# Patient Record
Sex: MALE | Race: WHITE | NOT HISPANIC OR LATINO | Employment: OTHER | URBAN - METROPOLITAN AREA
[De-identification: names, ages, dates, MRNs, and addresses within clinical notes are randomized per-mention and may not be internally consistent; named-entity substitution may affect disease eponyms.]

---

## 2017-01-03 ENCOUNTER — TELEPHONE (OUTPATIENT)
Dept: FAMILY MEDICINE CLINIC | Facility: CLINIC | Age: 72
End: 2017-01-03

## 2017-01-03 DIAGNOSIS — E87.5 HYPERKALEMIA: Primary | ICD-10-CM

## 2017-01-03 NOTE — TELEPHONE ENCOUNTER
----- Message from Denzel Fuller Jr., MD sent at 1/3/2017  4:05 PM EST -----  Contact: TELE: [phone]   Result note from 12/29 given.  Please contact patient make sure he got this  ----- Message -----     From: Kaci Smith MA     Sent: 1/3/2017   1:40 PM       To: Denzel Fuller Jr., MD        ----- Message -----     From: Jodi Palafox     Sent: 1/3/2017   1:06 PM       To: Myra Moore MA    HE NEEDS HIS BLOOD WORK RESULTS. HE HASN'T HEARD ANYTHING.

## 2017-01-07 ENCOUNTER — LAB (OUTPATIENT)
Dept: FAMILY MEDICINE CLINIC | Facility: CLINIC | Age: 72
End: 2017-01-07

## 2017-01-07 DIAGNOSIS — E87.5 HYPERKALEMIA: ICD-10-CM

## 2017-01-07 LAB
ANION GAP SERPL CALCULATED.3IONS-SCNC: 12.7 MMOL/L
BUN BLD-MCNC: 11 MG/DL (ref 8–23)
BUN/CREAT SERPL: 11.5 (ref 7–25)
CALCIUM SPEC-SCNC: 9.6 MG/DL (ref 8.6–10.5)
CHLORIDE SERPL-SCNC: 98 MMOL/L (ref 98–107)
CO2 SERPL-SCNC: 27.3 MMOL/L (ref 22–29)
CREAT BLD-MCNC: 0.96 MG/DL (ref 0.76–1.27)
GFR SERPL CREATININE-BSD FRML MDRD: 77 ML/MIN/1.73
GLUCOSE BLD-MCNC: 148 MG/DL (ref 65–99)
POTASSIUM BLD-SCNC: 5 MMOL/L (ref 3.5–5.2)
SODIUM BLD-SCNC: 138 MMOL/L (ref 136–145)

## 2017-01-07 PROCEDURE — 80048 BASIC METABOLIC PNL TOTAL CA: CPT | Performed by: INTERNAL MEDICINE

## 2017-02-27 RX ORDER — ATORVASTATIN CALCIUM 20 MG/1
TABLET, FILM COATED ORAL
Qty: 30 TABLET | Refills: 0 | Status: SHIPPED | OUTPATIENT
Start: 2017-02-27 | End: 2017-05-22 | Stop reason: SDUPTHER

## 2017-04-20 RX ORDER — GLIMEPIRIDE 2 MG/1
TABLET ORAL
Qty: 30 TABLET | Refills: 0 | Status: SHIPPED | OUTPATIENT
Start: 2017-04-20 | End: 2017-05-22 | Stop reason: SDUPTHER

## 2017-04-21 ENCOUNTER — TELEPHONE (OUTPATIENT)
Dept: FAMILY MEDICINE CLINIC | Facility: CLINIC | Age: 72
End: 2017-04-21

## 2017-04-21 NOTE — TELEPHONE ENCOUNTER
----- Message from Myra Moore MA sent at 4/21/2017  2:29 PM EDT -----  SPOKE WITH WIFE, SHE STATES SHE THINKS PT IS JUST HAVING ANXIETY BUT SHE DOESN'T KNOW IT COULD BE A HEART ATTACK. INFORMED DR FRANCOIS HAS NOTHING AVAILABLE TODAY HE NEEDS TO GO TO ER. SHE SAID HE WILL NOT GO HE WILL NOT SEE ANYONE ELSE. TOLD HER IF IT WAS THAT SEVERE THEN SHE SHOULD CALL EMS. SHE SAID HE WOULDN'T LET THAT HAPPEN. SHE ASKED ABOUT APPT FOR NEXT WEEK, OFFERED HER Monday SHE STATES SHE CAN'T BRING HIM Monday SHE HAS PLANS SO I GAVE HER APPT FOR TUES. SHE THEN SAID OK I WILL BRING HIM IF HE ISN'T DEAD. INFORMED HER AGAIN WE RECOMMEND ER. SHE SAID THAT ISN'T A RECOMMENDATION AND I WAS FRUSTRATING HER SO SHE HAD TO GET OFF OF THE PHONE. I ONCE AGAIN TOLD HER TO TAKE HIM TO ER OR CALL EMS.
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
## 2017-04-25 ENCOUNTER — OFFICE VISIT (OUTPATIENT)
Dept: FAMILY MEDICINE CLINIC | Facility: CLINIC | Age: 72
End: 2017-04-25

## 2017-04-25 VITALS
TEMPERATURE: 98.7 F | HEART RATE: 100 BPM | BODY MASS INDEX: 26.72 KG/M2 | DIASTOLIC BLOOD PRESSURE: 78 MMHG | HEIGHT: 69 IN | SYSTOLIC BLOOD PRESSURE: 140 MMHG | WEIGHT: 180.4 LBS | OXYGEN SATURATION: 98 %

## 2017-04-25 DIAGNOSIS — G47.09 OTHER INSOMNIA: ICD-10-CM

## 2017-04-25 DIAGNOSIS — R07.89 OTHER CHEST PAIN: Primary | ICD-10-CM

## 2017-04-25 DIAGNOSIS — F41.9 ANXIETY: ICD-10-CM

## 2017-04-25 DIAGNOSIS — R06.00 DYSPNEA, UNSPECIFIED TYPE: ICD-10-CM

## 2017-04-25 PROCEDURE — 71020 XR CHEST 2 VW: CPT | Performed by: INTERNAL MEDICINE

## 2017-04-25 PROCEDURE — 99214 OFFICE O/P EST MOD 30 MIN: CPT | Performed by: INTERNAL MEDICINE

## 2017-04-25 PROCEDURE — 93000 ELECTROCARDIOGRAM COMPLETE: CPT | Performed by: INTERNAL MEDICINE

## 2017-04-25 RX ORDER — NITROGLYCERIN 0.4 MG/1
0.4 TABLET SUBLINGUAL
Qty: 30 TABLET | Refills: 5 | Status: SHIPPED | OUTPATIENT
Start: 2017-04-25 | End: 2021-03-18 | Stop reason: SDUPTHER

## 2017-04-25 RX ORDER — BUSPIRONE HYDROCHLORIDE 5 MG/1
5 TABLET ORAL 2 TIMES DAILY
Qty: 60 TABLET | Refills: 5 | Status: SHIPPED | OUTPATIENT
Start: 2017-04-25 | End: 2017-04-25

## 2017-04-25 RX ORDER — MULTIVIT WITH MINERALS/LUTEIN
1200 TABLET ORAL NIGHTLY
COMMUNITY
End: 2021-04-11 | Stop reason: ALTCHOICE

## 2017-04-25 RX ORDER — ESOMEPRAZOLE MAGNESIUM 40 MG/1
40 CAPSULE, DELAYED RELEASE ORAL
COMMUNITY
End: 2018-10-03

## 2017-04-25 RX ORDER — LORAZEPAM 0.5 MG/1
0.5 TABLET ORAL EVERY 8 HOURS PRN
Qty: 21 TABLET | Refills: 0 | OUTPATIENT
Start: 2017-04-25 | End: 2017-04-25

## 2017-04-25 RX ORDER — UBIDECARENONE 200 MG
200 CAPSULE ORAL NIGHTLY
COMMUNITY

## 2017-04-25 RX ORDER — ISOSORBIDE MONONITRATE 30 MG/1
30 TABLET, EXTENDED RELEASE ORAL DAILY
Qty: 30 TABLET | Refills: 0 | Status: SHIPPED | OUTPATIENT
Start: 2017-04-25 | End: 2017-04-25
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
## 2017-04-26 ENCOUNTER — OFFICE VISIT (OUTPATIENT)
Dept: CARDIOLOGY | Facility: CLINIC | Age: 72
End: 2017-04-26

## 2017-04-26 VITALS
SYSTOLIC BLOOD PRESSURE: 140 MMHG | BODY MASS INDEX: 26.36 KG/M2 | WEIGHT: 178 LBS | HEIGHT: 69 IN | HEART RATE: 67 BPM | DIASTOLIC BLOOD PRESSURE: 80 MMHG

## 2017-04-26 DIAGNOSIS — R07.2 PRECORDIAL PAIN: Primary | ICD-10-CM

## 2017-04-26 DIAGNOSIS — I73.9 PVD (PERIPHERAL VASCULAR DISEASE) (HCC): ICD-10-CM

## 2017-04-26 DIAGNOSIS — E78.5 HYPERLIPIDEMIA, UNSPECIFIED HYPERLIPIDEMIA TYPE: ICD-10-CM

## 2017-04-26 PROCEDURE — 99204 OFFICE O/P NEW MOD 45 MIN: CPT | Performed by: INTERNAL MEDICINE
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
## 2017-05-09 ENCOUNTER — HOSPITAL ENCOUNTER (OUTPATIENT)
Dept: CARDIOLOGY | Facility: HOSPITAL | Age: 72
Discharge: HOME | End: 2017-05-09
Attending: INTERNAL MEDICINE | Admitting: INTERNAL MEDICINE

## 2017-05-09 DIAGNOSIS — R07.2 PRECORDIAL PAIN: ICD-10-CM

## 2017-05-09 LAB
BH CV STRESS BP STAGE 1: NORMAL
BH CV STRESS COMMENTS STAGE 1: NORMAL
BH CV STRESS DOSE REGADENOSON STAGE 1: 0.4
BH CV STRESS DURATION MIN STAGE 1: 0
BH CV STRESS DURATION SEC STAGE 1: 15
BH CV STRESS HR STAGE 1: 118
BH CV STRESS PROTOCOL 1: NORMAL
BH CV STRESS RECOVERY BP: NORMAL MMHG
BH CV STRESS RECOVERY HR: 78 BPM
BH CV STRESS STAGE 1: 1
LV EF NUC BP: 48 %
MAXIMAL PREDICTED HEART RATE: 150 BPM
PERCENT MAX PREDICTED HR: 78.67 %
STRESS BASELINE BP: NORMAL MMHG
STRESS BASELINE HR: 71 BPM
STRESS PERCENT HR: 93 %
STRESS POST EXERCISE DUR SEC: 15 SEC
STRESS POST PEAK BP: NORMAL MMHG
STRESS POST PEAK HR: 118 BPM
STRESS TARGET HR: 128 BPM

## 2017-05-09 PROCEDURE — 93017 CV STRESS TEST TRACING ONLY: CPT

## 2017-05-09 PROCEDURE — 93018 CV STRESS TEST I&R ONLY: CPT | Performed by: INTERNAL MEDICINE

## 2017-05-09 PROCEDURE — 78452 HT MUSCLE IMAGE SPECT MULT: CPT | Performed by: INTERNAL MEDICINE

## 2017-05-09 PROCEDURE — 0 TECHNETIUM TETROFOSMIN KIT: Performed by: INTERNAL MEDICINE

## 2017-05-09 PROCEDURE — 25010000002 REGADENOSON 0.4 MG/5ML SOLUTION: Performed by: INTERNAL MEDICINE

## 2017-05-09 PROCEDURE — 93016 CV STRESS TEST SUPVJ ONLY: CPT | Performed by: INTERNAL MEDICINE

## 2017-05-09 PROCEDURE — A9502 TC99M TETROFOSMIN: HCPCS | Performed by: INTERNAL MEDICINE

## 2017-05-09 PROCEDURE — 78452 HT MUSCLE IMAGE SPECT MULT: CPT

## 2017-05-09 RX ADMIN — TETROFOSMIN 1 DOSE: 1.38 INJECTION, POWDER, LYOPHILIZED, FOR SOLUTION INTRAVENOUS at 13:00

## 2017-05-09 RX ADMIN — REGADENOSON 0.4 MG: 0.08 INJECTION, SOLUTION INTRAVENOUS at 14:00

## 2017-05-09 RX ADMIN — TETROFOSMIN 1 DOSE: 1.38 INJECTION, POWDER, LYOPHILIZED, FOR SOLUTION INTRAVENOUS at 14:00

## 2017-05-22 ENCOUNTER — OFFICE VISIT (OUTPATIENT)
Dept: CARDIOLOGY | Facility: CLINIC | Age: 72
End: 2017-05-22

## 2017-05-22 VITALS
BODY MASS INDEX: 26.66 KG/M2 | WEIGHT: 180 LBS | HEART RATE: 64 BPM | SYSTOLIC BLOOD PRESSURE: 160 MMHG | HEIGHT: 69 IN | DIASTOLIC BLOOD PRESSURE: 76 MMHG

## 2017-05-22 DIAGNOSIS — R94.39 ABNORMAL CARDIOVASCULAR STRESS TEST: Primary | ICD-10-CM

## 2017-05-22 DIAGNOSIS — R07.2 PRECORDIAL PAIN: ICD-10-CM

## 2017-05-22 DIAGNOSIS — E78.2 MIXED HYPERLIPIDEMIA: ICD-10-CM

## 2017-05-22 DIAGNOSIS — I21.4 NON-ST ELEVATION (NSTEMI) MYOCARDIAL INFARCTION (HCC): ICD-10-CM

## 2017-05-22 DIAGNOSIS — I73.9 PERIPHERAL ARTERIAL DISEASE (HCC): ICD-10-CM

## 2017-05-22 PROCEDURE — 99213 OFFICE O/P EST LOW 20 MIN: CPT | Performed by: INTERNAL MEDICINE

## 2017-05-22 RX ORDER — ISOSORBIDE MONONITRATE 30 MG/1
TABLET, EXTENDED RELEASE ORAL
Qty: 30 TABLET | Refills: 0 | Status: SHIPPED | OUTPATIENT
Start: 2017-05-22 | End: 2017-06-20 | Stop reason: SDUPTHER

## 2017-05-22 RX ORDER — BUSPIRONE HYDROCHLORIDE 5 MG/1
5 TABLET ORAL
COMMUNITY
End: 2017-11-03 | Stop reason: SDUPTHER

## 2017-05-22 RX ORDER — GLIMEPIRIDE 2 MG/1
TABLET ORAL
Qty: 30 TABLET | Refills: 0 | Status: SHIPPED | OUTPATIENT
Start: 2017-05-22 | End: 2017-06-20 | Stop reason: SDUPTHER

## 2017-05-22 RX ORDER — ATORVASTATIN CALCIUM 20 MG/1
TABLET, FILM COATED ORAL
Qty: 30 TABLET | Refills: 0 | Status: SHIPPED | OUTPATIENT
Start: 2017-05-22 | End: 2017-06-20 | Stop reason: SDUPTHER

## 2017-06-05 RX ORDER — ATORVASTATIN CALCIUM 10 MG/1
TABLET, FILM COATED ORAL
Qty: 30 TABLET | Refills: 3 | Status: SHIPPED | OUTPATIENT
Start: 2017-06-05 | End: 2017-09-28 | Stop reason: SDUPTHER

## 2017-06-20 RX ORDER — ISOSORBIDE MONONITRATE 30 MG/1
TABLET, EXTENDED RELEASE ORAL
Qty: 30 TABLET | Refills: 0 | Status: SHIPPED | OUTPATIENT
Start: 2017-06-20 | End: 2017-07-10 | Stop reason: SDUPTHER

## 2017-06-20 RX ORDER — GLIMEPIRIDE 2 MG/1
TABLET ORAL
Qty: 30 TABLET | Refills: 0 | Status: SHIPPED | OUTPATIENT
Start: 2017-06-20 | End: 2017-07-10 | Stop reason: SDUPTHER

## 2017-06-20 RX ORDER — ATORVASTATIN CALCIUM 20 MG/1
TABLET, FILM COATED ORAL
Qty: 30 TABLET | Refills: 0 | Status: SHIPPED | OUTPATIENT
Start: 2017-06-20 | End: 2017-07-10 | Stop reason: SDUPTHER

## 2017-07-10 RX ORDER — GLIMEPIRIDE 2 MG/1
TABLET ORAL
Qty: 30 TABLET | Refills: 0 | Status: SHIPPED | OUTPATIENT
Start: 2017-07-10 | End: 2017-11-03 | Stop reason: SDUPTHER

## 2017-07-10 RX ORDER — ISOSORBIDE MONONITRATE 30 MG/1
TABLET, EXTENDED RELEASE ORAL
Qty: 30 TABLET | Refills: 0 | Status: ON HOLD | OUTPATIENT
Start: 2017-07-10 | End: 2018-05-07 | Stop reason: SDUPTHER

## 2017-07-10 RX ORDER — ATORVASTATIN CALCIUM 20 MG/1
TABLET, FILM COATED ORAL
Qty: 30 TABLET | Refills: 0 | Status: SHIPPED | OUTPATIENT
Start: 2017-07-10 | End: 2017-11-06 | Stop reason: DRUGHIGH

## 2017-07-17 RX ORDER — GLIMEPIRIDE 2 MG/1
TABLET ORAL
Qty: 30 TABLET | Refills: 0 | Status: SHIPPED | OUTPATIENT
Start: 2017-07-17 | End: 2017-09-05 | Stop reason: SDUPTHER

## 2017-07-17 RX ORDER — ISOSORBIDE MONONITRATE 30 MG/1
TABLET, EXTENDED RELEASE ORAL
Qty: 30 TABLET | Refills: 0 | Status: SHIPPED | OUTPATIENT
Start: 2017-07-17 | End: 2017-09-05 | Stop reason: SDUPTHER

## 2017-07-17 RX ORDER — ATORVASTATIN CALCIUM 20 MG/1
TABLET, FILM COATED ORAL
Qty: 30 TABLET | Refills: 0 | Status: SHIPPED | OUTPATIENT
Start: 2017-07-17 | End: 2017-09-05 | Stop reason: SDUPTHER

## 2017-09-05 RX ORDER — GLIMEPIRIDE 2 MG/1
TABLET ORAL
Qty: 30 TABLET | Refills: 0 | Status: SHIPPED | OUTPATIENT
Start: 2017-09-05 | End: 2017-10-09 | Stop reason: SDUPTHER

## 2017-09-05 RX ORDER — ATORVASTATIN CALCIUM 20 MG/1
TABLET, FILM COATED ORAL
Qty: 30 TABLET | Refills: 0 | Status: SHIPPED | OUTPATIENT
Start: 2017-09-05 | End: 2017-11-03 | Stop reason: SDUPTHER

## 2017-09-05 RX ORDER — ISOSORBIDE MONONITRATE 30 MG/1
TABLET, EXTENDED RELEASE ORAL
Qty: 30 TABLET | Refills: 0 | Status: SHIPPED | OUTPATIENT
Start: 2017-09-05 | End: 2017-11-03 | Stop reason: SDUPTHER

## 2017-09-11 RX ORDER — ATORVASTATIN CALCIUM 20 MG/1
TABLET, FILM COATED ORAL
Qty: 30 TABLET | Refills: 0 | Status: SHIPPED | OUTPATIENT
Start: 2017-09-11 | End: 2017-11-03 | Stop reason: SDUPTHER

## 2017-09-11 RX ORDER — ISOSORBIDE MONONITRATE 30 MG/1
TABLET, EXTENDED RELEASE ORAL
Qty: 30 TABLET | Refills: 0 | Status: SHIPPED | OUTPATIENT
Start: 2017-09-11 | End: 2017-11-03 | Stop reason: SDUPTHER

## 2017-09-28 RX ORDER — ATORVASTATIN CALCIUM 10 MG/1
TABLET, FILM COATED ORAL
Qty: 30 TABLET | Refills: 2 | Status: SHIPPED | OUTPATIENT
Start: 2017-09-28 | End: 2017-10-30 | Stop reason: SDUPTHER

## 2017-10-09 RX ORDER — GLIMEPIRIDE 2 MG/1
TABLET ORAL
Qty: 30 TABLET | Refills: 0 | Status: SHIPPED | OUTPATIENT
Start: 2017-10-09 | End: 2017-11-08 | Stop reason: SDUPTHER

## 2017-10-09 RX ORDER — BUSPIRONE HYDROCHLORIDE 5 MG/1
TABLET ORAL
Qty: 60 TABLET | Refills: 5 | Status: ON HOLD | OUTPATIENT
Start: 2017-10-09 | End: 2018-05-04 | Stop reason: SDUPTHER

## 2017-10-30 RX ORDER — ATORVASTATIN CALCIUM 10 MG/1
TABLET, FILM COATED ORAL
Qty: 90 TABLET | Refills: 1 | Status: SHIPPED | OUTPATIENT
Start: 2017-10-30 | End: 2017-11-03 | Stop reason: SDUPTHER

## 2017-11-03 ENCOUNTER — OFFICE VISIT (OUTPATIENT)
Dept: FAMILY MEDICINE CLINIC | Facility: CLINIC | Age: 72
End: 2017-11-03

## 2017-11-03 VITALS
DIASTOLIC BLOOD PRESSURE: 84 MMHG | TEMPERATURE: 98.7 F | HEART RATE: 86 BPM | BODY MASS INDEX: 26.72 KG/M2 | HEIGHT: 69 IN | WEIGHT: 180.4 LBS | SYSTOLIC BLOOD PRESSURE: 132 MMHG | OXYGEN SATURATION: 98 %

## 2017-11-03 DIAGNOSIS — E78.49 OTHER HYPERLIPIDEMIA: ICD-10-CM

## 2017-11-03 DIAGNOSIS — R26.89 LOSS OF BALANCE: Primary | ICD-10-CM

## 2017-11-03 DIAGNOSIS — E11.9 DIABETES MELLITUS WITHOUT COMPLICATION (HCC): ICD-10-CM

## 2017-11-03 LAB
ALBUMIN SERPL-MCNC: 4.2 G/DL (ref 3.5–5.2)
ALBUMIN UR-MCNC: 20 MG/L (ref 0–20)
ALBUMIN/GLOB SERPL: 1.3 G/DL
ALP SERPL-CCNC: 106 U/L (ref 39–117)
ALT SERPL W P-5'-P-CCNC: 23 U/L (ref 1–41)
ANION GAP SERPL CALCULATED.3IONS-SCNC: 13.4 MMOL/L
AST SERPL-CCNC: 13 U/L (ref 1–40)
BASOPHILS # BLD AUTO: 0.04 10*3/MM3 (ref 0–0.2)
BASOPHILS NFR BLD AUTO: 0.4 % (ref 0–1.5)
BILIRUB SERPL-MCNC: 0.4 MG/DL (ref 0.1–1.2)
BUN BLD-MCNC: 12 MG/DL (ref 8–23)
BUN/CREAT SERPL: 12 (ref 7–25)
CALCIUM SPEC-SCNC: 9 MG/DL (ref 8.6–10.5)
CHLORIDE SERPL-SCNC: 98 MMOL/L (ref 98–107)
CHOLEST SERPL-MCNC: 145 MG/DL (ref 0–200)
CO2 SERPL-SCNC: 25.6 MMOL/L (ref 22–29)
CREAT BLD-MCNC: 1 MG/DL (ref 0.76–1.27)
DEPRECATED RDW RBC AUTO: 45.1 FL (ref 37–54)
EOSINOPHIL # BLD AUTO: 0.28 10*3/MM3 (ref 0–0.7)
EOSINOPHIL NFR BLD AUTO: 3.1 % (ref 0.3–6.2)
ERYTHROCYTE [DISTWIDTH] IN BLOOD BY AUTOMATED COUNT: 13.6 % (ref 11.5–14.5)
GFR SERPL CREATININE-BSD FRML MDRD: 74 ML/MIN/1.73
GLOBULIN UR ELPH-MCNC: 3.3 GM/DL
GLUCOSE BLD-MCNC: 181 MG/DL (ref 65–99)
HBA1C MFR BLD: 7.8 % (ref 4.8–5.6)
HCT VFR BLD AUTO: 41 % (ref 40.4–52.2)
HDLC SERPL-MCNC: 40 MG/DL (ref 40–60)
HGB BLD-MCNC: 13.9 G/DL (ref 13.7–17.6)
IMM GRANULOCYTES # BLD: 0.03 10*3/MM3 (ref 0–0.03)
IMM GRANULOCYTES NFR BLD: 0.3 % (ref 0–0.5)
LDLC SERPL CALC-MCNC: 77 MG/DL (ref 0–100)
LDLC/HDLC SERPL: 1.93 {RATIO}
LYMPHOCYTES # BLD AUTO: 2.54 10*3/MM3 (ref 0.9–4.8)
LYMPHOCYTES NFR BLD AUTO: 28.3 % (ref 19.6–45.3)
MCH RBC QN AUTO: 30.7 PG (ref 27–32.7)
MCHC RBC AUTO-ENTMCNC: 33.9 G/DL (ref 32.6–36.4)
MCV RBC AUTO: 90.5 FL (ref 79.8–96.2)
MONOCYTES # BLD AUTO: 0.62 10*3/MM3 (ref 0.2–1.2)
MONOCYTES NFR BLD AUTO: 6.9 % (ref 5–12)
NEUTROPHILS # BLD AUTO: 5.48 10*3/MM3 (ref 1.9–8.1)
NEUTROPHILS NFR BLD AUTO: 61 % (ref 42.7–76)
PLATELET # BLD AUTO: 248 10*3/MM3 (ref 140–500)
PMV BLD AUTO: 9.6 FL (ref 6–12)
POTASSIUM BLD-SCNC: 4.4 MMOL/L (ref 3.5–5.2)
PROT SERPL-MCNC: 7.5 G/DL (ref 6–8.5)
RBC # BLD AUTO: 4.53 10*6/MM3 (ref 4.6–6)
SODIUM BLD-SCNC: 137 MMOL/L (ref 136–145)
TRIGL SERPL-MCNC: 140 MG/DL (ref 0–150)
VLDLC SERPL-MCNC: 28 MG/DL (ref 5–40)
WBC NRBC COR # BLD: 8.99 10*3/MM3 (ref 4.5–10.7)

## 2017-11-03 PROCEDURE — 36415 COLL VENOUS BLD VENIPUNCTURE: CPT | Performed by: INTERNAL MEDICINE

## 2017-11-03 PROCEDURE — 83036 HEMOGLOBIN GLYCOSYLATED A1C: CPT | Performed by: INTERNAL MEDICINE

## 2017-11-03 PROCEDURE — 85025 COMPLETE CBC W/AUTO DIFF WBC: CPT | Performed by: INTERNAL MEDICINE

## 2017-11-03 PROCEDURE — 99214 OFFICE O/P EST MOD 30 MIN: CPT | Performed by: INTERNAL MEDICINE

## 2017-11-03 PROCEDURE — 82043 UR ALBUMIN QUANTITATIVE: CPT | Performed by: INTERNAL MEDICINE

## 2017-11-03 PROCEDURE — 80053 COMPREHEN METABOLIC PANEL: CPT | Performed by: INTERNAL MEDICINE

## 2017-11-03 PROCEDURE — 80061 LIPID PANEL: CPT | Performed by: INTERNAL MEDICINE

## 2017-11-06 DIAGNOSIS — E78.5 HYPERLIPIDEMIA, UNSPECIFIED HYPERLIPIDEMIA TYPE: Primary | ICD-10-CM

## 2017-11-06 RX ORDER — ATORVASTATIN CALCIUM 40 MG/1
40 TABLET, FILM COATED ORAL DAILY
Qty: 30 TABLET | Refills: 1 | Status: SHIPPED | OUTPATIENT
Start: 2017-11-06 | End: 2018-01-08 | Stop reason: SDUPTHER

## 2017-11-08 RX ORDER — GLIMEPIRIDE 2 MG/1
TABLET ORAL
Qty: 90 TABLET | Refills: 1 | Status: ON HOLD | OUTPATIENT
Start: 2017-11-08 | End: 2018-05-04 | Stop reason: SDUPTHER

## 2017-11-08 RX ORDER — ISOSORBIDE MONONITRATE 30 MG/1
TABLET, EXTENDED RELEASE ORAL
Qty: 90 TABLET | Refills: 1 | Status: ON HOLD | OUTPATIENT
Start: 2017-11-08 | End: 2018-05-04 | Stop reason: SDUPTHER

## 2017-11-28 NOTE — PROGRESS NOTES
Subjective   Erick Bruno is a 72 y.o. male.   Progressive loss of balance, several falls  History of Present Illness   This is been building for a while patient has diabetic neuropathy of feet 3 cannot since well is a cannot stand straight if no dark area or if his eyes are closed.  No noticed dizziness turning said side sciatica be been since his down he will get slight dizziness goes from sitting to standing he complains of slight dizziness also.  No chest pain palpitations no known carotid stenosis patient does have known PAD is doing fairly well in that respect.  Diabetes is been doing fairly well does tend to have cervical dietary indiscretions.  Is compliant with his statins get updated lipids levels on him no headache complaints    Review of Systems   HENT: Positive for sinus pressure and sneezing.    Neurological: Positive for dizziness.   All other systems reviewed and are negative.      Objective   Vitals:    11/03/17 0810   BP: 124/80   Pulse: 74   Temp: 98.7 °F (37.1 °C)   SpO2: 98%   Weight: 180 lb 6.4 oz (81.8 kg)     Physical Exam   Constitutional: He appears well-developed and well-nourished.   HENT:   Head: Normocephalic and atraumatic.   Right Ear: External ear normal.   Left Ear: External ear normal.   Mouth/Throat: Oropharynx is clear and moist.   Eyes:   EOMs within normal limits   Neck:   No carotid bruits   Cardiovascular: Normal rate, regular rhythm and normal heart sounds.    Pulmonary/Chest: Effort normal and breath sounds normal.   Abdominal: Soft. Bowel sounds are normal.   Musculoskeletal:   Patient does use a quad cane to walk but appears do fairly well in office setting   Neurological: He is alert.   Positive Romberg   Skin: Skin is warm and dry.   Nursing note and vitals reviewed.      Lab Results   Component Value Date    INR 0.93 04/15/2016       Procedures    Assessment/Plan 1.  Loss of balance plan refer Dr. Mcfadden await pending lab    2.  Type 2 diabetes we'll get updated values  hemoglobin A1c    3.  Hyperlipidemia plan await lab if satisfactory recheck in 6 months time    Patient has known coronary disease takes isosorbide no complaints cardiac-wise.    Much of this encounter note is an electronic transcription/translation of spoken language to printed text.  The electronic translation of spoken language may permit erroneous, or at times, nonsensical words or phrases to be inadvertently transcribed.  Although I have reviewed the note for such errors, some may still exist.          None

## 2018-01-08 RX ORDER — ATORVASTATIN CALCIUM 40 MG/1
TABLET, FILM COATED ORAL
Qty: 30 TABLET | Refills: 0 | Status: SHIPPED | OUTPATIENT
Start: 2018-01-08 | End: 2018-02-04 | Stop reason: SDUPTHER

## 2018-02-05 RX ORDER — ATORVASTATIN CALCIUM 40 MG/1
TABLET, FILM COATED ORAL
Qty: 30 TABLET | Refills: 0 | Status: SHIPPED | OUTPATIENT
Start: 2018-02-05 | End: 2018-10-03 | Stop reason: SDUPTHER

## 2018-03-06 RX ORDER — ATORVASTATIN CALCIUM 40 MG/1
TABLET, FILM COATED ORAL
Qty: 30 TABLET | Refills: 0 | Status: ON HOLD | OUTPATIENT
Start: 2018-03-06 | End: 2018-05-07 | Stop reason: SDUPTHER

## 2018-05-06 ENCOUNTER — APPOINTMENT (OUTPATIENT)
Dept: CT IMAGING | Facility: HOSPITAL | Age: 73
End: 2018-05-06

## 2018-05-06 ENCOUNTER — HOSPITAL ENCOUNTER (INPATIENT)
Facility: HOSPITAL | Age: 73
LOS: 4 days | Discharge: HOME | End: 2018-05-10
Attending: EMERGENCY MEDICINE | Admitting: SURGERY

## 2018-05-06 DIAGNOSIS — K85.90 ACUTE PANCREATITIS, UNSPECIFIED COMPLICATION STATUS, UNSPECIFIED PANCREATITIS TYPE: ICD-10-CM

## 2018-05-06 DIAGNOSIS — Z86.010 HX OF COLONIC POLYPS: ICD-10-CM

## 2018-05-06 DIAGNOSIS — K80.20 GALLSTONES: Primary | ICD-10-CM

## 2018-05-06 DIAGNOSIS — R10.13 EPIGASTRIC PAIN: ICD-10-CM

## 2018-05-06 LAB
ALBUMIN SERPL-MCNC: 4.3 G/DL (ref 3.5–5.2)
ALBUMIN/GLOB SERPL: 1.2 G/DL
ALP SERPL-CCNC: 122 U/L (ref 39–117)
ALT SERPL W P-5'-P-CCNC: 24 U/L (ref 1–41)
AMYLASE SERPL-CCNC: 69 U/L (ref 28–100)
ANION GAP SERPL CALCULATED.3IONS-SCNC: 18.1 MMOL/L
AST SERPL-CCNC: 14 U/L (ref 1–40)
BACTERIA UR QL AUTO: NORMAL /HPF
BASOPHILS # BLD AUTO: 0.04 10*3/MM3 (ref 0–0.2)
BASOPHILS NFR BLD AUTO: 0.3 % (ref 0–1.5)
BILIRUB SERPL-MCNC: 0.4 MG/DL (ref 0.1–1.2)
BILIRUB UR QL STRIP: NEGATIVE
BUN BLD-MCNC: 17 MG/DL (ref 8–23)
BUN/CREAT SERPL: 17.3 (ref 7–25)
CALCIUM SPEC-SCNC: 9.5 MG/DL (ref 8.6–10.5)
CHLORIDE SERPL-SCNC: 90 MMOL/L (ref 98–107)
CHOLEST SERPL-MCNC: 179 MG/DL (ref 0–200)
CLARITY UR: CLEAR
CO2 SERPL-SCNC: 22.9 MMOL/L (ref 22–29)
COLOR UR: YELLOW
CREAT BLD-MCNC: 0.98 MG/DL (ref 0.76–1.27)
DEPRECATED RDW RBC AUTO: 44.7 FL (ref 37–54)
EOSINOPHIL # BLD AUTO: 0.2 10*3/MM3 (ref 0–0.7)
EOSINOPHIL NFR BLD AUTO: 1.4 % (ref 0.3–6.2)
ERYTHROCYTE [DISTWIDTH] IN BLOOD BY AUTOMATED COUNT: 13.5 % (ref 11.5–14.5)
GFR SERPL CREATININE-BSD FRML MDRD: 75 ML/MIN/1.73
GLOBULIN UR ELPH-MCNC: 3.7 GM/DL
GLUCOSE BLD-MCNC: 284 MG/DL (ref 65–99)
GLUCOSE BLDC GLUCOMTR-MCNC: 223 MG/DL (ref 70–130)
GLUCOSE UR STRIP-MCNC: ABNORMAL MG/DL
HCT VFR BLD AUTO: 44.1 % (ref 40.4–52.2)
HDLC SERPL-MCNC: 48 MG/DL (ref 40–60)
HGB BLD-MCNC: 14.6 G/DL (ref 13.7–17.6)
HGB UR QL STRIP.AUTO: NEGATIVE
HOLD SPECIMEN: NORMAL
HOLD SPECIMEN: NORMAL
HYALINE CASTS UR QL AUTO: NORMAL /LPF
IMM GRANULOCYTES # BLD: 0.05 10*3/MM3 (ref 0–0.03)
IMM GRANULOCYTES NFR BLD: 0.3 % (ref 0–0.5)
KETONES UR QL STRIP: NEGATIVE
LDLC SERPL CALC-MCNC: 105 MG/DL (ref 0–100)
LDLC/HDLC SERPL: 2.19 {RATIO}
LEUKOCYTE ESTERASE UR QL STRIP.AUTO: NEGATIVE
LIPASE SERPL-CCNC: 93 U/L (ref 13–60)
LYMPHOCYTES # BLD AUTO: 2.01 10*3/MM3 (ref 0.9–4.8)
LYMPHOCYTES NFR BLD AUTO: 13.8 % (ref 19.6–45.3)
MCH RBC QN AUTO: 30.1 PG (ref 27–32.7)
MCHC RBC AUTO-ENTMCNC: 33.1 G/DL (ref 32.6–36.4)
MCV RBC AUTO: 90.9 FL (ref 79.8–96.2)
MONOCYTES # BLD AUTO: 0.8 10*3/MM3 (ref 0.2–1.2)
MONOCYTES NFR BLD AUTO: 5.5 % (ref 5–12)
NEUTROPHILS # BLD AUTO: 11.48 10*3/MM3 (ref 1.9–8.1)
NEUTROPHILS NFR BLD AUTO: 78.7 % (ref 42.7–76)
NITRITE UR QL STRIP: NEGATIVE
PH UR STRIP.AUTO: 5.5 [PH] (ref 5–8)
PLATELET # BLD AUTO: 257 10*3/MM3 (ref 140–500)
PMV BLD AUTO: 9.3 FL (ref 6–12)
POTASSIUM BLD-SCNC: 4.5 MMOL/L (ref 3.5–5.2)
PROT SERPL-MCNC: 8 G/DL (ref 6–8.5)
PROT UR QL STRIP: ABNORMAL
RBC # BLD AUTO: 4.85 10*6/MM3 (ref 4.6–6)
RBC # UR: NORMAL /HPF
REF LAB TEST METHOD: NORMAL
SODIUM BLD-SCNC: 131 MMOL/L (ref 136–145)
SP GR UR STRIP: 1.02 (ref 1–1.03)
SQUAMOUS #/AREA URNS HPF: NORMAL /HPF
TRIGL SERPL-MCNC: 130 MG/DL (ref 0–150)
UROBILINOGEN UR QL STRIP: ABNORMAL
VLDLC SERPL-MCNC: 26 MG/DL (ref 5–40)
WBC NRBC COR # BLD: 14.58 10*3/MM3 (ref 4.5–10.7)
WBC UR QL AUTO: NORMAL /HPF
WHOLE BLOOD HOLD SPECIMEN: NORMAL
WHOLE BLOOD HOLD SPECIMEN: NORMAL

## 2018-05-06 PROCEDURE — 82150 ASSAY OF AMYLASE: CPT | Performed by: SURGERY

## 2018-05-06 PROCEDURE — 80061 LIPID PANEL: CPT | Performed by: SURGERY

## 2018-05-06 PROCEDURE — 99284 EMERGENCY DEPT VISIT MOD MDM: CPT

## 2018-05-06 PROCEDURE — 81001 URINALYSIS AUTO W/SCOPE: CPT | Performed by: EMERGENCY MEDICINE

## 2018-05-06 PROCEDURE — 82962 GLUCOSE BLOOD TEST: CPT

## 2018-05-06 PROCEDURE — 80053 COMPREHEN METABOLIC PANEL: CPT | Performed by: EMERGENCY MEDICINE

## 2018-05-06 PROCEDURE — 74176 CT ABD & PELVIS W/O CONTRAST: CPT

## 2018-05-06 PROCEDURE — 83690 ASSAY OF LIPASE: CPT | Performed by: EMERGENCY MEDICINE

## 2018-05-06 PROCEDURE — 25010000002 ONDANSETRON PER 1 MG: Performed by: EMERGENCY MEDICINE

## 2018-05-06 PROCEDURE — 85025 COMPLETE CBC W/AUTO DIFF WBC: CPT | Performed by: EMERGENCY MEDICINE

## 2018-05-06 PROCEDURE — 25010000002 HYDROMORPHONE PER 4 MG: Performed by: EMERGENCY MEDICINE

## 2018-05-06 RX ORDER — HYDROMORPHONE HYDROCHLORIDE 1 MG/ML
0.5 INJECTION, SOLUTION INTRAMUSCULAR; INTRAVENOUS; SUBCUTANEOUS
Status: DISCONTINUED | OUTPATIENT
Start: 2018-05-06 | End: 2018-05-10 | Stop reason: HOSPADM

## 2018-05-06 RX ORDER — ISOSORBIDE MONONITRATE 30 MG/1
30 TABLET, EXTENDED RELEASE ORAL DAILY
Status: DISCONTINUED | OUTPATIENT
Start: 2018-05-07 | End: 2018-05-07

## 2018-05-06 RX ORDER — SODIUM CHLORIDE, SODIUM LACTATE, POTASSIUM CHLORIDE, CALCIUM CHLORIDE 600; 310; 30; 20 MG/100ML; MG/100ML; MG/100ML; MG/100ML
100 INJECTION, SOLUTION INTRAVENOUS CONTINUOUS
Status: DISCONTINUED | OUTPATIENT
Start: 2018-05-06 | End: 2018-05-08

## 2018-05-06 RX ORDER — PANTOPRAZOLE SODIUM 40 MG/1
40 TABLET, DELAYED RELEASE ORAL EVERY MORNING
Status: DISCONTINUED | OUTPATIENT
Start: 2018-05-07 | End: 2018-05-10 | Stop reason: HOSPADM

## 2018-05-06 RX ORDER — LORAZEPAM 0.5 MG/1
0.5 TABLET ORAL EVERY 8 HOURS PRN
Status: DISCONTINUED | OUTPATIENT
Start: 2018-05-06 | End: 2018-05-10 | Stop reason: HOSPADM

## 2018-05-06 RX ORDER — ACETAMINOPHEN 325 MG/1
650 TABLET ORAL EVERY 4 HOURS PRN
Status: DISCONTINUED | OUTPATIENT
Start: 2018-05-06 | End: 2018-05-10 | Stop reason: HOSPADM

## 2018-05-06 RX ORDER — NALOXONE HCL 0.4 MG/ML
0.4 VIAL (ML) INJECTION
Status: DISCONTINUED | OUTPATIENT
Start: 2018-05-06 | End: 2018-05-10 | Stop reason: HOSPADM

## 2018-05-06 RX ORDER — SODIUM CHLORIDE 0.9 % (FLUSH) 0.9 %
1-10 SYRINGE (ML) INJECTION AS NEEDED
Status: DISCONTINUED | OUTPATIENT
Start: 2018-05-06 | End: 2018-05-10 | Stop reason: HOSPADM

## 2018-05-06 RX ORDER — BUSPIRONE HYDROCHLORIDE 5 MG/1
5 TABLET ORAL EVERY 12 HOURS SCHEDULED
Status: DISCONTINUED | OUTPATIENT
Start: 2018-05-07 | End: 2018-05-10 | Stop reason: HOSPADM

## 2018-05-06 RX ORDER — SODIUM CHLORIDE 0.9 % (FLUSH) 0.9 %
10 SYRINGE (ML) INJECTION AS NEEDED
Status: DISCONTINUED | OUTPATIENT
Start: 2018-05-06 | End: 2018-05-10 | Stop reason: HOSPADM

## 2018-05-06 RX ORDER — NITROGLYCERIN 0.4 MG/1
0.4 TABLET SUBLINGUAL
Status: DISCONTINUED | OUTPATIENT
Start: 2018-05-06 | End: 2018-05-10 | Stop reason: HOSPADM

## 2018-05-06 RX ORDER — ONDANSETRON 2 MG/ML
4 INJECTION INTRAMUSCULAR; INTRAVENOUS ONCE
Status: COMPLETED | OUTPATIENT
Start: 2018-05-06 | End: 2018-05-06

## 2018-05-06 RX ORDER — HYDROMORPHONE HCL 110MG/55ML
1 PATIENT CONTROLLED ANALGESIA SYRINGE INTRAVENOUS ONCE
Status: COMPLETED | OUTPATIENT
Start: 2018-05-06 | End: 2018-05-06

## 2018-05-06 RX ADMIN — ONDANSETRON 4 MG: 2 INJECTION INTRAMUSCULAR; INTRAVENOUS at 21:09

## 2018-05-06 RX ADMIN — HYDROMORPHONE HYDROCHLORIDE 1 MG: 2 INJECTION INTRAMUSCULAR; INTRAVENOUS; SUBCUTANEOUS at 21:11

## 2018-05-06 RX ADMIN — SODIUM CHLORIDE 1000 ML: 9 INJECTION, SOLUTION INTRAVENOUS at 20:50

## 2018-05-07 ENCOUNTER — APPOINTMENT (OUTPATIENT)
Dept: ULTRASOUND IMAGING | Facility: HOSPITAL | Age: 73
End: 2018-05-07

## 2018-05-07 PROBLEM — R10.13 EPIGASTRIC PAIN: Status: ACTIVE | Noted: 2018-05-06

## 2018-05-07 PROBLEM — Z86.010 HX OF COLONIC POLYPS: Status: ACTIVE | Noted: 2018-05-06

## 2018-05-07 PROBLEM — Z86.0100 HX OF COLONIC POLYPS: Status: ACTIVE | Noted: 2018-05-06

## 2018-05-07 LAB
AMYLASE SERPL-CCNC: 55 U/L (ref 28–100)
ANION GAP SERPL CALCULATED.3IONS-SCNC: 11.7 MMOL/L
BASOPHILS # BLD AUTO: 0.04 10*3/MM3 (ref 0–0.2)
BASOPHILS NFR BLD AUTO: 0.3 % (ref 0–1.5)
BUN BLD-MCNC: 13 MG/DL (ref 8–23)
BUN/CREAT SERPL: 14.9 (ref 7–25)
CALCIUM SPEC-SCNC: 9.2 MG/DL (ref 8.6–10.5)
CHLORIDE SERPL-SCNC: 97 MMOL/L (ref 98–107)
CO2 SERPL-SCNC: 25.3 MMOL/L (ref 22–29)
CREAT BLD-MCNC: 0.87 MG/DL (ref 0.76–1.27)
DEPRECATED RDW RBC AUTO: 44.1 FL (ref 37–54)
EOSINOPHIL # BLD AUTO: 0.21 10*3/MM3 (ref 0–0.7)
EOSINOPHIL NFR BLD AUTO: 1.7 % (ref 0.3–6.2)
ERYTHROCYTE [DISTWIDTH] IN BLOOD BY AUTOMATED COUNT: 13.4 % (ref 11.5–14.5)
GFR SERPL CREATININE-BSD FRML MDRD: 87 ML/MIN/1.73
GLUCOSE BLD-MCNC: 191 MG/DL (ref 65–99)
HCT VFR BLD AUTO: 40.1 % (ref 40.4–52.2)
HGB BLD-MCNC: 13.5 G/DL (ref 13.7–17.6)
IMM GRANULOCYTES # BLD: 0.04 10*3/MM3 (ref 0–0.03)
IMM GRANULOCYTES NFR BLD: 0.3 % (ref 0–0.5)
LIPASE SERPL-CCNC: 67 U/L (ref 13–60)
LYMPHOCYTES # BLD AUTO: 2.01 10*3/MM3 (ref 0.9–4.8)
LYMPHOCYTES NFR BLD AUTO: 16.4 % (ref 19.6–45.3)
MCH RBC QN AUTO: 30.3 PG (ref 27–32.7)
MCHC RBC AUTO-ENTMCNC: 33.7 G/DL (ref 32.6–36.4)
MCV RBC AUTO: 90.1 FL (ref 79.8–96.2)
MONOCYTES # BLD AUTO: 0.94 10*3/MM3 (ref 0.2–1.2)
MONOCYTES NFR BLD AUTO: 7.7 % (ref 5–12)
NEUTROPHILS # BLD AUTO: 9.07 10*3/MM3 (ref 1.9–8.1)
NEUTROPHILS NFR BLD AUTO: 73.9 % (ref 42.7–76)
PLATELET # BLD AUTO: 238 10*3/MM3 (ref 140–500)
PMV BLD AUTO: 8.9 FL (ref 6–12)
POTASSIUM BLD-SCNC: 4.8 MMOL/L (ref 3.5–5.2)
RBC # BLD AUTO: 4.45 10*6/MM3 (ref 4.6–6)
SODIUM BLD-SCNC: 134 MMOL/L (ref 136–145)
WBC NRBC COR # BLD: 12.27 10*3/MM3 (ref 4.5–10.7)

## 2018-05-07 PROCEDURE — 76705 ECHO EXAM OF ABDOMEN: CPT

## 2018-05-07 PROCEDURE — 93010 ELECTROCARDIOGRAM REPORT: CPT | Performed by: INTERNAL MEDICINE

## 2018-05-07 PROCEDURE — 36415 COLL VENOUS BLD VENIPUNCTURE: CPT | Performed by: SURGERY

## 2018-05-07 PROCEDURE — 85025 COMPLETE CBC W/AUTO DIFF WBC: CPT | Performed by: SURGERY

## 2018-05-07 PROCEDURE — 25010000002 ONDANSETRON PER 1 MG: Performed by: SURGERY

## 2018-05-07 PROCEDURE — 80048 BASIC METABOLIC PNL TOTAL CA: CPT | Performed by: SURGERY

## 2018-05-07 PROCEDURE — 82150 ASSAY OF AMYLASE: CPT | Performed by: SURGERY

## 2018-05-07 PROCEDURE — 25010000002 HYDROMORPHONE PER 4 MG: Performed by: SURGERY

## 2018-05-07 PROCEDURE — 25010000002 PROMETHAZINE PER 50 MG: Performed by: SURGERY

## 2018-05-07 PROCEDURE — 93005 ELECTROCARDIOGRAM TRACING: CPT | Performed by: SURGERY

## 2018-05-07 PROCEDURE — 83690 ASSAY OF LIPASE: CPT | Performed by: SURGERY

## 2018-05-07 RX ORDER — ISOSORBIDE MONONITRATE 30 MG/1
TABLET, EXTENDED RELEASE ORAL
Qty: 90 TABLET | Refills: 1 | Status: SHIPPED | OUTPATIENT
Start: 2018-05-07 | End: 2018-05-07 | Stop reason: SDUPTHER

## 2018-05-07 RX ORDER — BUSPIRONE HYDROCHLORIDE 5 MG/1
TABLET ORAL
Qty: 60 TABLET | Refills: 5 | Status: SHIPPED | OUTPATIENT
Start: 2018-05-07 | End: 2018-08-14 | Stop reason: SDUPTHER

## 2018-05-07 RX ORDER — POLYETHYLENE GLYCOL 3350, SODIUM CHLORIDE, POTASSIUM CHLORIDE, SODIUM BICARBONATE, AND SODIUM SULFATE 240; 5.84; 2.98; 6.72; 22.72 G/4L; G/4L; G/4L; G/4L; G/4L
2000 POWDER, FOR SOLUTION ORAL 2 TIMES DAILY
Status: COMPLETED | OUTPATIENT
Start: 2018-05-07 | End: 2018-05-07

## 2018-05-07 RX ORDER — PROMETHAZINE HYDROCHLORIDE 25 MG/ML
12.5 INJECTION, SOLUTION INTRAMUSCULAR; INTRAVENOUS EVERY 4 HOURS PRN
Status: DISCONTINUED | OUTPATIENT
Start: 2018-05-07 | End: 2018-05-10 | Stop reason: HOSPADM

## 2018-05-07 RX ORDER — ONDANSETRON 2 MG/ML
4 INJECTION INTRAMUSCULAR; INTRAVENOUS EVERY 6 HOURS PRN
Status: DISCONTINUED | OUTPATIENT
Start: 2018-05-07 | End: 2018-05-10 | Stop reason: HOSPADM

## 2018-05-07 RX ORDER — ISOSORBIDE MONONITRATE 30 MG/1
30 TABLET, EXTENDED RELEASE ORAL NIGHTLY
COMMUNITY
End: 2018-12-03 | Stop reason: SDUPTHER

## 2018-05-07 RX ORDER — OMEPRAZOLE 20 MG/1
20 CAPSULE, DELAYED RELEASE ORAL DAILY
COMMUNITY
End: 2018-12-03 | Stop reason: SDUPTHER

## 2018-05-07 RX ORDER — ISOSORBIDE MONONITRATE 30 MG/1
30 TABLET, EXTENDED RELEASE ORAL NIGHTLY
Status: DISCONTINUED | OUTPATIENT
Start: 2018-05-07 | End: 2018-05-10 | Stop reason: HOSPADM

## 2018-05-07 RX ORDER — GLIMEPIRIDE 2 MG/1
TABLET ORAL
Qty: 90 TABLET | Refills: 1 | Status: SHIPPED | OUTPATIENT
Start: 2018-05-07 | End: 2018-09-24 | Stop reason: SDUPTHER

## 2018-05-07 RX ADMIN — POLYETHYLENE GLYCOL 3350, SODIUM CHLORIDE, POTASSIUM CHLORIDE, SODIUM BICARBONATE, AND SODIUM SULFATE 2000 ML: 240; 5.84; 2.98; 6.72; 22.72 POWDER, FOR SOLUTION ORAL at 19:13

## 2018-05-07 RX ADMIN — PANTOPRAZOLE SODIUM 40 MG: 40 TABLET, DELAYED RELEASE ORAL at 08:32

## 2018-05-07 RX ADMIN — PROMETHAZINE HYDROCHLORIDE 12.5 MG: 25 INJECTION INTRAMUSCULAR; INTRAVENOUS at 16:26

## 2018-05-07 RX ADMIN — BUSPIRONE HYDROCHLORIDE 5 MG: 5 TABLET ORAL at 21:43

## 2018-05-07 RX ADMIN — HYDROMORPHONE HYDROCHLORIDE 0.5 MG: 1 INJECTION, SOLUTION INTRAMUSCULAR; INTRAVENOUS; SUBCUTANEOUS at 07:21

## 2018-05-07 RX ADMIN — LORAZEPAM 0.5 MG: 0.5 TABLET ORAL at 16:31

## 2018-05-07 RX ADMIN — BUSPIRONE HYDROCHLORIDE 5 MG: 5 TABLET ORAL at 00:55

## 2018-05-07 RX ADMIN — ISOSORBIDE MONONITRATE 30 MG: 30 TABLET ORAL at 21:43

## 2018-05-07 RX ADMIN — HYDROMORPHONE HYDROCHLORIDE 0.5 MG: 1 INJECTION, SOLUTION INTRAMUSCULAR; INTRAVENOUS; SUBCUTANEOUS at 10:30

## 2018-05-07 RX ADMIN — ONDANSETRON 4 MG: 2 INJECTION INTRAMUSCULAR; INTRAVENOUS at 11:26

## 2018-05-07 RX ADMIN — BUSPIRONE HYDROCHLORIDE 5 MG: 5 TABLET ORAL at 08:32

## 2018-05-07 RX ADMIN — POLYETHYLENE GLYCOL 3350, SODIUM CHLORIDE, POTASSIUM CHLORIDE, SODIUM BICARBONATE, AND SODIUM SULFATE 2000 ML: 240; 5.84; 2.98; 6.72; 22.72 POWDER, FOR SOLUTION ORAL at 13:00

## 2018-05-07 RX ADMIN — SODIUM CHLORIDE, POTASSIUM CHLORIDE, SODIUM LACTATE AND CALCIUM CHLORIDE 100 ML/HR: 600; 310; 30; 20 INJECTION, SOLUTION INTRAVENOUS at 11:28

## 2018-05-07 RX ADMIN — SODIUM CHLORIDE, POTASSIUM CHLORIDE, SODIUM LACTATE AND CALCIUM CHLORIDE 150 ML/HR: 600; 310; 30; 20 INJECTION, SOLUTION INTRAVENOUS at 00:55

## 2018-05-07 RX ADMIN — HYDROMORPHONE HYDROCHLORIDE 0.5 MG: 1 INJECTION, SOLUTION INTRAMUSCULAR; INTRAVENOUS; SUBCUTANEOUS at 00:55

## 2018-05-07 NOTE — PLAN OF CARE
Problem: Patient Care Overview  Goal: Plan of Care Review  Outcome: Ongoing (interventions implemented as appropriate)   05/07/18 0792   Coping/Psychosocial   Plan of Care Reviewed With patient;spouse   Plan of Care Review   Progress no change   OTHER   Outcome Summary pt medicated twice during shift for pain and nausea. Bowel prep start with no results at this time. Resting in bed at this time. Will continue to monitor.       Problem: Fall Risk (Adult)  Goal: Identify Related Risk Factors and Signs and Symptoms  Outcome: Ongoing (interventions implemented as appropriate)      Problem: Pancreatitis, Acute/Chronic (Adult)  Goal: Signs and Symptoms of Listed Potential Problems Will be Absent, Minimized or Managed (Pancreatitis, Acute/Chronic)  Outcome: Ongoing (interventions implemented as appropriate)      Problem: Pain, Acute (Adult)  Goal: Identify Related Risk Factors and Signs and Symptoms  Outcome: Ongoing (interventions implemented as appropriate)    Goal: Acceptable Pain Control/Comfort Level  Outcome: Ongoing (interventions implemented as appropriate)

## 2018-05-07 NOTE — ED PROVIDER NOTES
" EMERGENCY DEPARTMENT ENCOUNTER    CHIEF COMPLAINT  Chief Complaint: Abd pain  History given by: Pt  History limited by: Nothing  Room Number: 34/34  PMD: Denzel Fuller MD      HPI:  Pt is a 72 y.o. male who presents complaining of mid abd pain that began 1 week ago and has progressively worsened. The pt denies nausea, fever, chills, or diarrhea. The pt states that he does not drink alcohol.     Duration:  1 week  Onset: Gradual  Timing: Constant  Location: Mid abd  Radiation: None  Quality: \"pain\"  Intensity/Severity: Moderate  Progression: Worsening  Associated Symptoms: None  Aggravating Factors: Unknown  Alleviating Factors: Unknown  Previous Episodes: None  Treatment before arrival: None    PAST MEDICAL HISTORY  Active Ambulatory Problems     Diagnosis Date Noted   • Toe ulcer    • Sleep apnea    • Seizure    • PVD (peripheral vascular disease)    • Peripheral arterial disease    • Macular degeneration    • Hyperlipidemia    • Enlarged prostate    • Diabetes mellitus    • Colon polyps    • Arthritis    • Anxiety    • Acid reflux      Resolved Ambulatory Problems     Diagnosis Date Noted   • No Resolved Ambulatory Problems     Past Medical History:   Diagnosis Date   • Acid reflux    • Anemia    • Anxiety    • Arthritis    • Chest discomfort    • Colon polyps    • Diabetes mellitus    • Enlarged prostate    • Hepatitis A    • Hyperlipidemia    • Hypertension    • Macular degeneration    • Peripheral arterial disease    • Precordial pain    • PVD (peripheral vascular disease)    • Seizure    • Sleep apnea    • SOBOE (shortness of breath on exertion)    • Toe ulcer        PAST SURGICAL HISTORY  Past Surgical History:   Procedure Laterality Date   • COLONOSCOPY     • FEMORAL ARTERY STENT Left     on 3/22/16       FAMILY HISTORY  Family History   Problem Relation Age of Onset   • Hypertension Mother    • Heart failure Father    • Cancer Maternal Aunt    • Diabetes Maternal Grandfather    • Diabetes Daughter  "       SOCIAL HISTORY  Social History     Social History   • Marital status:      Spouse name: N/A   • Number of children: N/A   • Years of education: N/A     Occupational History   • [employer]      Social History Main Topics   • Smoking status: Former Smoker     Packs/day: 1.00     Years: 60.00     Quit date: 2008   • Smokeless tobacco: Never Used   • Alcohol use No      Comment: caffeine use   • Drug use: No   • Sexual activity: Not on file     Other Topics Concern   • Not on file     Social History Narrative   • No narrative on file       ALLERGIES  Biaxin [clarithromycin]; Crestor [rosuvastatin]; Levaquin [levofloxacin in d5w]; Penicillins; Percocet [oxycodone-acetaminophen]; and Latex    REVIEW OF SYSTEMS  Review of Systems   Constitutional: Negative for activity change, appetite change and fever.   HENT: Negative for congestion and sore throat.    Eyes: Negative.    Respiratory: Negative for cough and shortness of breath.    Cardiovascular: Negative for chest pain and leg swelling.   Gastrointestinal: Positive for abdominal pain. Negative for diarrhea and vomiting.   Endocrine: Negative.    Genitourinary: Negative for decreased urine volume and dysuria.   Musculoskeletal: Negative for neck pain.   Skin: Negative for rash and wound.   Allergic/Immunologic: Negative.    Neurological: Negative for weakness, numbness and headaches.   Hematological: Negative.    Psychiatric/Behavioral: Negative.    All other systems reviewed and are negative.      PHYSICAL EXAM  ED Triage Vitals   Temp Heart Rate Resp BP SpO2   05/06/18 1804 05/06/18 1804 05/06/18 1838 05/06/18 1840 05/06/18 1804   97.2 °F (36.2 °C) 110 16 (!) 200/100 93 %      Temp src Heart Rate Source Patient Position BP Location FiO2 (%)   05/06/18 1804 -- 05/06/18 1840 -- --   Tympanic  Sitting         Physical Exam   Constitutional: He is oriented to person, place, and time and well-developed, well-nourished, and in no distress.   HENT:   Head:  Normocephalic and atraumatic.   Eyes: EOM are normal. Pupils are equal, round, and reactive to light.   Neck: Normal range of motion. Neck supple.   Cardiovascular: Normal rate, regular rhythm and normal heart sounds.    Pulmonary/Chest: Effort normal and breath sounds normal. No respiratory distress.   Abdominal: Soft. There is tenderness (Diffuse). There is no rebound and no guarding.   Musculoskeletal: Normal range of motion. He exhibits no edema.   Neurological: He is alert and oriented to person, place, and time. He has normal sensation and normal strength.   Skin: Skin is warm and dry.   Psychiatric: Mood and affect normal.   Nursing note and vitals reviewed.      LAB RESULTS  Lab Results (last 24 hours)     Procedure Component Value Units Date/Time    CBC & Differential [[id number]] Collected:  05/06/18 2041    Specimen:  Blood Updated:  05/06/18 2121    Narrative:       The following orders were created for panel order CBC & Differential.  Procedure                               Abnormality         Status                     ---------                               -----------         ------                     CBC Auto Differential[[id number]]        Abnormal            Final result                 Please view results for these tests on the individual orders.    Comprehensive Metabolic Panel [[id number]]  (Abnormal) Collected:  05/06/18 2041    Specimen:  Blood from Arm, Right Updated:  05/06/18 2114     Glucose 284 (H) mg/dL      BUN 17 mg/dL      Creatinine 0.98 mg/dL      Sodium 131 (L) mmol/L      Potassium 4.5 mmol/L      Chloride 90 (L) mmol/L      CO2 22.9 mmol/L      Calcium 9.5 mg/dL      Total Protein 8.0 g/dL      Albumin 4.30 g/dL      ALT (SGPT) 24 U/L      AST (SGOT) 14 U/L      Alkaline Phosphatase 122 (H) U/L      Total Bilirubin 0.4 mg/dL      eGFR Non African Amer 75 mL/min/1.73      Globulin 3.7 gm/dL      A/G Ratio 1.2 g/dL      BUN/Creatinine Ratio 17.3     Anion Gap 18.1 mmol/L      Narrative:       The MDRD GFR formula is only valid for adults with stable renal function between ages 18 and 70.    Lipase [[id number]]  (Abnormal) Collected:  05/06/18 2041    Specimen:  Blood from Arm, Right Updated:  05/06/18 2114     Lipase 93 (H) U/L     CBC Auto Differential [[id number]]  (Abnormal) Collected:  05/06/18 2041    Specimen:  Blood from Arm, Right Updated:  05/06/18 2121     WBC 14.58 (H) 10*3/mm3      RBC 4.85 10*6/mm3      Hemoglobin 14.6 g/dL      Hematocrit 44.1 %      MCV 90.9 fL      MCH 30.1 pg      MCHC 33.1 g/dL      RDW 13.5 %      RDW-SD 44.7 fl      MPV 9.3 fL      Platelets 257 10*3/mm3      Neutrophil % 78.7 (H) %      Lymphocyte % 13.8 (L) %      Monocyte % 5.5 %      Eosinophil % 1.4 %      Basophil % 0.3 %      Immature Grans % 0.3 %      Neutrophils, Absolute 11.48 (H) 10*3/mm3      Lymphocytes, Absolute 2.01 10*3/mm3      Monocytes, Absolute 0.80 10*3/mm3      Eosinophils, Absolute 0.20 10*3/mm3      Basophils, Absolute 0.04 10*3/mm3      Immature Grans, Absolute 0.05 (H) 10*3/mm3     Amylase [[id number]]  (Normal) Collected:  05/06/18 2041    Specimen:  Blood from Arm, Right Updated:  05/06/18 2114     Amylase 69 U/L     Urinalysis With / Culture If Indicated - Urine, Clean Catch [[id number]]  (Abnormal) Collected:  05/06/18 2141    Specimen:  Urine from Urine, Clean Catch Updated:  05/06/18 2154     Color, UA Yellow     Appearance, UA Clear     pH, UA 5.5     Specific Gravity, UA 1.019     Glucose, UA >=1000 mg/dL (3+) (A)     Ketones, UA Negative     Bilirubin, UA Negative     Blood, UA Negative     Protein, UA 30 mg/dL (1+) (A)     Leuk Esterase, UA Negative     Nitrite, UA Negative     Urobilinogen, UA 0.2 E.U./dL    Urinalysis, Microscopic Only - Urine, Clean Catch [[id number]] Collected:  05/06/18 2141    Specimen:  Urine from Urine, Clean Catch Updated:  05/06/18 6935          I ordered the above labs and reviewed the results    RADIOLOGY  CT Abdomen Pelvis Without Contrast    Final Result   1.  Heterogeneous, fatty infiltrated liver.   2. Edematous pancreatic head with some inflammatory change about the   pancreas and duodenum as discussed.   3. Minimal diverticulosis                   This study was performed with techniques to keep radiation doses as low   as reasonably achievable (ALARA). Individualized dose reduction   techniques using automated exposure control or adjustment of mA and/or   kV according to the patient size were employed.        This report was finalized on 5/6/2018 8:14 PM by Richie Nguyen M.D.               I ordered the above noted radiological studies. Interpreted by radiologist. Reviewed by me in PACS.       PROCEDURES  Procedures      PROGRESS AND CONSULTS  ED Course   1913 Ordered CT abd/pelvis for further evaluation.     2038 Placed call to surgery without answer. Ordered dilaudid and Zofran for pain and nausea.     2048 Discussed the pt with Dr. Gonzalez (surgery) who agrees to admit the pt.     2119 Ordered inpatient admission.     2134 Discussed that the pt's CT abd/pelvis shows that he has pancreatitis and that he needs to be admitted to the hospital. Discussed that the pt    MEDICAL DECISION MAKING  Results were reviewed/discussed with the patient and they were also made aware of online access. Pt also made aware that some labs, such as cultures, will not be resulted during ER visit and follow up with PMD is necessary.     MDM  Number of Diagnoses or Management Options  Acute pancreatitis, unspecified complication status, unspecified pancreatitis type:      Amount and/or Complexity of Data Reviewed  Clinical lab tests: ordered and reviewed (Lipase: 93, Glucose: 284, Sodium: 131, WBC: 14.58)  Tests in the radiology section of CPT®: ordered and reviewed (CT abd/pelvis: fatty infiltrated liver, edematous pancreatic head, minimal diverticulosis)  Decide to obtain previous medical records or to obtain history from someone other than the patient: yes  Review  and summarize past medical records: yes  Discuss the patient with other providers: yes (Dr. Gonzalez (surgery))    Patient Progress  Patient progress: stable         DIAGNOSIS  Final diagnoses:   Acute pancreatitis, unspecified complication status, unspecified pancreatitis type       DISPOSITION  ADMISSION    Discussed treatment plan and reason for admission with pt/family and admitting physician.  Pt/family voiced understanding of the plan for admission for further testing/treatment as needed.       Latest Documented Vital Signs:  As of 9:55 PM  BP- (!) 189/79 HR- 98 Temp- 97.2 °F (36.2 °C) (Tympanic) O2 sat- 97%    --  Documentation assistance provided by shane Jones for Dr. Griffin.  Information recorded by the [employer] was done at my direction and has been verified and validated by me.     Beatris Jones  05/06/18 9752       Beatris Jones  05/06/18 3601       Willian Griffin MD  05/07/18 0103

## 2018-05-07 NOTE — PROGRESS NOTES
Discharge Planning Assessment   [facility]     Patient Name: Erick Bruno  MRN: [id number]  Today's Date: 5/7/2018    Admit Date: 5/6/2018          Discharge Needs Assessment     Row Name 05/07/18 1358       Living Environment    Lives With spouse    Current Living Arrangements home/apartment/condo    Primary Care Provided by self    Provides Primary Care For no one    Family Caregiver if Needed spouse    Able to Return to Prior Arrangements yes    Living Arrangement Comments Pt lives in a single story house with wife (Marcelle Bruno  [phone])       Resource/Environmental Concerns    Resource/Environmental Concerns none       Transition Planning    Patient/Family Anticipates Transition to home with family    Patient/Family Anticipated Services at Transition none    Transportation Anticipated family or friend will provide       Discharge Needs Assessment    Equipment Currently Used at Home bipap/cpap;cane, quad;glucometer;shower chair;walker, rolling;other (see comments)   Motorized scooter    Anticipated Changes Related to Illness none    Equipment Needed After Discharge bipap/cpap;cane, quad;glucometer;shower chair;walker, rolling;other (see comments)   Motorized scooter    Offered/Gave Vendor List no            Discharge Plan     Row Name 05/07/18 1400       Plan    Plan Plan home with family.   VIDAL Riddle    Patient/Family in Agreement with Plan yes    Plan Comments FACE SHEET VERIFIED/ IM LETTER SIGNED.  Spoke to pt and pt's daughter  (Veronique [phone]) at bedside with pt's permission.  Pt lives in a single story house with wife  (Marcelle Bruno  [phone]).  Pt's PCP is Dr. Denzel Fuller.   Pt has a quad cane, glucometer, shower chair, C PAP that he does not use, rolling walker, and motorized scooter for home use.  Pt gets prescriptions at [facility] or [facility] depending on cost.   Pt states can afford medications but looks for the best price.  Pt is not current with HH.  Pt has not been in SNF.   Pt denies any discharge needs.  Plan home.  Janessa,, RN        Destination     No service coordination in this encounter.      Durable Medical Equipment     No service coordination in this encounter.      Dialysis/Infusion     No service coordination in this encounter.      Home Medical Care     No service coordination in this encounter.      Social Care     No service coordination in this encounter.                Demographic Summary     Row Name 05/07/18 0992       General Information    Admission Type inpatient    Arrived From emergency department    Referral Source admission list    Reason for Consult discharge planning    Preferred Language English     Used During This Interaction no       Contact Information    Permission Granted to Share Info With family/designee   Daughter  (Veronique [phone])            Functional Status     Row Name 05/07/18 4979       Functional Status    Usual Activity Tolerance good    Current Activity Tolerance good       Functional Status, IADL    Medications independent    Meal Preparation assistive person    Housekeeping assistive person    Laundry assistive person    Shopping assistive person       Mental Status    General Appearance WDL WDL       Mental Status Summary    Recent Changes in Mental Status/Cognitive Functioning no changes            Psychosocial    No documentation.           Abuse/Neglect    No documentation.           Legal    No documentation.           Substance Abuse    No documentation.           Patient Forms    No documentation.         Charlotte Nagy, RN

## 2018-05-07 NOTE — PROGRESS NOTES
Continued Stay Note   [facility]     Patient Name: Erick Bruno  MRN: [id number]  Today's Date: 5/7/2018    Admit Date: 5/6/2018          Discharge Plan     Row Name 05/07/18 1400       Plan    Plan Plan home with family.   VIDAL Riddle    Patient/Family in Agreement with Plan yes    Plan Comments FACE SHEET VERIFIED/ IM LETTER SIGNED.  Spoke to pt and pt's daughter  (Veronique [phone]) at bedside with pt's permission.  Pt lives in a single story house with wife  (Marcelle Bruno  [phone]).  Pt's PCP is Dr. Denzel Fuller.   Pt has a quad cane, glucometer, shower chair, C PAP that he does not use, rolling walker, and motorized scooter for home use.  Pt gets prescriptions at [facility] or [facility] depending on cost.   Pt states can afford medications but looks for the best price.  Pt is not current with HH.  Pt has not been in SNF.  Pt denies any discharge needs.  Plan home.  Janessa, RN              Discharge Codes    No documentation.           Charlotte Nagy, RN

## 2018-05-07 NOTE — PLAN OF CARE
Problem: Patient Care Overview  Goal: Plan of Care Review  Outcome: Ongoing (interventions implemented as appropriate)   05/07/18 0650   Coping/Psychosocial   Plan of Care Reviewed With patient   Plan of Care Review   Progress no change   OTHER   Outcome Summary Patient admitted with Pancreatitis. medicated for pain. Remain NPO. IV fluids infusing well. Remain NSR on monitor. Nursing will continue to monitor.      Goal: Individualization and Mutuality  Outcome: Ongoing (interventions implemented as appropriate)    Goal: Discharge Needs Assessment  Outcome: Ongoing (interventions implemented as appropriate)    Goal: Interprofessional Rounds/Family Conf  Outcome: Ongoing (interventions implemented as appropriate)      Problem: Fall Risk (Adult)  Goal: Identify Related Risk Factors and Signs and Symptoms  Outcome: Ongoing (interventions implemented as appropriate)    Goal: Absence of Fall  Outcome: Ongoing (interventions implemented as appropriate)      Problem: Pancreatitis, Acute/Chronic (Adult)  Goal: Signs and Symptoms of Listed Potential Problems Will be Absent, Minimized or Managed (Pancreatitis, Acute/Chronic)  Outcome: Ongoing (interventions implemented as appropriate)      Problem: Pain, Acute (Adult)  Goal: Identify Related Risk Factors and Signs and Symptoms  Outcome: Ongoing (interventions implemented as appropriate)    Goal: Acceptable Pain Control/Comfort Level  Outcome: Ongoing (interventions implemented as appropriate)

## 2018-05-07 NOTE — PROGRESS NOTES
Clinical Pharmacy Services: Medication History    Erick Bruno is a 72 y.o. male presenting to [facility] for Pancreatitis, acute [K85.90]  Pancreatitis [K85.90]  Acute pancreatitis, unspecified complication status, unspecified pancreatitis type [K85.90]    He  has a past medical history of Acid reflux; Anemia; Anxiety; Arthritis; Chest discomfort; Colon polyps; Diabetes mellitus; Enlarged prostate; Hepatitis A; Hyperlipidemia; Hypertension; Macular degeneration; Peripheral arterial disease; Precordial pain; PVD (peripheral vascular disease); Seizure; Sleep apnea; SOBOE (shortness of breath on exertion); and Toe ulcer.    Allergies as of 05/06/2018 - Reviewed 05/06/2018   Allergen Reaction Noted   • Penicillins Hives 04/15/2016   • Percocet [oxycodone-acetaminophen] Itching 04/15/2016   • Biaxin [clarithromycin] Itching 04/15/2016   • Crestor [rosuvastatin] Other (See Comments) 04/15/2016   • Levaquin [levofloxacin in d5w] Other (See Comments) 04/15/2016   • Latex Rash 04/15/2016     Medication information was obtained from: Patient, Wife and Daughter    Prior to Admission Medications     Prescriptions Last Dose Informant Patient Reported? Taking?    aspirin 81 MG chewable tablet 5/5/2018  Yes Yes    Chew 81 mg Every Night. PT TO CALL DR MULLER TO SEE WHEN TO STOP    atorvastatin (LIPITOR) 40 MG tablet 5/6/2018  No Yes    TAKE 1 TABLET BY MOUTH ONE TIME A DAY     coenzyme Q10 100 MG capsule  Spouse/Significant Other Yes Yes    Take 200 mg by mouth Every Night.    esomeprazole (nexIUM) 40 MG capsule   Yes Yes    Take 40 mg by mouth Every Morning Before Breakfast.    LORazepam (ATIVAN) 0.5 MG tablet   Yes Yes    Take 0.5 mg by mouth Every 8 (Eight) Hours As Needed for Anxiety.    nitroglycerin (NITROSTAT) 0.4 MG SL tablet   No Yes    Place 1 tablet under the tongue Every 5 (Five) Minutes As Needed for Chest Pain. Take no more than 3 doses in 15 minutes.    ONE TOUCH ULTRA TEST test strip   No Yes    USE  ONE STRIP TO CHECK GLUCOSE ONCE DAILY    saw palmetto 160 MG capsule   Yes Yes    Take 160 mg by mouth 2 (Two) Times a Day.    vitamin E 1000 UNIT capsule  Spouse/Significant Other Yes Yes    Take 1,200 Units by mouth Every Night.    busPIRone (BUSPAR) 5 MG tablet  Spouse/Significant Other No No    TAKE ONE-HALF TABLET BY MOUTH TWICE DAILY FOR 7 DAYS AND THEN TAKE ONE TABLET TWICE DAILY    Patient taking differently:  TAKE ONE TABLET TWICE DAILY    glimepiride (AMARYL) 2 MG tablet   No No    TAKE ONE TABLET BY MOUTH IN THE MORNING BEFORE BREAKFAST    isosorbide mononitrate (IMDUR) 30 MG 24 hr tablet  Spouse/Significant Other No No    TAKE ONE TABLET BY MOUTH ONCE DAILY    Patient taking differently:  TAKE ONE TABLET BY MOUTH ONCE NIGHTLY    metFORMIN (GLUCOPHAGE) 1000 MG tablet   No No    Take 1 tablet by mouth 2 (Two) Times a Day With Meals.        Medication notes: Patient notes that he has recently switched from Nexium to OTC Prilosec 20mg at a once daily frequency.      This medication list is complete to the best of my knowledge as of 5/7/2018    Please call if questions.    Thanks, Bony Wesley, PharmD, BCPS  5/7/2018 3:15 PM

## 2018-05-07 NOTE — H&P
May 7, 2018    Erick Bruno  [id number]      GENERAL SURGERY HISTORY AND PHYSICAL    ADMITTING PHYSICIAN:  Daniel Gonzalez M.D.    PRIMARY PHYSICIAN:   Denzel Fuller MD.    DIAGNOSIS:  Epigastric abdominal pain, pancreatitis.    HISTORY:  Erick Bruno is a 72 y.o. male who is admitted to the hospital with epigastric abdominal pain and suspected acute pancreatitis.  Patient presented to the emergency room with about a week long history of epigastric abdominal pain that had been steady and worsening.  It was associated with some mild anorexia but no other associated GI complaints were noted.  He has no history of similar problems in the past but he does take Nexium for chronic reflux.  He denied any change in the color of urine or stool except when he started taking excessive Pepto-Bismol which resulted in marked stool.  That also resulted in constipation.  When the symptoms were not leading up he presented to the ER for further evaluation.  His workup in the ER revealed evidence of epigastric pain and some inflammation around the head of the pancreas.  He was admitted to surgical service.  Further evaluation included blood work which did not reveal elevated pancreas enzymes or elevated triglycerides for that matter.  He has no history of pancreatitis in the past.  He does not drink alcohol.  He has no known history of biliary disease.  His last colonoscopy was more than 5 years ago and he has a personal history of colon polyps.  He denies any urinary complaints, fever, chills, chest pain, shortness of breath.       Past Medical History:   Diagnosis Date   • Acid reflux    • Anemia    • Anxiety    • Arthritis    • Chest discomfort     both typical and atypical   • Colon polyps    • Diabetes mellitus    • Enlarged prostate    • Hepatitis A    • Hyperlipidemia    • Hypertension    • Macular degeneration    • Peripheral arterial disease    • Precordial pain    • PVD (peripheral vascular disease)    • Seizure     2013,  "HAD HIGH FEVER   • Sleep apnea    • SOBOE (shortness of breath on exertion)    • Toe ulcer     LEFT 2ND TOE     Past Surgical History:   Procedure Laterality Date   • COLONOSCOPY     • FEMORAL ARTERY STENT Left     on 3/22/16     Family History   Problem Relation Age of Onset   • Hypertension Mother    • Heart failure Father    • Cancer Maternal Aunt    • Diabetes Maternal Grandfather    • Diabetes Daughter      Social History   Substance Use Topics   • Smoking status: Former Smoker     Packs/day: 1.00     Years: 60.00     Quit date: 2008   • Smokeless tobacco: Never Used   • Alcohol use No      Comment: caffeine use       Review of Systems  On questioning, the patient denies any weight loss, fatigue or headache, no visual changes or hearing complaints, no new cardiovascular or pulmonary complaints, no  complaints, no musculoskeletal or neurologic complaints, no skin, bleeding, psychiatric or endocrine complaints.    Vitals:    05/06/18 2039 05/06/18 2043 05/06/18 2151 05/06/18 2341   BP: (!) 196/91 (!) 208/95 (!) 189/79 (!) 183/81   BP Location:    Left arm   Patient Position:    Lying   Pulse:   98 86   Resp:   17    Temp:       TempSrc:       SpO2:   97% 96%   Weight:       Height:           Physical Exam  BP (!) 183/81 (BP Location: Left arm, Patient Position: Lying)   Pulse 86   Temp 97.2 °F (36.2 °C) (Tympanic)   Resp 17   Ht 175.3 cm (69\")   Wt 81.6 kg (180 lb)   SpO2 96%   BMI 26.58 kg/m²     On exam, patient is alert oriented and appropriate and is in no acute distress.  HEENT:   Head is normocephalic, both external ears are normal and sclerae are nonicteric.  Neck:  Supple without lymphadenopathy.  Heart:  Regular without obvious murmur.  Chest:  Good respiratory effort bilaterally.  Abdomen:  Soft, protuberant, obese, nondistended, tenderness mild, without guarding, without rebound - epigastric.  Rectal:  Deferred.  Extremities:  Without edema.  Skin:  Negative.    Diagnostic Imaging: CT scan " images and results reviewed.  CT Abdomen Pelvis Without Contrast   Final Result   1.  Heterogeneous, fatty infiltrated liver.   2. Edematous pancreatic head with some inflammatory change about the   pancreas and duodenum as discussed.   3. Minimal diverticulosis                   This study was performed with techniques to keep radiation doses as low   as reasonably achievable (ALARA). Individualized dose reduction   techniques using automated exposure control or adjustment of mA and/or   kV according to the patient size were employed.        This report was finalized on 5/6/2018 8:14 PM by Richie Nguyen M.D.          US Gallbladder    (Results Pending)       Labs:   Lab Results   Component Value Date    WBC 12.27 (H) 05/07/2018    HGB 13.5 (L) 05/07/2018    HCT 40.1 (L) 05/07/2018     05/07/2018     Lab Results   Component Value Date     (L) 05/07/2018    K 4.8 05/07/2018    CL 97 (L) 05/07/2018    CO2 25.3 05/07/2018    BUN 13 05/07/2018    CREATININE 0.87 05/07/2018    GLUCOSE 191 (H) 05/07/2018     Pancreas enzymes very minimally elevated.    ASSESSMENT/PLAN:  Patient is a 72 y.o. male who is admitted to the hospital with epigastric pain and suspected acute pancreatitis.  He has no risk factors for obvious pancreatitis other than the fact that he has diabetes and potentially has had some biliary complaints that were undiagnosed.  In addition, the findings on CT scan are minimal at best and his enzymes aren't very elevated.  I think he needs to have an ultrasound of the gallbladder and an upper endoscopy.  And, given his history of colon polyps and lack of colon evaluation in 5 or more years, we should probably combine the upper endoscopy and colonoscopy.  I will initiate a bowel prep today in hopes of getting those tests done tomorrow and I will let him have clear liquids and we'll check the gallbladder today.  Plan was discussed, questions were answered and we will proceed.    Daniel Gonzalez,  M.D.

## 2018-05-08 ENCOUNTER — ANESTHESIA (OUTPATIENT)
Dept: GASTROENTEROLOGY | Facility: HOSPITAL | Age: 73
End: 2018-05-08

## 2018-05-08 ENCOUNTER — ANESTHESIA EVENT (OUTPATIENT)
Dept: GASTROENTEROLOGY | Facility: HOSPITAL | Age: 73
End: 2018-05-08

## 2018-05-08 PROBLEM — K80.20 GALLSTONES: Status: ACTIVE | Noted: 2018-05-06

## 2018-05-08 LAB
AMYLASE SERPL-CCNC: 31 U/L (ref 28–100)
LIPASE SERPL-CCNC: 29 U/L (ref 13–60)

## 2018-05-08 PROCEDURE — 25010000002 ERTAPENEM PER 500 MG: Performed by: SURGERY

## 2018-05-08 PROCEDURE — 83690 ASSAY OF LIPASE: CPT | Performed by: SURGERY

## 2018-05-08 PROCEDURE — 36415 COLL VENOUS BLD VENIPUNCTURE: CPT | Performed by: SURGERY

## 2018-05-08 PROCEDURE — 0DD78ZX EXTRACTION OF STOMACH, PYLORUS, VIA NATURAL OR ARTIFICIAL OPENING ENDOSCOPIC, DIAGNOSTIC: ICD-10-PCS | Performed by: SURGERY

## 2018-05-08 PROCEDURE — 82150 ASSAY OF AMYLASE: CPT | Performed by: SURGERY

## 2018-05-08 PROCEDURE — 88305 TISSUE EXAM BY PATHOLOGIST: CPT | Performed by: SURGERY

## 2018-05-08 PROCEDURE — 88312 SPECIAL STAINS GROUP 1: CPT | Performed by: SURGERY

## 2018-05-08 PROCEDURE — 0DBM8ZZ EXCISION OF DESCENDING COLON, VIA NATURAL OR ARTIFICIAL OPENING ENDOSCOPIC: ICD-10-PCS | Performed by: SURGERY

## 2018-05-08 PROCEDURE — 25010000002 PROPOFOL 10 MG/ML EMULSION: Performed by: ANESTHESIOLOGY

## 2018-05-08 PROCEDURE — 0DBN8ZZ EXCISION OF SIGMOID COLON, VIA NATURAL OR ARTIFICIAL OPENING ENDOSCOPIC: ICD-10-PCS | Performed by: SURGERY

## 2018-05-08 RX ORDER — PROPOFOL 10 MG/ML
VIAL (ML) INTRAVENOUS CONTINUOUS PRN
Status: DISCONTINUED | OUTPATIENT
Start: 2018-05-08 | End: 2018-05-08 | Stop reason: SURG

## 2018-05-08 RX ORDER — SODIUM CHLORIDE, SODIUM LACTATE, POTASSIUM CHLORIDE, AND CALCIUM CHLORIDE .6; .31; .03; .02 G/100ML; G/100ML; G/100ML; G/100ML
30 INJECTION, SOLUTION INTRAVENOUS CONTINUOUS
Status: DISCONTINUED | OUTPATIENT
Start: 2018-05-08 | End: 2018-05-08

## 2018-05-08 RX ORDER — CHOLECALCIFEROL (VITAMIN D3) 125 MCG
5 CAPSULE ORAL NIGHTLY PRN
Status: DISCONTINUED | OUTPATIENT
Start: 2018-05-08 | End: 2018-05-10 | Stop reason: HOSPADM

## 2018-05-08 RX ORDER — LIDOCAINE HYDROCHLORIDE 20 MG/ML
INJECTION, SOLUTION INFILTRATION; PERINEURAL AS NEEDED
Status: DISCONTINUED | OUTPATIENT
Start: 2018-05-08 | End: 2018-05-08 | Stop reason: SURG

## 2018-05-08 RX ORDER — PROPOFOL 10 MG/ML
VIAL (ML) INTRAVENOUS AS NEEDED
Status: DISCONTINUED | OUTPATIENT
Start: 2018-05-08 | End: 2018-05-08 | Stop reason: SURG

## 2018-05-08 RX ORDER — SODIUM CHLORIDE, SODIUM LACTATE, POTASSIUM CHLORIDE, CALCIUM CHLORIDE 600; 310; 30; 20 MG/100ML; MG/100ML; MG/100ML; MG/100ML
75 INJECTION, SOLUTION INTRAVENOUS CONTINUOUS
Status: DISCONTINUED | OUTPATIENT
Start: 2018-05-08 | End: 2018-05-10

## 2018-05-08 RX ADMIN — PROPOFOL 100 MCG/KG/MIN: 10 INJECTION, EMULSION INTRAVENOUS at 09:00

## 2018-05-08 RX ADMIN — BUSPIRONE HYDROCHLORIDE 5 MG: 5 TABLET ORAL at 11:02

## 2018-05-08 RX ADMIN — PANTOPRAZOLE SODIUM 40 MG: 40 TABLET, DELAYED RELEASE ORAL at 06:17

## 2018-05-08 RX ADMIN — PROPOFOL 100 MG: 10 INJECTION, EMULSION INTRAVENOUS at 09:00

## 2018-05-08 RX ADMIN — LIDOCAINE HYDROCHLORIDE 60 MG: 20 INJECTION, SOLUTION INFILTRATION; PERINEURAL at 09:00

## 2018-05-08 RX ADMIN — SODIUM CHLORIDE, POTASSIUM CHLORIDE, SODIUM LACTATE AND CALCIUM CHLORIDE 75 ML/HR: 600; 310; 30; 20 INJECTION, SOLUTION INTRAVENOUS at 14:08

## 2018-05-08 RX ADMIN — BUSPIRONE HYDROCHLORIDE 5 MG: 5 TABLET ORAL at 18:22

## 2018-05-08 RX ADMIN — Medication 5 MG: at 23:33

## 2018-05-08 RX ADMIN — SODIUM CHLORIDE 1 G: 900 INJECTION INTRAVENOUS at 14:19

## 2018-05-08 RX ADMIN — SODIUM CHLORIDE, POTASSIUM CHLORIDE, SODIUM LACTATE AND CALCIUM CHLORIDE 30 ML/HR: 600; 310; 30; 20 INJECTION, SOLUTION INTRAVENOUS at 08:16

## 2018-05-08 RX ADMIN — SODIUM CHLORIDE, POTASSIUM CHLORIDE, SODIUM LACTATE AND CALCIUM CHLORIDE: 600; 310; 30; 20 INJECTION, SOLUTION INTRAVENOUS at 08:55

## 2018-05-08 RX ADMIN — ISOSORBIDE MONONITRATE 30 MG: 30 TABLET ORAL at 21:28

## 2018-05-08 RX ADMIN — SODIUM CHLORIDE, POTASSIUM CHLORIDE, SODIUM LACTATE AND CALCIUM CHLORIDE 100 ML/HR: 600; 310; 30; 20 INJECTION, SOLUTION INTRAVENOUS at 00:01

## 2018-05-08 NOTE — OP NOTE
May 8, 2018    Erick Bruno  [id number]    PROCEDURE: Esophagogastroduodenoscopy with biopsy and colonoscopy to cecum with biopsy.    PREOPERATIVE DIAGNOSIS:  Epigastric pain [R10.13]  Hx of colonic polyps [Z86.010], abnormal CT scan abdomen.    POSTOPERATIVE DIAGNOSIS: Mild reflux esophagitis, small sliding hiatal hernia, mild antral gastritis, descending and sigmoid colon polyps.    SURGEON:  Daniel Gonzalez M.D.    ASSISTANT:  None.    ANESTHESIA:  MAC    ESTIMATED BLOOD LOSS:  Minimal    SPECIMENS:    Order Name Source Comment Collection Info Order Time   TISSUE PATHOLOGY EXAM Large Intestine, Left / Descending Colon  Collected By: Daniel Gonzalez MD 5/8/2018  9:37 AM       INDICATIONS:  Patient is a 72-year-old young man who came to the hospital with about a week of nonspecific abdominal complaints and malaise and was found on his workup to have mild pancreatitis and duodenitis on a CT scan.  He also had a history of polyps.  I recommended upper and lower endoscopy as part of his evaluation and he now presents..    PROCEDURE:    Patient was brought into the endoscopy room and confirmed. He was positioned in the left lateral decubitus position.  After administration of adequate IV sedation by anesthesia, an upper endoscope was inserted the posterior pharynx and advanced through the upper GI tract to the third portion of the duodenum. The scope was then pulled back into the stomach and retroflexed.  Picture documentation was performed.  Biopsies were taken. Findings included mild reflux esophagitis with a mild ring and a small sliding hiatal hernia as well as mild antral gastritis.  Duodenum was grossly normal.  Then, patient was turned around and repositioned for second portion of the procedure.  A digital rectal exam was performed and a colonoscope was inserted and advanced all the way to the cecum.  The scope was then removed with a slow reevaluation on the way out and the procedure was completed. Findings  included 2 polyps, descending and sigmoid colon, biopsied. Procedure was then concluded and my recommendations will depend on final biopsy results and ultimate findings.  Patient will be sent home today with instructions to call for those results.      Daniel Gonzalez M.D.

## 2018-05-08 NOTE — PLAN OF CARE
Problem: Patient Care Overview  Goal: Plan of Care Review  Outcome: Ongoing (interventions implemented as appropriate)   05/08/18 0635   Coping/Psychosocial   Plan of Care Reviewed With patient;spouse   Plan of Care Review   Progress no change   OTHER   Outcome Summary Patient has had no complaint of pain this shift. Completed bowel prep and is having loose B/M which is now mostly clear. nursing will continue to monitor.     Goal: Individualization and Mutuality  Outcome: Ongoing (interventions implemented as appropriate)    Goal: Discharge Needs Assessment  Outcome: Ongoing (interventions implemented as appropriate)      Problem: Fall Risk (Adult)  Goal: Identify Related Risk Factors and Signs and Symptoms  Outcome: Outcome(s) achieved Date Met: 05/08/18    Goal: Absence of Fall  Outcome: Ongoing (interventions implemented as appropriate)      Problem: Pancreatitis, Acute/Chronic (Adult)  Goal: Signs and Symptoms of Listed Potential Problems Will be Absent, Minimized or Managed (Pancreatitis, Acute/Chronic)  Outcome: Ongoing (interventions implemented as appropriate)      Problem: Pain, Acute (Adult)  Goal: Identify Related Risk Factors and Signs and Symptoms  Outcome: Outcome(s) achieved Date Met: 05/08/18    Goal: Acceptable Pain Control/Comfort Level  Outcome: Ongoing (interventions implemented as appropriate)

## 2018-05-08 NOTE — ANESTHESIA POSTPROCEDURE EVALUATION
"Patient: Erick Bruno    Procedure Summary     Date:  05/08/18 Room / Location:   [facility]    Anesthesia Start:  0900 Anesthesia Stop:  0945    Procedures:       ESOPHAGOGASTRODUODENOSCOPY with biopsies (N/A Esophagus)      COLONOSCOPY to cecum with polypectomies (N/A ) Diagnosis:       Epigastric pain      Hx of colonic polyps      (Epigastric pain [R10.13])      (Hx of colonic polyps [Z86.010])    Surgeon:  Daniel Gonzalez MD Provider:  Jack Kaplan MD    Anesthesia Type:  MAC ASA Status:  3          Anesthesia Type: MAC  Last vitals  BP   178/90 (05/08/18 0952)   Temp   36.7 °C (98.1 °F) (05/08/18 0952)   Pulse   84 (05/08/18 0942)   Resp   16 (05/08/18 0952)     SpO2   98 % (05/08/18 0952)     Post Anesthesia Care and Evaluation    Patient location during evaluation: PACU  Patient participation: complete - patient participated  Level of consciousness: awake  Pain score: 0  Pain management: adequate  Airway patency: patent  Anesthetic complications: No anesthetic complications  PONV Status: none  Cardiovascular status: acceptable  Respiratory status: acceptable  Hydration status: acceptable    Comments: /90 (BP Location: Left arm, Patient Position: Sitting)   Pulse 84   Temp 36.7 °C (98.1 °F) (Oral)   Resp 16   Ht 175.3 cm (69\")   Wt 81.6 kg (180 lb)   SpO2 98%   BMI 26.58 kg/m²       "

## 2018-05-08 NOTE — ANESTHESIA PREPROCEDURE EVALUATION
Anesthesia Evaluation     NPO Solid Status: > 8 hours  NPO Liquid Status: > 8 hours           Airway   Mallampati: I  TM distance: <3 FB  Neck ROM: full  no difficulty expected  Dental - normal exam   (+) poor dentition    Pulmonary - normal exam   (+) a smoker Former, shortness of breath, sleep apnea,   Cardiovascular - normal exam    (+) hypertension, past MI  6-12 months, PVD, hyperlipidemia,       Neuro/Psych  (+) seizures, psychiatric history,     GI/Hepatic/Renal/Endo    (+)  GERD,  hepatitis, liver disease, diabetes mellitus,     Musculoskeletal     Abdominal  - normal exam    Bowel sounds: normal.   Substance History      OB/GYN          Other   (+) arthritis                     Anesthesia Plan    ASA 3     MAC     Anesthetic plan and risks discussed with patient.

## 2018-05-09 ENCOUNTER — APPOINTMENT (OUTPATIENT)
Dept: GENERAL RADIOLOGY | Facility: HOSPITAL | Age: 73
End: 2018-05-09

## 2018-05-09 ENCOUNTER — ANESTHESIA EVENT (OUTPATIENT)
Dept: PERIOP | Facility: HOSPITAL | Age: 73
End: 2018-05-09

## 2018-05-09 ENCOUNTER — ANESTHESIA (OUTPATIENT)
Dept: PERIOP | Facility: HOSPITAL | Age: 73
End: 2018-05-09

## 2018-05-09 LAB
ALBUMIN SERPL-MCNC: 3.1 G/DL (ref 3.5–5.2)
ALBUMIN/GLOB SERPL: 1 G/DL
ALP SERPL-CCNC: 105 U/L (ref 39–117)
ALT SERPL W P-5'-P-CCNC: 25 U/L (ref 1–41)
AMYLASE SERPL-CCNC: 37 U/L (ref 28–100)
ANION GAP SERPL CALCULATED.3IONS-SCNC: 10.6 MMOL/L
AST SERPL-CCNC: 16 U/L (ref 1–40)
BASOPHILS # BLD AUTO: 0.03 10*3/MM3 (ref 0–0.2)
BASOPHILS NFR BLD AUTO: 0.5 % (ref 0–1.5)
BILIRUB SERPL-MCNC: 0.3 MG/DL (ref 0.1–1.2)
BUN BLD-MCNC: 11 MG/DL (ref 8–23)
BUN/CREAT SERPL: 11.7 (ref 7–25)
CALCIUM SPEC-SCNC: 8.7 MG/DL (ref 8.6–10.5)
CHLORIDE SERPL-SCNC: 99 MMOL/L (ref 98–107)
CO2 SERPL-SCNC: 25.4 MMOL/L (ref 22–29)
CREAT BLD-MCNC: 0.94 MG/DL (ref 0.76–1.27)
CYTO UR: NORMAL
DEPRECATED RDW RBC AUTO: 43.9 FL (ref 37–54)
EOSINOPHIL # BLD AUTO: 0.24 10*3/MM3 (ref 0–0.7)
EOSINOPHIL NFR BLD AUTO: 3.9 % (ref 0.3–6.2)
ERYTHROCYTE [DISTWIDTH] IN BLOOD BY AUTOMATED COUNT: 13.3 % (ref 11.5–14.5)
GFR SERPL CREATININE-BSD FRML MDRD: 79 ML/MIN/1.73
GLOBULIN UR ELPH-MCNC: 3.1 GM/DL
GLUCOSE BLD-MCNC: 276 MG/DL (ref 65–99)
GLUCOSE BLDC GLUCOMTR-MCNC: 175 MG/DL (ref 70–130)
GLUCOSE BLDC GLUCOMTR-MCNC: 185 MG/DL (ref 70–130)
GLUCOSE BLDC GLUCOMTR-MCNC: 264 MG/DL (ref 70–130)
HCT VFR BLD AUTO: 35 % (ref 40.4–52.2)
HGB BLD-MCNC: 11.3 G/DL (ref 13.7–17.6)
IMM GRANULOCYTES # BLD: 0 10*3/MM3 (ref 0–0.03)
IMM GRANULOCYTES NFR BLD: 0 % (ref 0–0.5)
LAB AP CASE REPORT: NORMAL
LIPASE SERPL-CCNC: 41 U/L (ref 13–60)
LYMPHOCYTES # BLD AUTO: 1.7 10*3/MM3 (ref 0.9–4.8)
LYMPHOCYTES NFR BLD AUTO: 27.9 % (ref 19.6–45.3)
Lab: NORMAL
MCH RBC QN AUTO: 29.5 PG (ref 27–32.7)
MCHC RBC AUTO-ENTMCNC: 32.3 G/DL (ref 32.6–36.4)
MCV RBC AUTO: 91.4 FL (ref 79.8–96.2)
MONOCYTES # BLD AUTO: 0.5 10*3/MM3 (ref 0.2–1.2)
MONOCYTES NFR BLD AUTO: 8.2 % (ref 5–12)
NEUTROPHILS # BLD AUTO: 3.63 10*3/MM3 (ref 1.9–8.1)
NEUTROPHILS NFR BLD AUTO: 59.5 % (ref 42.7–76)
PATH REPORT.FINAL DX SPEC: NORMAL
PATH REPORT.GROSS SPEC: NORMAL
PLATELET # BLD AUTO: 199 10*3/MM3 (ref 140–500)
PMV BLD AUTO: 9.2 FL (ref 6–12)
POTASSIUM BLD-SCNC: 4.1 MMOL/L (ref 3.5–5.2)
PROT SERPL-MCNC: 6.2 G/DL (ref 6–8.5)
RBC # BLD AUTO: 3.83 10*6/MM3 (ref 4.6–6)
SODIUM BLD-SCNC: 135 MMOL/L (ref 136–145)
WBC NRBC COR # BLD: 6.1 10*3/MM3 (ref 4.5–10.7)

## 2018-05-09 PROCEDURE — 82962 GLUCOSE BLOOD TEST: CPT

## 2018-05-09 PROCEDURE — 25010000002 DEXAMETHASONE PER 1 MG: Performed by: NURSE ANESTHETIST, CERTIFIED REGISTERED

## 2018-05-09 PROCEDURE — 0FT44ZZ RESECTION OF GALLBLADDER, PERCUTANEOUS ENDOSCOPIC APPROACH: ICD-10-PCS | Performed by: SURGERY

## 2018-05-09 PROCEDURE — 0 IOTHALAMATE 60 % SOLUTION: Performed by: SURGERY

## 2018-05-09 PROCEDURE — 25010000002 ONDANSETRON PER 1 MG: Performed by: NURSE ANESTHETIST, CERTIFIED REGISTERED

## 2018-05-09 PROCEDURE — 25010000002 NALOXONE PER 1 MG: Performed by: NURSE ANESTHETIST, CERTIFIED REGISTERED

## 2018-05-09 PROCEDURE — 25010000002 METOCLOPRAMIDE PER 10 MG: Performed by: SURGERY

## 2018-05-09 PROCEDURE — 82150 ASSAY OF AMYLASE: CPT | Performed by: SURGERY

## 2018-05-09 PROCEDURE — 25010000002 MIDAZOLAM PER 1 MG: Performed by: ANESTHESIOLOGY

## 2018-05-09 PROCEDURE — 83690 ASSAY OF LIPASE: CPT | Performed by: SURGERY

## 2018-05-09 PROCEDURE — 25010000002 ERTAPENEM PER 500 MG: Performed by: SURGERY

## 2018-05-09 PROCEDURE — 88304 TISSUE EXAM BY PATHOLOGIST: CPT | Performed by: SURGERY

## 2018-05-09 PROCEDURE — 74300 X-RAY BILE DUCTS/PANCREAS: CPT

## 2018-05-09 PROCEDURE — 25010000002 PROPOFOL 10 MG/ML EMULSION: Performed by: NURSE ANESTHETIST, CERTIFIED REGISTERED

## 2018-05-09 PROCEDURE — 85025 COMPLETE CBC W/AUTO DIFF WBC: CPT | Performed by: SURGERY

## 2018-05-09 PROCEDURE — 25010000002 KETOROLAC TROMETHAMINE PER 15 MG: Performed by: NURSE ANESTHETIST, CERTIFIED REGISTERED

## 2018-05-09 PROCEDURE — 25010000002 HYDROMORPHONE PER 4 MG: Performed by: NURSE ANESTHETIST, CERTIFIED REGISTERED

## 2018-05-09 PROCEDURE — 25010000002 FENTANYL CITRATE (PF) 100 MCG/2ML SOLUTION: Performed by: NURSE ANESTHETIST, CERTIFIED REGISTERED

## 2018-05-09 PROCEDURE — BF101ZZ FLUOROSCOPY OF BILE DUCTS USING LOW OSMOLAR CONTRAST: ICD-10-PCS | Performed by: SURGERY

## 2018-05-09 PROCEDURE — 80053 COMPREHEN METABOLIC PANEL: CPT | Performed by: SURGERY

## 2018-05-09 PROCEDURE — 25010000002 SUCCINYLCHOLINE PER 20 MG: Performed by: NURSE ANESTHETIST, CERTIFIED REGISTERED

## 2018-05-09 RX ORDER — BUSPIRONE HYDROCHLORIDE 5 MG/1
5 TABLET ORAL 2 TIMES DAILY
Status: DISCONTINUED | OUTPATIENT
Start: 2018-05-09 | End: 2018-05-09 | Stop reason: SDUPTHER

## 2018-05-09 RX ORDER — MEPERIDINE HYDROCHLORIDE 25 MG/ML
12.5 INJECTION INTRAMUSCULAR; INTRAVENOUS; SUBCUTANEOUS
Status: DISCONTINUED | OUTPATIENT
Start: 2018-05-09 | End: 2018-05-09 | Stop reason: HOSPADM

## 2018-05-09 RX ORDER — KETOROLAC TROMETHAMINE 30 MG/ML
INJECTION, SOLUTION INTRAMUSCULAR; INTRAVENOUS AS NEEDED
Status: DISCONTINUED | OUTPATIENT
Start: 2018-05-09 | End: 2018-05-09 | Stop reason: SURG

## 2018-05-09 RX ORDER — ONDANSETRON 2 MG/ML
4 INJECTION INTRAMUSCULAR; INTRAVENOUS ONCE AS NEEDED
Status: DISCONTINUED | OUTPATIENT
Start: 2018-05-09 | End: 2018-05-09 | Stop reason: HOSPADM

## 2018-05-09 RX ORDER — NALOXONE HYDROCHLORIDE 0.4 MG/ML
INJECTION, SOLUTION INTRAMUSCULAR; INTRAVENOUS; SUBCUTANEOUS AS NEEDED
Status: DISCONTINUED | OUTPATIENT
Start: 2018-05-09 | End: 2018-05-09 | Stop reason: SURG

## 2018-05-09 RX ORDER — HYDROCODONE BITARTRATE AND ACETAMINOPHEN 7.5; 325 MG/1; MG/1
2 TABLET ORAL EVERY 4 HOURS PRN
Status: DISCONTINUED | OUTPATIENT
Start: 2018-05-09 | End: 2018-05-10 | Stop reason: HOSPADM

## 2018-05-09 RX ORDER — ALBUTEROL SULFATE 2.5 MG/3ML
2.5 SOLUTION RESPIRATORY (INHALATION) ONCE AS NEEDED
Status: DISCONTINUED | OUTPATIENT
Start: 2018-05-09 | End: 2018-05-09 | Stop reason: HOSPADM

## 2018-05-09 RX ORDER — METOCLOPRAMIDE HYDROCHLORIDE 5 MG/ML
10 INJECTION INTRAMUSCULAR; INTRAVENOUS ONCE
Status: COMPLETED | OUTPATIENT
Start: 2018-05-09 | End: 2018-05-09

## 2018-05-09 RX ORDER — PROMETHAZINE HYDROCHLORIDE 25 MG/1
12.5 TABLET ORAL ONCE AS NEEDED
Status: DISCONTINUED | OUTPATIENT
Start: 2018-05-09 | End: 2018-05-09 | Stop reason: HOSPADM

## 2018-05-09 RX ORDER — HYDROCODONE BITARTRATE AND ACETAMINOPHEN 7.5; 325 MG/1; MG/1
1 TABLET ORAL EVERY 4 HOURS PRN
Status: DISCONTINUED | OUTPATIENT
Start: 2018-05-09 | End: 2018-05-10 | Stop reason: HOSPADM

## 2018-05-09 RX ORDER — HYDROMORPHONE HCL 110MG/55ML
0.5 PATIENT CONTROLLED ANALGESIA SYRINGE INTRAVENOUS
Status: DISCONTINUED | OUTPATIENT
Start: 2018-05-09 | End: 2018-05-09 | Stop reason: HOSPADM

## 2018-05-09 RX ORDER — MAGNESIUM HYDROXIDE 1200 MG/15ML
LIQUID ORAL AS NEEDED
Status: DISCONTINUED | OUTPATIENT
Start: 2018-05-09 | End: 2018-05-09 | Stop reason: HOSPADM

## 2018-05-09 RX ORDER — PROMETHAZINE HYDROCHLORIDE 25 MG/1
25 SUPPOSITORY RECTAL ONCE AS NEEDED
Status: DISCONTINUED | OUTPATIENT
Start: 2018-05-09 | End: 2018-05-09 | Stop reason: HOSPADM

## 2018-05-09 RX ORDER — DEXAMETHASONE SODIUM PHOSPHATE 10 MG/ML
INJECTION INTRAMUSCULAR; INTRAVENOUS AS NEEDED
Status: DISCONTINUED | OUTPATIENT
Start: 2018-05-09 | End: 2018-05-09 | Stop reason: SURG

## 2018-05-09 RX ORDER — EPHEDRINE SULFATE 50 MG/ML
5 INJECTION, SOLUTION INTRAVENOUS ONCE AS NEEDED
Status: DISCONTINUED | OUTPATIENT
Start: 2018-05-09 | End: 2018-05-09 | Stop reason: HOSPADM

## 2018-05-09 RX ORDER — PROMETHAZINE HYDROCHLORIDE 25 MG/1
25 TABLET ORAL ONCE AS NEEDED
Status: DISCONTINUED | OUTPATIENT
Start: 2018-05-09 | End: 2018-05-09 | Stop reason: HOSPADM

## 2018-05-09 RX ORDER — PROMETHAZINE HYDROCHLORIDE 25 MG/ML
12.5 INJECTION, SOLUTION INTRAMUSCULAR; INTRAVENOUS ONCE AS NEEDED
Status: DISCONTINUED | OUTPATIENT
Start: 2018-05-09 | End: 2018-05-09 | Stop reason: HOSPADM

## 2018-05-09 RX ORDER — MIDAZOLAM HYDROCHLORIDE 1 MG/ML
1 INJECTION INTRAMUSCULAR; INTRAVENOUS
Status: DISCONTINUED | OUTPATIENT
Start: 2018-05-09 | End: 2018-05-09 | Stop reason: HOSPADM

## 2018-05-09 RX ORDER — SODIUM CHLORIDE, SODIUM LACTATE, POTASSIUM CHLORIDE, CALCIUM CHLORIDE 600; 310; 30; 20 MG/100ML; MG/100ML; MG/100ML; MG/100ML
9 INJECTION, SOLUTION INTRAVENOUS CONTINUOUS
Status: DISCONTINUED | OUTPATIENT
Start: 2018-05-09 | End: 2018-05-10 | Stop reason: HOSPADM

## 2018-05-09 RX ORDER — LIDOCAINE HYDROCHLORIDE 10 MG/ML
0.5 INJECTION, SOLUTION EPIDURAL; INFILTRATION; INTRACAUDAL; PERINEURAL ONCE AS NEEDED
Status: DISCONTINUED | OUTPATIENT
Start: 2018-05-09 | End: 2018-05-09 | Stop reason: HOSPADM

## 2018-05-09 RX ORDER — FENTANYL CITRATE 50 UG/ML
INJECTION, SOLUTION INTRAMUSCULAR; INTRAVENOUS AS NEEDED
Status: DISCONTINUED | OUTPATIENT
Start: 2018-05-09 | End: 2018-05-09 | Stop reason: SURG

## 2018-05-09 RX ORDER — GLIPIZIDE 5 MG/1
5 TABLET ORAL
Status: DISCONTINUED | OUTPATIENT
Start: 2018-05-10 | End: 2018-05-10 | Stop reason: HOSPADM

## 2018-05-09 RX ORDER — ONDANSETRON 2 MG/ML
INJECTION INTRAMUSCULAR; INTRAVENOUS AS NEEDED
Status: DISCONTINUED | OUTPATIENT
Start: 2018-05-09 | End: 2018-05-09 | Stop reason: SURG

## 2018-05-09 RX ORDER — BUPIVACAINE HYDROCHLORIDE AND EPINEPHRINE 2.5; 5 MG/ML; UG/ML
INJECTION, SOLUTION INFILTRATION; PERINEURAL AS NEEDED
Status: DISCONTINUED | OUTPATIENT
Start: 2018-05-09 | End: 2018-05-09 | Stop reason: HOSPADM

## 2018-05-09 RX ORDER — LIDOCAINE HYDROCHLORIDE 20 MG/ML
INJECTION, SOLUTION INFILTRATION; PERINEURAL AS NEEDED
Status: DISCONTINUED | OUTPATIENT
Start: 2018-05-09 | End: 2018-05-09 | Stop reason: SURG

## 2018-05-09 RX ORDER — ISOSORBIDE MONONITRATE 30 MG/1
30 TABLET, EXTENDED RELEASE ORAL NIGHTLY
Status: DISCONTINUED | OUTPATIENT
Start: 2018-05-09 | End: 2018-05-09 | Stop reason: SDUPTHER

## 2018-05-09 RX ORDER — GLYCOPYRROLATE 0.2 MG/ML
INJECTION INTRAMUSCULAR; INTRAVENOUS AS NEEDED
Status: DISCONTINUED | OUTPATIENT
Start: 2018-05-09 | End: 2018-05-09 | Stop reason: SURG

## 2018-05-09 RX ORDER — DIPHENHYDRAMINE HYDROCHLORIDE 50 MG/ML
12.5 INJECTION INTRAMUSCULAR; INTRAVENOUS
Status: DISCONTINUED | OUTPATIENT
Start: 2018-05-09 | End: 2018-05-09 | Stop reason: HOSPADM

## 2018-05-09 RX ORDER — NALOXONE HCL 0.4 MG/ML
0.2 VIAL (ML) INJECTION AS NEEDED
Status: DISCONTINUED | OUTPATIENT
Start: 2018-05-09 | End: 2018-05-09 | Stop reason: HOSPADM

## 2018-05-09 RX ORDER — SUCCINYLCHOLINE CHLORIDE 20 MG/ML
INJECTION INTRAMUSCULAR; INTRAVENOUS AS NEEDED
Status: DISCONTINUED | OUTPATIENT
Start: 2018-05-09 | End: 2018-05-09 | Stop reason: SURG

## 2018-05-09 RX ORDER — FENTANYL CITRATE 50 UG/ML
50 INJECTION, SOLUTION INTRAMUSCULAR; INTRAVENOUS
Status: DISCONTINUED | OUTPATIENT
Start: 2018-05-09 | End: 2018-05-09 | Stop reason: HOSPADM

## 2018-05-09 RX ORDER — SODIUM CHLORIDE 0.9 % (FLUSH) 0.9 %
1-10 SYRINGE (ML) INJECTION AS NEEDED
Status: DISCONTINUED | OUTPATIENT
Start: 2018-05-09 | End: 2018-05-09 | Stop reason: HOSPADM

## 2018-05-09 RX ORDER — ROCURONIUM BROMIDE 10 MG/ML
INJECTION, SOLUTION INTRAVENOUS AS NEEDED
Status: DISCONTINUED | OUTPATIENT
Start: 2018-05-09 | End: 2018-05-09 | Stop reason: SURG

## 2018-05-09 RX ORDER — LABETALOL HYDROCHLORIDE 5 MG/ML
5 INJECTION, SOLUTION INTRAVENOUS
Status: DISCONTINUED | OUTPATIENT
Start: 2018-05-09 | End: 2018-05-09 | Stop reason: HOSPADM

## 2018-05-09 RX ORDER — FLUMAZENIL 0.1 MG/ML
0.2 INJECTION INTRAVENOUS AS NEEDED
Status: DISCONTINUED | OUTPATIENT
Start: 2018-05-09 | End: 2018-05-09 | Stop reason: HOSPADM

## 2018-05-09 RX ORDER — FAMOTIDINE 10 MG/ML
20 INJECTION, SOLUTION INTRAVENOUS ONCE
Status: COMPLETED | OUTPATIENT
Start: 2018-05-09 | End: 2018-05-09

## 2018-05-09 RX ORDER — HYDRALAZINE HYDROCHLORIDE 20 MG/ML
5 INJECTION INTRAMUSCULAR; INTRAVENOUS
Status: DISCONTINUED | OUTPATIENT
Start: 2018-05-09 | End: 2018-05-09 | Stop reason: HOSPADM

## 2018-05-09 RX ORDER — HYDROMORPHONE HCL 110MG/55ML
PATIENT CONTROLLED ANALGESIA SYRINGE INTRAVENOUS AS NEEDED
Status: DISCONTINUED | OUTPATIENT
Start: 2018-05-09 | End: 2018-05-09 | Stop reason: SURG

## 2018-05-09 RX ORDER — PROPOFOL 10 MG/ML
VIAL (ML) INTRAVENOUS AS NEEDED
Status: DISCONTINUED | OUTPATIENT
Start: 2018-05-09 | End: 2018-05-09 | Stop reason: SURG

## 2018-05-09 RX ORDER — MIDAZOLAM HYDROCHLORIDE 1 MG/ML
2 INJECTION INTRAMUSCULAR; INTRAVENOUS
Status: DISCONTINUED | OUTPATIENT
Start: 2018-05-09 | End: 2018-05-09 | Stop reason: HOSPADM

## 2018-05-09 RX ADMIN — SUGAMMADEX 200 MG: 100 INJECTION, SOLUTION INTRAVENOUS at 14:16

## 2018-05-09 RX ADMIN — FAMOTIDINE 20 MG: 10 INJECTION INTRAVENOUS at 13:06

## 2018-05-09 RX ADMIN — SODIUM CHLORIDE, POTASSIUM CHLORIDE, SODIUM LACTATE AND CALCIUM CHLORIDE 75 ML/HR: 600; 310; 30; 20 INJECTION, SOLUTION INTRAVENOUS at 04:36

## 2018-05-09 RX ADMIN — MIDAZOLAM HYDROCHLORIDE 1 MG: 2 INJECTION, SOLUTION INTRAMUSCULAR; INTRAVENOUS at 13:06

## 2018-05-09 RX ADMIN — GLYCOPYRROLATE 0.2 MG: 0.2 INJECTION INTRAMUSCULAR; INTRAVENOUS at 13:43

## 2018-05-09 RX ADMIN — METOCLOPRAMIDE 10 MG: 5 INJECTION, SOLUTION INTRAMUSCULAR; INTRAVENOUS at 20:34

## 2018-05-09 RX ADMIN — LIDOCAINE HYDROCHLORIDE 50 MG: 20 INJECTION, SOLUTION INFILTRATION; PERINEURAL at 13:45

## 2018-05-09 RX ADMIN — SODIUM CHLORIDE, POTASSIUM CHLORIDE, SODIUM LACTATE AND CALCIUM CHLORIDE 9 ML/HR: 600; 310; 30; 20 INJECTION, SOLUTION INTRAVENOUS at 13:04

## 2018-05-09 RX ADMIN — SUCCINYLCHOLINE CHLORIDE 100 MG: 20 INJECTION, SOLUTION INTRAMUSCULAR; INTRAVENOUS; PARENTERAL at 13:45

## 2018-05-09 RX ADMIN — ISOSORBIDE MONONITRATE 30 MG: 30 TABLET ORAL at 20:32

## 2018-05-09 RX ADMIN — FENTANYL CITRATE 100 MCG: 50 INJECTION INTRAMUSCULAR; INTRAVENOUS at 14:05

## 2018-05-09 RX ADMIN — NALOXONE HYDROCHLORIDE 0.1 MG: 0.4 INJECTION, SOLUTION INTRAMUSCULAR; INTRAVENOUS; SUBCUTANEOUS at 14:33

## 2018-05-09 RX ADMIN — PROPOFOL 50 MG: 10 INJECTION, EMULSION INTRAVENOUS at 13:59

## 2018-05-09 RX ADMIN — ROCURONIUM BROMIDE 5 MG: 10 INJECTION INTRAVENOUS at 13:45

## 2018-05-09 RX ADMIN — Medication 5 MG: at 20:44

## 2018-05-09 RX ADMIN — DEXAMETHASONE SODIUM PHOSPHATE 6 MG: 10 INJECTION INTRAMUSCULAR; INTRAVENOUS at 14:14

## 2018-05-09 RX ADMIN — HYDROMORPHONE HYDROCHLORIDE 0.25 MG: 2 INJECTION INTRAMUSCULAR; INTRAVENOUS; SUBCUTANEOUS at 13:43

## 2018-05-09 RX ADMIN — ONDANSETRON 4 MG: 2 INJECTION INTRAMUSCULAR; INTRAVENOUS at 14:14

## 2018-05-09 RX ADMIN — KETOROLAC TROMETHAMINE 30 MG: 30 INJECTION, SOLUTION INTRAMUSCULAR; INTRAVENOUS at 14:05

## 2018-05-09 RX ADMIN — PANTOPRAZOLE SODIUM 40 MG: 40 TABLET, DELAYED RELEASE ORAL at 07:00

## 2018-05-09 RX ADMIN — BUSPIRONE HYDROCHLORIDE 5 MG: 5 TABLET ORAL at 18:06

## 2018-05-09 RX ADMIN — PROPOFOL 150 MG: 10 INJECTION, EMULSION INTRAVENOUS at 13:45

## 2018-05-09 RX ADMIN — HYDROMORPHONE HYDROCHLORIDE 0.25 MG: 2 INJECTION INTRAMUSCULAR; INTRAVENOUS; SUBCUTANEOUS at 13:51

## 2018-05-09 RX ADMIN — SODIUM CHLORIDE 1 G: 900 INJECTION INTRAVENOUS at 10:15

## 2018-05-09 RX ADMIN — BUSPIRONE HYDROCHLORIDE 5 MG: 5 TABLET ORAL at 09:26

## 2018-05-09 RX ADMIN — SODIUM CHLORIDE, POTASSIUM CHLORIDE, SODIUM LACTATE AND CALCIUM CHLORIDE: 600; 310; 30; 20 INJECTION, SOLUTION INTRAVENOUS at 14:22

## 2018-05-09 NOTE — ANESTHESIA PROCEDURE NOTES
Airway  Urgency: elective    Airway not difficult    General Information and Staff    Patient location during procedure: OR  Anesthesiologist: NOELLE BIGGS  CRNA: NELL CASTRO    Indications and Patient Condition  Indications for airway management: airway protection    Preoxygenated: yes  MILS maintained throughout  Mask difficulty assessment: 1 - vent by mask    Final Airway Details  Final airway type: endotracheal airway      Successful airway: ETT  Cuffed: yes   Successful intubation technique: direct laryngoscopy  Facilitating devices/methods: cricoid pressure  Endotracheal tube insertion site: oral  Blade: Lucius  Blade size: #3  ETT size: 8.0 mm  Cormack-Lehane Classification: grade I - full view of glottis  Placement verified by: chest auscultation and capnometry   Inital cuff pressure (cm H2O): 22  Cuff volume (mL): 10  Measured from: lips  ETT to lips (cm): 23  Number of attempts at approach: 1

## 2018-05-09 NOTE — ANESTHESIA POSTPROCEDURE EVALUATION
Patient: Erick Bruno    Procedure Summary     Date:  05/09/18 Room / Location:  [facility] OR [address] [place] [address] MAIN OR    Anesthesia Start:  1337 Anesthesia Stop:  1440    Procedure:  CHOLECYSTECTOMY LAPAROSCOPIC INTRAOPERATIVE CHOLANGIOGRAM (N/A Abdomen) Diagnosis:       Gallstones      (Gallstones [K80.20])    Surgeon:  Daniel Gonzalez MD Provider:  Don Hahn MD    Anesthesia Type:  general ASA Status:  3 - Emergent          Anesthesia Type: general  Last vitals  BP   141/78 (05/09/18 1500)   Temp   36.4 °C (97.5 °F) (05/09/18 1440)   Pulse   69 (05/09/18 1500)   Resp   16 (05/09/18 1500)     SpO2   92 % (05/09/18 1500)     Post Anesthesia Care and Evaluation    Patient location during evaluation: PACU  Patient participation: complete - patient participated  Level of consciousness: awake and alert  Pain management: adequate  Airway patency: patent  Anesthetic complications: No anesthetic complications    Cardiovascular status: acceptable  Respiratory status: acceptable  Hydration status: acceptable    Comments: --------------------            05/09/18               1500     --------------------   BP:       141/78     Pulse:      69       Resp:       16       Temp:                SpO2:      92%      --------------------

## 2018-05-09 NOTE — ANESTHESIA PREPROCEDURE EVALUATION
Anesthesia Evaluation     Patient summary reviewed and Nursing notes reviewed                Airway   Mallampati: II  No difficulty expected  Dental    (+) poor dentition    Pulmonary    (+) a smoker Former, COPD, shortness of breath, sleep apnea,   Cardiovascular     ECG reviewed  Rhythm: regular  Rate: normal    (+) hypertension, past MI  6-12 months, CAD, PVD, hyperlipidemia,       Neuro/Psych  (+) seizures, psychiatric history Anxiety,     GI/Hepatic/Renal/Endo    (+)  GERD,  hepatitis, liver disease, diabetes mellitus,     Musculoskeletal     Abdominal    Substance History - negative use     OB/GYN negative ob/gyn ROS         Other   (+) arthritis                     Anesthesia Plan    ASA 3 - emergent     general   (COPD  Possible inf MI months ago ; saw cardiology then  Chronic SOB with COPD  PVD  Pt has one tooth)  intravenous induction   Anesthetic plan and risks discussed with patient.

## 2018-05-09 NOTE — PLAN OF CARE
Problem: Patient Care Overview  Goal: Plan of Care Review  Outcome: Ongoing (interventions implemented as appropriate)   05/09/18 8130   Coping/Psychosocial   Plan of Care Reviewed With patient;spouse   Plan of Care Review   Progress improving   OTHER   Outcome Summary Patient resting today. . Had EGD and Colonoscopy , tolerated well Patient NPO for Surgery this morning. Has denied any pain, C/o insomnia, order obtained for Melatonin No acute disress at this time. Nursing will continue to monitor.     Goal: Individualization and Mutuality  Outcome: Ongoing (interventions implemented as appropriate)    Goal: Discharge Needs Assessment  Outcome: Ongoing (interventions implemented as appropriate)      Problem: Fall Risk (Adult)  Goal: Absence of Fall  Outcome: Ongoing (interventions implemented as appropriate)      Problem: Pancreatitis, Acute/Chronic (Adult)  Goal: Signs and Symptoms of Listed Potential Problems Will be Absent, Minimized or Managed (Pancreatitis, Acute/Chronic)  Outcome: Ongoing (interventions implemented as appropriate)      Problem: Pain, Acute (Adult)  Goal: Acceptable Pain Control/Comfort Level  Outcome: Ongoing (interventions implemented as appropriate)

## 2018-05-10 VITALS
OXYGEN SATURATION: 97 % | BODY MASS INDEX: 26.67 KG/M2 | WEIGHT: 180.1 LBS | HEART RATE: 56 BPM | SYSTOLIC BLOOD PRESSURE: 149 MMHG | HEIGHT: 69 IN | DIASTOLIC BLOOD PRESSURE: 66 MMHG | RESPIRATION RATE: 14 BRPM | TEMPERATURE: 97.4 F

## 2018-05-10 PROBLEM — K85.90 PANCREATITIS, ACUTE: Status: RESOLVED | Noted: 2018-05-06 | Resolved: 2018-05-10

## 2018-05-10 PROBLEM — K85.90 PANCREATITIS: Status: RESOLVED | Noted: 2018-05-06 | Resolved: 2018-05-10

## 2018-05-10 PROBLEM — K80.20 GALLSTONES: Status: RESOLVED | Noted: 2018-05-06 | Resolved: 2018-05-10

## 2018-05-10 PROBLEM — R10.13 EPIGASTRIC PAIN: Status: RESOLVED | Noted: 2018-05-06 | Resolved: 2018-05-10

## 2018-05-10 LAB
CYTO UR: NORMAL
GLUCOSE BLDC GLUCOMTR-MCNC: 199 MG/DL (ref 70–130)
GLUCOSE BLDC GLUCOMTR-MCNC: 287 MG/DL (ref 70–130)
LAB AP CASE REPORT: NORMAL
Lab: NORMAL
PATH REPORT.FINAL DX SPEC: NORMAL
PATH REPORT.GROSS SPEC: NORMAL

## 2018-05-10 PROCEDURE — 90732 PPSV23 VACC 2 YRS+ SUBQ/IM: CPT | Performed by: SURGERY

## 2018-05-10 PROCEDURE — 25010000002 PNEUMOCOCCAL VAC POLYVALENT PER 0.5 ML: Performed by: SURGERY

## 2018-05-10 PROCEDURE — G0009 ADMIN PNEUMOCOCCAL VACCINE: HCPCS | Performed by: SURGERY

## 2018-05-10 PROCEDURE — 82962 GLUCOSE BLOOD TEST: CPT

## 2018-05-10 PROCEDURE — 25010000002 ERTAPENEM: Performed by: SURGERY

## 2018-05-10 RX ORDER — HYDROCODONE BITARTRATE AND ACETAMINOPHEN 7.5; 325 MG/1; MG/1
1 TABLET ORAL EVERY 4 HOURS PRN
Qty: 25 TABLET | Refills: 0 | Status: SHIPPED | OUTPATIENT
Start: 2018-05-10 | End: 2018-10-03

## 2018-05-10 RX ADMIN — PNEUMOCOCCAL VACCINE POLYVALENT 0.5 ML
25; 25; 25; 25; 25; 25; 25; 25; 25; 25; 25; 25; 25; 25; 25; 25; 25; 25; 25; 25; 25; 25; 25 INJECTION, SOLUTION INTRAMUSCULAR; SUBCUTANEOUS at 07:58

## 2018-05-10 RX ADMIN — PANTOPRAZOLE SODIUM 40 MG: 40 TABLET, DELAYED RELEASE ORAL at 06:50

## 2018-05-10 RX ADMIN — BUSPIRONE HYDROCHLORIDE 5 MG: 5 TABLET ORAL at 10:46

## 2018-05-10 RX ADMIN — SODIUM CHLORIDE 1 G: 900 INJECTION INTRAVENOUS at 11:55

## 2018-05-10 RX ADMIN — GLIPIZIDE 5 MG: 5 TABLET ORAL at 06:50

## 2018-05-10 NOTE — PLAN OF CARE
Problem: Patient Care Overview  Goal: Plan of Care Review  Outcome: Ongoing (interventions implemented as appropriate)   05/10/18 0313   Coping/Psychosocial   Plan of Care Reviewed With patient   Plan of Care Review   Progress improving   OTHER   Outcome Summary pt resting in bed quietly. denies pain. passing gas. one bm this shift. voiding well. bs positive. will continue to monitor.      Goal: Individualization and Mutuality  Outcome: Ongoing (interventions implemented as appropriate)    Goal: Discharge Needs Assessment  Outcome: Ongoing (interventions implemented as appropriate)      Problem: Fall Risk (Adult)  Goal: Absence of Fall  Outcome: Ongoing (interventions implemented as appropriate)      Problem: Pancreatitis, Acute/Chronic (Adult)  Goal: Signs and Symptoms of Listed Potential Problems Will be Absent, Minimized or Managed (Pancreatitis, Acute/Chronic)  Outcome: Ongoing (interventions implemented as appropriate)      Problem: Pain, Acute (Adult)  Goal: Acceptable Pain Control/Comfort Level  Outcome: Ongoing (interventions implemented as appropriate)

## 2018-05-10 NOTE — DISCHARGE SUMMARY
May 10, 2018    Erick Bruno  [id number]      ADMITTING DIAGNOSIS:  Pancreatitis, acute [K85.90]  Pancreatitis [K85.90]  Acute pancreatitis, unspecified complication status, unspecified pancreatitis type [K85.90]    DISCHARGE DIAGNOSIS:  Same with reflux esophagitis, antral gastritis, colon polyps, cholecystitis and cholelithiasis.      ADMITTING MD:  Daniel Gonzalez M.D.    CONSULTANTS:  None.    PRIMARY MD:  Denzel Fuller MD    PROCEDURES PERFORMED:   Procedure(s):  EGD with BIOPSY and COLONOSCOPY with BIOPSY  CHOLECYSTECTOMY LAPAROSCOPIC INTRAOPERATIVE CHOLANGIOGRAM       HOSPITAL COURSE:  Patient is a 72 y.o. male presented with epigastric pain and what looked like mild duodenitis versus pancreatitis on CT scan.  He was admitted for plans for conservative therapy and further evaluation.  Based on the CT scan findings and the minimal evidence of chemical pancreatitis I was concerned that perhaps there was duodenitis and recommended upper endoscopy in conjunction with a colonoscopy for personal history of polyps.  I also recommended gallbladder studies based on his biliary colic type complaints.  Upper endoscopy confirms some mild inflammation of the upper GI tract but nothing grossly acutely pathological and the ultrasound of the gallbladder confirmed gallstones.  Based on the minimal findings at endoscopy and his complaints of brought into the hospital I recommended consideration for laparoscopic cholecystectomy while here and they agreed to proceed.  The procedure was performed uneventfully and he had a very uneventful and almost pain-free night after the surgery while kept in observation.  He was cleared for discharge this morning with outpatient follow-up planned and resumption of all of his home medications.  He was to call the any setbacks or problems after discharge and he is to follow-up with his primary physician as previously scheduled.    DISPOSITION:  Home.    Discharge Medications   Erick Bruno    Home Medication Instructions GEORGETTE:[id number]    Printed on:05/10/18 0655   Medication Information                      aspirin 81 MG chewable tablet  Chew 81 mg Every Night. PT TO CALL DR MULLER TO SEE WHEN TO STOP             atorvastatin (LIPITOR) 40 MG tablet  TAKE 1 TABLET BY MOUTH ONE TIME A DAY              busPIRone (BUSPAR) 5 MG tablet  TAKE ONE-HALF TABLET BY MOUTH TWICE DAILY FOR 7 DAYS AND THEN TAKE ONE TABLET TWICE DAILY             coenzyme Q10 100 MG capsule  Take 200 mg by mouth Every Night.             esomeprazole (nexIUM) 40 MG capsule  Take 40 mg by mouth Every Morning Before Breakfast.             glimepiride (AMARYL) 2 MG tablet  TAKE ONE TABLET BY MOUTH IN THE MORNING BEFORE BREAKFAST             HYDROcodone-acetaminophen (NORCO) 7.5-325 MG per tablet  Take 1 tablet by mouth Every 4 (Four) Hours As Needed for Moderate Pain  for up to 25 doses.             isosorbide mononitrate (IMDUR) 30 MG 24 hr tablet  Take 30 mg by mouth Every Night.             LORazepam (ATIVAN) 0.5 MG tablet  Take 0.5 mg by mouth Every 8 (Eight) Hours As Needed for Anxiety.             metFORMIN (GLUCOPHAGE) 1000 MG tablet  Take 1 tablet by mouth 2 (Two) Times a Day With Meals.             nitroglycerin (NITROSTAT) 0.4 MG SL tablet  Place 1 tablet under the tongue Every 5 (Five) Minutes As Needed for Chest Pain. Take no more than 3 doses in 15 minutes.             omeprazole (priLOSEC) 20 MG capsule  Take 20 mg by mouth Daily. Uses when not taking Nexium             ONE TOUCH ULTRA TEST test strip  USE ONE STRIP TO CHECK GLUCOSE ONCE DAILY             saw palmetto 160 MG capsule  Take 160 mg by mouth 2 (Two) Times a Day.             vitamin E 1000 UNIT capsule  Take 1,200 Units by mouth Every Night.                   Daniel Gonzalez M.D.  05/10/18  6:55 AM    Time: 15 minutes.

## 2018-05-10 NOTE — PROGRESS NOTES
Continued Stay Note   [facility]     Patient Name: Erick Bruno  MRN: [id number]  Today's Date: 5/10/2018    Admit Date: 5/6/2018          Discharge Plan     Row Name 05/10/18 1002       Plan    Plan Plan home with wife.  VIDAL Riddle    Patient/Family in Agreement with Plan yes    Plan Comments Spoke with pt and wife (Marcelle  [phone]) at bedside.  Pt denies any discharge needs.  Plan home.  VIDAL Riddle              Discharge Codes    No documentation.       Expected Discharge Date and Time     Expected Discharge Date Expected Discharge Time    May 10, 2018             Charlotte Nagy, RN

## 2018-05-10 NOTE — PLAN OF CARE
Problem: Patient Care Overview  Goal: Plan of Care Review  Outcome: Ongoing (interventions implemented as appropriate)   05/09/18 2032   Coping/Psychosocial   Plan of Care Reviewed With patient;spouse;family   Plan of Care Review   Progress improving   OTHER   Outcome Summary Lap Elizabeth surgery completed. Pt doing well. Denies pain Drsgs intact. Voided. Tolerating liquids. Telemetry NSR. Will continue to monitor.,       Problem: Fall Risk (Adult)  Goal: Absence of Fall  Outcome: Ongoing (interventions implemented as appropriate)      Problem: Pancreatitis, Acute/Chronic (Adult)  Goal: Signs and Symptoms of Listed Potential Problems Will be Absent, Minimized or Managed (Pancreatitis, Acute/Chronic)  Outcome: Ongoing (interventions implemented as appropriate)   05/09/18 2032   Goal/Outcome Evaluation   Problems Assessed (Pancreatitis) all   Problems Present (Pancreatitis) situational response       Problem: Pain, Acute (Adult)  Goal: Acceptable Pain Control/Comfort Level  Outcome: Ongoing (interventions implemented as appropriate)

## 2018-05-14 NOTE — PROGRESS NOTES
Case Management Discharge Note    Final Note: Plan home with wife.  VIDAL Riddle    Destination     No service coordination in this encounter.      Durable Medical Equipment     No service coordination in this encounter.      Dialysis/Infusion     No service coordination in this encounter.      Home Medical Care     No service coordination in this encounter.      Social Care     No service coordination in this encounter.        Other: Other (Private Auto)    Final Discharge Disposition Code: 01 - home or self-care

## 2018-05-15 RX ORDER — ATORVASTATIN CALCIUM 40 MG/1
TABLET, FILM COATED ORAL
Qty: 30 TABLET | Refills: 2 | Status: SHIPPED | OUTPATIENT
Start: 2018-05-15 | End: 2018-05-21 | Stop reason: SDUPTHER

## 2018-05-21 ENCOUNTER — OFFICE VISIT (OUTPATIENT)
Dept: CARDIOLOGY | Facility: CLINIC | Age: 73
End: 2018-05-21

## 2018-05-21 VITALS
SYSTOLIC BLOOD PRESSURE: 140 MMHG | WEIGHT: 174 LBS | HEART RATE: 71 BPM | HEIGHT: 69 IN | DIASTOLIC BLOOD PRESSURE: 84 MMHG | BODY MASS INDEX: 25.77 KG/M2

## 2018-05-21 DIAGNOSIS — R94.39 ABNORMAL NUCLEAR STRESS TEST: Primary | Chronic | ICD-10-CM

## 2018-05-21 DIAGNOSIS — E78.49 OTHER HYPERLIPIDEMIA: ICD-10-CM

## 2018-05-21 PROCEDURE — 99213 OFFICE O/P EST LOW 20 MIN: CPT | Performed by: INTERNAL MEDICINE

## 2018-05-21 RX ORDER — ATORVASTATIN CALCIUM 10 MG/1
10 TABLET, FILM COATED ORAL DAILY
Refills: 1 | COMMUNITY
Start: 2018-04-05 | End: 2018-05-21 | Stop reason: SDUPTHER

## 2018-05-21 NOTE — PROGRESS NOTES
Subjective:     Encounter Date: 5/21/2018      Patient ID: Erick Bruno is a 72 y.o. male.    Chief Complaint: abnormal stress test  History of Present Illness    Dear Dr. Fuller,    I had the pleasure of seeing this patient in the office today for follow-up of his cardiac status.  It has been one year since his last visit    He is been seen in the past for chest discomfort.  No stress test performed that showed a small area of hypokinesis in the inferior wall with an ejection fraction of 48% and no ischemia.  At the time he elected not to proceed with any additional diagnostic testing.  He's been treated with medical therapy.    He was hospitalized with acute cholecystitis and pancreatitis and had a cholecystectomy.  Other than that he's been doing okay.  He didn't have any heart issues at the time his hospitalization.    He states that since his last visit he's been doing great from his heart standpoint with no other complaints.  This patient denies any chest pain, pressure, tightness, squeezing, or heartburn.  This patient has not experienced any feeling of palpitations, tachycardia or heart racing and no presyncope or syncope.  There has not been any problems with dizziness or lightheadedness.  There has not been any orthopnea or PND, and no problems with lower extremity edema.  This patient denies any shortness of breath at rest or with activity and has not had any wheezing.  This patient has not had any problems with unexplained nausea or vomiting. The patient has continued to perform daily activities of living without any specific problem or change in the level of activity.       The following portions of the patient's history were reviewed and updated as appropriate: allergies, current medications, past family history, past medical history, past social history, past surgical history and problem list.    Past Medical History:   Diagnosis Date   • Acid reflux    • Anemia    • Anxiety    • Arthritis    •  CAD (coronary artery disease)    • Chest discomfort     both typical and atypical   • Colon polyps    • Diabetes mellitus    • Enlarged prostate    • Hepatitis A    • Hyperlipidemia    • Hypertension    • Macular degeneration    • NSTEMI (non-ST elevated myocardial infarction)    • Panarteritis    • Peripheral arterial disease    • Precordial pain    • PVD (peripheral vascular disease)    • Seizure     2013, HAD HIGH FEVER   • Sleep apnea    • SOBOE (shortness of breath on exertion)    • Toe ulcer     LEFT 2ND TOE       Past Surgical History:   Procedure Laterality Date   • CHOLECYSTECTOMY WITH INTRAOPERATIVE CHOLANGIOGRAM N/A 5/9/2018    Procedure: CHOLECYSTECTOMY LAPAROSCOPIC INTRAOPERATIVE CHOLANGIOGRAM;  Surgeon: Daniel Gonzalez MD;  Location: [facility] MAIN OR;  Service: General   • COLONOSCOPY     • COLONOSCOPY N/A 5/8/2018    Procedure: COLONOSCOPY to cecum with polypectomies;  Surgeon: Daniel Gonzalez MD;  Location: [facility] ENDOSCOPY;  Service: Gastroenterology   • ENDOSCOPY N/A 5/8/2018    Procedure: ESOPHAGOGASTRODUODENOSCOPY with biopsies;  Surgeon: Daniel Gonzalez MD;  Location: [facility] ENDOSCOPY;  Service: Gastroenterology   • FEMORAL ARTERY STENT Left     on 3/22/16       Social History     Social History   • Marital status:      Spouse name: N/A   • Number of children: N/A   • Years of education: N/A     Occupational History   • [employer]      Social History Main Topics   • Smoking status: Former Smoker     Packs/day: 1.00     Years: 60.00     Quit date: 2008   • Smokeless tobacco: Never Used   • Alcohol use No      Comment: caffeine use   • Drug use: No   • Sexual activity: Defer     Other Topics Concern   • Not on file     Social History Narrative   • No narrative on file       Review of Systems   Constitution: Negative for chills, decreased appetite, fever and night sweats.   HENT: Negative for ear discharge, ear pain, hearing loss, nosebleeds and sore throat.    Eyes: Negative for  "blurred vision, double vision and pain.   Cardiovascular: Negative for cyanosis.   Respiratory: Negative for hemoptysis and sputum production.    Endocrine: Negative for cold intolerance and heat intolerance.   Hematologic/Lymphatic: Negative for adenopathy.   Skin: Negative for dry skin, itching, nail changes, rash and suspicious lesions.   Musculoskeletal: Negative for arthritis, gout, muscle cramps, muscle weakness, myalgias and neck pain.   Gastrointestinal: Negative for anorexia, bowel incontinence, constipation, diarrhea, dysphagia, hematemesis and jaundice.   Genitourinary: Negative for bladder incontinence, dysuria, flank pain, frequency, hematuria and nocturia.   Neurological: Negative for focal weakness, numbness, paresthesias and seizures.   Psychiatric/Behavioral: Negative for altered mental status, hallucinations, hypervigilance, suicidal ideas and thoughts of violence.   Allergic/Immunologic: Negative for persistent infections.       Procedures  I reviewed the EKG from the hospital.     Objective:     Vitals:    05/21/18 1144   BP: 140/84   Pulse: 71   Weight: 78.9 kg (174 lb)   Height: 175.3 cm (69\")         Physical Exam   Constitutional: He is oriented to person, place, and time. He appears well-developed and well-nourished. No distress.   HENT:   Head: Normocephalic and atraumatic.   Nose: Nose normal.   Mouth/Throat: Oropharynx is clear and moist.   Eyes: Conjunctivae and EOM are normal. Pupils are equal, round, and reactive to light. Right eye exhibits no discharge. Left eye exhibits no discharge.   Neck: Normal range of motion. Neck supple. No tracheal deviation present. No thyromegaly present.   Cardiovascular: Normal rate, regular rhythm, S1 normal, S2 normal and normal heart sounds.  Exam reveals no S3.    Pulses:       Dorsalis pedis pulses are 1+ on the right side, and 1+ on the left side.        Posterior tibial pulses are 1+ on the right side, and 1+ on the left side.   Pulmonary/Chest: " Effort normal and breath sounds normal. No stridor. No respiratory distress. He exhibits no tenderness.   Abdominal: Soft. Bowel sounds are normal. He exhibits no distension and no mass. There is no tenderness. There is no rebound and no guarding.   Musculoskeletal: Normal range of motion. He exhibits no tenderness or deformity.   Lymphadenopathy:     He has no cervical adenopathy.   Neurological: He is alert and oriented to person, place, and time. He has normal reflexes.   Skin: Skin is warm and dry. No rash noted. He is not diaphoretic. No erythema.   Psychiatric: He has a normal mood and affect. Thought content normal.       Lab Review:             Performed        Assessment:          Diagnosis Plan   1. Abnormal nuclear stress test     2. Other hyperlipidemia            Plan:       1. Coronary Artery Disease  Assessment  • The patient has no angina    Plan  • Lifestyle modifications discussed include adhering to a heart healthy diet, avoidance of tobacco products, maintenance of a healthy weight, medication compliance, regular exercise and regular monitoring of cholesterol and blood pressure  • This patient has a stress test consistent with a prior inferior myocardial infarction.  No ischemia, and the patient now is pain-free on his current medical therapy.  We will continue medical therapy and focus on risk factor modification.    Subjective - Objective  • There is a history of past MI  • Current antiplatelet therapy includes aspirin 81 mg        Thank you very much for allowing us to participate in the care of this pleasant patient.  Please don't hesitate to call if I can be of assistance in any way.      Current Outpatient Prescriptions:   •  aspirin 81 MG chewable tablet, Chew 81 mg Every Night. PT TO CALL DR MULLER TO SEE WHEN TO STOP, Disp: , Rfl:   •  atorvastatin (LIPITOR) 40 MG tablet, TAKE 1 TABLET BY MOUTH ONE TIME A DAY , Disp: 30 tablet, Rfl: 0  •  busPIRone (BUSPAR) 5 MG tablet, TAKE ONE-HALF  TABLET BY MOUTH TWICE DAILY FOR 7 DAYS AND THEN TAKE ONE TABLET TWICE DAILY (Patient taking differently: TAKE ONE TABLET TWICE DAILY), Disp: 60 tablet, Rfl: 5  •  Coenzyme Q10 200 MG capsule, Take 200 mg by mouth Every Night., Disp: , Rfl:   •  esomeprazole (nexIUM) 40 MG capsule, Take 40 mg by mouth Every Morning Before Breakfast., Disp: , Rfl:   •  glimepiride (AMARYL) 2 MG tablet, TAKE ONE TABLET BY MOUTH IN THE MORNING BEFORE BREAKFAST, Disp: 90 tablet, Rfl: 1  •  isosorbide mononitrate (IMDUR) 30 MG 24 hr tablet, Take 30 mg by mouth Every Night., Disp: , Rfl:   •  LORazepam (ATIVAN) 0.5 MG tablet, Take 0.5 mg by mouth Every 8 (Eight) Hours As Needed for Anxiety., Disp: , Rfl:   •  metFORMIN (GLUCOPHAGE) 1000 MG tablet, Take 1 tablet by mouth 2 (Two) Times a Day With Meals., Disp: 60 tablet, Rfl: 4  •  nitroglycerin (NITROSTAT) 0.4 MG SL tablet, Place 1 tablet under the tongue Every 5 (Five) Minutes As Needed for Chest Pain. Take no more than 3 doses in 15 minutes., Disp: 30 tablet, Rfl: 5  •  omeprazole (priLOSEC) 20 MG capsule, Take 20 mg by mouth Daily. Uses when not taking Nexium, Disp: , Rfl:   •  ONE TOUCH ULTRA TEST test strip, USE ONE STRIP TO CHECK GLUCOSE ONCE DAILY, Disp: 100 each, Rfl: 0  •  saw palmetto 160 MG capsule, Take 160 mg by mouth 2 (Two) Times a Day., Disp: , Rfl:   •  vitamin E 1000 UNIT capsule, Take 1,200 Units by mouth Every Night., Disp: , Rfl:   •  HYDROcodone-acetaminophen (NORCO) 7.5-325 MG per tablet, Take 1 tablet by mouth Every 4 (Four) Hours As Needed for Moderate Pain  for up to 25 doses., Disp: 25 tablet, Rfl: 0         EMR Dragon/Transcription disclaimer:    Much of this encounter note is an electronic transcription/translation of spoken language to printed text. The electronic translation of spoken language may permit erroneous, or at times, nonsensical words or phrases to be inadvertently transcribed; Although I have reviewed the note for such errors, some may still  exist.

## 2018-07-23 RX ORDER — ATORVASTATIN CALCIUM 40 MG/1
TABLET, FILM COATED ORAL
Qty: 30 TABLET | Refills: 0 | Status: SHIPPED | OUTPATIENT
Start: 2018-07-23 | End: 2018-08-13 | Stop reason: SDUPTHER

## 2018-08-13 RX ORDER — ATORVASTATIN CALCIUM 40 MG/1
TABLET, FILM COATED ORAL
Qty: 30 TABLET | Refills: 0 | Status: SHIPPED | OUTPATIENT
Start: 2018-08-13 | End: 2018-10-03 | Stop reason: SDUPTHER

## 2018-08-14 RX ORDER — BUSPIRONE HYDROCHLORIDE 5 MG/1
5 TABLET ORAL 2 TIMES DAILY
Qty: 180 TABLET | Refills: 0 | Status: SHIPPED | OUTPATIENT
Start: 2018-08-14 | End: 2018-12-03 | Stop reason: SDUPTHER

## 2018-08-20 RX ORDER — ATORVASTATIN CALCIUM 40 MG/1
TABLET, FILM COATED ORAL
Qty: 30 TABLET | Refills: 0 | Status: SHIPPED | OUTPATIENT
Start: 2018-08-20 | End: 2018-10-03 | Stop reason: SDUPTHER

## 2018-09-17 ENCOUNTER — TRANSCRIBE ORDERS (OUTPATIENT)
Dept: ADMINISTRATIVE | Facility: HOSPITAL | Age: 73
End: 2018-09-17

## 2018-09-17 DIAGNOSIS — I73.9 PAD (PERIPHERAL ARTERY DISEASE) (HCC): Primary | ICD-10-CM

## 2018-09-24 RX ORDER — GLIMEPIRIDE 2 MG/1
TABLET ORAL
Qty: 90 TABLET | Refills: 1 | Status: SHIPPED | OUTPATIENT
Start: 2018-09-24 | End: 2018-10-03 | Stop reason: SDUPTHER

## 2018-09-24 RX ORDER — ATORVASTATIN CALCIUM 20 MG/1
TABLET, FILM COATED ORAL
Qty: 30 TABLET | Refills: 0 | Status: SHIPPED | OUTPATIENT
Start: 2018-09-24 | End: 2018-10-03

## 2018-09-24 RX ORDER — ISOSORBIDE MONONITRATE 30 MG/1
TABLET, EXTENDED RELEASE ORAL
Qty: 90 TABLET | Refills: 1 | Status: SHIPPED | OUTPATIENT
Start: 2018-09-24 | End: 2018-10-03 | Stop reason: SDUPTHER

## 2018-09-26 ENCOUNTER — HOSPITAL ENCOUNTER (OUTPATIENT)
Dept: CT IMAGING | Facility: HOSPITAL | Age: 73
Discharge: HOME | End: 2018-09-26
Attending: SURGERY | Admitting: SURGERY

## 2018-09-26 DIAGNOSIS — I73.9 PAD (PERIPHERAL ARTERY DISEASE) (HCC): ICD-10-CM

## 2018-09-26 PROCEDURE — 75635 CT ANGIO ABDOMINAL ARTERIES: CPT

## 2018-09-26 PROCEDURE — 82565 ASSAY OF CREATININE: CPT

## 2018-09-26 PROCEDURE — 25010000002 IOPAMIDOL 61 % SOLUTION: Performed by: SURGERY

## 2018-09-26 RX ADMIN — IOPAMIDOL 95 ML: 612 INJECTION, SOLUTION INTRAVENOUS at 19:48

## 2018-09-27 LAB — CREAT BLDA-MCNC: 1 MG/DL (ref 0.6–1.3)

## 2018-10-03 ENCOUNTER — HOSPITAL ENCOUNTER (OUTPATIENT)
Dept: GENERAL RADIOLOGY | Facility: HOSPITAL | Age: 73
Discharge: HOME | End: 2018-10-03
Admitting: SURGERY

## 2018-10-03 ENCOUNTER — APPOINTMENT (OUTPATIENT)
Dept: PREADMISSION TESTING | Facility: HOSPITAL | Age: 73
End: 2018-10-03

## 2018-10-03 VITALS
WEIGHT: 175.9 LBS | TEMPERATURE: 98.5 F | SYSTOLIC BLOOD PRESSURE: 141 MMHG | BODY MASS INDEX: 26.05 KG/M2 | HEART RATE: 67 BPM | HEIGHT: 69 IN | OXYGEN SATURATION: 97 % | RESPIRATION RATE: 16 BRPM | DIASTOLIC BLOOD PRESSURE: 70 MMHG

## 2018-10-03 DIAGNOSIS — Z00.00 ROUTINE GENERAL MEDICAL EXAMINATION AT A HEALTH CARE FACILITY: Primary | ICD-10-CM

## 2018-10-03 LAB
ALBUMIN SERPL-MCNC: 4.1 G/DL (ref 3.5–5.2)
ALBUMIN/GLOB SERPL: 1.2 G/DL
ALP SERPL-CCNC: 112 U/L (ref 39–117)
ALT SERPL W P-5'-P-CCNC: 29 U/L (ref 1–41)
ANION GAP SERPL CALCULATED.3IONS-SCNC: 15.4 MMOL/L
APTT PPP: 26.5 SECONDS (ref 22.7–35.4)
AST SERPL-CCNC: 18 U/L (ref 1–40)
BASOPHILS # BLD AUTO: 0.05 10*3/MM3 (ref 0–0.2)
BASOPHILS NFR BLD AUTO: 0.6 % (ref 0–1.5)
BILIRUB SERPL-MCNC: 0.3 MG/DL (ref 0.1–1.2)
BILIRUB UR QL STRIP: NEGATIVE
BUN BLD-MCNC: 18 MG/DL (ref 8–23)
BUN/CREAT SERPL: 15.8 (ref 7–25)
CALCIUM SPEC-SCNC: 9.2 MG/DL (ref 8.6–10.5)
CHLORIDE SERPL-SCNC: 97 MMOL/L (ref 98–107)
CLARITY UR: CLEAR
CO2 SERPL-SCNC: 22.6 MMOL/L (ref 22–29)
COLOR UR: YELLOW
CREAT BLD-MCNC: 1.14 MG/DL (ref 0.76–1.27)
DEPRECATED RDW RBC AUTO: 44.1 FL (ref 37–54)
EOSINOPHIL # BLD AUTO: 0.29 10*3/MM3 (ref 0–0.7)
EOSINOPHIL NFR BLD AUTO: 3.6 % (ref 0.3–6.2)
ERYTHROCYTE [DISTWIDTH] IN BLOOD BY AUTOMATED COUNT: 13.5 % (ref 11.5–14.5)
GFR SERPL CREATININE-BSD FRML MDRD: 63 ML/MIN/1.73
GLOBULIN UR ELPH-MCNC: 3.3 GM/DL
GLUCOSE BLD-MCNC: 138 MG/DL (ref 65–99)
GLUCOSE UR STRIP-MCNC: ABNORMAL MG/DL
HCT VFR BLD AUTO: 42.1 % (ref 40.4–52.2)
HGB BLD-MCNC: 13.6 G/DL (ref 13.7–17.6)
HGB UR QL STRIP.AUTO: NEGATIVE
IMM GRANULOCYTES # BLD: 0.02 10*3/MM3 (ref 0–0.03)
IMM GRANULOCYTES NFR BLD: 0.2 % (ref 0–0.5)
INR PPP: 1.01 (ref 0.9–1.1)
KETONES UR QL STRIP: ABNORMAL
LEUKOCYTE ESTERASE UR QL STRIP.AUTO: NEGATIVE
LYMPHOCYTES # BLD AUTO: 2.27 10*3/MM3 (ref 0.9–4.8)
LYMPHOCYTES NFR BLD AUTO: 28.2 % (ref 19.6–45.3)
MCH RBC QN AUTO: 28.9 PG (ref 27–32.7)
MCHC RBC AUTO-ENTMCNC: 32.3 G/DL (ref 32.6–36.4)
MCV RBC AUTO: 89.6 FL (ref 79.8–96.2)
MONOCYTES # BLD AUTO: 0.45 10*3/MM3 (ref 0.2–1.2)
MONOCYTES NFR BLD AUTO: 5.6 % (ref 5–12)
NEUTROPHILS # BLD AUTO: 4.96 10*3/MM3 (ref 1.9–8.1)
NEUTROPHILS NFR BLD AUTO: 61.8 % (ref 42.7–76)
NITRITE UR QL STRIP: NEGATIVE
PH UR STRIP.AUTO: 5.5 [PH] (ref 5–8)
PLATELET # BLD AUTO: 216 10*3/MM3 (ref 140–500)
PMV BLD AUTO: 9.6 FL (ref 6–12)
POTASSIUM BLD-SCNC: 4.4 MMOL/L (ref 3.5–5.2)
PROT SERPL-MCNC: 7.4 G/DL (ref 6–8.5)
PROT UR QL STRIP: NEGATIVE
PROTHROMBIN TIME: 13.1 SECONDS (ref 11.7–14.2)
RBC # BLD AUTO: 4.7 10*6/MM3 (ref 4.6–6)
SODIUM BLD-SCNC: 135 MMOL/L (ref 136–145)
SP GR UR STRIP: 1.03 (ref 1–1.03)
UROBILINOGEN UR QL STRIP: ABNORMAL
WBC NRBC COR # BLD: 8.04 10*3/MM3 (ref 4.5–10.7)

## 2018-10-03 PROCEDURE — 71046 X-RAY EXAM CHEST 2 VIEWS: CPT

## 2018-10-03 PROCEDURE — 85730 THROMBOPLASTIN TIME PARTIAL: CPT | Performed by: SURGERY

## 2018-10-03 PROCEDURE — 85610 PROTHROMBIN TIME: CPT | Performed by: SURGERY

## 2018-10-03 PROCEDURE — 81003 URINALYSIS AUTO W/O SCOPE: CPT | Performed by: SURGERY

## 2018-10-03 PROCEDURE — 80053 COMPREHEN METABOLIC PANEL: CPT | Performed by: SURGERY

## 2018-10-03 PROCEDURE — 85025 COMPLETE CBC W/AUTO DIFF WBC: CPT | Performed by: SURGERY

## 2018-10-03 PROCEDURE — 36415 COLL VENOUS BLD VENIPUNCTURE: CPT

## 2018-10-03 RX ORDER — ATORVASTATIN CALCIUM 40 MG/1
40 TABLET, FILM COATED ORAL DAILY
COMMUNITY
End: 2018-12-03 | Stop reason: SDUPTHER

## 2018-10-03 RX ORDER — GLIMEPIRIDE 2 MG/1
2 TABLET ORAL
COMMUNITY
End: 2018-12-03 | Stop reason: SDUPTHER

## 2018-10-03 NOTE — DISCHARGE INSTRUCTIONS
Take the following medications the morning of surgery with a small sip of water:  PRILOSEC    PLEASE ARRIVE AT THE HOSPITAL AT 6:30 AM ON October 10, 2018    General Instructions:  • Do not eat solid food after midnight the night before surgery.  • You may drink clear liquids day of surgery but must stop at least one hour before your hospital arrival time.  • It is beneficial for you to have a clear drink that contains carbohydrates the day of surgery.  We suggest a 12 to 20 ounce bottle of Gatorade or Powerade for non-diabetic patients or a 12 to 20 ounce bottle of G2 or Powerade Zero for diabetic patients. (Pediatric patients, are not advised to drink a 12 to 20 ounce carbohydrate drink)    Clear liquids are liquids you can see through.  Nothing red in color.     Plain water                               Sports drinks  Sodas                                   Gelatin (Jell-O)  Fruit juices without pulp such as white grape juice and apple juice  Popsicles that contain no fruit or yogurt  Tea or coffee (no cream or milk added)  Gatorade / Powerade  G2 / Powerade Zero    • Infants may have breast milk up to four hours before surgery.  • Infants drinking formula may drink formula up to six hours before surgery.   • Patients who avoid smoking, chewing tobacco and alcohol for 4 weeks prior to surgery have a reduced risk of post-operative complications.  Quit smoking as many days before surgery as you can.  • Do not smoke, use chewing tobacco or drink alcohol the day of surgery.   • If applicable bring your C-PAP/ BI-PAP machine.  • Bring any papers given to you in the doctor’s office.  • Wear clean comfortable clothes and socks.  • Do not wear contact lenses or make-up.  Bring a case for your glasses.   • Bring crutches or walker if applicable.  • Remove all piercings.  Leave jewelry and any other valuables at home.  • Hair extensions with metal clips must be removed prior to surgery.  • The Pre-Admission Testing nurse  will instruct you to bring medications if unable to obtain an accurate list in Pre-Admission Testing.        If you were given a blood bank ID arm band remember to bring it with you the day of surgery.    Preventing a Surgical Site Infection:  • For 2 to 3 days before surgery, avoid shaving with a razor because the razor can irritate skin and make it easier to develop an infection.    • Any areas of open skin can increase the risk of a post-operative wound infection by allowing bacteria to enter and travel throughout the body.  Notify your surgeon if you have any skin wounds / rashes even if it is not near the expected surgical site.  The area will need assessed to determine if surgery should be delayed until it is healed.  • The night prior to surgery sleep in a clean bed with clean clothing.  Do not allow pets to sleep with you.  • Shower on the morning of surgery using a fresh bar of anti-bacterial soap (such as Dial) and clean washcloth.  Dry with a clean towel and dress in clean clothing.  • Ask your surgeon if you will be receiving antibiotics prior to surgery.  • Make sure you, your family, and all healthcare providers clean their hands with soap and water or an alcohol based hand  before caring for you or your wound.    Day of surgery:  Upon arrival, a Pre-op nurse and Anesthesiologist will review your health history, obtain vital signs, and answer questions you may have.  The only belongings needed at this time will be your home medications and if applicable your C-PAP/BI-PAP machine.  If you are staying overnight your family can leave the rest of your belongings in the car and bring them to your room later.  A Pre-op nurse will start an IV and you may receive medication in preparation for surgery, including something to help you relax.  Your family will be able to see you in the Pre-op area.  While you are in surgery your family should notify the waiting room  if they leave the waiting  room area and provide a contact phone number.    Please be aware that surgery does come with discomfort.  We want to make every effort to control your discomfort so please discuss any uncontrolled symptoms with your nurse.   Your doctor will most likely have prescribed pain medications.      If you are going home after surgery you will receive individualized written care instructions before being discharged.  A responsible adult must drive you to and from the hospital on the day of your surgery and stay with you for 24 hours.    If you are staying overnight following surgery, you will be transported to your hospital room following the recovery period.  [facility] has all private rooms.    You have received a list of surgical assistants for your reference.  If you have any questions please call Pre-Admission Testing at [phone].  Deductibles and co-payments are collected on the day of service. Please be prepared to pay the required co-pay, deductible or deposit on the day of service as defined by your plan.

## 2018-10-17 ENCOUNTER — APPOINTMENT (OUTPATIENT)
Dept: GENERAL RADIOLOGY | Facility: HOSPITAL | Age: 73
End: 2018-10-17

## 2018-10-17 ENCOUNTER — ANESTHESIA EVENT (OUTPATIENT)
Dept: PERIOP | Facility: HOSPITAL | Age: 73
End: 2018-10-17

## 2018-10-17 ENCOUNTER — HOSPITAL ENCOUNTER (OUTPATIENT)
Facility: HOSPITAL | Age: 73
Setting detail: HOSPITAL OUTPATIENT SURGERY
Discharge: HOME | End: 2018-10-17
Attending: SURGERY | Admitting: SURGERY

## 2018-10-17 ENCOUNTER — ANESTHESIA (OUTPATIENT)
Dept: PERIOP | Facility: HOSPITAL | Age: 73
End: 2018-10-17

## 2018-10-17 VITALS
HEART RATE: 95 BPM | BODY MASS INDEX: 25.94 KG/M2 | TEMPERATURE: 97.6 F | HEIGHT: 69 IN | OXYGEN SATURATION: 96 % | SYSTOLIC BLOOD PRESSURE: 116 MMHG | DIASTOLIC BLOOD PRESSURE: 64 MMHG | RESPIRATION RATE: 16 BRPM | WEIGHT: 175.13 LBS

## 2018-10-17 LAB
GLUCOSE BLDC GLUCOMTR-MCNC: 141 MG/DL (ref 70–130)
GLUCOSE BLDC GLUCOMTR-MCNC: 229 MG/DL (ref 70–130)

## 2018-10-17 PROCEDURE — C2623 CATH, TRANSLUMIN, DRUG-COAT: HCPCS | Performed by: SURGERY

## 2018-10-17 PROCEDURE — 25010000002 HEPARIN (PORCINE) PER 1000 UNITS: Performed by: SURGERY

## 2018-10-17 PROCEDURE — C1887 CATHETER, GUIDING: HCPCS | Performed by: SURGERY

## 2018-10-17 PROCEDURE — 75716 ARTERY X-RAYS ARMS/LEGS: CPT

## 2018-10-17 PROCEDURE — 25010000002 VANCOMYCIN: Performed by: SURGERY

## 2018-10-17 PROCEDURE — C1725 CATH, TRANSLUMIN NON-LASER: HCPCS | Performed by: SURGERY

## 2018-10-17 PROCEDURE — 25010000002 PROPOFOL 10 MG/ML EMULSION: Performed by: NURSE ANESTHETIST, CERTIFIED REGISTERED

## 2018-10-17 PROCEDURE — 25010000002 HEPARIN (PORCINE) PER 1000 UNITS: Performed by: NURSE ANESTHETIST, CERTIFIED REGISTERED

## 2018-10-17 PROCEDURE — C1769 GUIDE WIRE: HCPCS | Performed by: SURGERY

## 2018-10-17 PROCEDURE — C1894 INTRO/SHEATH, NON-LASER: HCPCS | Performed by: SURGERY

## 2018-10-17 PROCEDURE — 0 IOPAMIDOL PER 1 ML: Performed by: SURGERY

## 2018-10-17 PROCEDURE — C1751 CATH, INF, PER/CENT/MIDLINE: HCPCS | Performed by: SURGERY

## 2018-10-17 PROCEDURE — 82962 GLUCOSE BLOOD TEST: CPT

## 2018-10-17 PROCEDURE — 25010000002 VANCOMYCIN 10 G RECONSTITUTED SOLUTION: Performed by: SURGERY

## 2018-10-17 PROCEDURE — 25010000002 FENTANYL CITRATE (PF) 100 MCG/2ML SOLUTION: Performed by: ANESTHESIOLOGY

## 2018-10-17 PROCEDURE — 25010000002 PROTAMINE SULFATE PER 10 MG: Performed by: NURSE ANESTHETIST, CERTIFIED REGISTERED

## 2018-10-17 RX ORDER — MIDAZOLAM HYDROCHLORIDE 1 MG/ML
2 INJECTION INTRAMUSCULAR; INTRAVENOUS
Status: DISCONTINUED | OUTPATIENT
Start: 2018-10-17 | End: 2018-10-17 | Stop reason: HOSPADM

## 2018-10-17 RX ORDER — HYDROCODONE BITARTRATE AND ACETAMINOPHEN 5; 325 MG/1; MG/1
2 TABLET ORAL EVERY 6 HOURS PRN
Qty: 24 TABLET | Refills: 0 | Status: SHIPPED | OUTPATIENT
Start: 2018-10-17 | End: 2018-12-03

## 2018-10-17 RX ORDER — PROTAMINE SULFATE 10 MG/ML
INJECTION, SOLUTION INTRAVENOUS AS NEEDED
Status: DISCONTINUED | OUTPATIENT
Start: 2018-10-17 | End: 2018-10-17 | Stop reason: SURG

## 2018-10-17 RX ORDER — LIDOCAINE HYDROCHLORIDE 10 MG/ML
0.5 INJECTION, SOLUTION EPIDURAL; INFILTRATION; INTRACAUDAL; PERINEURAL ONCE AS NEEDED
Status: DISCONTINUED | OUTPATIENT
Start: 2018-10-17 | End: 2018-10-17 | Stop reason: HOSPADM

## 2018-10-17 RX ORDER — PROMETHAZINE HYDROCHLORIDE 25 MG/ML
12.5 INJECTION, SOLUTION INTRAMUSCULAR; INTRAVENOUS ONCE AS NEEDED
Status: DISCONTINUED | OUTPATIENT
Start: 2018-10-17 | End: 2018-10-17 | Stop reason: HOSPADM

## 2018-10-17 RX ORDER — HEPARIN SODIUM 1000 [USP'U]/ML
INJECTION, SOLUTION INTRAVENOUS; SUBCUTANEOUS AS NEEDED
Status: DISCONTINUED | OUTPATIENT
Start: 2018-10-17 | End: 2018-10-17 | Stop reason: SURG

## 2018-10-17 RX ORDER — SODIUM CHLORIDE 9 MG/ML
100 INJECTION, SOLUTION INTRAVENOUS CONTINUOUS
Status: DISCONTINUED | OUTPATIENT
Start: 2018-10-17 | End: 2018-10-17 | Stop reason: HOSPADM

## 2018-10-17 RX ORDER — LABETALOL HYDROCHLORIDE 5 MG/ML
5 INJECTION, SOLUTION INTRAVENOUS
Status: DISCONTINUED | OUTPATIENT
Start: 2018-10-17 | End: 2018-10-17 | Stop reason: HOSPADM

## 2018-10-17 RX ORDER — PROPOFOL 10 MG/ML
VIAL (ML) INTRAVENOUS CONTINUOUS PRN
Status: DISCONTINUED | OUTPATIENT
Start: 2018-10-17 | End: 2018-10-17 | Stop reason: SURG

## 2018-10-17 RX ORDER — NALOXONE HCL 0.4 MG/ML
0.2 VIAL (ML) INJECTION AS NEEDED
Status: DISCONTINUED | OUTPATIENT
Start: 2018-10-17 | End: 2018-10-17 | Stop reason: HOSPADM

## 2018-10-17 RX ORDER — CLOPIDOGREL BISULFATE 75 MG/1
75 TABLET ORAL DAILY
Qty: 30 TABLET | Refills: 3 | Status: SHIPPED | OUTPATIENT
Start: 2018-10-17 | End: 2018-12-03 | Stop reason: SDUPTHER

## 2018-10-17 RX ORDER — LIDOCAINE HYDROCHLORIDE 20 MG/ML
INJECTION, SOLUTION INFILTRATION; PERINEURAL AS NEEDED
Status: DISCONTINUED | OUTPATIENT
Start: 2018-10-17 | End: 2018-10-17 | Stop reason: SURG

## 2018-10-17 RX ORDER — PROMETHAZINE HYDROCHLORIDE 25 MG/1
12.5 TABLET ORAL ONCE AS NEEDED
Status: DISCONTINUED | OUTPATIENT
Start: 2018-10-17 | End: 2018-10-17 | Stop reason: HOSPADM

## 2018-10-17 RX ORDER — PROMETHAZINE HYDROCHLORIDE 25 MG/1
25 TABLET ORAL ONCE AS NEEDED
Status: DISCONTINUED | OUTPATIENT
Start: 2018-10-17 | End: 2018-10-17 | Stop reason: HOSPADM

## 2018-10-17 RX ORDER — MIDAZOLAM HYDROCHLORIDE 1 MG/ML
1 INJECTION INTRAMUSCULAR; INTRAVENOUS
Status: DISCONTINUED | OUTPATIENT
Start: 2018-10-17 | End: 2018-10-17 | Stop reason: HOSPADM

## 2018-10-17 RX ORDER — FENTANYL CITRATE 50 UG/ML
50 INJECTION, SOLUTION INTRAMUSCULAR; INTRAVENOUS
Status: DISCONTINUED | OUTPATIENT
Start: 2018-10-17 | End: 2018-10-17 | Stop reason: HOSPADM

## 2018-10-17 RX ORDER — SODIUM CHLORIDE 0.9 % (FLUSH) 0.9 %
3 SYRINGE (ML) INJECTION EVERY 12 HOURS SCHEDULED
Status: DISCONTINUED | OUTPATIENT
Start: 2018-10-17 | End: 2018-10-17 | Stop reason: HOSPADM

## 2018-10-17 RX ORDER — LORAZEPAM 0.5 MG/1
0.5 TABLET ORAL EVERY 8 HOURS PRN
COMMUNITY
End: 2018-12-03 | Stop reason: SDUPTHER

## 2018-10-17 RX ORDER — SODIUM CHLORIDE 0.9 % (FLUSH) 0.9 %
1-10 SYRINGE (ML) INJECTION AS NEEDED
Status: DISCONTINUED | OUTPATIENT
Start: 2018-10-17 | End: 2018-10-17 | Stop reason: HOSPADM

## 2018-10-17 RX ORDER — CLOPIDOGREL BISULFATE 75 MG/1
75 TABLET ORAL DAILY
Status: DISCONTINUED | OUTPATIENT
Start: 2018-10-18 | End: 2018-10-17 | Stop reason: HOSPADM

## 2018-10-17 RX ORDER — SODIUM CHLORIDE 0.9 % (FLUSH) 0.9 %
3-10 SYRINGE (ML) INJECTION AS NEEDED
Status: DISCONTINUED | OUTPATIENT
Start: 2018-10-17 | End: 2018-10-17 | Stop reason: HOSPADM

## 2018-10-17 RX ORDER — ONDANSETRON 2 MG/ML
4 INJECTION INTRAMUSCULAR; INTRAVENOUS ONCE AS NEEDED
Status: DISCONTINUED | OUTPATIENT
Start: 2018-10-17 | End: 2018-10-17 | Stop reason: HOSPADM

## 2018-10-17 RX ORDER — PROMETHAZINE HYDROCHLORIDE 25 MG/1
25 SUPPOSITORY RECTAL ONCE AS NEEDED
Status: DISCONTINUED | OUTPATIENT
Start: 2018-10-17 | End: 2018-10-17 | Stop reason: HOSPADM

## 2018-10-17 RX ORDER — FAMOTIDINE 10 MG/ML
20 INJECTION, SOLUTION INTRAVENOUS ONCE
Status: COMPLETED | OUTPATIENT
Start: 2018-10-17 | End: 2018-10-17

## 2018-10-17 RX ORDER — FLUMAZENIL 0.1 MG/ML
0.2 INJECTION INTRAVENOUS AS NEEDED
Status: DISCONTINUED | OUTPATIENT
Start: 2018-10-17 | End: 2018-10-17 | Stop reason: HOSPADM

## 2018-10-17 RX ORDER — CLOPIDOGREL BISULFATE 75 MG/1
300 TABLET ORAL ONCE
Status: DISCONTINUED | OUTPATIENT
Start: 2018-10-17 | End: 2018-10-17

## 2018-10-17 RX ORDER — DIPHENHYDRAMINE HYDROCHLORIDE 50 MG/ML
12.5 INJECTION INTRAMUSCULAR; INTRAVENOUS
Status: DISCONTINUED | OUTPATIENT
Start: 2018-10-17 | End: 2018-10-17 | Stop reason: HOSPADM

## 2018-10-17 RX ORDER — SODIUM CHLORIDE, SODIUM LACTATE, POTASSIUM CHLORIDE, CALCIUM CHLORIDE 600; 310; 30; 20 MG/100ML; MG/100ML; MG/100ML; MG/100ML
9 INJECTION, SOLUTION INTRAVENOUS CONTINUOUS
Status: DISCONTINUED | OUTPATIENT
Start: 2018-10-17 | End: 2018-10-17 | Stop reason: HOSPADM

## 2018-10-17 RX ORDER — EPHEDRINE SULFATE 50 MG/ML
5 INJECTION, SOLUTION INTRAVENOUS ONCE AS NEEDED
Status: DISCONTINUED | OUTPATIENT
Start: 2018-10-17 | End: 2018-10-17 | Stop reason: HOSPADM

## 2018-10-17 RX ADMIN — SODIUM CHLORIDE, POTASSIUM CHLORIDE, SODIUM LACTATE AND CALCIUM CHLORIDE 9 ML/HR: 600; 310; 30; 20 INJECTION, SOLUTION INTRAVENOUS at 11:42

## 2018-10-17 RX ADMIN — PROPOFOL 100 MCG/KG/MIN: 10 INJECTION, EMULSION INTRAVENOUS at 14:25

## 2018-10-17 RX ADMIN — IOPAMIDOL 163.3 ML: 510 INJECTION, SOLUTION INTRAVASCULAR at 14:05

## 2018-10-17 RX ADMIN — VANCOMYCIN HYDROCHLORIDE 1.25 G: 10 INJECTION, POWDER, LYOPHILIZED, FOR SOLUTION INTRAVENOUS at 14:15

## 2018-10-17 RX ADMIN — FAMOTIDINE 20 MG: 10 INJECTION, SOLUTION INTRAVENOUS at 11:42

## 2018-10-17 RX ADMIN — PROTAMINE SULFATE 40 MG: 10 INJECTION, SOLUTION INTRAVENOUS at 16:29

## 2018-10-17 RX ADMIN — FENTANYL CITRATE 25 MCG: 50 INJECTION INTRAMUSCULAR; INTRAVENOUS at 15:35

## 2018-10-17 RX ADMIN — HEPARIN SODIUM 2500 UNITS: 1000 INJECTION, SOLUTION INTRAVENOUS; SUBCUTANEOUS at 15:45

## 2018-10-17 RX ADMIN — HEPARIN SODIUM 5000 UNITS: 1000 INJECTION, SOLUTION INTRAVENOUS; SUBCUTANEOUS at 15:25

## 2018-10-17 RX ADMIN — FENTANYL CITRATE 25 MCG: 50 INJECTION INTRAMUSCULAR; INTRAVENOUS at 14:25

## 2018-10-17 RX ADMIN — FENTANYL CITRATE 25 MCG: 50 INJECTION INTRAMUSCULAR; INTRAVENOUS at 16:00

## 2018-10-17 RX ADMIN — LIDOCAINE HYDROCHLORIDE 100 MG: 20 INJECTION, SOLUTION INFILTRATION; PERINEURAL at 14:29

## 2018-10-17 RX ADMIN — VANCOMYCIN HYDROCHLORIDE 1250 MG: 10 INJECTION, POWDER, LYOPHILIZED, FOR SOLUTION INTRAVENOUS at 13:39

## 2018-10-17 NOTE — OP NOTE
Operative Note  Date of Admission:  10/17/2018  OR Date: 10/17/2018    Pre-op Diagnosis:  Atherosclerosis of native arteries of extremities with intermittent claudication, bilateral legs (CMS/HCC)    Post-op Diagnosis: Same    Procedure:   1) ultrasound guided vascular access right common femoral artery  2) AIF with bilateral lower extremity runoff  3) left popliteal artery angioplasty with a 4 x 150 DCB  4) left superficial femoral artery angioplasty of in-stent restenosis with 4 x 150 DCB    Surgeon: Jayden Marie MD    Assistant: Pablo Lea [facility]    CPT:  12285 Fem/Pop PTA  26682-70 Abdominal aortogram (no diagnostic quality arterial imaging prior or significant change in vascular status)  52041-87 Bilateral extremity runoff arteriogram (no diagnostic quality arterial imaging prior or significant change in vascular status)      Anesthesia: Monitor Anesthesia Care    Estimated Blood Loss: minimal    Findings:    Intense scarring in the right groin make it very difficult to pass each sheath.    Implants:    Nothing was implanted during the procedure    Specimen: * No orders in the log *    Complications:   None    Closure:  Manual pressure for 12 minutes    Indications:    The patient is an 73 y.o. male seen for evaluation of severe bilateral lower extremity peripheral arterial disease.  Left lower extremity JODI had declined with a known in-stent restenosis status post prior secondary intervention.  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  The CT angiogram done in preparation for today's intervention did reveal some information but because of the dissection, calcium, and tortuosity of the vessels it was really inadequate for me to understand and plan the intervention.  For this reason, diagnostic angiogram need to be done prior to today's intervention.       Procedure:    The patient was taken to the operating room and placed supine on the operating room  table.  After induction of IV sedation, the bilateral groins were prepped and draped with ChloraPrep.  The femoral heads were marked under fluoroscopy.  They were relatively high because of his pannus.  The right-sided common femoral artery was accessed under direct ultrasound guidance.  Despite this, I still had to take 2 separate passes at this in order to cannulate the femoral artery.  It was very woody in its feel and felt like a lot of chronic scar tissue.  A 4-[place] Micropuncture sheath was placed and a right femoral angiogram was done in an oblique position showing that we had good access site.  I went ahead and then upsized over a Stiff wire to a 5-[place] sheath. This, too, was also very difficult to advance into the right groin, but we were able to do so with the assistance of the Stiff wire.  I then used a Glidewire to navigate through the dissection of the right external and common iliac artery and got into the true lumen of the aorta without any significant difficulty.  AIF was obtained. The patient has a significantly irregular diseased aorta.  There is a 60% stenosis at the origin of the right common iliac, and then the right external iliac artery dissection with aneurysmal-looking changes.  I decided not to intervene on this side right now and to go ahead and crossover onto the left and see how bad the in-stent restenosis appeared to be. I used a Rim pressure catheter to crossover, and then positioned a multi-side hole in the left common femoral artery.  A runoff was done.  The common femoral and profunda femoris arteries were patent.  There was a severe in-stent restenosis in the first of the SFA stents.  There was about a 60% stenosis in the unstented mid segment and then another severe in-stent restenosis of the distal SFA stent.  There were some calcium protrusions into the above and at the knee popliteal arteries, but contrast does go quickly through these.  Anterior tibial runoff and posterior  tibial runoff were the main runoff to the foot.  He had a high origin of the anterior tibial artery on the left.  I crossed the lesion with a hydrophilic-braided catheter and the straight Glidewire.  I then angioplastied the popliteal segment with a predilatation right plain old 4 x 250 balloon.  Two inflations were still used because of the length of the treated segment.  After angioplasty, I went back and retreated the in-stent restenosis segments with 4 x 150 drug-coated balloons.  Drug-coated balloons were each held up for 3 minutes per instructions for use as provided by the .  After angioplasty, there was minimal residual stenosis.  The sheath was then backed up over the bifurcation and I did a runoff of the right lower extremity. Surprisingly, the SFA has in-line flow, although it is diseased all the way down to the below-knee popliteal artery.  He has advanced tibial disease.  I would like not to do any sort of intervention in this setting on the right SFA.  The sheath was removed.  Manual pressure alone was used for hemostasis.  Heparin was reversed with protamine.                                      Radiographic Findings:            There are no hospital problems to display for this patient.     Jayden Marie MD     Date: 10/17/2018  Time: 4:36 PM

## 2018-10-17 NOTE — DISCHARGE INSTRUCTIONS
[facility]  Audie Sahu, Alyssa Deutsch Rachel, Scherrer, Thomas  [address]  [place] [address]  [phone]      Discharge Instructions for Angiogram    1. Go home, rest and take it easy today.      2. You may experience some dizziness or memory loss from the anesthesia.  This may last for the next 24 hours.  Someone should plan on staying with you for the first 24 hours for your safety.    3. Do not make any important legal decisions or sign any legal papers for the next 24 hours.      4. Eat and drink lightly today.  Start off with liquids, jello, soup, crackers or other bland foods at first. You may advance your diet tomorrow as tolerated as long as you do not experience any nausea or vomiting.    5. You may resume routine medications including blood thinners. However, Glucophage should be not be started for 72 hours after the dye is given.      6. No lifting over 10 pounds and no strenuous activity for the next 2-3 days.    7. Try not to strain or bear down when your bowels move or when you empty your bladder.    8. Limit going up and down steps for 2 days.    9. No driving for the remainder of the day.  You may resume limited driving tomorrow if necessary.     10.  You may shower tomorrow.  No bath or hot tubs for at least 2 days after the procedure.          11. Leave the pressure dressing on until tomorrow morning.  You may then take this off, as well as the small see through dressing with gauze tomorrow.  If it doesn’t come off easily, do not pull this off.  If it is stuck, shower and let the warm water loosen it before removal.       12. Check your groin dressing regularly for bleeding through the dressing (under the pressure dressing).  A small amount of blood contained by the gauze is normal; the whole dressing should not be filled with blood or leaking out under the sides.     13. If you experience bleeding through the gauze/clear sticky dressing, lay flat  and have someone apply direct pressure for 15 minutes.  If bleeding has stopped after this, you may put a clean gauze and tape over the area.  Continue to lie flat for 1-2 hours.  If bleeding continues after 15 minutes of pressure, call us at the number listed above.  There is an MD available after hours.      14. If you experience heavy bleeding or large swelling, continue to hold firm pressure and              call 911.  Do not call the MD on call.     15.  If you experience pain or discomfort you may take Tylenol or Ibuprofen, whichever you normally use for minor discomfort, unless otherwise instructed.       16.  If the MD gives you a prescription for narcotic pain pills (Tylenol #3, Vicodin, Hydrocodone, Oxycodone or Percocet), you cannot drive a vehicle or operate machinery while taking these.    17.  Severe pain is not expected after this procedure.  If you experience severe pain, please call our office at [phone].    18.  Remember to contact our office for any of the following:    • Fever > 101 degrees  • Severe pain that cannot be controlled by taking your pain pills  • Severe nausea or vomiting   • Significant bleeding of your incisions  • Drainage that has a bad smell or is yellow or green in appearance  • Any other questions or concerns

## 2018-10-17 NOTE — PERIOPERATIVE NURSING NOTE
Dr. Marie at bedside, states he does not want plavix loading dose given today.  Pt to start plavix tomorrow.

## 2018-10-17 NOTE — PERIOPERATIVE NURSING NOTE
Notified dr. Marie of scabs on left second toe and left shin.  No orders given.    Also Dr. Marie notified that we used the doppler to check pedal pulses.  No pulses detected on dorsal pedal pulses.  No orders given.

## 2018-10-17 NOTE — ANESTHESIA PREPROCEDURE EVALUATION
Anesthesia Evaluation     Patient summary reviewed and Nursing notes reviewed   NPO Solid Status: > 8 hours  NPO Liquid Status: > 2 hours           Airway   Mallampati: II  Neck ROM: full  No difficulty expected  Dental    (+) poor dentition    Comment: Few teeth lower    Pulmonary     breath sounds clear to auscultation  (+) a smoker Former, shortness of breath, sleep apnea,   Cardiovascular     Rhythm: regular    (+) hypertension, past MI , CAD, PVD, hyperlipidemia,       Neuro/Psych  (+) seizures, psychiatric history Anxiety,     GI/Hepatic/Renal/Endo    (+)  GERD,  diabetes mellitus,     Musculoskeletal     Abdominal    Substance History      OB/GYN          Other   (+) arthritis                     Anesthesia Plan    ASA 3     MAC     intravenous induction   Anesthetic plan, all risks, benefits, and alternatives have been provided, discussed and informed consent has been obtained with: patient.

## 2018-10-18 ENCOUNTER — TRANSCRIBE ORDERS (OUTPATIENT)
Dept: ADMINISTRATIVE | Facility: HOSPITAL | Age: 73
End: 2018-10-18

## 2018-10-18 DIAGNOSIS — I73.9 PAD (PERIPHERAL ARTERY DISEASE) (HCC): Primary | ICD-10-CM

## 2018-10-18 NOTE — ANESTHESIA POSTPROCEDURE EVALUATION
"Patient: Erick Bruno    Procedure Summary     Date:  10/17/18 Room / Location:  [facility] OR [address] [place] [address] MAIN OR    Anesthesia Start:  1415 Anesthesia Stop:  1657    Procedure:  AIF ARTERIOGRAM, LEFT SFA  ANGIOPLASTY (N/A ) Diagnosis:  Atherosclerosis of native arteries of extremities with intermittent claudication, bilateral legs (CMS/HCC)    Surgeon:  Jayden Marie MD Provider:  Kumar Crowe MD    Anesthesia Type:  MAC ASA Status:  3          Anesthesia Type: MAC  Last vitals  BP   111/57 (10/17/18 2105)   Temp   36.4 °C (97.6 °F) (10/17/18 1652)   Pulse   95 (10/17/18 2105)   Resp   16 (10/17/18 2105)     SpO2   94 % (10/17/18 2105)     Post Anesthesia Care and Evaluation    Patient location during evaluation: bedside  Patient participation: complete - patient participated  Level of consciousness: awake  Pain management: adequate  Airway patency: patent  Anesthetic complications: No anesthetic complications    Cardiovascular status: acceptable  Respiratory status: acceptable  Hydration status: acceptable    Comments: */57   Pulse 95   Temp 36.4 °C (97.6 °F) (Oral)   Resp 16   Ht 175.3 cm (69\")   Wt 79.4 kg (175 lb 2 oz)   SpO2 94%   BMI 25.86 kg/m²         "

## 2018-10-29 ENCOUNTER — HOSPITAL ENCOUNTER (OUTPATIENT)
Dept: CARDIOLOGY | Facility: HOSPITAL | Age: 73
Discharge: HOME | End: 2018-10-29
Attending: SURGERY | Admitting: SURGERY

## 2018-10-29 ENCOUNTER — TRANSCRIBE ORDERS (OUTPATIENT)
Dept: ADMINISTRATIVE | Facility: HOSPITAL | Age: 73
End: 2018-10-29

## 2018-10-29 DIAGNOSIS — I73.9 PAD (PERIPHERAL ARTERY DISEASE) (HCC): ICD-10-CM

## 2018-10-29 DIAGNOSIS — I73.9 PAD (PERIPHERAL ARTERY DISEASE) (HCC): Primary | ICD-10-CM

## 2018-10-29 LAB
BH CV LOWER ARTERIAL LEFT ABI RATIO: 0.81
BH CV LOWER ARTERIAL LEFT CALF RATIO: 0.91
BH CV LOWER ARTERIAL LEFT DORSALIS PEDIS SYS MAX: 122 MMHG
BH CV LOWER ARTERIAL LEFT GREAT TOE SYS MAX: 102 MMHG
BH CV LOWER ARTERIAL LEFT POPLITEAL SYS MAX: 137 MMHG
BH CV LOWER ARTERIAL LEFT POST TIBIAL SYS MAX: 93 MMHG
BH CV LOWER ARTERIAL LEFT TBI RATIO: 0.68
BH CV LOWER ARTERIAL RIGHT ABI RATIO: 0.68
BH CV LOWER ARTERIAL RIGHT CALF RATIO: 0.91
BH CV LOWER ARTERIAL RIGHT DORSALIS PEDIS SYS MAX: 93 MMHG
BH CV LOWER ARTERIAL RIGHT GREAT TOE SYS MAX: 68 MMHG
BH CV LOWER ARTERIAL RIGHT HIGH THIGH SYS MAX: 144 MMHG
BH CV LOWER ARTERIAL RIGHT LOW THIGH SYS MAX: 138 MMHG
BH CV LOWER ARTERIAL RIGHT POPLITEAL SYS MAX: 136 MMHG
BH CV LOWER ARTERIAL RIGHT POST TIBIAL SYS MAX: 102 MMHG
BH CV LOWER ARTERIAL RIGHT TBI RATIO: 0.45
UPPER ARTERIAL LEFT ARM BRACHIAL SYS MAX: 150 MMHG
UPPER ARTERIAL RIGHT ARM BRACHIAL SYS MAX: 148 MMHG

## 2018-10-29 PROCEDURE — 93923 UPR/LXTR ART STDY 3+ LVLS: CPT

## 2018-11-13 RX ORDER — ATORVASTATIN CALCIUM 40 MG/1
TABLET, FILM COATED ORAL
Qty: 30 TABLET | Refills: 0 | OUTPATIENT
Start: 2018-11-13

## 2018-11-13 RX ORDER — ATORVASTATIN CALCIUM 20 MG/1
TABLET, FILM COATED ORAL
Qty: 30 TABLET | Refills: 0 | OUTPATIENT
Start: 2018-11-13

## 2018-11-15 RX ORDER — ATORVASTATIN CALCIUM 20 MG/1
TABLET, FILM COATED ORAL
Qty: 30 TABLET | Refills: 0 | OUTPATIENT
Start: 2018-11-15

## 2018-12-03 ENCOUNTER — OFFICE VISIT (OUTPATIENT)
Dept: FAMILY MEDICINE CLINIC | Facility: CLINIC | Age: 73
End: 2018-12-03

## 2018-12-03 VITALS
TEMPERATURE: 98.4 F | DIASTOLIC BLOOD PRESSURE: 78 MMHG | BODY MASS INDEX: 26.51 KG/M2 | SYSTOLIC BLOOD PRESSURE: 140 MMHG | OXYGEN SATURATION: 99 % | WEIGHT: 179 LBS | HEART RATE: 75 BPM | HEIGHT: 69 IN

## 2018-12-03 DIAGNOSIS — F41.9 ANXIETY: ICD-10-CM

## 2018-12-03 DIAGNOSIS — E78.49 OTHER HYPERLIPIDEMIA: ICD-10-CM

## 2018-12-03 DIAGNOSIS — E11.9 DIABETES MELLITUS WITHOUT COMPLICATION (HCC): ICD-10-CM

## 2018-12-03 DIAGNOSIS — Z00.00 MEDICARE ANNUAL WELLNESS VISIT, INITIAL: Primary | ICD-10-CM

## 2018-12-03 DIAGNOSIS — Z51.81 MEDICATION MONITORING ENCOUNTER: ICD-10-CM

## 2018-12-03 LAB
ALBUMIN SERPL-MCNC: 4.5 G/DL (ref 3.5–5.2)
ALBUMIN UR-MCNC: 0 MG/L (ref 0–20)
ALBUMIN/GLOB SERPL: 1.5 G/DL
ALP SERPL-CCNC: 136 U/L (ref 39–117)
ALT SERPL W P-5'-P-CCNC: 21 U/L (ref 1–41)
ANION GAP SERPL CALCULATED.3IONS-SCNC: 13.9 MMOL/L
AST SERPL-CCNC: 17 U/L (ref 1–40)
BILIRUB SERPL-MCNC: 0.4 MG/DL (ref 0.1–1.2)
BUN BLD-MCNC: 12 MG/DL (ref 8–23)
BUN/CREAT SERPL: 11.2 (ref 7–25)
CALCIUM SPEC-SCNC: 9.7 MG/DL (ref 8.6–10.5)
CHLORIDE SERPL-SCNC: 96 MMOL/L (ref 98–107)
CHOLEST SERPL-MCNC: 160 MG/DL (ref 0–200)
CO2 SERPL-SCNC: 26.1 MMOL/L (ref 22–29)
CREAT BLD-MCNC: 1.07 MG/DL (ref 0.76–1.27)
GFR SERPL CREATININE-BSD FRML MDRD: 68 ML/MIN/1.73
GLOBULIN UR ELPH-MCNC: 3.1 GM/DL
GLUCOSE BLD-MCNC: 213 MG/DL (ref 65–99)
HBA1C MFR BLD: 7.8 % (ref 4.8–5.6)
HDLC SERPL-MCNC: 45 MG/DL (ref 40–60)
LDLC SERPL CALC-MCNC: 79 MG/DL (ref 0–100)
LDLC/HDLC SERPL: 1.76 {RATIO}
POTASSIUM BLD-SCNC: 4.3 MMOL/L (ref 3.5–5.2)
PROT SERPL-MCNC: 7.6 G/DL (ref 6–8.5)
SODIUM BLD-SCNC: 136 MMOL/L (ref 136–145)
TRIGL SERPL-MCNC: 180 MG/DL (ref 0–150)
VLDLC SERPL-MCNC: 36 MG/DL (ref 5–40)

## 2018-12-03 PROCEDURE — 36415 COLL VENOUS BLD VENIPUNCTURE: CPT | Performed by: INTERNAL MEDICINE

## 2018-12-03 PROCEDURE — 80061 LIPID PANEL: CPT | Performed by: INTERNAL MEDICINE

## 2018-12-03 PROCEDURE — G0438 PPPS, INITIAL VISIT: HCPCS | Performed by: INTERNAL MEDICINE

## 2018-12-03 PROCEDURE — 80053 COMPREHEN METABOLIC PANEL: CPT | Performed by: INTERNAL MEDICINE

## 2018-12-03 PROCEDURE — 82043 UR ALBUMIN QUANTITATIVE: CPT | Performed by: INTERNAL MEDICINE

## 2018-12-03 PROCEDURE — 83036 HEMOGLOBIN GLYCOSYLATED A1C: CPT | Performed by: INTERNAL MEDICINE

## 2018-12-03 PROCEDURE — 99214 OFFICE O/P EST MOD 30 MIN: CPT | Performed by: INTERNAL MEDICINE

## 2018-12-03 RX ORDER — GLIMEPIRIDE 2 MG/1
2 TABLET ORAL
Qty: 30 TABLET | Refills: 5 | Status: SHIPPED | OUTPATIENT
Start: 2018-12-03 | End: 2019-08-30 | Stop reason: SDUPTHER

## 2018-12-03 RX ORDER — LORAZEPAM 0.5 MG/1
0.5 TABLET ORAL EVERY 8 HOURS PRN
Qty: 30 TABLET | Refills: 0 | Status: SHIPPED | OUTPATIENT
Start: 2018-12-03 | End: 2020-02-22

## 2018-12-03 RX ORDER — OMEPRAZOLE 20 MG/1
20 CAPSULE, DELAYED RELEASE ORAL DAILY
Qty: 30 CAPSULE | Refills: 5 | Status: SHIPPED | OUTPATIENT
Start: 2018-12-03 | End: 2019-05-30 | Stop reason: SDUPTHER

## 2018-12-03 RX ORDER — ATORVASTATIN CALCIUM 40 MG/1
40 TABLET, FILM COATED ORAL DAILY
Qty: 30 TABLET | Refills: 5 | Status: SHIPPED | OUTPATIENT
Start: 2018-12-03 | End: 2018-12-14 | Stop reason: SDUPTHER

## 2018-12-03 RX ORDER — BUSPIRONE HYDROCHLORIDE 5 MG/1
5 TABLET ORAL 2 TIMES DAILY
Qty: 60 TABLET | Refills: 5 | Status: SHIPPED | OUTPATIENT
Start: 2018-12-03 | End: 2019-06-02 | Stop reason: SDUPTHER

## 2018-12-03 RX ORDER — METFORMIN HYDROCHLORIDE 500 MG/1
TABLET, EXTENDED RELEASE ORAL
Qty: 120 TABLET | Refills: 1 | Status: SHIPPED | OUTPATIENT
Start: 2018-12-03 | End: 2019-02-20 | Stop reason: SDUPTHER

## 2018-12-03 RX ORDER — CLOPIDOGREL BISULFATE 75 MG/1
75 TABLET ORAL DAILY
Qty: 30 TABLET | Refills: 5 | Status: SHIPPED | OUTPATIENT
Start: 2018-12-03 | End: 2019-07-02 | Stop reason: SDUPTHER

## 2018-12-03 RX ORDER — ISOSORBIDE MONONITRATE 30 MG/1
30 TABLET, EXTENDED RELEASE ORAL NIGHTLY
Qty: 30 TABLET | Refills: 5 | Status: SHIPPED | OUTPATIENT
Start: 2018-12-03 | End: 2019-08-30 | Stop reason: SDUPTHER

## 2018-12-03 NOTE — PROGRESS NOTES
Subjective   Erick Bruno is a 73 y.o. male.   Care wellness visit initial.  Needs follow-up on diabetes lipids  History of Present Illness   Patient with recent procedure for PAD which worked fairly well.  Does need updated lipids rechecked sugars doing fairly well will check these he does have some difficulty getting here for lab work a regular basis sugars been doing fairly well patient's knowledge no specific chest pain or other complaints this point.  Compliant with medications drug allergy include penicillin and latex patient's former smoker quit 2008 no heavy alcohol patient has family history for heart failure have retention and diabetes.  Review of Systems   All other systems reviewed and are negative.      Objective   Vitals:    12/03/18 1608   BP: 140/78   Pulse: 75   Temp: 98.4 °F (36.9 °C)   SpO2: 99%   Weight: 81.2 kg (179 lb)     Physical Exam   Constitutional: He appears well-developed and well-nourished.   HENT:   Head: Normocephalic and atraumatic.   Eyes: Conjunctivae are normal. Pupils are equal, round, and reactive to light.   Neck:   No carotid bruits   Cardiovascular: Normal rate, regular rhythm and normal heart sounds.   Pulmonary/Chest: Effort normal and breath sounds normal.   Abdominal: Soft. Bowel sounds are normal.   Neurological: He is alert.   Somewhat slow but relatively stable gait and station   Skin: Skin is warm and dry.   Nursing note and vitals reviewed.      Lab Results   Component Value Date    INR 1.01 10/03/2018    INR 0.93 04/15/2016       Procedures    Assessment/Plan     Medicare wellness visit initial    2.  Hyperlipidemia await pending lab if good recheck 6 months    3.  Type 2 diabetes plan await hemoglobin A1c if satisfactory continue present recheck in 6 months    4.  Medication monitoring for Ativan which she takes for his anxiety also does fairly well with BuSpar also    5.  Anxiety disorder continue Ativan when necessary and BuSpar regularly        Much of this  encounter note is an electronic transcription/translation of spoken language to printed text.  The electronic translation of spoken language may permit erroneous, or at times, nonsensical words or phrases to be inadvertently transcribed.  Although I have reviewed the note for such errors, some may still exist. If there are questions or for further clarification, please contact me.

## 2018-12-03 NOTE — PATIENT INSTRUCTIONS
Medicare Wellness  Personal Prevention Plan of Service     Date of Office Visit:  2018  Encounter Provider:  Denzel Fuller MD  Place of Service:  [facility]  Patient Name: Erick Bruno  :  1945    As part of the Medicare Wellness portion of your visit today, we are providing you with this personalized preventive plan of services (PPPS). This plan is based upon recommendations of the [place] Preventive Services Task Force (USPSTF) and the Advisory Committee on Immunization Practices (ACIP).    This lists the preventive care services that should be considered, and provides dates of when you are due. Items listed as completed are up-to-date and do not require any further intervention.    Health Maintenance   Topic Date Due   • TDAP/TD VACCINES (1 - Tdap) 1965   • ZOSTER VACCINE (1 of 2) 1996   • LUNG CANCER SCREENING  2001   • HEPATITIS C SCREENING  2016   • DIABETIC EYE EXAM  2016   • HEMOGLOBIN A1C  2018   • URINE MICROALBUMIN  2018   • LIPID PANEL  2019   • DIABETIC FOOT EXAM  2019   • MEDICARE ANNUAL WELLNESS  2019   • COLONOSCOPY  2028   • INFLUENZA VACCINE  Completed   • AAA SCREEN (ONE-TIME)  Completed   • PNEUMOCOCCAL VACCINES (65+ LOW/MEDIUM RISK)  Addressed       No orders of the defined types were placed in this encounter.      No Follow-up on file.

## 2018-12-03 NOTE — PROGRESS NOTES
QUICK REFERENCE INFORMATION:  The ABCs of the Annual Wellness Visit    Initial Medicare Wellness Visit    HEALTH RISK ASSESSMENT    1945    Recent Hospitalizations:  Recently treated at the following:  [facility].        Current Medical Providers:  Patient Care Team:  Denzel Fuller Jr., MD as PCP - General  Denzel Fuller Jr., MD as PCP - Family Medicine  Michael Wyman III, MD as Consulting Physician (Cardiology)  Jayden Marie MD as Consulting Physician (Vascular Surgery)  Segundo Cunningham OD as Consulting Physician (Ophthalmology)  Daniel Gonzalez MD as Surgeon (General Surgery)        Smoking Status:  Social History     Tobacco Use   Smoking Status Former Smoker   • Packs/day: 1.00   • Years: 60.00   • Pack years: 60.00   • Last attempt to quit: 2008   • Years since quitting: 10.9   Smokeless Tobacco Never Used       Alcohol Consumption:  Social History     Substance and Sexual Activity   Alcohol Use No    Comment: caffeine use       Depression Screen:   PHQ-2/PHQ-9 Depression Screening 12/3/2018   Little interest or pleasure in doing things 0   Feeling down, depressed, or hopeless 0   Trouble falling or staying asleep, or sleeping too much 0   Feeling tired or having little energy 0   Poor appetite or overeating 0   Feeling bad about yourself - or that you are a failure or have let yourself or your family down 0   Trouble concentrating on things, such as reading the newspaper or watching television 0   Moving or speaking so slowly that other people could have noticed. Or the opposite - being so fidgety or restless that you have been moving around a lot more than usual 0   Thoughts that you would be better off dead, or of hurting yourself in some way 0   Total Score 0   If you checked off any problems, how difficult have these problems made it for you to do your work, take care of things at home, or get along with other people? Not difficult at all       Health Habits  and Functional and Cognitive Screening:  Functional & Cognitive Status 12/3/2018   Do you have difficulty preparing food and eating? No   Do you have difficulty bathing yourself, getting dressed or grooming yourself? Yes   Do you have difficulty using the toilet? No   Do you have difficulty moving around from place to place? Yes   Do you have trouble with steps or getting out of a bed or a chair? Yes   In the past year have you fallen or experienced a near fall? Yes   Current Diet Unhealthy Diet   Dental Exam Not up to date   Eye Exam Not up to date   Exercise (times per week) 2 times per week   Current Exercise Activities Include Walking   Do you need help using the phone?  No   Are you deaf or do you have serious difficulty hearing?  Yes   Do you need help with transportation? Yes   Do you need help shopping? No   Do you need help preparing meals?  No   Do you need help with housework?  No   Do you need help with laundry? No   Do you need help taking your medications? No   Do you need help managing money? No   Do you ever drive or ride in a car without wearing a seat belt? No   Have you felt unusual stress, anger or loneliness in the last month? Yes   Who do you live with? Spouse   If you need help, do you have trouble finding someone available to you? No   Have you been bothered in the last four weeks by sexual problems? No   Do you have difficulty concentrating, remembering or making decisions? Yes           Does the patient have evidence of cognitive impairment?no      Asiprin use counseling: Taking ASA appropriately as indicated      Recent Lab Results:    Visual Acuity:  No exam data present    Age-appropriate Screening Schedule:  Refer to the list below for future screening recommendations based on patient's age, sex and/or medical conditions. Orders for these recommended tests are listed in the plan section. The patient has been provided with a written plan.    Health Maintenance   Topic Date Due   •  TDAP/TD VACCINES (1 - Tdap) 06/17/1965   • ZOSTER VACCINE (1 of 2) 06/17/1996   • DIABETIC EYE EXAM  03/28/2016   • HEMOGLOBIN A1C  05/03/2018   • URINE MICROALBUMIN  11/03/2018   • LIPID PANEL  05/06/2019   • DIABETIC FOOT EXAM  11/05/2019   • COLONOSCOPY  05/08/2028   • INFLUENZA VACCINE  Completed   • PNEUMOCOCCAL VACCINES (65+ LOW/MEDIUM RISK)  Addressed        Subjective   History of Present Illness    Erick Bruno is a 73 y.o. male who presents for an Annual Wellness Visit.    The following portions of the patient's history were reviewed and updated as appropriate: allergies, past family history, past medical history, past social history and problem list.    Outpatient Medications Prior to Visit   Medication Sig Dispense Refill   • aspirin 81 MG chewable tablet Chew 81 mg Every Night. PER JUAN DYKES TO CONTINUE TAKING     • Coenzyme Q10 200 MG capsule Take 200 mg by mouth Every Night. HOLDING FOR SURGERY     • LORazepam (ATIVAN) 0.5 MG tablet Take 0.5 mg by mouth Every 8 (Eight) Hours As Needed for Anxiety.     • metFORMIN (GLUCOPHAGE) 1000 MG tablet Take 1 tablet by mouth 2 (Two) Times a Day With Meals. 60 tablet 4   • nitroglycerin (NITROSTAT) 0.4 MG SL tablet Place 1 tablet under the tongue Every 5 (Five) Minutes As Needed for Chest Pain. Take no more than 3 doses in 15 minutes. 30 tablet 5   • ONE TOUCH ULTRA TEST test strip USE ONE STRIP TO CHECK GLUCOSE ONCE DAILY 50 each 1   • saw palmetto 160 MG capsule Take 160 mg by mouth 2 (Two) Times a Day. HOLD FOR SURGERY     • vitamin E 1000 UNIT capsule Take 1,200 Units by mouth Every Night. HOLD FOR SURGERY     • atorvastatin (LIPITOR) 40 MG tablet Take 40 mg by mouth Daily.     • busPIRone (BUSPAR) 5 MG tablet Take 1 tablet by mouth 2 (Two) Times a Day. 180 tablet 0   • clopidogrel (PLAVIX) 75 MG tablet Take 1 tablet by mouth Daily. 30 tablet 3   • glimepiride (AMARYL) 2 MG tablet Take 2 mg by mouth Every Morning Before Breakfast.     • isosorbide  "mononitrate (IMDUR) 30 MG 24 hr tablet Take 30 mg by mouth Every Night.     • omeprazole (priLOSEC) 20 MG capsule Take 20 mg by mouth Daily. Uses when not taking Nexium     • HYDROcodone-acetaminophen (NORCO) 5-325 MG per tablet Take 2 tablets by mouth Every 6 (Six) Hours As Needed for Moderate Pain . 24 tablet 0     No facility-administered medications prior to visit.        Patient Active Problem List   Diagnosis   • Toe ulcer (CMS/HCC)   • Sleep apnea   • Seizure (CMS/HCC)   • PVD (peripheral vascular disease) (CMS/HCC)   • Peripheral arterial disease (CMS/HCC)   • Macular degeneration   • Hyperlipidemia   • Enlarged prostate   • Diabetes mellitus (CMS/HCC)   • Colon polyps   • Arthritis   • Anxiety   • Acid reflux   • Hx of colonic polyps   • Abnormal nuclear stress test       Advance Care Planning:  has NO advance directive - not interested in additional information    Identification of Risk Factors:  Risk factors include: weight , unhealthy diet, cardiovascular risk, increased fall risk, depression and hearing limitations.    Review of Systems    Compared to one year ago, the patient feels his physical health is worse.  Compared to one year ago, the patient feels his mental health is worse.    Objective     Physical Exam    Vitals:    12/03/18 1608   BP: 140/78   BP Location: Left arm   Patient Position: Sitting   Cuff Size: Adult   Pulse: 75   Temp: 98.4 °F (36.9 °C)   TempSrc: Oral   SpO2: 99%   Weight: 81.2 kg (179 lb)   Height: 175.3 cm (69\")   PainSc: 0-No pain       Patient's Body mass index is 26.43 kg/m². BMI is within normal parameters. No follow-up required.      Assessment/Plan   Patient Self-Management and Personalized Health Advice  The patient has been provided with information about: diet, exercise, weight management, fall prevention and mental health concerns and preventive services including:   · Diabetes screening, see lab orders.    Visit Diagnoses:  No diagnosis found.    No orders of the " defined types were placed in this encounter.      Outpatient Encounter Medications as of 12/3/2018   Medication Sig Dispense Refill   • aspirin 81 MG chewable tablet Chew 81 mg Every Night. PER JUAN DYKES TO CONTINUE TAKING     • atorvastatin (LIPITOR) 40 MG tablet Take 1 tablet by mouth Daily. 30 tablet 5   • busPIRone (BUSPAR) 5 MG tablet Take 1 tablet by mouth 2 (Two) Times a Day. 60 tablet 5   • clopidogrel (PLAVIX) 75 MG tablet Take 1 tablet by mouth Daily. 30 tablet 5   • Coenzyme Q10 200 MG capsule Take 200 mg by mouth Every Night. HOLDING FOR SURGERY     • glimepiride (AMARYL) 2 MG tablet Take 1 tablet by mouth Every Morning Before Breakfast. 30 tablet 5   • isosorbide mononitrate (IMDUR) 30 MG 24 hr tablet Take 1 tablet by mouth Every Night. 30 tablet 5   • LORazepam (ATIVAN) 0.5 MG tablet Take 0.5 mg by mouth Every 8 (Eight) Hours As Needed for Anxiety.     • metFORMIN (GLUCOPHAGE) 1000 MG tablet Take 1 tablet by mouth 2 (Two) Times a Day With Meals. 60 tablet 4   • nitroglycerin (NITROSTAT) 0.4 MG SL tablet Place 1 tablet under the tongue Every 5 (Five) Minutes As Needed for Chest Pain. Take no more than 3 doses in 15 minutes. 30 tablet 5   • omeprazole (priLOSEC) 20 MG capsule Take 1 capsule by mouth Daily. Uses when not taking Nexium 30 capsule 5   • ONE TOUCH ULTRA TEST test strip USE ONE STRIP TO CHECK GLUCOSE ONCE DAILY 50 each 1   • saw palmetto 160 MG capsule Take 160 mg by mouth 2 (Two) Times a Day. HOLD FOR SURGERY     • vitamin E 1000 UNIT capsule Take 1,200 Units by mouth Every Night. HOLD FOR SURGERY     • [DISCONTINUED] atorvastatin (LIPITOR) 40 MG tablet Take 40 mg by mouth Daily.     • [DISCONTINUED] busPIRone (BUSPAR) 5 MG tablet Take 1 tablet by mouth 2 (Two) Times a Day. 180 tablet 0   • [DISCONTINUED] clopidogrel (PLAVIX) 75 MG tablet Take 1 tablet by mouth Daily. 30 tablet 3   • [DISCONTINUED] glimepiride (AMARYL) 2 MG tablet Take 2 mg by mouth Every Morning Before Breakfast.     •  [DISCONTINUED] isosorbide mononitrate (IMDUR) 30 MG 24 hr tablet Take 30 mg by mouth Every Night.     • [DISCONTINUED] omeprazole (priLOSEC) 20 MG capsule Take 20 mg by mouth Daily. Uses when not taking Nexium     • HYDROcodone-acetaminophen (NORCO) 5-325 MG per tablet Take 2 tablets by mouth Every 6 (Six) Hours As Needed for Moderate Pain . 24 tablet 0     No facility-administered encounter medications on file as of 12/3/2018.        Reviewed use of high risk medication in the elderly: not applicable  Reviewed for potential of harmful drug interactions in the elderly: not applicable    Follow Up:  No Follow-up on file.     An After Visit Summary and PPPS with all of these plans were given to the patient.

## 2018-12-06 LAB — CONV REPORT SUMMARY: NORMAL

## 2018-12-12 ENCOUNTER — DOCUMENTATION (OUTPATIENT)
Dept: FAMILY MEDICINE CLINIC | Facility: CLINIC | Age: 73
End: 2018-12-12

## 2018-12-14 ENCOUNTER — TELEPHONE (OUTPATIENT)
Dept: FAMILY MEDICINE CLINIC | Facility: CLINIC | Age: 73
End: 2018-12-14

## 2018-12-14 DIAGNOSIS — E11.9 TYPE 2 DIABETES MELLITUS WITHOUT COMPLICATION, WITHOUT LONG-TERM CURRENT USE OF INSULIN (HCC): ICD-10-CM

## 2018-12-14 DIAGNOSIS — R74.8 ELEVATED ALKALINE PHOSPHATASE LEVEL: ICD-10-CM

## 2018-12-14 DIAGNOSIS — E78.5 HYPERLIPIDEMIA, UNSPECIFIED HYPERLIPIDEMIA TYPE: Primary | ICD-10-CM

## 2018-12-14 RX ORDER — ATORVASTATIN CALCIUM 80 MG/1
80 TABLET, FILM COATED ORAL DAILY
Qty: 30 TABLET | Refills: 2 | Status: SHIPPED | OUTPATIENT
Start: 2018-12-14 | End: 2019-02-26 | Stop reason: SDUPTHER

## 2018-12-14 RX ORDER — ICOSAPENT ETHYL 1000 MG/1
2 CAPSULE ORAL 2 TIMES DAILY WITH MEALS
Qty: 120 CAPSULE | Refills: 2 | Status: SHIPPED | OUTPATIENT
Start: 2018-12-14 | End: 2019-05-24 | Stop reason: ALTCHOICE

## 2018-12-17 ENCOUNTER — LAB (OUTPATIENT)
Dept: FAMILY MEDICINE CLINIC | Facility: CLINIC | Age: 73
End: 2018-12-17

## 2018-12-17 DIAGNOSIS — R74.8 ELEVATED ALKALINE PHOSPHATASE LEVEL: ICD-10-CM

## 2018-12-17 PROCEDURE — 36415 COLL VENOUS BLD VENIPUNCTURE: CPT | Performed by: INTERNAL MEDICINE

## 2018-12-18 LAB
ALP BONE CFR SERPL: 23 % (ref 12–68)
ALP INTEST CFR SERPL: 13 % (ref 0–18)
ALP LIVER CFR SERPL: 64 % (ref 13–88)
ALP SERPL-CCNC: 107 IU/L (ref 39–117)

## 2019-02-21 RX ORDER — METFORMIN HYDROCHLORIDE 500 MG/1
TABLET, EXTENDED RELEASE ORAL
Qty: 120 TABLET | Refills: 1 | Status: SHIPPED | OUTPATIENT
Start: 2019-02-21 | End: 2019-04-23 | Stop reason: SDUPTHER

## 2019-02-26 RX ORDER — ATORVASTATIN CALCIUM 80 MG/1
TABLET, FILM COATED ORAL
Qty: 30 TABLET | Refills: 2 | Status: SHIPPED | OUTPATIENT
Start: 2019-02-26 | End: 2019-07-18 | Stop reason: SDUPTHER

## 2019-02-27 ENCOUNTER — LAB (OUTPATIENT)
Dept: LAB | Facility: HOSPITAL | Age: 74
End: 2019-02-27

## 2019-02-27 ENCOUNTER — TRANSCRIBE ORDERS (OUTPATIENT)
Dept: ADMINISTRATIVE | Facility: HOSPITAL | Age: 74
End: 2019-02-27

## 2019-02-27 DIAGNOSIS — F04: ICD-10-CM

## 2019-02-27 DIAGNOSIS — F04: Primary | ICD-10-CM

## 2019-02-27 DIAGNOSIS — R94.6 ABNORMAL RESULTS OF THYROID FUNCTION STUDIES: ICD-10-CM

## 2019-02-27 LAB
T-UPTAKE NFR SERPL: 1.1 TBI (ref 0.8–1.3)
T3 SERPL-MCNC: 81.7 NG/DL (ref 80–200)
T4 SERPL-MCNC: 6.16 MCG/DL (ref 4.5–11.7)
TSH SERPL DL<=0.05 MIU/L-ACNC: 2.3 MIU/ML (ref 0.27–4.2)
VIT B12 BLD-MCNC: 318 PG/ML (ref 211–946)

## 2019-02-27 PROCEDURE — 84479 ASSAY OF THYROID (T3 OR T4): CPT

## 2019-02-27 PROCEDURE — 84480 ASSAY TRIIODOTHYRONINE (T3): CPT

## 2019-02-27 PROCEDURE — 82607 VITAMIN B-12: CPT

## 2019-02-27 PROCEDURE — 36415 COLL VENOUS BLD VENIPUNCTURE: CPT

## 2019-02-27 PROCEDURE — 84443 ASSAY THYROID STIM HORMONE: CPT

## 2019-02-27 PROCEDURE — 84436 ASSAY OF TOTAL THYROXINE: CPT

## 2019-04-15 ENCOUNTER — HOSPITAL ENCOUNTER (OUTPATIENT)
Dept: CARDIOLOGY | Facility: HOSPITAL | Age: 74
Discharge: HOME | End: 2019-04-15
Attending: SURGERY | Admitting: SURGERY

## 2019-04-15 DIAGNOSIS — I73.9 PAD (PERIPHERAL ARTERY DISEASE) (HCC): ICD-10-CM

## 2019-04-15 LAB
BH CV LOWER ARTERIAL LEFT ABI RATIO: 0.87
BH CV LOWER ARTERIAL LEFT DORSALIS PEDIS SYS MAX: 142 MMHG
BH CV LOWER ARTERIAL LEFT GREAT TOE SYS MAX: 99 MMHG
BH CV LOWER ARTERIAL LEFT POPLITEAL SYS MAX: 157 MMHG
BH CV LOWER ARTERIAL LEFT POST EX ABI RATIO: 0.86
BH CV LOWER ARTERIAL LEFT POST TIBIAL SYS MAX: 142 MMHG
BH CV LOWER ARTERIAL LEFT TBI RATIO: 0.6
BH CV LOWER ARTERIAL RIGHT ABI RATIO: 0.71
BH CV LOWER ARTERIAL RIGHT DORSALIS PEDIS SYS MAX: 88 MMHG
BH CV LOWER ARTERIAL RIGHT GREAT TOE SYS MAX: 71 MMHG
BH CV LOWER ARTERIAL RIGHT HIGH THIGH SYS MAX: 161 MMHG
BH CV LOWER ARTERIAL RIGHT LOW THIGH SYS MAX: 139 MMHG
BH CV LOWER ARTERIAL RIGHT POPLITEAL SYS MAX: 114 MMHG
BH CV LOWER ARTERIAL RIGHT POST EX ABI RATIO: 0.69
BH CV LOWER ARTERIAL RIGHT POST TIBIAL SYS MAX: 117 MMHG
BH CV LOWER ARTERIAL RIGHT TBI RATIO: 0.43
UPPER ARTERIAL LEFT ARM BRACHIAL SYS MAX: 164 MMHG
UPPER ARTERIAL RIGHT ARM BRACHIAL SYS MAX: 161 MMHG

## 2019-04-15 PROCEDURE — 93923 UPR/LXTR ART STDY 3+ LVLS: CPT

## 2019-04-23 RX ORDER — METFORMIN HYDROCHLORIDE 500 MG/1
TABLET, EXTENDED RELEASE ORAL
Qty: 240 TABLET | Refills: 0 | Status: SHIPPED | OUTPATIENT
Start: 2019-04-23 | End: 2019-04-30 | Stop reason: SDUPTHER

## 2019-04-30 RX ORDER — METFORMIN HYDROCHLORIDE 500 MG/1
TABLET, EXTENDED RELEASE ORAL
Qty: 240 TABLET | Refills: 0 | Status: SHIPPED | OUTPATIENT
Start: 2019-04-30 | End: 2019-07-19 | Stop reason: SDUPTHER

## 2019-05-01 ENCOUNTER — TRANSCRIBE ORDERS (OUTPATIENT)
Dept: ADMINISTRATIVE | Facility: HOSPITAL | Age: 74
End: 2019-05-01

## 2019-05-01 DIAGNOSIS — I73.9 PAD (PERIPHERAL ARTERY DISEASE) (HCC): ICD-10-CM

## 2019-05-01 DIAGNOSIS — I71.00 DISSECTION OF AORTA, UNSPECIFIED PORTION OF AORTA (HCC): Primary | ICD-10-CM

## 2019-05-24 ENCOUNTER — OFFICE VISIT (OUTPATIENT)
Dept: CARDIOLOGY | Facility: CLINIC | Age: 74
End: 2019-05-24

## 2019-05-24 VITALS
BODY MASS INDEX: 24.59 KG/M2 | WEIGHT: 166 LBS | SYSTOLIC BLOOD PRESSURE: 126 MMHG | HEART RATE: 56 BPM | HEIGHT: 69 IN | DIASTOLIC BLOOD PRESSURE: 78 MMHG

## 2019-05-24 DIAGNOSIS — R94.39 ABNORMAL NUCLEAR STRESS TEST: Primary | Chronic | ICD-10-CM

## 2019-05-24 DIAGNOSIS — I73.9 PERIPHERAL ARTERIAL DISEASE (HCC): ICD-10-CM

## 2019-05-24 DIAGNOSIS — E11.51 TYPE 2 DIABETES MELLITUS WITH DIABETIC PERIPHERAL ANGIOPATHY WITHOUT GANGRENE, WITHOUT LONG-TERM CURRENT USE OF INSULIN (HCC): ICD-10-CM

## 2019-05-24 DIAGNOSIS — E78.49 OTHER HYPERLIPIDEMIA: Chronic | ICD-10-CM

## 2019-05-24 PROCEDURE — 99214 OFFICE O/P EST MOD 30 MIN: CPT | Performed by: INTERNAL MEDICINE

## 2019-05-24 PROCEDURE — 93000 ELECTROCARDIOGRAM COMPLETE: CPT | Performed by: INTERNAL MEDICINE

## 2019-05-24 RX ORDER — ESCITALOPRAM OXALATE 10 MG/1
10 TABLET ORAL NIGHTLY
COMMUNITY

## 2019-05-24 NOTE — PROGRESS NOTES
Subjective:     Encounter Date: 5/24/2019      Patient ID: Erick Bruno is a 73 y.o. male.    Chief Complaint: abnormal stress test  History of Present Illness    Dear Dr. Fuller,    I had the pleasure of seeing this patient in the office today for follow-up of his cardiac status.  It has been one year since his last visit    He is been seen in the past for chest discomfort.  In 2017 he had a stress test performed that showed a small area of hypokinesis in the inferior wall with an ejection fraction of 48% and no ischemia.  At the time he elected not to proceed with any additional diagnostic testing.  He's been treated with medical therapy.    Seen today for routine follow-up and states that he has been doing great without any cardiac complaints.  He has been working with vascular surgery for his peripheral arterial disease.  That is doing better- he is not having claudication at this point.  He denies any chest pain, pressure, tightness, squeezing, or heartburn.  He has not experienced any feeling of palpitations, tachycardia or heart racing and no presyncope or syncope.  He continues to have problems with stability which is a chronic problem.  There has not been any orthopnea or PND, and no problems with lower extremity edema.  He denies any shortness of breath at rest or with activity and has not had any wheezing.  He has not had any problems with unexplained nausea or vomiting. He has continued to perform daily activities of living without any specific problem or change in the level of activity.  He has not been recently hospitalized for any reason.      The following portions of the patient's history were reviewed and updated as appropriate: allergies, current medications, past family history, past medical history, past social history, past surgical history and problem list.    Past Medical History:   Diagnosis Date   • Abnormal nuclear stress test 5/21/2018   • Acid reflux    • Anemia    • Anxiety    •  Arthritis    • CAD (coronary artery disease)    • Chest discomfort     both typical and atypical   • Dementia    • Diabetes mellitus (CMS/HCC)    • Enlarged prostate    • History of hepatitis A    • History of seizure 2013    S/P TEMPERATURE   • Hyperlipidemia    • Hypertension    • Macular degeneration    • NSTEMI (non-ST elevated myocardial infarction) (CMS/HCC)    • Panarteritis (CMS/HCC)    • Peripheral arterial disease (CMS/HCC)    • Precordial pain    • PVD (peripheral vascular disease) (CMS/HCC)    • Sleep apnea     DOES NOT WEAR CPAP   • SOBOE (shortness of breath on exertion)        Past Surgical History:   Procedure Laterality Date   • APPENDECTOMY     • ATHRECTOMY ILIAC, FEMORAL, TIBIAL ARTERY N/A 10/17/2018    Procedure: AIF ARTERIOGRAM, LEFT SFA  ANGIOPLASTY;  Surgeon: Jayden Marie MD;  Location: [facility] OR;  Service: Vascular   • CHOLECYSTECTOMY     • CHOLECYSTECTOMY WITH INTRAOPERATIVE CHOLANGIOGRAM N/A 2018    Procedure: CHOLECYSTECTOMY LAPAROSCOPIC INTRAOPERATIVE CHOLANGIOGRAM;  Surgeon: Daniel Gonzalez MD;  Location: [facility] MAIN OR;  Service: General   • COLONOSCOPY     • COLONOSCOPY N/A 2018    Procedure: COLONOSCOPY to cecum with polypectomies;  Surgeon: Daniel Gonzalez MD;  Location: [facility] ENDOSCOPY;  Service: Gastroenterology   • ENDOSCOPY N/A 2018    Procedure: ESOPHAGOGASTRODUODENOSCOPY with biopsies;  Surgeon: Daniel Gonzalez MD;  Location: [facility] ENDOSCOPY;  Service: Gastroenterology   • FEMORAL ARTERY STENT Left     on 3/22/16       Social History     Socioeconomic History   • Marital status:      Spouse name: Not on file   • Number of children: Not on file   • Years of education: Not on file   • Highest education level: Not on file   Occupational History   • Occupation: [employer]   Tobacco Use   • Smoking status: Former Smoker     Packs/day: 1.00     Years: 60.00     Pack years: 60.00     Last attempt to quit:      Years since quittin.4  "  • Smokeless tobacco: Never Used   Substance and Sexual Activity   • Alcohol use: No     Comment: caffeine use   • Drug use: No   • Sexual activity: Defer       Review of Systems   Constitution: Negative for chills, decreased appetite, fever and night sweats.   HENT: Negative for ear discharge, ear pain, hearing loss, nosebleeds and sore throat.    Eyes: Negative for blurred vision, double vision and pain.   Cardiovascular: Negative for cyanosis.   Respiratory: Negative for hemoptysis and sputum production.    Endocrine: Negative for cold intolerance and heat intolerance.   Hematologic/Lymphatic: Negative for adenopathy.   Skin: Negative for dry skin, itching, nail changes, rash and suspicious lesions.   Musculoskeletal: Negative for arthritis, gout, muscle cramps, muscle weakness, myalgias and neck pain.   Gastrointestinal: Negative for anorexia, bowel incontinence, constipation, diarrhea, dysphagia, hematemesis and jaundice.   Genitourinary: Negative for bladder incontinence, dysuria, flank pain, frequency, hematuria and nocturia.   Neurological: Negative for focal weakness, numbness, paresthesias and seizures.   Psychiatric/Behavioral: Negative for altered mental status, hallucinations, hypervigilance, suicidal ideas and thoughts of violence.   Allergic/Immunologic: Negative for persistent infections.         ECG 12 Lead  Date/Time: 5/24/2019 10:29 AM  Performed by: Michael Wyman III, MD  Authorized by: Michael Wyman III, MD   Comparison: compared with previous ECG   Similar to previous ECG  Rhythm: sinus rhythm  Rate: normal  Conduction: conduction normal  ST Segments: ST segments normal  T Waves: T waves normal  QRS axis: normal  Other: no other findings    Clinical impression: normal ECG               Objective:     Vitals:    05/24/19 1008   BP: 126/78   Pulse: 56   Weight: 75.3 kg (166 lb)   Height: 175.3 cm (69\")         Physical Exam   Constitutional: He is oriented to person, place, and time. He " appears well-developed and well-nourished. No distress.   HENT:   Head: Normocephalic and atraumatic.   Nose: Nose normal.   Mouth/Throat: Oropharynx is clear and moist.   Eyes: Conjunctivae and EOM are normal. Pupils are equal, round, and reactive to light. Right eye exhibits no discharge. Left eye exhibits no discharge.   Neck: Normal range of motion. Neck supple. No tracheal deviation present. No thyromegaly present.   Cardiovascular: Normal rate, regular rhythm, S1 normal, S2 normal and normal heart sounds. Exam reveals no S3.   Pulses:       Dorsalis pedis pulses are 1+ on the right side, and 1+ on the left side.        Posterior tibial pulses are 1+ on the right side, and 1+ on the left side.   Pulmonary/Chest: Effort normal and breath sounds normal. No stridor. No respiratory distress. He exhibits no tenderness.   Abdominal: Soft. Bowel sounds are normal. He exhibits no distension and no mass. There is no tenderness. There is no rebound and no guarding.   Musculoskeletal: Normal range of motion. He exhibits no tenderness or deformity.   Lymphadenopathy:     He has no cervical adenopathy.   Neurological: He is alert and oriented to person, place, and time. He has normal reflexes.   Skin: Skin is warm and dry. No rash noted. He is not diaphoretic. No erythema.   Psychiatric: He has a normal mood and affect. Thought content normal.       Lab Review:             Performed        Assessment:          Diagnosis Plan   1. Abnormal nuclear stress test  ECG 12 Lead   2. Other hyperlipidemia  ECG 12 Lead   3. Peripheral arterial disease (CMS/[facility])  ECG 12 Lead   4. Type 2 diabetes mellitus with diabetic peripheral angiopathy without gangrene, without long-term current use of insulin (CMS/[facility])  ECG 12 Lead          Plan:       1. Coronary Artery Disease  Assessment  • The patient has no angina    Plan  • Lifestyle modifications discussed include adhering to a heart healthy diet, avoidance of tobacco products, maintenance  of a healthy weight, medication compliance, regular exercise and regular monitoring of cholesterol and blood pressure  • This patient has a stress test consistent with a prior inferior myocardial infarction.  No ischemia, and the patient now is pain-free on his current medical therapy.  We will continue medical therapy and focus on risk factor modification.    Subjective - Objective  • There is a history of past MI  • Current antiplatelet therapy includes aspirin 81 mg        Thank you very much for allowing us to participate in the care of this pleasant patient.  Please don't hesitate to call if I can be of assistance in any way.      Current Outpatient Medications:   •  aspirin 81 MG chewable tablet, Chew 81 mg Every Night. PER JUAN DYKES TO CONTINUE TAKING, Disp: , Rfl:   •  atorvastatin (LIPITOR) 80 MG tablet, TAKE 1 TABLET BY MOUTH ONCE DAILY, Disp: 30 tablet, Rfl: 2  •  busPIRone (BUSPAR) 5 MG tablet, Take 1 tablet by mouth 2 (Two) Times a Day., Disp: 60 tablet, Rfl: 5  •  clopidogrel (PLAVIX) 75 MG tablet, Take 1 tablet by mouth Daily., Disp: 30 tablet, Rfl: 5  •  Coenzyme Q10 200 MG capsule, Take 200 mg by mouth Every Night. HOLDING FOR SURGERY, Disp: , Rfl:   •  escitalopram (LEXAPRO) 5 MG tablet, Take 5 mg by mouth Daily., Disp: , Rfl:   •  glimepiride (AMARYL) 2 MG tablet, Take 1 tablet by mouth Every Morning Before Breakfast., Disp: 30 tablet, Rfl: 5  •  glucose blood test strip, bid, Disp: 100 each, Rfl: 12  •  isosorbide mononitrate (IMDUR) 30 MG 24 hr tablet, Take 1 tablet by mouth Every Night., Disp: 30 tablet, Rfl: 5  •  LORazepam (ATIVAN) 0.5 MG tablet, Take 1 tablet by mouth Every 8 (Eight) Hours As Needed for Anxiety., Disp: 30 tablet, Rfl: 0  •  metFORMIN ER (GLUCOPHAGE-XR) 500 MG 24 hr tablet, TAKE 4 TABLETS BY MOUTH ONCE DAILY WITH  SUPPER, Disp: 240 tablet, Rfl: 0  •  nitroglycerin (NITROSTAT) 0.4 MG SL tablet, Place 1 tablet under the tongue Every 5 (Five) Minutes As Needed for Chest  Pain. Take no more than 3 doses in 15 minutes., Disp: 30 tablet, Rfl: 5  •  omeprazole (priLOSEC) 20 MG capsule, Take 1 capsule by mouth Daily. Uses when not taking Nexium, Disp: 30 capsule, Rfl: 5  •  ONE TOUCH ULTRA TEST test strip, USE ONE STRIP TO CHECK GLUCOSE ONCE DAILY, Disp: 50 each, Rfl: 1  •  saw palmetto 160 MG capsule, Take 160 mg by mouth 2 (Two) Times a Day. HOLD FOR SURGERY, Disp: , Rfl:   •  vitamin E 1000 UNIT capsule, Take 1,200 Units by mouth Every Night. HOLD FOR SURGERY, Disp: , Rfl:          EMR Dragon/Transcription disclaimer:    Much of this encounter note is an electronic transcription/translation of spoken language to printed text. The electronic translation of spoken language may permit erroneous, or at times, nonsensical words or phrases to be inadvertently transcribed; Although I have reviewed the note for such errors, some may still exist.

## 2019-05-30 RX ORDER — OMEPRAZOLE 20 MG/1
CAPSULE, DELAYED RELEASE ORAL
Qty: 90 CAPSULE | Refills: 1 | Status: SHIPPED | OUTPATIENT
Start: 2019-05-30 | End: 2019-11-30 | Stop reason: SDUPTHER

## 2019-06-03 RX ORDER — BUSPIRONE HYDROCHLORIDE 5 MG/1
TABLET ORAL
Qty: 60 TABLET | Refills: 5 | Status: SHIPPED | OUTPATIENT
Start: 2019-06-03 | End: 2019-12-17 | Stop reason: SDUPTHER

## 2019-07-02 RX ORDER — CLOPIDOGREL BISULFATE 75 MG/1
TABLET ORAL
Qty: 90 TABLET | Refills: 1 | Status: SHIPPED | OUTPATIENT
Start: 2019-07-02 | End: 2019-10-09 | Stop reason: SDUPTHER

## 2019-07-18 RX ORDER — ATORVASTATIN CALCIUM 80 MG/1
TABLET, FILM COATED ORAL
Qty: 90 TABLET | Refills: 0 | Status: SHIPPED | OUTPATIENT
Start: 2019-07-18 | End: 2019-08-30 | Stop reason: SDUPTHER

## 2019-07-19 RX ORDER — METFORMIN HYDROCHLORIDE 500 MG/1
TABLET, EXTENDED RELEASE ORAL
Qty: 360 TABLET | Refills: 0 | Status: SHIPPED | OUTPATIENT
Start: 2019-07-19 | End: 2019-11-30 | Stop reason: SDUPTHER

## 2019-08-30 RX ORDER — ATORVASTATIN CALCIUM 80 MG/1
TABLET, FILM COATED ORAL
Qty: 90 TABLET | Refills: 0 | Status: SHIPPED | OUTPATIENT
Start: 2019-08-30 | End: 2019-09-04 | Stop reason: SDUPTHER

## 2019-08-30 RX ORDER — ISOSORBIDE MONONITRATE 30 MG/1
TABLET, EXTENDED RELEASE ORAL
Qty: 90 TABLET | Refills: 1 | Status: SHIPPED | OUTPATIENT
Start: 2019-08-30 | End: 2019-09-04 | Stop reason: SDUPTHER

## 2019-08-30 RX ORDER — GLIMEPIRIDE 2 MG/1
TABLET ORAL
Qty: 90 TABLET | Refills: 1 | Status: SHIPPED | OUTPATIENT
Start: 2019-08-30 | End: 2019-12-30

## 2019-09-04 RX ORDER — ISOSORBIDE MONONITRATE 30 MG/1
30 TABLET, EXTENDED RELEASE ORAL NIGHTLY
Qty: 90 TABLET | Refills: 1 | Status: SHIPPED | OUTPATIENT
Start: 2019-09-04 | End: 2020-03-26 | Stop reason: SDUPTHER

## 2019-09-04 RX ORDER — ATORVASTATIN CALCIUM 80 MG/1
80 TABLET, FILM COATED ORAL DAILY
Qty: 90 TABLET | Refills: 0 | Status: SHIPPED | OUTPATIENT
Start: 2019-09-04 | End: 2020-02-24

## 2019-10-10 RX ORDER — CLOPIDOGREL BISULFATE 75 MG/1
TABLET ORAL
Qty: 90 TABLET | Refills: 1 | Status: SHIPPED | OUTPATIENT
Start: 2019-10-10 | End: 2020-03-26 | Stop reason: SDUPTHER

## 2019-10-17 ENCOUNTER — TRANSCRIBE ORDERS (OUTPATIENT)
Dept: ADMINISTRATIVE | Facility: HOSPITAL | Age: 74
End: 2019-10-17

## 2019-10-17 DIAGNOSIS — I71.019 CHRONIC DISSECTION OF THORACIC AORTA (HCC): ICD-10-CM

## 2019-10-17 DIAGNOSIS — I73.9 PAD (PERIPHERAL ARTERY DISEASE) (HCC): Primary | ICD-10-CM

## 2019-10-30 ENCOUNTER — HOSPITAL ENCOUNTER (OUTPATIENT)
Dept: CT IMAGING | Facility: HOSPITAL | Age: 74
Discharge: HOME | End: 2019-10-30
Admitting: SURGERY

## 2019-10-30 ENCOUNTER — HOSPITAL ENCOUNTER (OUTPATIENT)
Dept: CARDIOLOGY | Facility: HOSPITAL | Age: 74
Discharge: HOME | End: 2019-10-30

## 2019-10-30 DIAGNOSIS — I73.9 PAD (PERIPHERAL ARTERY DISEASE) (HCC): ICD-10-CM

## 2019-10-30 DIAGNOSIS — I71.019 CHRONIC DISSECTION OF THORACIC AORTA (HCC): ICD-10-CM

## 2019-10-30 LAB
BH CV LOWER ARTERIAL LEFT ABI RATIO: 0.56
BH CV LOWER ARTERIAL LEFT DORSALIS PEDIS SYS MAX: 92 MMHG
BH CV LOWER ARTERIAL LEFT GREAT TOE SYS MAX: 74 MMHG
BH CV LOWER ARTERIAL LEFT HIGH THIGH SYS MAX: 141 MMHG
BH CV LOWER ARTERIAL LEFT POPLITEAL SYS MAX: 126 MMHG
BH CV LOWER ARTERIAL LEFT POST EX ABI RATIO: 0.85
BH CV LOWER ARTERIAL LEFT POST TIBIAL SYS MAX: 75 MMHG
BH CV LOWER ARTERIAL LEFT TBI RATIO: 0.45
BH CV LOWER ARTERIAL RIGHT ABI RATIO: 0.72
BH CV LOWER ARTERIAL RIGHT DORSALIS PEDIS SYS MAX: 72 MMHG
BH CV LOWER ARTERIAL RIGHT GREAT TOE SYS MAX: 49 MMHG
BH CV LOWER ARTERIAL RIGHT HIGH THIGH SYS MAX: 123 MMHG
BH CV LOWER ARTERIAL RIGHT LOW THIGH SYS MAX: 107 MMHG
BH CV LOWER ARTERIAL RIGHT POPLITEAL SYS MAX: 96 MMHG
BH CV LOWER ARTERIAL RIGHT POST EX ABI RATIO: 0.74
BH CV LOWER ARTERIAL RIGHT POST TIBIAL SYS MAX: 118 MMHG
BH CV LOWER ARTERIAL RIGHT TBI RATIO: 0.3
CREAT BLDA-MCNC: 1 MG/DL (ref 0.6–1.3)
UPPER ARTERIAL LEFT ARM BRACHIAL SYS MAX: 153 MMHG
UPPER ARTERIAL RIGHT ARM BRACHIAL SYS MAX: 165 MMHG

## 2019-10-30 PROCEDURE — 93923 UPR/LXTR ART STDY 3+ LVLS: CPT

## 2019-10-30 PROCEDURE — 82565 ASSAY OF CREATININE: CPT

## 2019-10-30 PROCEDURE — 93924 LWR XTR VASC STDY BILAT: CPT

## 2019-10-30 PROCEDURE — 74174 CTA ABD&PLVS W/CONTRAST: CPT

## 2019-10-30 PROCEDURE — 25010000002 IOPAMIDOL 61 % SOLUTION: Performed by: SURGERY

## 2019-10-30 RX ADMIN — IOPAMIDOL 95 ML: 612 INJECTION, SOLUTION INTRAVENOUS at 15:07

## 2019-12-02 RX ORDER — METFORMIN HYDROCHLORIDE 500 MG/1
TABLET, EXTENDED RELEASE ORAL
Qty: 360 TABLET | Refills: 0 | Status: SHIPPED | OUTPATIENT
Start: 2019-12-02 | End: 2020-02-19

## 2019-12-02 RX ORDER — OMEPRAZOLE 20 MG/1
CAPSULE, DELAYED RELEASE ORAL
Qty: 90 CAPSULE | Refills: 1 | Status: SHIPPED | OUTPATIENT
Start: 2019-12-02 | End: 2020-03-26 | Stop reason: SDUPTHER

## 2019-12-17 RX ORDER — BUSPIRONE HYDROCHLORIDE 5 MG/1
TABLET ORAL
Qty: 60 TABLET | Refills: 0 | Status: SHIPPED | OUTPATIENT
Start: 2019-12-17 | End: 2020-02-19 | Stop reason: SDUPTHER

## 2019-12-30 RX ORDER — GLIMEPIRIDE 2 MG/1
TABLET ORAL
Qty: 90 TABLET | Refills: 0 | Status: SHIPPED | OUTPATIENT
Start: 2019-12-30 | End: 2020-02-24

## 2020-02-14 RX ORDER — BUSPIRONE HYDROCHLORIDE 5 MG/1
TABLET ORAL
Qty: 60 TABLET | Refills: 0 | OUTPATIENT
Start: 2020-02-14

## 2020-02-19 RX ORDER — BUSPIRONE HYDROCHLORIDE 5 MG/1
5 TABLET ORAL 2 TIMES DAILY
Qty: 20 TABLET | Refills: 0 | Status: SHIPPED | OUTPATIENT
Start: 2020-02-19 | End: 2020-02-24

## 2020-02-19 RX ORDER — METFORMIN HYDROCHLORIDE 500 MG/1
TABLET, EXTENDED RELEASE ORAL
Qty: 30 TABLET | Refills: 0 | Status: SHIPPED | OUTPATIENT
Start: 2020-02-19 | End: 2020-02-24

## 2020-02-21 ENCOUNTER — OFFICE VISIT (OUTPATIENT)
Dept: FAMILY MEDICINE CLINIC | Facility: CLINIC | Age: 75
End: 2020-02-21

## 2020-02-21 VITALS
BODY MASS INDEX: 23.79 KG/M2 | HEART RATE: 83 BPM | HEIGHT: 69 IN | OXYGEN SATURATION: 99 % | WEIGHT: 160.6 LBS | DIASTOLIC BLOOD PRESSURE: 74 MMHG | TEMPERATURE: 98.3 F | SYSTOLIC BLOOD PRESSURE: 139 MMHG

## 2020-02-21 DIAGNOSIS — E11.65 TYPE 2 DIABETES MELLITUS WITH HYPERGLYCEMIA, WITHOUT LONG-TERM CURRENT USE OF INSULIN (HCC): ICD-10-CM

## 2020-02-21 DIAGNOSIS — Z00.00 MEDICARE ANNUAL WELLNESS VISIT, SUBSEQUENT: Primary | ICD-10-CM

## 2020-02-21 DIAGNOSIS — E78.5 HYPERLIPIDEMIA, UNSPECIFIED HYPERLIPIDEMIA TYPE: ICD-10-CM

## 2020-02-21 DIAGNOSIS — Z12.2 ENCOUNTER FOR SCREENING FOR LUNG CANCER: ICD-10-CM

## 2020-02-21 DIAGNOSIS — Z87.891 FORMER SMOKER: ICD-10-CM

## 2020-02-21 DIAGNOSIS — R41.3 MEMORY IMPAIRMENT: ICD-10-CM

## 2020-02-21 LAB
ALBUMIN SERPL-MCNC: 4.4 G/DL (ref 3.5–5.2)
ALBUMIN/GLOB SERPL: 1.3 G/DL
ALP SERPL-CCNC: 137 U/L (ref 39–117)
ALT SERPL W P-5'-P-CCNC: 21 U/L (ref 1–41)
ANION GAP SERPL CALCULATED.3IONS-SCNC: 13 MMOL/L (ref 5–15)
AST SERPL-CCNC: 23 U/L (ref 1–40)
BILIRUB SERPL-MCNC: 0.7 MG/DL (ref 0.2–1.2)
BUN BLD-MCNC: 17 MG/DL (ref 8–23)
BUN/CREAT SERPL: 16.5 (ref 7–25)
CALCIUM SPEC-SCNC: 9.1 MG/DL (ref 8.6–10.5)
CHLORIDE SERPL-SCNC: 96 MMOL/L (ref 98–107)
CHOLEST SERPL-MCNC: 145 MG/DL (ref 0–200)
CO2 SERPL-SCNC: 26 MMOL/L (ref 22–29)
CREAT BLD-MCNC: 1.03 MG/DL (ref 0.76–1.27)
GFR SERPL CREATININE-BSD FRML MDRD: 71 ML/MIN/1.73
GLOBULIN UR ELPH-MCNC: 3.4 GM/DL
GLUCOSE BLD-MCNC: 77 MG/DL (ref 65–99)
HBA1C MFR BLD: 5.82 % (ref 4.8–5.6)
HDLC SERPL-MCNC: 37 MG/DL (ref 40–60)
LDLC SERPL CALC-MCNC: 82 MG/DL (ref 0–100)
LDLC/HDLC SERPL: 2.22 {RATIO}
POTASSIUM BLD-SCNC: 4.6 MMOL/L (ref 3.5–5.2)
PROT SERPL-MCNC: 7.8 G/DL (ref 6–8.5)
SODIUM BLD-SCNC: 135 MMOL/L (ref 136–145)
TRIGL SERPL-MCNC: 129 MG/DL (ref 0–150)
TSH SERPL DL<=0.05 MIU/L-ACNC: 2.95 UIU/ML (ref 0.27–4.2)
VIT B12 BLD-MCNC: 317 PG/ML (ref 211–946)
VLDLC SERPL-MCNC: 25.8 MG/DL (ref 5–40)

## 2020-02-21 PROCEDURE — 99214 OFFICE O/P EST MOD 30 MIN: CPT | Performed by: INTERNAL MEDICINE

## 2020-02-21 PROCEDURE — 83036 HEMOGLOBIN GLYCOSYLATED A1C: CPT | Performed by: INTERNAL MEDICINE

## 2020-02-21 PROCEDURE — 82607 VITAMIN B-12: CPT | Performed by: INTERNAL MEDICINE

## 2020-02-21 PROCEDURE — 80061 LIPID PANEL: CPT | Performed by: INTERNAL MEDICINE

## 2020-02-21 PROCEDURE — G0439 PPPS, SUBSEQ VISIT: HCPCS | Performed by: INTERNAL MEDICINE

## 2020-02-21 PROCEDURE — 80053 COMPREHEN METABOLIC PANEL: CPT | Performed by: INTERNAL MEDICINE

## 2020-02-21 PROCEDURE — 36415 COLL VENOUS BLD VENIPUNCTURE: CPT | Performed by: INTERNAL MEDICINE

## 2020-02-21 PROCEDURE — 84443 ASSAY THYROID STIM HORMONE: CPT | Performed by: INTERNAL MEDICINE

## 2020-02-21 NOTE — PROGRESS NOTES
Subjective   Erick Bruno is a 74 y.o. male.  Patient here for Medicare wellness visit subsequent also concerned about declining memory follow-up on type 2 diabetes and lipids as well.  Body mass index is 23.72 kg/m².  History of Present Illness   [facility]  Subsequent.  Also some declining memory issues which we will pursue type 2 diabetes which is doing fairly well.  Lipids patient is a former smoker does have PAD.  CAD.  Cardiology as well as vascular surgery.  Recent complaints that regard.  Have historically had more problems with PAD patient is interested getting a CT of the chest screening for lung cancer as he is a former smoker which we will try facilitate he wants this done at [facility] finds this more cost effective than [facility].  He had no hemoptysis increasing shortness of breath or unexplained weight loss recently.  Does include penicillin caused anaphylaxis and hives, Percocet itching itching Crestor undefined undefined and latex causing rash.  Former smoker quit in 2008 family history positive for hypertension heart failure cancer undefined diabetes  Review of Systems   All other systems reviewed and are negative.      Objective   Vitals:    02/21/20 1405   BP: 139/74   Pulse: 83   Temp: 98.3 °F (36.8 °C)   SpO2: 99%   Weight: 72.8 kg (160 lb 9.6 oz)     Physical Exam   Constitutional: He appears well-developed and well-nourished.   HENT:   Head: Normocephalic and atraumatic.   Eyes: Pupils are equal, round, and reactive to light. Conjunctivae are normal.   Neck:   No carotid bruits   Cardiovascular: Normal rate, regular rhythm and normal heart sounds.   Pulmonary/Chest: Effort normal and breath sounds normal.   Abdominal: Soft. Bowel sounds are normal.   Neurological:   Upon some probing the real-time questions memory is somewhat lacking spouse is present.  Unremarkable gait and station slightly stooped posture.   Skin: Skin is warm and dry.   Nursing note and vitals  reviewed.      Lab Results   Component Value Date    INR 1.01 10/03/2018    INR 0.93 04/15/2016       Procedures    Assessment/Plan   1.  Medicare wellness visit subsequent    2.  Memory impairment await B12 and TSH if these are negative or within normal size should say consider neurologist consult    3.  Type 2 diabetes relatively controlled by history await hemoglobin A1c if good recheck 6 months    4.  Hyperlipidemia weight pending lab again if satisfactory recheck in 6 months deftly 1B is LDL as low as we can get it actually 70 or so    5.  Encounter for lung cancer screening CT of chest range for [facility]    6.  Former smoker concerns as a #5    Much of this encounter note is an electronic transcription/translation of spoken language to printed text.  The electronic translation of spoken language may permit erroneous, or at times, nonsensical words or phrases to be inadvertently transcribed.  Although I have reviewed the note for such errors, some may still exist. If there are questions or for further clarification, please contact me.  tdap

## 2020-02-21 NOTE — PATIENT INSTRUCTIONS
Medicare Wellness  Personal Prevention Plan of Service     Date of Office Visit:  2020  Encounter Provider:  Denzel Fuller MD  Place of Service:  [facility]  Patient Name: Erick Bruno  :  1945    As part of the Medicare Wellness portion of your visit today, we are providing you with this personalized preventive plan of services (PPPS). This plan is based upon recommendations of the [place] Preventive Services Task Force (USPSTF) and the Advisory Committee on Immunization Practices (ACIP).    This lists the preventive care services that should be considered, and provides dates of when you are due. Items listed as completed are up-to-date and do not require any further intervention.    Health Maintenance   Topic Date Due   • TDAP/TD VACCINES (1 - Tdap) 1957   • ZOSTER VACCINE (1 of 2) 1996   • LUNG CANCER SCREENING  2001   • HEPATITIS C SCREENING  2016   • DIABETIC EYE EXAM  2016   • COLONOSCOPY  2016   • HEMOGLOBIN A1C  2019   • DIABETIC FOOT EXAM  2019   • LIPID PANEL  2019   • URINE MICROALBUMIN  2019   • MEDICARE ANNUAL WELLNESS  2021   • Pneumococcal Vaccine Once at 65 Years Old  Completed   • INFLUENZA VACCINE  Completed   • AAA SCREEN (ONE-TIME)  Completed       No orders of the defined types were placed in this encounter.      No follow-ups on file.

## 2020-02-21 NOTE — PROGRESS NOTES
The ABCs of the Annual Wellness Visit  Subsequent Medicare Wellness Visit    Chief Complaint   Patient presents with   • Med Refill   • Medicare Wellness-subsequent       Subjective   History of Present Illness:  Erick Bruno is a 74 y.o. male who presents for a Subsequent Medicare Wellness Visit.    HEALTH RISK ASSESSMENT    Recent Hospitalizations:  No hospitalization(s) within the last year.    Current Medical Providers:  Patient Care Team:  Denzel Fuller Jr., MD as PCP - General  Denzel Fuller Jr., MD as PCP - Family Medicine  Michael Wyman III, MD as Consulting Physician (Cardiology)  Jayden Marie MD as Consulting Physician (Vascular Surgery)  Segundo Cunningham OD as Consulting Physician (Ophthalmology)  Daniel Gonzalez MD as Surgeon (General Surgery)    Smoking Status:  Social History     Tobacco Use   Smoking Status Former Smoker   • Packs/day: 1.00   • Years: 60.00   • Pack years: 60.00   • Last attempt to quit:    • Years since quittin.1   Smokeless Tobacco Never Used       Alcohol Consumption:  Social History     Substance and Sexual Activity   Alcohol Use No    Comment: caffeine use       Depression Screen:   PHQ-2/PHQ-9 Depression Screening 12/3/2018   Little interest or pleasure in doing things 0   Feeling down, depressed, or hopeless 0   Trouble falling or staying asleep, or sleeping too much 0   Feeling tired or having little energy 0   Poor appetite or overeating 0   Feeling bad about yourself - or that you are a failure or have let yourself or your family down 0   Trouble concentrating on things, such as reading the newspaper or watching television 0   Moving or speaking so slowly that other people could have noticed. Or the opposite - being so fidgety or restless that you have been moving around a lot more than usual 0   Thoughts that you would be better off dead, or of hurting yourself in some way 0   Total Score 0   If you checked off any problems, how difficult  have these problems made it for you to do your work, take care of things at home, or get along with other people? Not difficult at all       Fall Risk Screen:  RICHIE Fall Risk Assessment has not been completed.    Health Habits and Functional and Cognitive Screening:  Functional & Cognitive Status 12/3/2018   Do you have difficulty preparing food and eating? No   Do you have difficulty bathing yourself, getting dressed or grooming yourself? Yes   Do you have difficulty using the toilet? No   Do you have difficulty moving around from place to place? Yes   Do you have trouble with steps or getting out of a bed or a chair? Yes   Current Diet Unhealthy Diet   Dental Exam Not up to date   Eye Exam Not up to date   Exercise (times per week) 2 times per week   Current Exercise Activities Include Walking   Do you need help using the phone?  No   Are you deaf or do you have serious difficulty hearing?  Yes   Do you need help with transportation? Yes   Do you need help shopping? No   Do you need help preparing meals?  No   Do you need help with housework?  No   Do you need help with laundry? No   Do you need help taking your medications? No   Do you need help managing money? No   Do you ever drive or ride in a car without wearing a seat belt? No   Have you felt unusual stress, anger or loneliness in the last month? Yes   Who do you live with? Spouse   If you need help, do you have trouble finding someone available to you? No   Have you been bothered in the last four weeks by sexual problems? No   Do you have difficulty concentrating, remembering or making decisions? Yes         Does the patient have evidence of cognitive impairment? No    Asprin use counseling:Does not need ASA (and currently is not on it)    Age-appropriate Screening Schedule:  Refer to the list below for future screening recommendations based on patient's age, sex and/or medical conditions. Orders for these recommended tests are listed in the plan section.  The patient has been provided with a written plan.    Health Maintenance   Topic Date Due   • TDAP/TD VACCINES (1 - Tdap) 06/17/1957   • ZOSTER VACCINE (1 of 2) 06/17/1996   • DIABETIC EYE EXAM  03/28/2016   • COLONOSCOPY  03/28/2016   • HEMOGLOBIN A1C  06/03/2019   • INFLUENZA VACCINE  08/01/2019   • DIABETIC FOOT EXAM  11/05/2019   • LIPID PANEL  12/03/2019   • URINE MICROALBUMIN  12/03/2019          The following portions of the patient's history were reviewed and updated as appropriate: allergies, current medications, past family history, past medical history, past social history, past surgical history and problem list.    Outpatient Medications Prior to Visit   Medication Sig Dispense Refill   • aspirin 81 MG chewable tablet Chew 81 mg Every Night. PER JUAN DYKES TO CONTINUE TAKING     • atorvastatin (LIPITOR) 80 MG tablet Take 1 tablet by mouth Daily. 90 tablet 0   • busPIRone (BUSPAR) 5 MG tablet Take 1 tablet by mouth 2 (Two) Times a Day. 20 tablet 0   • clopidogrel (PLAVIX) 75 MG tablet TAKE 1 TABLET BY MOUTH ONCE DAILY 90 tablet 1   • Coenzyme Q10 200 MG capsule Take 200 mg by mouth Every Night. HOLDING FOR SURGERY     • escitalopram (LEXAPRO) 5 MG tablet Take 5 mg by mouth Daily.     • glimepiride (AMARYL) 2 MG tablet TAKE 1 TABLET BY MOUTH ONCE DAILY IN THE MORNING BEFORE BREAKFAST 90 tablet 0   • glucose blood test strip bid 100 each 12   • isosorbide mononitrate (IMDUR) 30 MG 24 hr tablet Take 1 tablet by mouth Every Night. 90 tablet 1   • metFORMIN ER (GLUCOPHAGE-XR) 500 MG 24 hr tablet TAKE 4 TABLETS BY MOUTH ONCE DAILY WITH SUPPER 30 tablet 0   • nitroglycerin (NITROSTAT) 0.4 MG SL tablet Place 1 tablet under the tongue Every 5 (Five) Minutes As Needed for Chest Pain. Take no more than 3 doses in 15 minutes. 30 tablet 5   • omeprazole (priLOSEC) 20 MG capsule TAKE 1 CAPSULE BY MOUTH ONCE DAILY. USE WHEN NOT TAKING NEXIUM (ESOMEPRAZOLE). 90 capsule 1   • ONE TOUCH ULTRA TEST test strip USE ONE  "STRIP TO CHECK GLUCOSE ONCE DAILY 50 each 1   • saw palmetto 160 MG capsule Take 160 mg by mouth 2 (Two) Times a Day. HOLD FOR SURGERY     • vitamin E 1000 UNIT capsule Take 1,200 Units by mouth Every Night. HOLD FOR SURGERY     • LORazepam (ATIVAN) 0.5 MG tablet Take 1 tablet by mouth Every 8 (Eight) Hours As Needed for Anxiety. 30 tablet 0     No facility-administered medications prior to visit.        Patient Active Problem List   Diagnosis   • Toe ulcer (CMS/HCC)   • Sleep apnea   • Seizure (CMS/HCC)   • PVD (peripheral vascular disease) (CMS/HCC)   • Peripheral arterial disease (CMS/HCC)   • Macular degeneration   • Hyperlipidemia   • Enlarged prostate   • Diabetes mellitus (CMS/HCC)   • Colon polyps   • Arthritis   • Anxiety   • Acid reflux   • Hx of colonic polyps   • Abnormal nuclear stress test       Advanced Care Planning:  ACP discussion was held with the patient during this visit. Patient has an advance directive that is valid in EMR.     Review of Systems    Compared to one year ago, the patient feels his physical health is worse.  Compared to one year ago, the patient feels his mental health is worse.    Reviewed chart for potential of high risk medication in the elderly: yes  Reviewed chart for potential of harmful drug interactions in the elderly:yes    Objective         Vitals:    02/21/20 1405   BP: 139/74   BP Location: Left arm   Patient Position: Sitting   Cuff Size: Adult   Pulse: 83   Temp: 98.3 °F (36.8 °C)   TempSrc: Oral   SpO2: 99%   Weight: 72.8 kg (160 lb 9.6 oz)   Height: 175.3 cm (69\")   PainSc: 0-No pain       Body mass index is 23.72 kg/m².  Discussed the patient's BMI with him. The BMI is in the acceptable range.    Physical Exam          Assessment/Plan   Medicare Risks and Personalized Health Plan  CMS Preventative Services Quick Reference  Dementia/Memory   Depression/Dysphoria  Fall Risk  Hearing Problem  Immunizations Discussed/Encouraged (specific immunizations; adacel Tdap " and Shingrix )  Lung Cancer Risk    The above risks/problems have been discussed with the patient.  Pertinent information has been shared with the patient in the After Visit Summary.  Follow up plans and orders are seen below in the Assessment/Plan Section.    There are no diagnoses linked to this encounter.  Follow Up:  No follow-ups on file.     An After Visit Summary and PPPS were given to the patient.

## 2020-02-24 LAB — ALBUMIN UR-MCNC: 50 MG/L (ref 0–20)

## 2020-02-24 PROCEDURE — 82043 UR ALBUMIN QUANTITATIVE: CPT | Performed by: INTERNAL MEDICINE

## 2020-02-24 RX ORDER — EZETIMIBE 10 MG/1
10 TABLET ORAL DAILY
Qty: 30 TABLET | Refills: 5 | Status: SHIPPED | OUTPATIENT
Start: 2020-02-24 | End: 2020-12-22

## 2020-02-24 RX ORDER — BUSPIRONE HYDROCHLORIDE 5 MG/1
5 TABLET ORAL 2 TIMES DAILY
Qty: 60 TABLET | Refills: 0 | Status: SHIPPED | OUTPATIENT
Start: 2020-02-24 | End: 2020-03-26 | Stop reason: SDUPTHER

## 2020-02-24 RX ORDER — METFORMIN HYDROCHLORIDE 500 MG/1
TABLET, EXTENDED RELEASE ORAL
Qty: 360 TABLET | Refills: 0 | Status: SHIPPED | OUTPATIENT
Start: 2020-02-24 | End: 2020-03-26 | Stop reason: SDUPTHER

## 2020-02-24 RX ORDER — BUSPIRONE HYDROCHLORIDE 5 MG/1
TABLET ORAL
Qty: 60 TABLET | Refills: 0 | Status: SHIPPED | OUTPATIENT
Start: 2020-02-24 | End: 2020-02-24 | Stop reason: SDUPTHER

## 2020-02-24 RX ORDER — ATORVASTATIN CALCIUM 80 MG/1
TABLET, FILM COATED ORAL
Qty: 90 TABLET | Refills: 0 | Status: SHIPPED | OUTPATIENT
Start: 2020-02-24 | End: 2020-03-26 | Stop reason: SDUPTHER

## 2020-02-24 RX ORDER — GLIMEPIRIDE 2 MG/1
TABLET ORAL
Qty: 90 TABLET | Refills: 0 | Status: SHIPPED | OUTPATIENT
Start: 2020-02-24 | End: 2020-03-26 | Stop reason: SDUPTHER

## 2020-03-02 ENCOUNTER — LAB (OUTPATIENT)
Dept: FAMILY MEDICINE CLINIC | Facility: CLINIC | Age: 75
End: 2020-03-02

## 2020-03-02 DIAGNOSIS — R80.9 PROTEINURIA, UNSPECIFIED TYPE: Primary | ICD-10-CM

## 2020-03-02 LAB — PROT 24H UR-MRATE: 114 MG/24HOURS (ref 0–150)

## 2020-03-02 PROCEDURE — 81050 URINALYSIS VOLUME MEASURE: CPT | Performed by: INTERNAL MEDICINE

## 2020-03-02 PROCEDURE — 84156 ASSAY OF PROTEIN URINE: CPT | Performed by: INTERNAL MEDICINE

## 2020-03-12 ENCOUNTER — TELEPHONE (OUTPATIENT)
Dept: FAMILY MEDICINE CLINIC | Facility: CLINIC | Age: 75
End: 2020-03-12

## 2020-03-26 RX ORDER — BUSPIRONE HYDROCHLORIDE 5 MG/1
5 TABLET ORAL 2 TIMES DAILY
Qty: 60 TABLET | Refills: 0 | Status: SHIPPED | OUTPATIENT
Start: 2020-03-26 | End: 2020-05-18

## 2020-03-26 RX ORDER — OMEPRAZOLE 20 MG/1
20 CAPSULE, DELAYED RELEASE ORAL DAILY
Qty: 90 CAPSULE | Refills: 1 | Status: SHIPPED | OUTPATIENT
Start: 2020-03-26 | End: 2020-09-17

## 2020-03-26 RX ORDER — ATORVASTATIN CALCIUM 80 MG/1
80 TABLET, FILM COATED ORAL DAILY
Qty: 90 TABLET | Refills: 0 | Status: SHIPPED | OUTPATIENT
Start: 2020-03-26 | End: 2020-06-15

## 2020-03-26 RX ORDER — GLIMEPIRIDE 2 MG/1
2 TABLET ORAL
Qty: 90 TABLET | Refills: 0 | Status: SHIPPED | OUTPATIENT
Start: 2020-03-26 | End: 2020-07-20

## 2020-03-26 RX ORDER — ISOSORBIDE MONONITRATE 30 MG/1
30 TABLET, EXTENDED RELEASE ORAL NIGHTLY
Qty: 90 TABLET | Refills: 1 | Status: SHIPPED | OUTPATIENT
Start: 2020-03-26 | End: 2020-09-17

## 2020-03-26 RX ORDER — METFORMIN HYDROCHLORIDE 500 MG/1
TABLET, EXTENDED RELEASE ORAL
Qty: 360 TABLET | Refills: 0 | Status: SHIPPED | OUTPATIENT
Start: 2020-03-26 | End: 2020-06-18

## 2020-03-26 RX ORDER — CLOPIDOGREL BISULFATE 75 MG/1
75 TABLET ORAL DAILY
Qty: 90 TABLET | Refills: 1 | Status: SHIPPED | OUTPATIENT
Start: 2020-03-26 | End: 2020-09-17

## 2020-03-26 NOTE — TELEPHONE ENCOUNTER
Spouse called in to change the pharmacy on the patients account and also request re-fill for     atorvastatin (LIPITOR) 80 MG tablet  busPIRone (BUSPAR) 5 MG tablet  clopidogrel (PLAVIX) 75 MG tablet  glimepiride (AMARYL) 2 MG tablet  isosorbide mononitrate (IMDUR) 30 MG 24 hr tablet  metFORMIN ER (GLUCOPHAGE-XR) 500 MG 24 hr tablet  omeprazole (priLOSEC) 20 MG capsule    90 day Supply    [address]    Patient call back [phone]

## 2020-03-30 ENCOUNTER — TELEPHONE (OUTPATIENT)
Dept: FAMILY MEDICINE CLINIC | Facility: CLINIC | Age: 75
End: 2020-03-30

## 2020-03-30 NOTE — TELEPHONE ENCOUNTER
PTS WIFE CALLED STATING WHERE PT IS UNABLE TO LEAVE THE HOUSE HIS BLOOD SUGAR IS DROPPING. WIFE STATES HIS BLOOD SURGAR THIS MORNING WAS AT 86. HAS BEEN GOING DOWN SINCE 03/26. HAS BEEN LOWER 100S. WIFE STATES LOWEST IT HAS BEEN HAS BEEN 86. PT IS FEELING FINE.     PLEASE ADVICE     PT CALL BACK   [phone]

## 2020-03-30 NOTE — TELEPHONE ENCOUNTER
Patient wife informed 86 is not bad will let us know if drops below 60 or symptoms of hypoglycemia.[facility] [place]

## 2020-05-07 ENCOUNTER — TELEPHONE (OUTPATIENT)
Dept: FAMILY MEDICINE CLINIC | Facility: CLINIC | Age: 75
End: 2020-05-07

## 2020-05-07 NOTE — TELEPHONE ENCOUNTER
Pt called and requested to have following meds refilled. Please fill following:     ONE TOUCH ULTRA TEST test strip    [facility]     Please contact pt once called in @ [phone].      Pt also wanted to know if there is a glucose meter that has a larger display than the one he has. If there is one, pt is requesting for Rx to be sent for this also.

## 2020-05-11 ENCOUNTER — TELEPHONE (OUTPATIENT)
Dept: FAMILY MEDICINE CLINIC | Facility: CLINIC | Age: 75
End: 2020-05-11

## 2020-05-11 NOTE — TELEPHONE ENCOUNTER
Mabel from [facility] calling and states they need dx to fill script for Medicare part B.  The medication is  glucose blood (ONE TOUCH ULTRA TEST) test strip.    Mabel at [facility] can be reached at [phone].

## 2020-05-12 ENCOUNTER — TELEPHONE (OUTPATIENT)
Dept: FAMILY MEDICINE CLINIC | Facility: CLINIC | Age: 75
End: 2020-05-12

## 2020-05-12 NOTE — TELEPHONE ENCOUNTER
PATIENTS WIFE CALLED IN REGARDS TO GETTING THE PATIENTS GLUCOSE BLOOD ONE TOUCH ULTRA TEST STRIPS SENT TO THE CORRECT PHARM WHICH IS THE [facility] ON [address]?      ALSO GERI STATES THAT THE PATIENT IS HAVING TROUBLE SEEING THE NUMBERS ON HIS CURRENT MONITOR AND WOULD LIKE TO KNOW IF THERE WAS ONE THAT COULD MAKE THE NUMBERS LARGER AND IF SO COULD ONE BE CALLED IN FOR HIM?      MRS. NEVAREZ WOULD LIKE TO GET A CALL BACK IN REGARDS TO THIS MATTER TODAY IF POSSIBLE [phone].

## 2020-05-18 RX ORDER — BUSPIRONE HYDROCHLORIDE 5 MG/1
TABLET ORAL
Qty: 60 TABLET | Refills: 0 | Status: SHIPPED | OUTPATIENT
Start: 2020-05-18 | End: 2020-06-15

## 2020-05-20 ENCOUNTER — TELEPHONE (OUTPATIENT)
Dept: FAMILY MEDICINE CLINIC | Facility: CLINIC | Age: 75
End: 2020-05-20

## 2020-05-20 RX ORDER — LANCETS 30 GAUGE
EACH MISCELLANEOUS
Qty: 100 EACH | Refills: 3 | Status: SHIPPED | OUTPATIENT
Start: 2020-05-20 | End: 2021-01-13

## 2020-05-20 NOTE — TELEPHONE ENCOUNTER
Patient has trouble seeing his current machine//new RX sent in for ImpulseSave connect since it has a larger screen for reading. Rx sent to [facility].

## 2020-05-22 RX ORDER — BLOOD-GLUCOSE METER
KIT MISCELLANEOUS
Qty: 1 EACH | Refills: 1 | Status: SHIPPED | OUTPATIENT
Start: 2020-05-22 | End: 2021-01-13

## 2020-05-22 RX ORDER — LANCETS 30 GAUGE
EACH MISCELLANEOUS
Qty: 100 EACH | Refills: 3 | Status: SHIPPED | OUTPATIENT
Start: 2020-05-22 | End: 2021-01-13

## 2020-05-29 ENCOUNTER — NURSE TRIAGE (OUTPATIENT)
Dept: CALL CENTER | Facility: HOSPITAL | Age: 75
End: 2020-05-29

## 2020-05-29 VITALS — HEIGHT: 69 IN | BODY MASS INDEX: 23.7 KG/M2 | WEIGHT: 160 LBS
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
## 2020-06-15 RX ORDER — ATORVASTATIN CALCIUM 80 MG/1
TABLET, FILM COATED ORAL
Qty: 90 TABLET | Refills: 0 | Status: SHIPPED | OUTPATIENT
Start: 2020-06-15 | End: 2020-07-20

## 2020-06-15 RX ORDER — BUSPIRONE HYDROCHLORIDE 5 MG/1
TABLET ORAL
Qty: 60 TABLET | Refills: 0 | Status: SHIPPED | OUTPATIENT
Start: 2020-06-15 | End: 2020-07-20

## 2020-06-16 NOTE — TELEPHONE ENCOUNTER
Patient called and stated he got a new Blood sugar machine. He stated that he was not prescribed the test stripes that go with the new machine. Patient was given some samples that he had to pay for called relioff test stripes. His new machine is an RELION PREMIUM VOICE GLUCOSE MACHINE. He also state that his insurance company need verification that test stripes where prescribed.     Please advise patient at [phone] with any questions or concerns.     Sent to [facility] in [place], [place] on [address]

## 2020-06-18 RX ORDER — METFORMIN HYDROCHLORIDE 500 MG/1
TABLET, EXTENDED RELEASE ORAL
Qty: 360 TABLET | Refills: 0 | Status: SHIPPED | OUTPATIENT
Start: 2020-06-18 | End: 2020-09-17

## 2020-06-25 ENCOUNTER — TELEPHONE (OUTPATIENT)
Dept: FAMILY MEDICINE CLINIC | Facility: CLINIC | Age: 75
End: 2020-06-25

## 2020-06-25 NOTE — TELEPHONE ENCOUNTER
"Patient's wife is very unhappy with the treatment they have been getting in the office.  She stated that they do not receive their results in a timely manner and that she does not want Myra Moore involved in their care in any way.    She was unhappy that they had not received a pre authorization for the test strips yet from the pharmacy.  I had Ayesha Beard do that for me and the result from \"Cover My Meds\" said \"favorable\".    I assured Mrs. Bruno that I would tell Dr. Fuller of her concerns.  "

## 2020-07-07 ENCOUNTER — OFFICE VISIT (OUTPATIENT)
Dept: CARDIOLOGY | Facility: CLINIC | Age: 75
End: 2020-07-07

## 2020-07-07 VITALS — HEIGHT: 69 IN | WEIGHT: 159 LBS | BODY MASS INDEX: 23.55 KG/M2

## 2020-07-07 DIAGNOSIS — E78.49 OTHER HYPERLIPIDEMIA: Chronic | ICD-10-CM

## 2020-07-07 DIAGNOSIS — I73.9 PERIPHERAL ARTERIAL DISEASE (HCC): Chronic | ICD-10-CM

## 2020-07-07 DIAGNOSIS — R94.39 ABNORMAL NUCLEAR STRESS TEST: Primary | Chronic | ICD-10-CM

## 2020-07-07 PROCEDURE — 99442 PR PHYS/QHP TELEPHONE EVALUATION 11-20 MIN: CPT | Performed by: INTERNAL MEDICINE

## 2020-07-07 NOTE — PROGRESS NOTES
Subjective:     Encounter Date:  07/07/20        Patient ID: Erick Bruno is a 75 y.o. male.    Chief Complaint: abnormal stress test  History of Present Illness    Dear Dr. Fuller,    This patient has consented to a telehealth visit via audio. The visit was scheduled as a audio visit to comply with patient safety concerns in accordance with [facility] recommendations.  All vitals recorded within this visit are reported by the patient.  I spent  20 minutes in total including but not limited to the 13 minutes spent in direct conversation with this patient.     He is not having any cardiac complaints. Continues to have claudication and is to see vascular surgery again soon. Occasionally has claudication but most days not. No sores or ulcers. No CP/discomfort. No SOB at rest or with activity. No exposure to COVID-19, hasn't been out of house for 2 months.     He is been seen in the past for chest discomfort.  In 2017 he had a stress test performed that showed a small area of hypokinesis in the inferior wall with an ejection fraction of 48% and no ischemia.  At the time he elected not to proceed with any additional diagnostic testing.  He's been treated with medical therapy.    Seen today for routine follow-up and states that he has been doing great without any cardiac complaints.  He has been working with vascular surgery for his peripheral arterial disease.  That is doing better- he is not having claudication at this point.        The following portions of the patient's history were reviewed and updated as appropriate: allergies, current medications, past family history, past medical history, past social history, past surgical history and problem list.    Past Medical History:   Diagnosis Date   • Abnormal nuclear stress test 5/21/2018   • Acid reflux    • Anemia    • Anxiety    • Arthritis    • CAD (coronary artery disease)    • Chest discomfort     both typical and atypical   • Dementia (CMS/[facility])    • Diabetes mellitus  (CMS/HCC)    • Enlarged prostate    • History of hepatitis A    • History of seizure 2013    S/P TEMPERATURE   • Hyperlipidemia    • Hypertension    • Macular degeneration    • NSTEMI (non-ST elevated myocardial infarction) (CMS/HCC)    • Panarteritis (CMS/HCC)    • Peripheral arterial disease (CMS/HCC)    • Precordial pain    • PVD (peripheral vascular disease) (CMS/HCC)    • Sleep apnea     DOES NOT WEAR CPAP   • SOBOE (shortness of breath on exertion)        Past Surgical History:   Procedure Laterality Date   • APPENDECTOMY     • ATHRECTOMY ILIAC, FEMORAL, TIBIAL ARTERY N/A 10/17/2018    Procedure: AIF ARTERIOGRAM, LEFT SFA  ANGIOPLASTY;  Surgeon: Jayden Marie MD;  Location: [facility] MAIN OR;  Service: Vascular   • CHOLECYSTECTOMY     • CHOLECYSTECTOMY WITH INTRAOPERATIVE CHOLANGIOGRAM N/A 2018    Procedure: CHOLECYSTECTOMY LAPAROSCOPIC INTRAOPERATIVE CHOLANGIOGRAM;  Surgeon: Daniel Gonzalez MD;  Location: [facility] MAIN OR;  Service: General   • COLONOSCOPY     • COLONOSCOPY N/A 2018    Procedure: COLONOSCOPY to cecum with polypectomies;  Surgeon: Daniel Gonzalez MD;  Location: [facility] ENDOSCOPY;  Service: Gastroenterology   • ENDOSCOPY N/A 2018    Procedure: ESOPHAGOGASTRODUODENOSCOPY with biopsies;  Surgeon: Daniel Gonzalez MD;  Location: 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   HENT: Negative for ear discharge, ear pain, hearing loss, nosebleeds and sore throat.    Eyes: Negative for blurred vision, double vision and pain.   Cardiovascular: Negative for cyanosis.   Respiratory: Negative for hemoptysis and sputum production.    Endocrine: Negative for cold intolerance and heat intolerance.   Hematologic/Lymphatic: Negative for adenopathy.   Skin: Negative for dry skin, itching, nail changes, rash and suspicious lesions.   Musculoskeletal: Negative for arthritis, gout, muscle cramps, muscle weakness, myalgias and neck pain.   Gastrointestinal: Negative for anorexia, bowel incontinence, constipation, diarrhea, dysphagia, hematemesis and jaundice.   Genitourinary: Negative for bladder incontinence, dysuria, flank pain, frequency, hematuria and nocturia.   Neurological: Negative for focal weakness, numbness, paresthesias and seizures.   Psychiatric/Behavioral: Negative for altered mental status, hallucinations, hypervigilance, suicidal ideas and thoughts of violence.   Allergic/Immunologic: Negative for persistent infections.       Procedures       Objective:     Vitals:    07/07/20 1331   Weight: 72.1 kg (159 lb)   Height: 175.3 cm (69\")     Alert and oriented x3, thought processes normal, judgment normal, speaking in full sentences with no wheezing and no respiratory distress. Hearing is appropriate without difficulty.      Lab Review:             Performed        Assessment:          Diagnosis Plan   1. Abnormal nuclear stress test     2. Other hyperlipidemia     3. Peripheral arterial disease (CMS/[facility])            Plan:       1. Coronary Artery Disease  Assessment  • The patient has no angina    Plan  • Lifestyle modifications discussed include adhering to a heart healthy diet, avoidance of tobacco products, maintenance of a healthy weight, medication compliance, " regular exercise and regular monitoring of cholesterol and blood pressure  • This patient has a stress test consistent with a prior inferior myocardial infarction.  No ischemia, and the patient now is pain-free on his current medical therapy.  We will continue medical therapy and focus on risk factor modification.    Subjective - Objective  • There is a history of past MI  • Current antiplatelet therapy includes aspirin 81 mg    2.  Peripheral vascular disease, followed by vascular surgery  3.  Diabetes mellitus with circulatory complication, continue risk factor modification    Thank you very much for allowing us to participate in the care of this pleasant patient.  Please don't hesitate to call if I can be of assistance in any way.      Current Outpatient Medications:   •  aspirin 81 MG chewable tablet, Chew 81 mg Every Night. PER DR. MULLER, OK TO CONTINUE TAKING, Disp: , Rfl:   •  atorvastatin (LIPITOR) 80 MG tablet, Take 1 tablet by mouth once daily, Disp: 90 tablet, Rfl: 0  •  Blood Glucose Monitoring Suppl (ACCU-CHEK JENNIFFER CONNECT) w/Device kit, 1 each Daily. DX E11.9 check bs daily accu check jenniffer connect needs due to mac degenration cant read most numbers, Disp: 1 kit, Rfl: 1  •  busPIRone (BUSPAR) 5 MG tablet, Take 1 tablet by mouth twice daily, Disp: 60 tablet, Rfl: 0  •  clopidogrel (PLAVIX) 75 MG tablet, Take 1 tablet by mouth Daily., Disp: 90 tablet, Rfl: 1  •  Coenzyme Q10 200 MG capsule, Take 200 mg by mouth Every Night. HOLDING FOR SURGERY, Disp: , Rfl:   •  escitalopram (LEXAPRO) 5 MG tablet, Take 5 mg by mouth Daily., Disp: , Rfl:   •  ezetimibe (ZETIA) 10 MG tablet, Take 1 tablet by mouth Daily., Disp: 30 tablet, Rfl: 5  •  glimepiride (AMARYL) 2 MG tablet, Take 1 tablet by mouth Every Morning Before Breakfast., Disp: 90 tablet, Rfl: 0  •  glucose blood (ONE TOUCH ULTRA TEST) test strip, 1 each by Other route Daily. To check glucose, Disp: 90 each, Rfl: 1  •  glucose blood test strip, accu jenniffer  connect check bs daily  DX e11.9 patient needs new supplies due to macular degeneration, Disp: 100 each, Rfl: 3  •  glucose blood test strip, DC E11.9 give matching supplies with device he is given check bs daily, Disp: 100 each, Rfl: 12  •  glucose blood test strip, Test glucose once daily, Disp: 50 each, Rfl: 12  •  glucose monitor monitoring kit, Dx E11.9 give Relion Prime device check bs daily Give matching supplies covered by his insurance with the largest readout for patient to see results., Disp: 1 each, Rfl: 1  •  isosorbide mononitrate (IMDUR) 30 MG 24 hr tablet, Take 1 tablet by mouth Every Night., Disp: 90 tablet, Rfl: 1  •  Lancets misc, DX E11.9 check bs daily give matching supplies for accu brittany connect, Disp: 100 each, Rfl: 3  •  Lancets misc, DX E11.9 check bs daily give matching supplies with device given, Disp: 100 each, Rfl: 3  •  metFORMIN ER (GLUCOPHAGE-XR) 500 MG 24 hr tablet, TAKE 4 TABLETS BY MOUTH ONCE DAILY WITH SUPPER (Patient taking differently: Take 500 mg by mouth 2 (two) times a day. TAKE 4 TABLETS BY MOUTH ONCE DAILY WITH SUPPER), Disp: 360 tablet, Rfl: 0  •  nitroglycerin (NITROSTAT) 0.4 MG SL tablet, Place 1 tablet under the tongue Every 5 (Five) Minutes As Needed for Chest Pain. Take no more than 3 doses in 15 minutes., Disp: 30 tablet, Rfl: 5  •  omeprazole (priLOSEC) 20 MG capsule, Take 1 capsule by mouth Daily., Disp: 90 capsule, Rfl: 1  •  saw palmetto 160 MG capsule, Take 160 mg by mouth 2 (Two) Times a Day. HOLD FOR SURGERY, Disp: , Rfl:   •  vitamin E 1000 UNIT capsule, Take 1,200 Units by mouth Every Night. HOLD FOR SURGERY, Disp: , Rfl:          EMR Dragon/Transcription disclaimer:    Much of this encounter note is an electronic transcription/translation of spoken language to printed text. The electronic translation of spoken language may permit erroneous, or at times, nonsensical words or phrases to be inadvertently transcribed; Although I have reviewed the note for such  errors, some may still exist.

## 2020-07-20 RX ORDER — ATORVASTATIN CALCIUM 80 MG/1
TABLET, FILM COATED ORAL
Qty: 90 TABLET | Refills: 0 | Status: SHIPPED | OUTPATIENT
Start: 2020-07-20 | End: 2021-01-12

## 2020-07-20 RX ORDER — GLIMEPIRIDE 2 MG/1
TABLET ORAL
Qty: 90 TABLET | Refills: 0 | Status: SHIPPED | OUTPATIENT
Start: 2020-07-20 | End: 2020-09-17

## 2020-07-20 RX ORDER — BUSPIRONE HYDROCHLORIDE 5 MG/1
TABLET ORAL
Qty: 60 TABLET | Refills: 0 | Status: SHIPPED | OUTPATIENT
Start: 2020-07-20 | End: 2020-09-17

## 2020-09-17 RX ORDER — ISOSORBIDE MONONITRATE 30 MG/1
TABLET, EXTENDED RELEASE ORAL
Qty: 90 TABLET | Refills: 0 | Status: SHIPPED | OUTPATIENT
Start: 2020-09-17 | End: 2021-01-12

## 2020-09-17 RX ORDER — GLIMEPIRIDE 2 MG/1
TABLET ORAL
Qty: 90 TABLET | Refills: 0 | Status: SHIPPED | OUTPATIENT
Start: 2020-09-17 | End: 2021-01-12

## 2020-09-17 RX ORDER — BUSPIRONE HYDROCHLORIDE 5 MG/1
TABLET ORAL
Qty: 60 TABLET | Refills: 3 | Status: SHIPPED | OUTPATIENT
Start: 2020-09-17 | End: 2021-01-12

## 2020-09-17 RX ORDER — CLOPIDOGREL BISULFATE 75 MG/1
TABLET ORAL
Qty: 90 TABLET | Refills: 3 | Status: SHIPPED | OUTPATIENT
Start: 2020-09-17 | End: 2021-03-03 | Stop reason: HOSPADM

## 2020-09-17 RX ORDER — METFORMIN HYDROCHLORIDE 500 MG/1
TABLET, EXTENDED RELEASE ORAL
Qty: 360 TABLET | Refills: 0 | Status: SHIPPED | OUTPATIENT
Start: 2020-09-17 | End: 2020-12-14

## 2020-09-17 RX ORDER — OMEPRAZOLE 20 MG/1
CAPSULE, DELAYED RELEASE ORAL
Qty: 90 CAPSULE | Refills: 1 | Status: SHIPPED | OUTPATIENT
Start: 2020-09-17 | End: 2021-04-16 | Stop reason: SDUPTHER

## 2020-11-09 ENCOUNTER — TRANSCRIBE ORDERS (OUTPATIENT)
Dept: ADMINISTRATIVE | Facility: HOSPITAL | Age: 75
End: 2020-11-09

## 2020-11-09 DIAGNOSIS — I65.23 CAROTID STENOSIS, BILATERAL: Primary | ICD-10-CM

## 2020-11-27 ENCOUNTER — HOSPITAL ENCOUNTER (OUTPATIENT)
Dept: CT IMAGING | Facility: HOSPITAL | Age: 75
Discharge: HOME | End: 2020-11-27
Admitting: SURGERY

## 2020-11-27 DIAGNOSIS — I65.23 CAROTID STENOSIS, BILATERAL: ICD-10-CM

## 2020-11-27 PROCEDURE — 70498 CT ANGIOGRAPHY NECK: CPT

## 2020-11-27 PROCEDURE — 70496 CT ANGIOGRAPHY HEAD: CPT

## 2020-11-27 PROCEDURE — 82565 ASSAY OF CREATININE: CPT

## 2020-11-27 PROCEDURE — 0 IOPAMIDOL PER 1 ML: Performed by: SURGERY

## 2020-11-27 RX ADMIN — IOPAMIDOL 95 ML: 755 INJECTION, SOLUTION INTRAVENOUS at 16:31

## 2020-11-28 LAB — CREAT BLDA-MCNC: 1.5 MG/DL (ref 0.6–1.3)

## 2020-12-14 RX ORDER — METFORMIN HYDROCHLORIDE 500 MG/1
TABLET, EXTENDED RELEASE ORAL
Qty: 360 TABLET | Refills: 0 | Status: ON HOLD | OUTPATIENT
Start: 2020-12-14 | End: 2021-03-03 | Stop reason: SDUPTHER

## 2020-12-22 ENCOUNTER — OFFICE VISIT (OUTPATIENT)
Dept: CARDIOLOGY | Facility: CLINIC | Age: 75
End: 2020-12-22

## 2020-12-22 VITALS
WEIGHT: 160 LBS | HEIGHT: 69 IN | DIASTOLIC BLOOD PRESSURE: 80 MMHG | BODY MASS INDEX: 23.7 KG/M2 | SYSTOLIC BLOOD PRESSURE: 130 MMHG | HEART RATE: 55 BPM

## 2020-12-22 DIAGNOSIS — I73.9 PERIPHERAL ARTERIAL DISEASE (HCC): Chronic | ICD-10-CM

## 2020-12-22 DIAGNOSIS — Z01.810 PREOP CARDIOVASCULAR EXAM: Primary | ICD-10-CM

## 2020-12-22 DIAGNOSIS — I25.118 CORONARY ARTERY DISEASE INVOLVING NATIVE CORONARY ARTERY OF NATIVE HEART WITH OTHER FORM OF ANGINA PECTORIS (HCC): Chronic | ICD-10-CM

## 2020-12-22 DIAGNOSIS — R94.39 ABNORMAL NUCLEAR STRESS TEST: Chronic | ICD-10-CM

## 2020-12-22 DIAGNOSIS — R94.31 ABNORMAL ECG: ICD-10-CM

## 2020-12-22 PROCEDURE — 93000 ELECTROCARDIOGRAM COMPLETE: CPT | Performed by: INTERNAL MEDICINE

## 2020-12-22 PROCEDURE — 99214 OFFICE O/P EST MOD 30 MIN: CPT | Performed by: INTERNAL MEDICINE

## 2020-12-22 RX ORDER — CHOLECALCIFEROL (VITAMIN D3) 125 MCG
5 CAPSULE ORAL NIGHTLY
COMMUNITY

## 2020-12-22 RX ORDER — MULTIPLE VITAMINS W/ MINERALS TAB 9MG-400MCG
1 TAB ORAL NIGHTLY
COMMUNITY

## 2020-12-22 NOTE — PROGRESS NOTES
Subjective:     Encounter Date:  12/22/20        Patient ID: Erick Bruno is a 75 y.o. male.    Chief Complaint: abnormal stress test  History of Present Illness    Dear ,    I had the pleasure of seeing this patient in the office today for assessment of cardiac risk prior to carotid artery revascularization surgery.  Appreciate that you sent him to see us.    He is been seen in the past for chest discomfort.  In 2017 he had a stress test performed that showed a small area of hypokinesis in the inferior wall with an ejection fraction of 48% and no ischemia.  At the time he elected not to proceed with any additional diagnostic testing.  He's been treated with medical therapy.    Patient has a history of peripheral arterial disease, including bilateral lower extremity disease as well as bilateral carotid artery disease.  He now has high-grade disease on the left and surgical revascularization is being contemplated.  No surgery is scheduled.    He does have generalized fatigue all the time.  He can walk very slowly for short distances.  He has problems with instability.  He walks with a quad cane for stability.  He does not have any chest pain with walking.  A little bit of shortness of breath at times.  No palpitations or tachycardia.  A little bit of swelling at times that is rare.  He has not had any chills or fevers.  No cough.  No loss of taste or smell.  No exposure to anyone known to have COVID-19.    Seen today for routine follow-up and states that he has been doing great without any cardiac complaints.  He has been working with vascular surgery for his peripheral arterial disease.  That is doing better- he is not having claudication at this point.        The following portions of the patient's history were reviewed and updated as appropriate: allergies, current medications, past family history, past medical history, past social history, past surgical history and problem list.    Past Medical  History:   Diagnosis Date   • Abnormal nuclear stress test 5/21/2018   • Acid reflux    • Anemia    • Anxiety    • Arthritis    • CAD (coronary artery disease)    • CAD (coronary artery disease)    • Carotid artery disease (CMS/HCC)    • Chest discomfort     both typical and atypical   • Dementia (CMS/HCC)    • Diabetes mellitus (CMS/HCC)    • Enlarged prostate    • History of hepatitis A    • History of seizure 2013    S/P TEMPERATURE   • Hyperlipidemia    • Hypertension    • Macular degeneration    • NSTEMI (non-ST elevated myocardial infarction) (CMS/HCC)    • Panarteritis (CMS/HCC)    • Peripheral arterial disease (CMS/HCC)    • Precordial pain    • PVD (peripheral vascular disease) (CMS/HCC)    • Sleep apnea     DOES NOT WEAR CPAP   • SOBOE (shortness of breath on exertion)        Past Surgical History:   Procedure Laterality Date   • APPENDECTOMY     • ATHRECTOMY ILIAC, FEMORAL, TIBIAL ARTERY N/A 10/17/2018    Procedure: AIF ARTERIOGRAM, LEFT SFA  ANGIOPLASTY;  Surgeon: Jayden Marie MD;  Location: [facility] OR;  Service: Vascular   • CHOLECYSTECTOMY     • CHOLECYSTECTOMY WITH INTRAOPERATIVE CHOLANGIOGRAM N/A 5/9/2018    Procedure: CHOLECYSTECTOMY LAPAROSCOPIC INTRAOPERATIVE CHOLANGIOGRAM;  Surgeon: Daniel Gonzalez MD;  Location: [facility] OR;  Service: General   • COLONOSCOPY     • COLONOSCOPY N/A 5/8/2018    Procedure: COLONOSCOPY to cecum with polypectomies;  Surgeon: Daniel Gonzalez MD;  Location: [facility] ENDOSCOPY;  Service: Gastroenterology   • ENDOSCOPY N/A 5/8/2018    Procedure: ESOPHAGOGASTRODUODENOSCOPY with biopsies;  Surgeon: Daniel Gonzalez MD;  Location: [facility] ENDOSCOPY;  Service: Gastroenterology   • FEMORAL ARTERY STENT Left     on 3/22/16       Social History     Socioeconomic History   • Marital status:      Spouse name: Not on file   • Number of children: Not on file   • Years of education: Not on file   • Highest education level: Not on file   Occupational History   •  Occupation: [employer]   Tobacco Use   • Smoking status: Former Smoker     Packs/day: 1.00     Years: 60.00     Pack years: 60.00     Quit date:      Years since quittin.9   • Smokeless tobacco: Never Used   Substance and Sexual Activity   • Alcohol use: No     Comment: Daily caffeine use   • Drug use: No   • Sexual activity: Defer       Review of Systems   Constitution: Negative for chills, decreased appetite, fever and night sweats.   HENT: Negative for ear discharge, ear pain, hearing loss, nosebleeds and sore throat.    Eyes: Negative for blurred vision, double vision and pain.   Cardiovascular: Negative for cyanosis.   Respiratory: Negative for hemoptysis and sputum production.    Endocrine: Negative for cold intolerance and heat intolerance.   Hematologic/Lymphatic: Negative for adenopathy.   Skin: Negative for dry skin, itching, nail changes, rash and suspicious lesions.   Musculoskeletal: Negative for arthritis, gout, muscle cramps, muscle weakness, myalgias and neck pain.   Gastrointestinal: Negative for anorexia, bowel incontinence, constipation, diarrhea, dysphagia, hematemesis and jaundice.   Genitourinary: Negative for bladder incontinence, dysuria, flank pain, frequency, hematuria and nocturia.   Neurological: Negative for focal weakness, numbness, paresthesias and seizures.   Psychiatric/Behavioral: Negative for altered mental status, hallucinations, hypervigilance, suicidal ideas and thoughts of violence.   Allergic/Immunologic: Negative for persistent infections.         ECG 12 Lead    Date/Time: 2020 12:44 PM  Performed by: Michael Wyman III, MD  Authorized by: Michael Wyman III, MD   Comparison: compared with previous ECG   Similar to previous ECG  Rhythm: sinus rhythm  Rate: normal  Conduction: conduction normal  QRS axis: normal  Other findings: non-specific ST-T wave changes    Clinical impression: normal ECG               Objective:     Vitals:    20 1137   BP:  "130/80   Pulse: 55   Weight: 72.6 kg (160 lb)   Height: 175.3 cm (69\")     General Appearance:    Alert, cooperative, in no acute distress   Head:    Normocephalic, without obvious abnormality, atraumatic   Eyes:            Lids and lashes normal, conjunctivae and sclerae normal, no   icterus, no pallor, corneas clear, PERRLA   Ears:    Ears appear intact with no abnormalities noted   Throat:   No oral lesions, no thrush, oral mucosa moist   Neck:   No adenopathy, supple, trachea midline, no thyromegaly, no   carotid bruit, no JVD   Back:     No kyphosis present, no scoliosis present, no skin lesions, erythema or scars, no tenderness to percussion or palpation, range of motion normal   Lungs:     Clear to auscultation,respirations regular, even and unlabored    Heart:    Regular rhythm and normal rate, normal S1 and S2, no murmur, no gallop, no rub, no click   Chest Wall:    No abnormalities observed   Abdomen:     Normal bowel sounds, no masses, no organomegaly, soft        non-tender, non-distended, no guarding, no rebound  tenderness   Extremities:   Moves all extremities well, no edema, no cyanosis, no redness   Pulses:  Diminished peripheral pulses both lower extremities, bilateral carotid bruits   Skin:  Psychiatric:   No bleeding, bruising or rash    Alert and oriented x 3, normal mood and affect           Lab Review:             Performed        Assessment:          Diagnosis Plan   1. Preop cardiovascular exam  Stress Test With Myocardial Perfusion One Day    ECG 12 Lead   2. Peripheral arterial disease (CMS/HCC)  Stress Test With Myocardial Perfusion One Day    ECG 12 Lead   3. Coronary artery disease involving native coronary artery of native heart with other form of angina pectoris (CMS/HCC)  Stress Test With Myocardial Perfusion One Day    ECG 12 Lead   4. Abnormal nuclear stress test  Stress Test With Myocardial Perfusion One Day    ECG 12 Lead   5. Abnormal ECG            Plan:       1. Coronary " Artery Disease  Assessment  • The patient has no angina    Plan  • Lifestyle modifications discussed include adhering to a heart healthy diet, avoidance of tobacco products, maintenance of a healthy weight, medication compliance, regular exercise and regular monitoring of cholesterol and blood pressure  • This patient has a stress test consistent with a prior inferior myocardial infarction.  This was formed in 2017.  Patient has essentially no regular activity except for very slow ambulation for short distances.  He has been found to have a high-grade carotid artery disease, we will arrange for a Lexiscan Cardiolite to be performed.  His EKG is not normal but unchanged from prior.    Subjective - Objective  • There is a history of past MI  • Current antiplatelet therapy includes aspirin 81 mg    2.  Peripheral vascular disease, with both disease in bilateral lower extremities as well as high-grade carotid artery disease, for potential carotid endarterectomy  3.  Diabetes mellitus with circulatory complication, continue risk factor modification  4.  Preoperative cardiac assessment-we will await the results of the nuclear stress test and then provide a greater degree of assessment of his cardiac risk for the anticipated surgical procedure.    Thank you very much for allowing us to participate in the care of this pleasant patient.  Please don't hesitate to call if I can be of assistance in any way.      Current Outpatient Medications:   •  aspirin 81 MG chewable tablet, Chew 81 mg Every Night. PER JUAN DYKES TO CONTINUE TAKING, Disp: , Rfl:   •  atorvastatin (LIPITOR) 80 MG tablet, Take 1 tablet by mouth once daily, Disp: 90 tablet, Rfl: 0  •  Blood Glucose Monitoring Suppl (ACCU-CHEK JENNIFFER CONNECT) w/Device kit, 1 each Daily. DX E11.9 check bs daily accu check jenniffer connect needs due to mac degenration cant read most numbers, Disp: 1 kit, Rfl: 1  •  busPIRone (BUSPAR) 5 MG tablet, Take 1 tablet by mouth twice  daily, Disp: 60 tablet, Rfl: 3  •  clopidogrel (PLAVIX) 75 MG tablet, Take 1 tablet by mouth once daily, Disp: 90 tablet, Rfl: 3  •  Coenzyme Q10 200 MG capsule, Take 200 mg by mouth Every Night. HOLDING FOR SURGERY, Disp: , Rfl:   •  escitalopram (LEXAPRO) 10 MG tablet, Take 10 mg by mouth Daily., Disp: , Rfl:   •  glimepiride (AMARYL) 2 MG tablet, TAKE 1 TABLET BY MOUTH ONCE DAILY IN THE MORNING BEFORE BREAKFAST, Disp: 90 tablet, Rfl: 0  •  glucose blood (ONE TOUCH ULTRA TEST) test strip, 1 each by Other route Daily. To check glucose, Disp: 90 each, Rfl: 1  •  glucose blood test strip, accu brittany connect check bs daily  DX e11.9 patient needs new supplies due to macular degeneration, Disp: 100 each, Rfl: 3  •  glucose blood test strip, DC E11.9 give matching supplies with device he is given check bs daily, Disp: 100 each, Rfl: 12  •  glucose blood test strip, Test glucose once daily, Disp: 50 each, Rfl: 12  •  glucose monitor monitoring kit, Dx E11.9 give [employer] Prime device check bs daily Give matching supplies covered by his insurance with the largest readout for patient to see results., Disp: 1 each, Rfl: 1  •  isosorbide mononitrate (IMDUR) 30 MG 24 hr tablet, Take 1 tablet by mouth nightly, Disp: 90 tablet, Rfl: 0  •  Lancets misc, DX E11.9 check bs daily give matching supplies for accu brittany connect, Disp: 100 each, Rfl: 3  •  Lancets misc, DX E11.9 check bs daily give matching supplies with device given, Disp: 100 each, Rfl: 3  •  melatonin 5 MG tablet tablet, Take 5 mg by mouth Every Night., Disp: , Rfl:   •  metFORMIN ER (GLUCOPHAGE-XR) 500 MG 24 hr tablet, TAKE 4 TABLETS BY MOUTH ONCE DAILY WITH SUPPER (Patient taking differently: Take 1,000 mg by mouth 2 (Two) Times a Day. TAKE 4 TABLETS BY MOUTH ONCE DAILY WITH SUPPER), Disp: 360 tablet, Rfl: 0  •  multivitamin with minerals (MULTIVITAMIN ADULTS PO), Take 1 tablet by mouth Daily., Disp: , Rfl:   •  nitroglycerin (NITROSTAT) 0.4 MG SL tablet, Place 1  tablet under the tongue Every 5 (Five) Minutes As Needed for Chest Pain. Take no more than 3 doses in 15 minutes., Disp: 30 tablet, Rfl: 5  •  omeprazole (priLOSEC) 20 MG capsule, Take 1 capsule by mouth once daily, Disp: 90 capsule, Rfl: 1  •  saw palmetto 160 MG capsule, Take 160 mg by mouth 2 (Two) Times a Day. HOLD FOR SURGERY, Disp: , Rfl:   •  vitamin E 1000 UNIT capsule, Take 1,200 Units by mouth Every Night. HOLD FOR SURGERY, Disp: , Rfl:          EMR Dragon/Transcription disclaimer:    Much of this encounter note is an electronic transcription/translation of spoken language to printed text. The electronic translation of spoken language may permit erroneous, or at times, nonsensical words or phrases to be inadvertently transcribed; Although I have reviewed the note for such errors, some may still exist.

## 2020-12-24 ENCOUNTER — TRANSCRIBE ORDERS (OUTPATIENT)
Dept: SLEEP MEDICINE | Facility: HOSPITAL | Age: 75
End: 2020-12-24

## 2020-12-24 DIAGNOSIS — Z01.818 OTHER SPECIFIED PRE-OPERATIVE EXAMINATION: Primary | ICD-10-CM

## 2020-12-26 ENCOUNTER — LAB (OUTPATIENT)
Dept: LAB | Facility: HOSPITAL | Age: 75
End: 2020-12-26

## 2020-12-26 DIAGNOSIS — Z01.818 OTHER SPECIFIED PRE-OPERATIVE EXAMINATION: ICD-10-CM

## 2020-12-26 PROCEDURE — C9803 HOPD COVID-19 SPEC COLLECT: HCPCS

## 2020-12-26 PROCEDURE — U0004 COV-19 TEST NON-CDC HGH THRU: HCPCS

## 2020-12-28 LAB — SARS-COV-2 RNA RESP QL NAA+PROBE: NOT DETECTED

## 2020-12-29 ENCOUNTER — HOSPITAL ENCOUNTER (OUTPATIENT)
Dept: CARDIOLOGY | Facility: HOSPITAL | Age: 75
Discharge: HOME | End: 2020-12-29
Admitting: INTERNAL MEDICINE

## 2020-12-29 VITALS — HEIGHT: 69 IN | BODY MASS INDEX: 23.71 KG/M2 | WEIGHT: 160.05 LBS

## 2020-12-29 DIAGNOSIS — I73.9 PERIPHERAL ARTERIAL DISEASE (HCC): ICD-10-CM

## 2020-12-29 DIAGNOSIS — I25.118 CORONARY ARTERY DISEASE INVOLVING NATIVE CORONARY ARTERY OF NATIVE HEART WITH OTHER FORM OF ANGINA PECTORIS (HCC): ICD-10-CM

## 2020-12-29 DIAGNOSIS — R94.39 ABNORMAL NUCLEAR STRESS TEST: ICD-10-CM

## 2020-12-29 DIAGNOSIS — Z01.810 PREOP CARDIOVASCULAR EXAM: ICD-10-CM

## 2020-12-29 LAB
BH CV NUCLEAR PRIOR STUDY: 1
BH CV STRESS BP STAGE 1: NORMAL
BH CV STRESS COMMENTS STAGE 1: NORMAL
BH CV STRESS DOSE REGADENOSON STAGE 1: 0.4
BH CV STRESS DURATION MIN STAGE 1: 0
BH CV STRESS DURATION SEC STAGE 1: 10
BH CV STRESS HR STAGE 1: 71
BH CV STRESS PROTOCOL 1: NORMAL
BH CV STRESS RECOVERY BP: NORMAL MMHG
BH CV STRESS RECOVERY HR: 63 BPM
BH CV STRESS STAGE 1: 1
LV EF NUC BP: 54 %
MAXIMAL PREDICTED HEART RATE: 146 BPM
PERCENT MAX PREDICTED HR: 48.63 %
STRESS BASELINE BP: NORMAL MMHG
STRESS BASELINE HR: 52 BPM
STRESS PERCENT HR: 57 %
STRESS POST EXERCISE DUR SEC: 10 SEC
STRESS POST PEAK BP: NORMAL MMHG
STRESS POST PEAK HR: 71 BPM
STRESS TARGET HR: 124 BPM

## 2020-12-29 PROCEDURE — 0 TECHNETIUM TETROFOSMIN KIT: Performed by: INTERNAL MEDICINE

## 2020-12-29 PROCEDURE — 93017 CV STRESS TEST TRACING ONLY: CPT

## 2020-12-29 PROCEDURE — 93016 CV STRESS TEST SUPVJ ONLY: CPT | Performed by: INTERNAL MEDICINE

## 2020-12-29 PROCEDURE — A9502 TC99M TETROFOSMIN: HCPCS | Performed by: INTERNAL MEDICINE

## 2020-12-29 PROCEDURE — 78452 HT MUSCLE IMAGE SPECT MULT: CPT

## 2020-12-29 PROCEDURE — 93018 CV STRESS TEST I&R ONLY: CPT | Performed by: INTERNAL MEDICINE

## 2020-12-29 PROCEDURE — 78452 HT MUSCLE IMAGE SPECT MULT: CPT | Performed by: INTERNAL MEDICINE

## 2020-12-29 PROCEDURE — 25010000002 REGADENOSON 0.4 MG/5ML SOLUTION: Performed by: INTERNAL MEDICINE

## 2020-12-29 RX ADMIN — REGADENOSON 0.4 MG: 0.08 INJECTION, SOLUTION INTRAVENOUS at 10:44

## 2020-12-29 RX ADMIN — TETROFOSMIN 1 DOSE: 1.38 INJECTION, POWDER, LYOPHILIZED, FOR SOLUTION INTRAVENOUS at 10:44

## 2020-12-29 RX ADMIN — TETROFOSMIN 1 DOSE: 1.38 INJECTION, POWDER, LYOPHILIZED, FOR SOLUTION INTRAVENOUS at 10:02

## 2021-01-04 ENCOUNTER — DOCUMENTATION (OUTPATIENT)
Dept: CARDIOLOGY | Facility: CLINIC | Age: 76
End: 2021-01-04

## 2021-01-12 RX ORDER — BUSPIRONE HYDROCHLORIDE 5 MG/1
TABLET ORAL
Qty: 60 TABLET | Refills: 0 | Status: SHIPPED | OUTPATIENT
Start: 2021-01-12 | End: 2021-03-12

## 2021-01-12 RX ORDER — BUSPIRONE HYDROCHLORIDE 5 MG/1
TABLET ORAL
Qty: 60 TABLET | Refills: 0 | Status: SHIPPED | OUTPATIENT
Start: 2021-01-12 | End: 2021-01-12

## 2021-01-12 RX ORDER — ATORVASTATIN CALCIUM 80 MG/1
TABLET, FILM COATED ORAL
Qty: 90 TABLET | Refills: 0 | Status: SHIPPED | OUTPATIENT
Start: 2021-01-12 | End: 2021-04-08 | Stop reason: SDUPTHER

## 2021-01-12 RX ORDER — GLIMEPIRIDE 2 MG/1
TABLET ORAL
Qty: 90 TABLET | Refills: 0 | Status: SHIPPED | OUTPATIENT
Start: 2021-01-12 | End: 2021-01-12

## 2021-01-12 RX ORDER — ISOSORBIDE MONONITRATE 30 MG/1
TABLET, EXTENDED RELEASE ORAL
Qty: 90 TABLET | Refills: 0 | Status: SHIPPED | OUTPATIENT
Start: 2021-01-12 | End: 2021-04-11 | Stop reason: SDUPTHER

## 2021-01-12 RX ORDER — GLIMEPIRIDE 2 MG/1
TABLET ORAL
Qty: 90 TABLET | Refills: 0 | Status: SHIPPED | OUTPATIENT
Start: 2021-01-12 | End: 2021-04-08 | Stop reason: SDUPTHER

## 2021-01-12 RX ORDER — ISOSORBIDE MONONITRATE 30 MG/1
TABLET, EXTENDED RELEASE ORAL
Qty: 90 TABLET | Refills: 0 | Status: SHIPPED | OUTPATIENT
Start: 2021-01-12 | End: 2021-01-12

## 2021-01-13 ENCOUNTER — APPOINTMENT (OUTPATIENT)
Dept: PREADMISSION TESTING | Facility: HOSPITAL | Age: 76
End: 2021-01-13

## 2021-01-13 VITALS
HEIGHT: 69 IN | RESPIRATION RATE: 16 BRPM | WEIGHT: 160 LBS | SYSTOLIC BLOOD PRESSURE: 161 MMHG | HEART RATE: 59 BPM | DIASTOLIC BLOOD PRESSURE: 74 MMHG | OXYGEN SATURATION: 100 % | TEMPERATURE: 98 F | BODY MASS INDEX: 23.7 KG/M2

## 2021-01-13 LAB
ANION GAP SERPL CALCULATED.3IONS-SCNC: 9 MMOL/L (ref 5–15)
BUN SERPL-MCNC: 18 MG/DL (ref 8–23)
BUN/CREAT SERPL: 14.9 (ref 7–25)
CALCIUM SPEC-SCNC: 8.9 MG/DL (ref 8.6–10.5)
CHLORIDE SERPL-SCNC: 99 MMOL/L (ref 98–107)
CO2 SERPL-SCNC: 27 MMOL/L (ref 22–29)
CREAT SERPL-MCNC: 1.21 MG/DL (ref 0.76–1.27)
DEPRECATED RDW RBC AUTO: 42.4 FL (ref 37–54)
ERYTHROCYTE [DISTWIDTH] IN BLOOD BY AUTOMATED COUNT: 12.6 % (ref 12.3–15.4)
GFR SERPL CREATININE-BSD FRML MDRD: 59 ML/MIN/1.73
GLUCOSE SERPL-MCNC: 104 MG/DL (ref 65–99)
HCT VFR BLD AUTO: 38.3 % (ref 37.5–51)
HGB BLD-MCNC: 13.1 G/DL (ref 13–17.7)
MCH RBC QN AUTO: 31.7 PG (ref 26.6–33)
MCHC RBC AUTO-ENTMCNC: 34.2 G/DL (ref 31.5–35.7)
MCV RBC AUTO: 92.7 FL (ref 79–97)
PLATELET # BLD AUTO: 182 10*3/MM3 (ref 140–450)
PMV BLD AUTO: 9.6 FL (ref 6–12)
POTASSIUM SERPL-SCNC: 5 MMOL/L (ref 3.5–5.2)
RBC # BLD AUTO: 4.13 10*6/MM3 (ref 4.14–5.8)
SODIUM SERPL-SCNC: 135 MMOL/L (ref 136–145)
WBC # BLD AUTO: 5.85 10*3/MM3 (ref 3.4–10.8)

## 2021-01-13 PROCEDURE — 80048 BASIC METABOLIC PNL TOTAL CA: CPT

## 2021-01-13 PROCEDURE — U0004 COV-19 TEST NON-CDC HGH THRU: HCPCS

## 2021-01-13 PROCEDURE — 36415 COLL VENOUS BLD VENIPUNCTURE: CPT

## 2021-01-13 PROCEDURE — C9803 HOPD COVID-19 SPEC COLLECT: HCPCS

## 2021-01-13 PROCEDURE — 85027 COMPLETE CBC AUTOMATED: CPT

## 2021-01-13 RX ORDER — LORAZEPAM 0.5 MG/1
0.5 TABLET ORAL EVERY 8 HOURS PRN
COMMUNITY
End: 2021-03-18 | Stop reason: SDUPTHER

## 2021-01-13 NOTE — DISCHARGE INSTRUCTIONS
Arrive to hospital on your day of surgery at 7AM ON 1-15-21      Take the following medications the morning of surgery: OMEPRAZOLE, LEXAPRO, BUSPAR AND ATIVAN IF NEEDED    KEEP TAKING DAILY ASPIRIN AND P[LAVIX AS INSTRUCTED PER MD      If you are on prescription narcotic pain medication to control your pain you may also take that medication the morning of surgery.    General Instructions:  • Do not eat solid food after midnight the night before surgery.  • You may drink clear liquids day of surgery but must stop at least one hour before your hospital arrival time.  • It is beneficial for you to have a clear drink that contains carbohydrates the day of surgery.  We suggest a 12 to 20 ounce bottle of Gatorade or Powerade for non-diabetic patients or a 12 to 20 ounce bottle of G2 or Powerade Zero for diabetic patients. (Pediatric patients, are not advised to drink a 12 to 20 ounce carbohydrate drink)    Clear liquids are liquids you can see through.  Nothing red in color.     Plain water                               Sports drinks  Sodas                                   Gelatin (Jell-O)  Fruit juices without pulp such as white grape juice and apple juice  Popsicles that contain no fruit or yogurt  Tea or coffee (no cream or milk added)  Gatorade / Powerade  G2 / Powerade Zero    • Infants may have breast milk up to four hours before surgery.  • Infants drinking formula may drink formula up to six hours before surgery.   • Patients who avoid smoking, chewing tobacco and alcohol for 4 weeks prior to surgery have a reduced risk of post-operative complications.  Quit smoking as many days before surgery as you can.  • Do not smoke, use chewing tobacco or drink alcohol the day of surgery.   • If applicable bring your C-PAP/ BI-PAP machine.  • Bring any papers given to you in the doctor’s office.  • Wear clean comfortable clothes.  • Do not wear contact lenses, false eyelashes or make-up.  Bring a case for your glasses.    • Bring crutches or walker if applicable.  • Remove all piercings.  Leave jewelry and any other valuables at home.  • Hair extensions with metal clips must be removed prior to surgery.  • The Pre-Admission Testing nurse will instruct you to bring medications if unable to obtain an accurate list in Pre-Admission Testing.        If you were given a blood bank ID arm band remember to bring it with you the day of surgery.    Preventing a Surgical Site Infection:  • For 2 to 3 days before surgery, avoid shaving with a razor because the razor can irritate skin and make it easier to develop an infection.    • Any areas of open skin can increase the risk of a post-operative wound infection by allowing bacteria to enter and travel throughout the body.  Notify your surgeon if you have any skin wounds / rashes even if it is not near the expected surgical site.  The area will need assessed to determine if surgery should be delayed until it is healed.  • The night prior to surgery shower using a fresh bar of anti-bacterial soap (such as Dial) and clean washcloth.  Sleep in a clean bed with clean clothing.  Do not allow pets to sleep with you.  • Shower on the morning of surgery using a fresh bar of anti-bacterial soap (such as Dial) and clean washcloth.  Dry with a clean towel and dress in clean clothing.  • Ask your surgeon if you will be receiving antibiotics prior to surgery.  • Make sure you, your family, and all healthcare providers clean their hands with soap and water or an alcohol based hand  before caring for you or your wound.    Day of surgery:  Your arrival time is approximately two hours before your scheduled surgery time.  Upon arrival, a Pre-op nurse and Anesthesiologist will review your health history, obtain vital signs, and answer questions you may have.  The only belongings needed at this time will be a list of your home medications and if applicable your C-PAP/BI-PAP machine.  A Pre-op nurse will  start an IV and you may receive medication in preparation for surgery, including something to help you relax.     Please be aware that surgery does come with discomfort.  We want to make every effort to control your discomfort so please discuss any uncontrolled symptoms with your nurse.   Your doctor will most likely have prescribed pain medications.      If you are going home after surgery you will receive individualized written care instructions before being discharged.  A responsible adult must drive you to and from the hospital on the day of your surgery and stay with you for 24 hours.  Discharge prescriptions can be filled by the hospital pharmacy during regular pharmacy hours.  If you are having surgery late in the day/evening your prescription may be e-prescribed to your pharmacy.  Please verify your pharmacy hours or chose a 24 hour pharmacy to avoid not having access to your prescription because your pharmacy has closed for the day.    If you are staying overnight following surgery, you will be transported to your hospital room following the recovery period.  [facility] has all private rooms.    If you have any questions please call Pre-Admission Testing at [phone].  Deductibles and co-payments are collected on the day of service. Please be prepared to pay the required co-pay, deductible or deposit on the day of service as defined by your plan.    Patient Education for Self-Quarantine Process    Following your COVID testing, we strongly recommend that you do not leave your home after you have been tested for COVID except to get medical care. This includes not going to work, school or to public areas.  If this is not possible for you to do please limit your activities to only required outings.  Be sure to wear a mask when you are with other people, practice social distancing and wash your hands frequently.      The following items provide additional details to keep you safe.  • Wash your  hands with soap and water frequently for at least 20 seconds.   • Avoid touching your eyes, nose and mouth with unwashed hands.  • Do not share anything - utensils, towels, food from the same bowl.   • Have your own utensils, drinking glass, dishes, towels and bedding.   • Do not have visitors.   • Do use FaceTime to stay in touch with family and friends.  • You should stay in a specific room away from others if possible.   • Stay at least 6 feet away from others in the home if you cannot have a dedicated room to yourself.   • Do not snuggle with your pet. While the CDC says there is no evidence that pets can spread COVID-19 or be infected from humans, it is probably best to avoid “petting, snuggling, being kissed or licked and sharing food (during self-quarantine)”, according to the CDC.   • Sanitize household surfaces daily. Include all high touch areas (door handles, light switches, phones, countertops, etc.)  • Do not share a bathroom with others, if possible.   • Wear a mask around others in your home if you are unable to stay in a separate room or 6 feet apart. If  you are unable to wear a mask, have your family member wear a mask if they must be within 6 feet of you.   Call your surgeon immediately if you experience any of the following symptoms:  • Sore Throat  • Shortness of Breath or difficulty breathing  • Cough  • Chills  • Body soreness or muscle pain  • Headache  • Fever  • New loss of taste or smell  • Do not arrive for your surgery ill.  Your procedure will need to be rescheduled to another time.  You will need to call your physician before the day of surgery to avoid any unnecessary exposure to hospital staff as well as other patients.      CHLORHEXIDINE CLOTH INSTRUCTIONS  The morning of surgery follow these instructions using the Chlorhexidine cloths you've been given.  These steps reduce bacteria on the body.  Do not use the cloths near your eyes, ears mouth, genitalia or on open wounds.  Throw  the cloths away after use but do not try to flush them down a toilet.      • Open and remove one cloth at a time from the package.    • Leave the cloth unfolded and begin the bathing.  • Massage the skin with the cloths using gentle pressure to remove bacteria.  Do not scrub harshly.   • Follow the steps below with one 2% CHG cloth per area (6 total cloths).  • One cloth for neck, shoulders and chest.  • One cloth for both arms, hands, fingers and underarms (do underarms last).  • One cloth for the abdomen followed by groin.  • One cloth for right leg and foot including between the toes.  • One cloth for left leg and foot including between the toes.  • The last cloth is to be used for the back of the neck, back and buttocks.    Allow the CHG to air dry 3 minutes on the skin which will give it time to work and decrease the chance of irritation.  The skin may feel sticky until it is dry.  Do not rinse with water or any other liquid or you will lose the beneficial effects of the CHG.  If mild skin irritation occurs, do rinse the skin to remove the CHG.  Report this to the nurse at time of admission.  Do not apply lotions, creams, ointments, deodorants or perfumes after using the clothes. Dress in clean clothes before coming to the hospital.

## 2021-01-14 LAB — SARS-COV-2 RNA RESP QL NAA+PROBE: NOT DETECTED

## 2021-01-15 ENCOUNTER — HOSPITAL ENCOUNTER (INPATIENT)
Facility: HOSPITAL | Age: 76
LOS: 2 days | Discharge: HOME | End: 2021-01-17
Attending: SURGERY | Admitting: SURGERY

## 2021-01-15 ENCOUNTER — APPOINTMENT (OUTPATIENT)
Dept: GENERAL RADIOLOGY | Facility: HOSPITAL | Age: 76
End: 2021-01-15

## 2021-01-15 ENCOUNTER — ANESTHESIA EVENT (OUTPATIENT)
Dept: PERIOP | Facility: HOSPITAL | Age: 76
End: 2021-01-15

## 2021-01-15 ENCOUNTER — ANESTHESIA (OUTPATIENT)
Dept: PERIOP | Facility: HOSPITAL | Age: 76
End: 2021-01-15

## 2021-01-15 PROBLEM — I65.21 CAROTID STENOSIS, ASYMPTOMATIC, RIGHT: Status: ACTIVE | Noted: 2021-01-15

## 2021-01-15 LAB
ABO GROUP BLD: NORMAL
BLD GP AB SCN SERPL QL: NEGATIVE
GLUCOSE BLDC GLUCOMTR-MCNC: 112 MG/DL (ref 70–130)
GLUCOSE BLDC GLUCOMTR-MCNC: 129 MG/DL (ref 70–130)
GLUCOSE BLDC GLUCOMTR-MCNC: 89 MG/DL (ref 70–130)
RH BLD: POSITIVE
T&S EXPIRATION DATE: NORMAL

## 2021-01-15 PROCEDURE — 25010000002 HEPARIN (PORCINE) PER 1000 UNITS: Performed by: SURGERY

## 2021-01-15 PROCEDURE — C1769 GUIDE WIRE: HCPCS | Performed by: SURGERY

## 2021-01-15 PROCEDURE — 85347 COAGULATION TIME ACTIVATED: CPT

## 2021-01-15 PROCEDURE — 25010000002 ONDANSETRON PER 1 MG: Performed by: NURSE ANESTHETIST, CERTIFIED REGISTERED

## 2021-01-15 PROCEDURE — 25010000002 HEPARIN (PORCINE) PER 1000 UNITS: Performed by: NURSE ANESTHETIST, CERTIFIED REGISTERED

## 2021-01-15 PROCEDURE — 25010000002 NEOSTIGMINE PER 0.5 MG: Performed by: NURSE ANESTHETIST, CERTIFIED REGISTERED

## 2021-01-15 PROCEDURE — 25010000002 VANCOMYCIN PER 500 MG: Performed by: SURGERY

## 2021-01-15 PROCEDURE — 86850 RBC ANTIBODY SCREEN: CPT | Performed by: ANESTHESIOLOGY

## 2021-01-15 PROCEDURE — 86901 BLOOD TYPING SEROLOGIC RH(D): CPT | Performed by: ANESTHESIOLOGY

## 2021-01-15 PROCEDURE — 25010000002 FENTANYL CITRATE (PF) 100 MCG/2ML SOLUTION: Performed by: ANESTHESIOLOGY

## 2021-01-15 PROCEDURE — C1876 STENT, NON-COA/NON-COV W/DEL: HCPCS | Performed by: SURGERY

## 2021-01-15 PROCEDURE — C1894 INTRO/SHEATH, NON-LASER: HCPCS | Performed by: SURGERY

## 2021-01-15 PROCEDURE — B3131ZZ FLUOROSCOPY OF RIGHT COMMON CAROTID ARTERY USING LOW OSMOLAR CONTRAST: ICD-10-PCS | Performed by: SURGERY

## 2021-01-15 PROCEDURE — 25010000002 PHENYLEPHRINE PER 1 ML: Performed by: NURSE ANESTHETIST, CERTIFIED REGISTERED

## 2021-01-15 PROCEDURE — 25010000002 PROPOFOL 10 MG/ML EMULSION: Performed by: NURSE ANESTHETIST, CERTIFIED REGISTERED

## 2021-01-15 PROCEDURE — 25010000002 PROTAMINE SULFATE PER 10 MG: Performed by: NURSE ANESTHETIST, CERTIFIED REGISTERED

## 2021-01-15 PROCEDURE — 037K3DZ DILATION OF RIGHT INTERNAL CAROTID ARTERY WITH INTRALUMINAL DEVICE, PERCUTANEOUS APPROACH: ICD-10-PCS | Performed by: SURGERY

## 2021-01-15 PROCEDURE — C1725 CATH, TRANSLUMIN NON-LASER: HCPCS | Performed by: SURGERY

## 2021-01-15 PROCEDURE — 82962 GLUCOSE BLOOD TEST: CPT

## 2021-01-15 PROCEDURE — 86900 BLOOD TYPING SEROLOGIC ABO: CPT | Performed by: ANESTHESIOLOGY

## 2021-01-15 PROCEDURE — 0 IOPAMIDOL PER 1 ML: Performed by: SURGERY

## 2021-01-15 DEVICE — ABSORBABLE HEMOSTAT (OXIDIZED REGENERATED CELLULOSE, U.S.P.)
Type: IMPLANTABLE DEVICE | Site: NECK | Status: FUNCTIONAL
Brand: SURGICEL

## 2021-01-15 DEVICE — 9 MM X 30 MM
Type: IMPLANTABLE DEVICE | Site: CAROTID | Status: FUNCTIONAL
Brand: ENROUTE TRANSCAROTID STENT

## 2021-01-15 RX ORDER — ATORVASTATIN CALCIUM 80 MG/1
80 TABLET, FILM COATED ORAL DAILY
Status: DISCONTINUED | OUTPATIENT
Start: 2021-01-15 | End: 2021-01-17 | Stop reason: HOSPADM

## 2021-01-15 RX ORDER — GLYCOPYRROLATE 0.2 MG/ML
INJECTION INTRAMUSCULAR; INTRAVENOUS AS NEEDED
Status: DISCONTINUED | OUTPATIENT
Start: 2021-01-15 | End: 2021-01-15 | Stop reason: SURG

## 2021-01-15 RX ORDER — LORAZEPAM 0.5 MG/1
0.5 TABLET ORAL EVERY 8 HOURS PRN
Status: DISCONTINUED | OUTPATIENT
Start: 2021-01-15 | End: 2021-01-17 | Stop reason: HOSPADM

## 2021-01-15 RX ORDER — OXYCODONE HYDROCHLORIDE 5 MG/1
5 TABLET ORAL EVERY 4 HOURS PRN
Status: DISCONTINUED | OUTPATIENT
Start: 2021-01-15 | End: 2021-01-17 | Stop reason: HOSPADM

## 2021-01-15 RX ORDER — PANTOPRAZOLE SODIUM 40 MG/1
40 TABLET, DELAYED RELEASE ORAL EVERY MORNING
Status: DISCONTINUED | OUTPATIENT
Start: 2021-01-16 | End: 2021-01-17 | Stop reason: HOSPADM

## 2021-01-15 RX ORDER — PROMETHAZINE HYDROCHLORIDE 25 MG/1
25 SUPPOSITORY RECTAL ONCE AS NEEDED
Status: DISCONTINUED | OUTPATIENT
Start: 2021-01-15 | End: 2021-01-15 | Stop reason: HOSPADM

## 2021-01-15 RX ORDER — HYDROMORPHONE HYDROCHLORIDE 1 MG/ML
0.25 INJECTION, SOLUTION INTRAMUSCULAR; INTRAVENOUS; SUBCUTANEOUS
Status: DISCONTINUED | OUTPATIENT
Start: 2021-01-15 | End: 2021-01-17 | Stop reason: HOSPADM

## 2021-01-15 RX ORDER — GLIPIZIDE 5 MG/1
5 TABLET ORAL
Status: DISCONTINUED | OUTPATIENT
Start: 2021-01-16 | End: 2021-01-17 | Stop reason: HOSPADM

## 2021-01-15 RX ORDER — CHOLECALCIFEROL (VITAMIN D3) 125 MCG
5 CAPSULE ORAL NIGHTLY
Status: DISCONTINUED | OUTPATIENT
Start: 2021-01-15 | End: 2021-01-17 | Stop reason: HOSPADM

## 2021-01-15 RX ORDER — CLOPIDOGREL BISULFATE 75 MG/1
75 TABLET ORAL DAILY
Status: DISCONTINUED | OUTPATIENT
Start: 2021-01-16 | End: 2021-01-17 | Stop reason: HOSPADM

## 2021-01-15 RX ORDER — PROTAMINE SULFATE 10 MG/ML
INJECTION, SOLUTION INTRAVENOUS AS NEEDED
Status: DISCONTINUED | OUTPATIENT
Start: 2021-01-15 | End: 2021-01-15 | Stop reason: SURG

## 2021-01-15 RX ORDER — SODIUM CHLORIDE, SODIUM LACTATE, POTASSIUM CHLORIDE, CALCIUM CHLORIDE 600; 310; 30; 20 MG/100ML; MG/100ML; MG/100ML; MG/100ML
INJECTION, SOLUTION INTRAVENOUS CONTINUOUS PRN
Status: DISCONTINUED | OUTPATIENT
Start: 2021-01-15 | End: 2021-01-15 | Stop reason: SURG

## 2021-01-15 RX ORDER — PROMETHAZINE HYDROCHLORIDE 25 MG/1
25 TABLET ORAL ONCE AS NEEDED
Status: DISCONTINUED | OUTPATIENT
Start: 2021-01-15 | End: 2021-01-15 | Stop reason: HOSPADM

## 2021-01-15 RX ORDER — EPHEDRINE SULFATE 50 MG/ML
INJECTION, SOLUTION INTRAVENOUS AS NEEDED
Status: DISCONTINUED | OUTPATIENT
Start: 2021-01-15 | End: 2021-01-15 | Stop reason: SURG

## 2021-01-15 RX ORDER — BUSPIRONE HYDROCHLORIDE 5 MG/1
5 TABLET ORAL 2 TIMES DAILY
Status: DISCONTINUED | OUTPATIENT
Start: 2021-01-15 | End: 2021-01-17 | Stop reason: HOSPADM

## 2021-01-15 RX ORDER — ASPIRIN 81 MG/1
81 TABLET, CHEWABLE ORAL NIGHTLY
Status: DISCONTINUED | OUTPATIENT
Start: 2021-01-15 | End: 2021-01-17 | Stop reason: HOSPADM

## 2021-01-15 RX ORDER — FENTANYL CITRATE 50 UG/ML
50 INJECTION, SOLUTION INTRAMUSCULAR; INTRAVENOUS
Status: DISCONTINUED | OUTPATIENT
Start: 2021-01-15 | End: 2021-01-15 | Stop reason: HOSPADM

## 2021-01-15 RX ORDER — SODIUM CHLORIDE 0.9 % (FLUSH) 0.9 %
3-10 SYRINGE (ML) INJECTION AS NEEDED
Status: DISCONTINUED | OUTPATIENT
Start: 2021-01-15 | End: 2021-01-15 | Stop reason: HOSPADM

## 2021-01-15 RX ORDER — HEPARIN SODIUM 1000 [USP'U]/ML
INJECTION, SOLUTION INTRAVENOUS; SUBCUTANEOUS AS NEEDED
Status: DISCONTINUED | OUTPATIENT
Start: 2021-01-15 | End: 2021-01-15 | Stop reason: SURG

## 2021-01-15 RX ORDER — MULTIPLE VITAMINS W/ MINERALS TAB 9MG-400MCG
1 TAB ORAL DAILY
Status: DISCONTINUED | OUTPATIENT
Start: 2021-01-15 | End: 2021-01-17 | Stop reason: HOSPADM

## 2021-01-15 RX ORDER — SODIUM CHLORIDE 9 MG/ML
125 INJECTION, SOLUTION INTRAVENOUS CONTINUOUS
Status: DISCONTINUED | OUTPATIENT
Start: 2021-01-15 | End: 2021-01-16

## 2021-01-15 RX ORDER — MIDAZOLAM HYDROCHLORIDE 1 MG/ML
1 INJECTION INTRAMUSCULAR; INTRAVENOUS
Status: DISCONTINUED | OUTPATIENT
Start: 2021-01-15 | End: 2021-01-15 | Stop reason: HOSPADM

## 2021-01-15 RX ORDER — ESCITALOPRAM OXALATE 10 MG/1
10 TABLET ORAL DAILY
Status: DISCONTINUED | OUTPATIENT
Start: 2021-01-16 | End: 2021-01-17 | Stop reason: HOSPADM

## 2021-01-15 RX ORDER — VANCOMYCIN HYDROCHLORIDE 1 G/200ML
15 INJECTION, SOLUTION INTRAVENOUS ONCE
Status: COMPLETED | OUTPATIENT
Start: 2021-01-15 | End: 2021-01-15

## 2021-01-15 RX ORDER — LIDOCAINE HYDROCHLORIDE 20 MG/ML
INJECTION, SOLUTION INFILTRATION; PERINEURAL AS NEEDED
Status: DISCONTINUED | OUTPATIENT
Start: 2021-01-15 | End: 2021-01-15 | Stop reason: SURG

## 2021-01-15 RX ORDER — ONDANSETRON 4 MG/1
4 TABLET, FILM COATED ORAL EVERY 6 HOURS PRN
Status: DISCONTINUED | OUTPATIENT
Start: 2021-01-15 | End: 2021-01-17 | Stop reason: HOSPADM

## 2021-01-15 RX ORDER — CLINDAMYCIN PHOSPHATE 600 MG/50ML
INJECTION INTRAVENOUS AS NEEDED
Status: DISCONTINUED | OUTPATIENT
Start: 2021-01-15 | End: 2021-01-15 | Stop reason: SURG

## 2021-01-15 RX ORDER — DIPHENHYDRAMINE HCL 25 MG
25 CAPSULE ORAL
Status: DISCONTINUED | OUTPATIENT
Start: 2021-01-15 | End: 2021-01-15 | Stop reason: HOSPADM

## 2021-01-15 RX ORDER — SODIUM CHLORIDE, SODIUM LACTATE, POTASSIUM CHLORIDE, CALCIUM CHLORIDE 600; 310; 30; 20 MG/100ML; MG/100ML; MG/100ML; MG/100ML
9 INJECTION, SOLUTION INTRAVENOUS CONTINUOUS
Status: DISCONTINUED | OUTPATIENT
Start: 2021-01-15 | End: 2021-01-15

## 2021-01-15 RX ORDER — FAMOTIDINE 10 MG/ML
20 INJECTION, SOLUTION INTRAVENOUS ONCE
Status: COMPLETED | OUTPATIENT
Start: 2021-01-15 | End: 2021-01-15

## 2021-01-15 RX ORDER — ONDANSETRON 2 MG/ML
4 INJECTION INTRAMUSCULAR; INTRAVENOUS EVERY 6 HOURS PRN
Status: DISCONTINUED | OUTPATIENT
Start: 2021-01-15 | End: 2021-01-17 | Stop reason: HOSPADM

## 2021-01-15 RX ORDER — EPHEDRINE SULFATE 50 MG/ML
5 INJECTION, SOLUTION INTRAVENOUS ONCE AS NEEDED
Status: DISCONTINUED | OUTPATIENT
Start: 2021-01-15 | End: 2021-01-15 | Stop reason: HOSPADM

## 2021-01-15 RX ORDER — SODIUM CHLORIDE 0.9 % (FLUSH) 0.9 %
10 SYRINGE (ML) INJECTION AS NEEDED
Status: DISCONTINUED | OUTPATIENT
Start: 2021-01-15 | End: 2021-01-15 | Stop reason: HOSPADM

## 2021-01-15 RX ORDER — LABETALOL HYDROCHLORIDE 5 MG/ML
INJECTION, SOLUTION INTRAVENOUS AS NEEDED
Status: DISCONTINUED | OUTPATIENT
Start: 2021-01-15 | End: 2021-01-15 | Stop reason: SURG

## 2021-01-15 RX ORDER — LIDOCAINE HYDROCHLORIDE 10 MG/ML
INJECTION, SOLUTION EPIDURAL; INFILTRATION; INTRACAUDAL; PERINEURAL AS NEEDED
Status: DISCONTINUED | OUTPATIENT
Start: 2021-01-15 | End: 2021-01-15 | Stop reason: HOSPADM

## 2021-01-15 RX ORDER — PROPOFOL 10 MG/ML
VIAL (ML) INTRAVENOUS AS NEEDED
Status: DISCONTINUED | OUTPATIENT
Start: 2021-01-15 | End: 2021-01-15 | Stop reason: SURG

## 2021-01-15 RX ORDER — SODIUM CHLORIDE 0.9 % (FLUSH) 0.9 %
10 SYRINGE (ML) INJECTION EVERY 12 HOURS SCHEDULED
Status: DISCONTINUED | OUTPATIENT
Start: 2021-01-15 | End: 2021-01-15 | Stop reason: HOSPADM

## 2021-01-15 RX ORDER — SODIUM CHLORIDE, SODIUM LACTATE, POTASSIUM CHLORIDE, CALCIUM CHLORIDE 600; 310; 30; 20 MG/100ML; MG/100ML; MG/100ML; MG/100ML
9 INJECTION, SOLUTION INTRAVENOUS CONTINUOUS
Status: DISCONTINUED | OUTPATIENT
Start: 2021-01-15 | End: 2021-01-16

## 2021-01-15 RX ORDER — FLUMAZENIL 0.1 MG/ML
0.2 INJECTION INTRAVENOUS AS NEEDED
Status: DISCONTINUED | OUTPATIENT
Start: 2021-01-15 | End: 2021-01-15 | Stop reason: HOSPADM

## 2021-01-15 RX ORDER — NALOXONE HCL 0.4 MG/ML
0.2 VIAL (ML) INJECTION AS NEEDED
Status: DISCONTINUED | OUTPATIENT
Start: 2021-01-15 | End: 2021-01-15 | Stop reason: HOSPADM

## 2021-01-15 RX ORDER — ONDANSETRON 2 MG/ML
4 INJECTION INTRAMUSCULAR; INTRAVENOUS ONCE AS NEEDED
Status: DISCONTINUED | OUTPATIENT
Start: 2021-01-15 | End: 2021-01-15 | Stop reason: HOSPADM

## 2021-01-15 RX ORDER — ACETAMINOPHEN 500 MG
1000 TABLET ORAL EVERY 6 HOURS SCHEDULED
Status: DISCONTINUED | OUTPATIENT
Start: 2021-01-15 | End: 2021-01-17 | Stop reason: HOSPADM

## 2021-01-15 RX ORDER — SODIUM CHLORIDE 0.9 % (FLUSH) 0.9 %
3 SYRINGE (ML) INJECTION EVERY 12 HOURS SCHEDULED
Status: DISCONTINUED | OUTPATIENT
Start: 2021-01-15 | End: 2021-01-15

## 2021-01-15 RX ORDER — NITROGLYCERIN 0.4 MG/1
0.4 TABLET SUBLINGUAL
Status: DISCONTINUED | OUTPATIENT
Start: 2021-01-15 | End: 2021-01-17 | Stop reason: HOSPADM

## 2021-01-15 RX ORDER — LIDOCAINE HYDROCHLORIDE 10 MG/ML
0.5 INJECTION, SOLUTION EPIDURAL; INFILTRATION; INTRACAUDAL; PERINEURAL ONCE AS NEEDED
Status: DISCONTINUED | OUTPATIENT
Start: 2021-01-15 | End: 2021-01-15 | Stop reason: HOSPADM

## 2021-01-15 RX ORDER — ONDANSETRON 2 MG/ML
INJECTION INTRAMUSCULAR; INTRAVENOUS AS NEEDED
Status: DISCONTINUED | OUTPATIENT
Start: 2021-01-15 | End: 2021-01-15 | Stop reason: SURG

## 2021-01-15 RX ORDER — LABETALOL HYDROCHLORIDE 5 MG/ML
5 INJECTION, SOLUTION INTRAVENOUS
Status: DISCONTINUED | OUTPATIENT
Start: 2021-01-15 | End: 2021-01-15 | Stop reason: HOSPADM

## 2021-01-15 RX ORDER — ROCURONIUM BROMIDE 10 MG/ML
INJECTION, SOLUTION INTRAVENOUS AS NEEDED
Status: DISCONTINUED | OUTPATIENT
Start: 2021-01-15 | End: 2021-01-15 | Stop reason: SURG

## 2021-01-15 RX ORDER — FAMOTIDINE 10 MG/ML
20 INJECTION, SOLUTION INTRAVENOUS ONCE
Status: DISCONTINUED | OUTPATIENT
Start: 2021-01-15 | End: 2021-01-15

## 2021-01-15 RX ORDER — MAGNESIUM HYDROXIDE 1200 MG/15ML
LIQUID ORAL AS NEEDED
Status: DISCONTINUED | OUTPATIENT
Start: 2021-01-15 | End: 2021-01-15 | Stop reason: HOSPADM

## 2021-01-15 RX ORDER — ISOSORBIDE MONONITRATE 30 MG/1
30 TABLET, EXTENDED RELEASE ORAL NIGHTLY
Status: DISCONTINUED | OUTPATIENT
Start: 2021-01-15 | End: 2021-01-17 | Stop reason: HOSPADM

## 2021-01-15 RX ORDER — DIPHENHYDRAMINE HYDROCHLORIDE 50 MG/ML
12.5 INJECTION INTRAMUSCULAR; INTRAVENOUS
Status: DISCONTINUED | OUTPATIENT
Start: 2021-01-15 | End: 2021-01-15 | Stop reason: HOSPADM

## 2021-01-15 RX ADMIN — FENTANYL CITRATE 100 MCG: 50 INJECTION INTRAMUSCULAR; INTRAVENOUS at 10:14

## 2021-01-15 RX ADMIN — LABETALOL HYDROCHLORIDE 10 MG: 5 INJECTION, SOLUTION INTRAVENOUS at 11:57

## 2021-01-15 RX ADMIN — VANCOMYCIN HYDROCHLORIDE 1000 MG: 1 INJECTION, SOLUTION INTRAVENOUS at 09:10

## 2021-01-15 RX ADMIN — ROCURONIUM BROMIDE 50 MG: 10 INJECTION INTRAVENOUS at 10:18

## 2021-01-15 RX ADMIN — LABETALOL HYDROCHLORIDE 10 MG: 5 INJECTION, SOLUTION INTRAVENOUS at 11:43

## 2021-01-15 RX ADMIN — ONDANSETRON HYDROCHLORIDE 4 MG: 2 SOLUTION INTRAMUSCULAR; INTRAVENOUS at 10:40

## 2021-01-15 RX ADMIN — EPHEDRINE SULFATE 10 MG: 50 INJECTION INTRAVENOUS at 11:25

## 2021-01-15 RX ADMIN — ACETAMINOPHEN 1000 MG: 500 TABLET ORAL at 17:17

## 2021-01-15 RX ADMIN — IOPAMIDOL 20 ML: 510 INJECTION, SOLUTION INTRAVASCULAR at 12:15

## 2021-01-15 RX ADMIN — CLINDAMYCIN PHOSPHATE 600 MG: 600 INJECTION, SOLUTION INTRAVENOUS at 10:16

## 2021-01-15 RX ADMIN — EPHEDRINE SULFATE 5 MG: 50 INJECTION INTRAVENOUS at 10:37

## 2021-01-15 RX ADMIN — NEOSTIGMINE METHYLSULFATE 3.5 MG: 1 INJECTION INTRAMUSCULAR; INTRAVENOUS; SUBCUTANEOUS at 11:52

## 2021-01-15 RX ADMIN — ACETAMINOPHEN 1000 MG: 500 TABLET ORAL at 23:51

## 2021-01-15 RX ADMIN — PHENYLEPHRINE HYDROCHLORIDE 50 MCG: 10 INJECTION INTRAVENOUS at 10:37

## 2021-01-15 RX ADMIN — FAMOTIDINE 20 MG: 10 INJECTION INTRAVENOUS at 09:58

## 2021-01-15 RX ADMIN — ASPIRIN 81 MG: 81 TABLET, CHEWABLE ORAL at 21:55

## 2021-01-15 RX ADMIN — PROTAMINE SULFATE 40 MG: 10 INJECTION, SOLUTION INTRAVENOUS at 11:41

## 2021-01-15 RX ADMIN — GLYCOPYRROLATE 0.2 MG: 0.2 INJECTION INTRAMUSCULAR; INTRAVENOUS at 10:28

## 2021-01-15 RX ADMIN — PHENYLEPHRINE HYDROCHLORIDE 100 MCG: 10 INJECTION INTRAVENOUS at 11:08

## 2021-01-15 RX ADMIN — Medication 5 MG: at 23:51

## 2021-01-15 RX ADMIN — BUSPIRONE HYDROCHLORIDE 5 MG: 5 TABLET ORAL at 21:55

## 2021-01-15 RX ADMIN — LIDOCAINE HYDROCHLORIDE 40 MG: 20 INJECTION, SOLUTION INFILTRATION; PERINEURAL at 10:16

## 2021-01-15 RX ADMIN — HEPARIN SODIUM 7500 UNITS: 1000 INJECTION INTRAVENOUS; SUBCUTANEOUS at 11:02

## 2021-01-15 RX ADMIN — SODIUM CHLORIDE 125 ML/HR: 9 INJECTION, SOLUTION INTRAVENOUS at 17:17

## 2021-01-15 RX ADMIN — PROPOFOL 150 MG: 10 INJECTION, EMULSION INTRAVENOUS at 10:16

## 2021-01-15 RX ADMIN — SODIUM CHLORIDE, POTASSIUM CHLORIDE, SODIUM LACTATE AND CALCIUM CHLORIDE: 600; 310; 30; 20 INJECTION, SOLUTION INTRAVENOUS at 10:06

## 2021-01-15 RX ADMIN — GLYCOPYRROLATE 0.4 MG: 0.2 INJECTION INTRAMUSCULAR; INTRAVENOUS at 11:52

## 2021-01-15 RX ADMIN — PROPOFOL 20 MG: 10 INJECTION, EMULSION INTRAVENOUS at 11:28

## 2021-01-15 NOTE — ANESTHESIA PROCEDURE NOTES
Airway  Urgency: elective    Date/Time: 1/15/2021 10:19 AM  Airway not difficult    General Information and Staff    Patient location during procedure: OR  Anesthesiologist: Gricelda Henriquez MD  CRNA: Mirian Bennett CRNA    Indications and Patient Condition  Indications for airway management: airway protection    Preoxygenated: yes  Mask difficulty assessment: 1 - vent by mask    Final Airway Details  Final airway type: endotracheal airway      Successful airway: ETT  Cuffed: yes   Successful intubation technique: direct laryngoscopy  Endotracheal tube insertion site: oral  Blade: Lucius  Blade size: 4  ETT size (mm): 7.5  Cormack-Lehane Classification: grade I - full view of glottis  Placement verified by: chest auscultation and capnometry   Measured from: lips  ETT/EBT  to lips (cm): 22  Number of attempts at approach: 1  Assessment: lips, teeth, and gum same as pre-op and atraumatic intubation    Additional Comments  Smooth IV/mask induction/intubation. Easy bag-mask ventilation. Orally intubated, easy, atraumatic, lips/teeth/mouth left intact, as preop. Direct visual of vocal cords. +ETCO2, bilateral breath sounds and equal.

## 2021-01-15 NOTE — ANESTHESIA PREPROCEDURE EVALUATION
Anesthesia Evaluation     no history of anesthetic complications:               Airway   Mallampati: II  TM distance: >3 FB  Neck ROM: full  Small opening  Dental    (+) edentulous    Pulmonary    (+) a smoker Former, shortness of breath, sleep apnea,   Cardiovascular     (+) hypertension, past MI , CAD, PVD, hyperlipidemia,  carotid artery disease  (-) dysrhythmias, angina    ROS comment: Stress mild to severe ischemia in small inf area    Neuro/Psych  (+) seizures, syncope (may be balance issue), dementia,     GI/Hepatic/Renal/Endo    (+)  GERD,  diabetes mellitus,   (-) liver disease, no renal disease    Musculoskeletal     Abdominal    Substance History      OB/GYN          Other                        Anesthesia Plan    ASA 3     general   (A line)  intravenous induction     Anesthetic plan, all risks, benefits, and alternatives have been provided, discussed and informed consent has been obtained with: patient.

## 2021-01-15 NOTE — ANESTHESIA PROCEDURE NOTES
Arterial Line      Patient reassessed immediately prior to procedure    Start time: 1/15/2021 9:50 AM  Stop Time:1/15/2021 9:54 AM       Performed By   Anesthesiologist: Keisha Cloud MD  Preanesthetic Checklist  Completed: patient identified, site marked, surgical consent, pre-op evaluation, timeout performed, IV checked, risks and benefits discussed and monitors and equipment checked  Arterial Line Prep   Sterile Tech: cap, gloves and sterile barriers  Prep: ChloraPrep  Patient monitoring: EKG, continuous pulse oximetry and blood pressure monitoring  Arterial Line Procedure   Laterality:left  Location:  radial artery  Catheter size: 20 G   Guidance: ultrasound guided  PROCEDURE NOTE/ULTRASOUND INTERPRETATION.  Using ultrasound guidance the potential vascular sites for insertion of the catheter were visualized to determine the patency of the vessel to be used for vascular access.  After selecting the appropriate site for insertion, the needle was visualized under ultrasound being inserted into the radial artery, followed by ultrasound confirmation of wire and catheter placement. There were no abnormalities seen on ultrasound; an image was taken; and the patient tolerated the procedure with no complications.   Number of attempts: 1  Successful placement: yes  Post Assessment   Dressing Type: wrist guard applied, secured with tape and occlusive dressing applied.   Complications no  Circ/Move/Sens Assessment: normal and unchanged.   Patient Tolerance: patient tolerated the procedure well with no apparent complications

## 2021-01-15 NOTE — NURSING NOTE
Noted right arm red and patient reports itching. Vancomyocin stopped and Dr Marie called [phone]. MD here at 0926. For evaluation.

## 2021-01-15 NOTE — PLAN OF CARE
Goal Outcome Evaluation:  Plan of Care Reviewed With: patient  Progress: improving  Outcome Summary: VSS, transferred from PACU, no complaints of pain. Van Voorhis in place. Neuro checks intact. Wife approved to stay at bedside. Will continue to monitor

## 2021-01-15 NOTE — ANESTHESIA POSTPROCEDURE EVALUATION
"Patient: Erick Bruno    Procedure Summary     Date: 01/15/21 Room / Location:  [facility]    Anesthesia Start: 1007 Anesthesia Stop: 1217    Procedure: RIGHT TRANSCAROTID ARTERY REVASCULARIZATION (Right Neck) Diagnosis:     Surgeon: Jayden Marie MD Provider: Gricelda Henriquez MD    Anesthesia Type: general ASA Status: 3          Anesthesia Type: general    Vitals  Vitals Value Taken Time   /65 01/15/21 1400   Temp 36.8 °C (98.2 °F) 01/15/21 1215   Pulse 61 01/15/21 1403   Resp 14 01/15/21 1400   SpO2 94 % 01/15/21 1403   Vitals shown include unvalidated device data.        Post Anesthesia Care and Evaluation    Patient location during evaluation: bedside  Patient participation: complete - patient participated  Level of consciousness: awake and alert  Pain management: adequate  Airway patency: patent  Anesthetic complications: No anesthetic complications    Cardiovascular status: acceptable  Respiratory status: acceptable  Hydration status: acceptable    Comments: /65   Pulse 58   Temp 36.8 °C (98.2 °F) (Oral)   Resp 14   Ht 175.3 cm (69\")   Wt 75 kg (165 lb 5.5 oz)   SpO2 96%   BMI 24.42 kg/m²       "

## 2021-01-15 NOTE — BRIEF OP NOTE
[facility]    Physiologic High Risk Criteria  Patient has >= 2-vessel coronary artery disease and history of angina of any severity    Anatomic High Risk Criteria   N/A    Pre-op Modified Limestone Score (if patient had prior stroke/TIA): 0 = No Symptoms    Urgency:  Elective    ICA vessel tortuosity:  Normal    Number of distinct lesions treated: 1    Primary lesion location:  ICA    Primary Lesion Type: Atherosclerosis    Primary lesion length:  13 mm    Primary lesion stenosis: 80 %    Was secondary stenosis, distal or proximal to ICA lesion treated or contralateral stenosis, treated?: NA    Secondary lesion location:  NA      Bradyarrhythmia requiring treatment: No    Total reverse flow time:  10 minuntes    Arch Type:  Type 2 - The arch vessels arise between the parallel planes delineated by the outer and iner curves of the arch (moderate angulation).  The vertical distance from the orgin of christiane innominate artery to top of the arch is between 1 and 2 left CCA diameters.    Bovine Arch:  No    Arch Atherosclerosis: Moderate >= 4mm thick    Primary lesion calcification: Grade 3=51-99% circumference

## 2021-01-15 NOTE — INTERVAL H&P NOTE
H&P reviewed.  The patient was examined and there are no changes to the H&P    Patient took his Plavix this morning, aspirin last night, and his statin today.

## 2021-01-15 NOTE — OP NOTE
Operative Note  Location: [facility]  Date of Admission:  1/15/2021  OR Date: 1/15/2021    Pre-op Diagnosis:right internal carotid artery stenosis, 80-99%      Post-op Diagnosis: Same    Procedure:    Trans-carotid cervical stent with distal embolic protection device (CPT 28237)  Ultrasound guided femoral vein access    Surgeon(s):  Jayden Marie MD    Assistant: Mariam JAMES , Provided critical assistance in exposure, retraction, and suction that overall decrease blood loss and operative time.    Anesthesia: General    Estimated Blood Loss: minimal    Staff:   Circulator: Kathy Herron, RN; Angela Tanner RN  Radiology Technologist: Lilli Vila, RRT; Justen Mclaughlin RRT  Scrub Person: Suzanna Araujo  Assistant: Mariam Cleveland CSA  Orientee: Armando Boone RN    Complications: None    Specimen: None    Physiologic High Risk Criteria  Patient has >= 2-vessel coronary artery disease and history of angina of any severity    Anatomic High Risk Criteria   N/A    Findings: 30% residual stenosis    Implants:   Implant Name Type Inv. Item Serial No.  Lot No. LRB No. Used Action   STNT ENROUTE TRANSCAROTID 6F 9X30MM - S. - [id number] Stent STNT ENROUTE TRANSCAROTID 6F 9X30MM . [facility] [id number] Right 1 Implanted   HEMOST ABS SURGICEL 2X3 - S. - [id number] Implant HEMOST ABS SURGICEL 2X3 . [employer] [id number] Right 1 Implanted       Indications:  Patient with a duplex examination identified internal carotid artery stenoses of 80 - 99% with no symptoms. The patient was seen in the Holding Room. The risks, benefits, complications, treatment options, and expected outcomes were reviewed with the patient. The risks and potential complications of their problem and purposed treatment include but are not limited to infection, bleeding, stroke, pain, nerve and vessel injury and complication secondary to the anesthetic. The patient concurred with the  proposed plan, giving informed consent. The site of surgery properly noted/marked.       Procedure:  The patient was taken to Operating Room and identified as Erick Bruno and the procedure verified as right carotid stent. A Time Out was held and the above information confirmed.    The patient was taken to the operating and placed supine on the operating table.  After induction of IV sedation and anesthesia the neck was prepped and draped with ChloraPrep.  A timeout was observed.  The carotid artery was evaluated under ultrasound in a sterile fashion.  The distance from the clavicle to the carotid bifurcation was verified to be a greater than or equal to 5 cm.  We then flushed and closed the venous and arterial sheath sets.  The Enroute flow controller was set to low volume for the system prep.  The contralateral femoral vein was accessed with the Enroute venous return sheath under direct ultrasound guidance.  The sheath was secured.  The skin just above the clavicle anterior to the sternocleidomastoid muscle was incised in the skin and subcutaneous tissues were divided.  The platysma was divided.  The common carotid artery was exposed circumferentially.  The vagus nerve was identified and protected.  Umbilical tape as placed proximal to the planned access site of the common carotid artery.    The patient was systemically heparinized. A U stitch was placed at the planned arterial entry site with a 5-0 Prolene suture.  The common carotid artery was accessed with a micropuncture needle and wire.  A carotid angiogram was taken in 2 views.  Once a micropuncture sheath was in place, a stiff 035 wire was used to facilitate the arterial sheath placement. The sheath was secured.  We then connected the Enroute flow controller line to the arterial sheath.  It was back bled to remove air and then connected to the venous sheath.  We then used a Clamp, [employer]da Debakey clamp to occlude the common carotid artery just proximal to  the arterial sheath entry point.  Reversal of flow as distal embolic protection was confirmed by injecting saline into the venous shot line and visualizing the washout.  We confirmed this on both high and low flow settings.    Carotid angiogram was done by injecting contrast by hand while depressing the flow stop button on the flow controller.  This confirmed the lesion location.  Degree of stenosis was 80%.  We planned our of intervention by determining the size of predilatation balloons and the stent size.  A 6 mm predilatation balloon was used..  The angioplasty and carotid stents were delivered under high reverse flow settings. There was 30% residual stenosis.    The common carotid artery was unclamped to restore antegrade flow.  The arterial and venous sheaths were removed and the puncture sites were closed.  There was good hemostasis.  The wound was irrigated with antibiotic irrigation.  The site was closed in 3 layers with Vicryl sutures.  Dermabond glue was used for the skin.    The patient awoke neurologically intact.  The patient went to the recovery room in stable condition.        Active Hospital Problems    Diagnosis  POA   • Carotid stenosis, asymptomatic, right [I65.21]  Yes      Resolved Hospital Problems   No resolved problems to display.      Jayden Marie MD     Date: 1/15/2021  Time: 11:57 EST

## 2021-01-16 LAB
ANION GAP SERPL CALCULATED.3IONS-SCNC: 9.8 MMOL/L (ref 5–15)
BASOPHILS # BLD AUTO: 0.05 10*3/MM3 (ref 0–0.2)
BASOPHILS NFR BLD AUTO: 1 % (ref 0–1.5)
BUN SERPL-MCNC: 18 MG/DL (ref 8–23)
BUN/CREAT SERPL: 17 (ref 7–25)
CALCIUM SPEC-SCNC: 7.3 MG/DL (ref 8.6–10.5)
CHLORIDE SERPL-SCNC: 100 MMOL/L (ref 98–107)
CO2 SERPL-SCNC: 24.2 MMOL/L (ref 22–29)
CREAT SERPL-MCNC: 1.06 MG/DL (ref 0.76–1.27)
DEPRECATED RDW RBC AUTO: 40.9 FL (ref 37–54)
EOSINOPHIL # BLD AUTO: 0.2 10*3/MM3 (ref 0–0.4)
EOSINOPHIL NFR BLD AUTO: 3.8 % (ref 0.3–6.2)
ERYTHROCYTE [DISTWIDTH] IN BLOOD BY AUTOMATED COUNT: 12.5 % (ref 12.3–15.4)
GFR SERPL CREATININE-BSD FRML MDRD: 68 ML/MIN/1.73
GLUCOSE BLDC GLUCOMTR-MCNC: 119 MG/DL (ref 70–130)
GLUCOSE BLDC GLUCOMTR-MCNC: 94 MG/DL (ref 70–130)
GLUCOSE SERPL-MCNC: 85 MG/DL (ref 65–99)
HCT VFR BLD AUTO: 29.4 % (ref 37.5–51)
HGB BLD-MCNC: 10.2 G/DL (ref 13–17.7)
IMM GRANULOCYTES # BLD AUTO: 0.02 10*3/MM3 (ref 0–0.05)
IMM GRANULOCYTES NFR BLD AUTO: 0.4 % (ref 0–0.5)
LYMPHOCYTES # BLD AUTO: 1.4 10*3/MM3 (ref 0.7–3.1)
LYMPHOCYTES NFR BLD AUTO: 26.9 % (ref 19.6–45.3)
MCH RBC QN AUTO: 31.5 PG (ref 26.6–33)
MCHC RBC AUTO-ENTMCNC: 34.7 G/DL (ref 31.5–35.7)
MCV RBC AUTO: 90.7 FL (ref 79–97)
MONOCYTES # BLD AUTO: 0.38 10*3/MM3 (ref 0.1–0.9)
MONOCYTES NFR BLD AUTO: 7.3 % (ref 5–12)
NEUTROPHILS NFR BLD AUTO: 3.16 10*3/MM3 (ref 1.7–7)
NEUTROPHILS NFR BLD AUTO: 60.6 % (ref 42.7–76)
NRBC BLD AUTO-RTO: 0 /100 WBC (ref 0–0.2)
PLATELET # BLD AUTO: 138 10*3/MM3 (ref 140–450)
PMV BLD AUTO: 9.4 FL (ref 6–12)
POTASSIUM SERPL-SCNC: 3.9 MMOL/L (ref 3.5–5.2)
RBC # BLD AUTO: 3.24 10*6/MM3 (ref 4.14–5.8)
SODIUM SERPL-SCNC: 134 MMOL/L (ref 136–145)
WBC # BLD AUTO: 5.21 10*3/MM3 (ref 3.4–10.8)

## 2021-01-16 PROCEDURE — 80048 BASIC METABOLIC PNL TOTAL CA: CPT | Performed by: SURGERY

## 2021-01-16 PROCEDURE — 25010000002 ENOXAPARIN PER 10 MG: Performed by: SURGERY

## 2021-01-16 PROCEDURE — 85025 COMPLETE CBC W/AUTO DIFF WBC: CPT | Performed by: SURGERY

## 2021-01-16 PROCEDURE — 82962 GLUCOSE BLOOD TEST: CPT

## 2021-01-16 RX ADMIN — BUSPIRONE HYDROCHLORIDE 5 MG: 5 TABLET ORAL at 08:25

## 2021-01-16 RX ADMIN — ACETAMINOPHEN 1000 MG: 500 TABLET ORAL at 06:00

## 2021-01-16 RX ADMIN — ATORVASTATIN CALCIUM 80 MG: 80 TABLET, FILM COATED ORAL at 08:25

## 2021-01-16 RX ADMIN — BUSPIRONE HYDROCHLORIDE 5 MG: 5 TABLET ORAL at 20:42

## 2021-01-16 RX ADMIN — PANTOPRAZOLE SODIUM 40 MG: 40 TABLET, DELAYED RELEASE ORAL at 06:00

## 2021-01-16 RX ADMIN — CLOPIDOGREL 75 MG: 75 TABLET, FILM COATED ORAL at 08:25

## 2021-01-16 RX ADMIN — ENOXAPARIN SODIUM 40 MG: 40 INJECTION SUBCUTANEOUS at 08:24

## 2021-01-16 RX ADMIN — ISOSORBIDE MONONITRATE 30 MG: 30 TABLET ORAL at 20:43

## 2021-01-16 RX ADMIN — MULTIPLE VITAMINS W/ MINERALS TAB 1 TABLET: TAB at 08:25

## 2021-01-16 RX ADMIN — ACETAMINOPHEN 1000 MG: 500 TABLET ORAL at 18:47

## 2021-01-16 RX ADMIN — GLIPIZIDE 5 MG: 5 TABLET ORAL at 08:24

## 2021-01-16 RX ADMIN — Medication 5 MG: at 20:43

## 2021-01-16 RX ADMIN — ASPIRIN 81 MG: 81 TABLET, CHEWABLE ORAL at 20:42

## 2021-01-16 RX ADMIN — ACETAMINOPHEN 1000 MG: 500 TABLET ORAL at 12:53

## 2021-01-16 RX ADMIN — ESCITALOPRAM 10 MG: 10 TABLET, FILM COATED ORAL at 08:25

## 2021-01-16 NOTE — PROGRESS NOTES
Patient is 1 day status post right transcarotid revascularization.  He has done well neurologically with has had issues with urinary retention.  He had a Lal catheter placed earlier this morning with some hematuria following this.  Of note, he is on aspirin and Plavix.  Otherwise doing satisfactorily.  He is tolerating his diet well.    Right neck incision healing nicely with minimal ecchymosis.  No hematoma.  Left groin incision well-healed with no hematoma.  Neurologic examination normal.    Since he has a Lal catheter in place, and no urologist that he follows, we will plan on keeping him here the rest of the day.  There is hematuria as well and need to make sure this clears.  I have discussed this with the patient and wife and they are in agreement.

## 2021-01-16 NOTE — PLAN OF CARE
Problem: Adult Inpatient Plan of Care  Goal: Plan of Care Review  Outcome: Ongoing, Progressing  Flowsheets  Taken 1/16/2021 0053 by Joana Lott, RN  Outcome Summary: no neuro deficits noted, denies pain, wife at bedside.  up to chair for an hour delilah. well.  will cont to monitor  Taken 1/15/2021 1655 by Rosario Santana, RN  Progress: improving  Plan of Care Reviewed With: patient   Goal Outcome Evaluation:  Plan of Care Reviewed With: patient  Progress: improving  Outcome Summary: no neuro deficits noted, denies pain, wife at bedside.  up to chair for an hour delilah. well.  will cont to monitor

## 2021-01-16 NOTE — NURSING NOTE
Pt unable to void, bladder scan 518.  fc placed to BSD wo any difficulty, however bloody urine returned.  Encouraged pt to drink more fluids.  Will monitor

## 2021-01-17 ENCOUNTER — READMISSION MANAGEMENT (OUTPATIENT)
Dept: CALL CENTER | Facility: HOSPITAL | Age: 76
End: 2021-01-17

## 2021-01-17 VITALS
BODY MASS INDEX: 24.49 KG/M2 | WEIGHT: 165.34 LBS | SYSTOLIC BLOOD PRESSURE: 152 MMHG | OXYGEN SATURATION: 97 % | TEMPERATURE: 97.7 F | HEIGHT: 69 IN | HEART RATE: 54 BPM | DIASTOLIC BLOOD PRESSURE: 66 MMHG | RESPIRATION RATE: 16 BRPM

## 2021-01-17 PROCEDURE — 25010000002 ENOXAPARIN PER 10 MG: Performed by: SURGERY

## 2021-01-17 RX ADMIN — ACETAMINOPHEN 1000 MG: 500 TABLET ORAL at 06:48

## 2021-01-17 RX ADMIN — GLIPIZIDE 5 MG: 5 TABLET ORAL at 06:48

## 2021-01-17 RX ADMIN — PANTOPRAZOLE SODIUM 40 MG: 40 TABLET, DELAYED RELEASE ORAL at 06:48

## 2021-01-17 RX ADMIN — CLOPIDOGREL 75 MG: 75 TABLET, FILM COATED ORAL at 08:39

## 2021-01-17 RX ADMIN — ESCITALOPRAM 10 MG: 10 TABLET, FILM COATED ORAL at 08:39

## 2021-01-17 RX ADMIN — BUSPIRONE HYDROCHLORIDE 5 MG: 5 TABLET ORAL at 08:40

## 2021-01-17 RX ADMIN — ATORVASTATIN CALCIUM 80 MG: 80 TABLET, FILM COATED ORAL at 08:39

## 2021-01-17 RX ADMIN — MULTIPLE VITAMINS W/ MINERALS TAB 1 TABLET: TAB at 08:39

## 2021-01-17 RX ADMIN — ENOXAPARIN SODIUM 40 MG: 40 INJECTION SUBCUTANEOUS at 08:40

## 2021-01-17 NOTE — PLAN OF CARE
Goal Outcome Evaluation:  Plan of Care Reviewed With: patient  Progress: improving  Pt able to void within 2 hours post Lal catheter removal. Eating and drinking adequately, okay for discharge per MD.

## 2021-01-17 NOTE — DISCHARGE SUMMARY
Name: Erick Bruno ADMIT: 1/15/2021   : 1945  PCP: Denzel Fuller Jr., MD    MRN: [id number] LOS: 2 days   AGE/SEX: 75 y.o. male  Location: [facility]     Date of Admission: 1/15/2021  Date of Discharge:  2021    PCP: Denzel Fuller Jr., MD      DISCHARGE DIAGNOSIS  Active Hospital Problems    Diagnosis  POA   • Carotid stenosis, asymptomatic, right [I65.21]  Yes      Resolved Hospital Problems   No resolved problems to display.       SECONDARY DIAGNOSES  Past Medical History:   Diagnosis Date   • Abnormal nuclear stress test 2018   • Acid reflux    • Anemia    • Anxiety    • Arthritis    • CAD (coronary artery disease)    • CAD (coronary artery disease)    • Carotid artery disease (CMS/HCC)    • Chest discomfort     both typical and atypical   • Dementia (CMS/HCC)    • Diabetes mellitus (CMS/HCC)    • Enlarged prostate    • History of hepatitis A    • History of seizure 2013    S/P TEMPERATURE   • Hyperlipidemia    • Hypertension    • Macular degeneration    • NSTEMI (non-ST elevated myocardial infarction) (CMS/HCC)    • Panarteritis (CMS/HCC)    • Peripheral arterial disease (CMS/HCC)    • Poor balance    • Precordial pain    • 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          PROCEDURES PERFORMED    Date: January 15, 2021, right carotid artery revascularization    HOSPITAL COURSE  Patient is a 75 y.o. male presented to [facility] admitted for critical stenosis, asymptomatic, of right internal carotid artery.  Please see the admitting history and physical for further details.  Patient was admitted underwent the above-mentioned procedure.  His postoperative course was eventful only for urinary retention.  He remained normal neurologically and hemodynamically.  He has dual  "antiplatelet therapy and statin agent were continued.  Lal catheter was placed and he maintained good urinary output.  There was brief hematuria initially but this cleared.  His Lal catheter was removed on his second postoperative day and he was able to void adequately.  He was therefore discharged home.      VITAL SIGNS  /75 (BP Location: Right arm, Patient Position: Lying)   Pulse 58   Temp 97.8 °F (36.6 °C) (Oral)   Resp 16   Ht 175.3 cm (69\")   Wt 75 kg (165 lb 5.5 oz)   SpO2 95%   BMI 24.42 kg/m²   Objective: Right neck incision healing satisfactorily.  Left groin puncture site without hematoma.  Neurologic examination normal.  CONDITION ON DISCHARGE  Stable.      DISCHARGE DISPOSITION   Home or Self Care      DISCHARGE MEDICATIONS     Discharge Medications      Changes to Medications      Instructions Start Date   glimepiride 2 MG tablet  Commonly known as: AMARYL  What changed: when to take this   TAKE 1 TABLET BY MOUTH ONCE DAILY IN THE MORNING BEFORE BREAKFAST      metFORMIN  MG 24 hr tablet  Commonly known as: GLUCOPHAGE-XR  What changed: See the new instructions.   TAKE 4 TABLETS BY MOUTH ONCE DAILY WITH SUPPER         Continue These Medications      Instructions Start Date   aspirin 81 MG chewable tablet   81 mg, Oral, Nightly, PER JUAN DYKES TO CONTINUE TAKING PER MD      atorvastatin 80 MG tablet  Commonly known as: LIPITOR   Take 1 tablet by mouth once daily      busPIRone 5 MG tablet  Commonly known as: BUSPAR   Take 1 tablet by mouth twice daily      clopidogrel 75 MG tablet  Commonly known as: PLAVIX   Take 1 tablet by mouth once daily      Coenzyme Q10 200 MG capsule   200 mg, Oral, Nightly, HOLDING FOR SURGERY      escitalopram 10 MG tablet  Commonly known as: LEXAPRO   10 mg, Oral, Daily      isosorbide mononitrate 30 MG 24 hr tablet  Commonly known as: IMDUR   Take 1 tablet by mouth nightly      LORazepam 0.5 MG tablet  Commonly known as: ATIVAN   0.5 mg, Oral, Every " 8 Hours PRN      melatonin 5 MG tablet tablet   5 mg, Oral, Nightly      multivitamin with minerals tablet tablet   1 tablet, Oral, Daily      nitroglycerin 0.4 MG SL tablet  Commonly known as: Nitrostat   0.4 mg, Sublingual, Every 5 Minutes PRN, Take no more than 3 doses in 15 minutes.      omeprazole 20 MG capsule  Commonly known as: priLOSEC   Take 1 capsule by mouth once daily      saw palmetto 160 MG capsule   160 mg, Oral, Nightly, HOLD FOR SURGERY      vitamin E 1000 UNIT capsule   1,200 Units, Oral, Nightly, HOLD FOR SURGERY              Activity Instructions     Other Activity Instructions      Activity Instructions:   1.  No lifting more than 20 pounds for 2 days.  2.  Patient does not drive.  3.  May shower beginning today.      No future appointments.  Additional Instructions for the Follow-ups that You Need to Schedule     Discharge Follow-up with Specified Provider: Please have patient call [phone] for follow-up with Dr. Marie; 2 Weeks   As directed      To: Please have patient call [phone] for follow-up with Dr. Marie    Follow Up: 2 Weeks           Follow-up Information     Denzel Fuller Jr., MD .    Specialty: Internal Medicine  Contact information:  [facility] [facility] [address] [phone]                   TEST  RESULTS PENDING AT DISCHARGE       [place], Post Op Global    Ukiah Mayank Bronson Jr., MD  Office Number [phone]    21  08:12 EST

## 2021-01-17 NOTE — PROGRESS NOTES
Patient is 2 days status post right transcarotid revascularization.  He has done well neurologically with has had issues with urinary retention.  He had a Lal catheter placed yesterday with some hematuria following this.  Of note, he is on aspirin and Plavix.  Otherwise doing satisfactorily.  He is tolerating his diet well.  Urine is now totally clear.     Right neck incision healing nicely with minimal ecchymosis.  No hematoma.  Left groin incision well-healed with no hematoma.  Neurologic examination normal.    Lal catheter is removed.  He will be discharged home once he voids on his own.  Instructions given.  Prescriptions written.

## 2021-01-17 NOTE — SIGNIFICANT NOTE
01/17/21 1037   OTHER   Discipline physical therapist   Rehab Time/Intention   Session Not Performed other (see comments)  (RN reports that patient is largely at baseline. He is at risk for falls but is not willing to attend outpatient services at this time for PT. MD has placed DC orders to go home.)

## 2021-01-17 NOTE — PLAN OF CARE
Goal Outcome Evaluation:  Plan of Care Reviewed With: patient  Progress: improving  Outcome Summary: VSS, room air, no neuro deficits noted, incisions CDI, denies pain, estevez catheter draining above adequate UOP - yellow in color. Wife at bedside. Will continue to monitor.

## 2021-01-17 NOTE — DISCHARGE INSTRUCTIONS
Understanding Your Risk for Falls  Each year, millions of people have serious injuries from falls. It is important to understand your risk for falling. Talk with your health care provider about your risk and what you can do to lower it. There are actions you can take at home to lower your risk.  If you do have a serious fall, make sure you tell your health care provider. Falling once raises your risk for falling again.  How can falls affect me?  Serious injuries from falls are common. These include:  · Broken bones, such as hip fractures.  · Head injuries, such as traumatic brain injuries (TBI).  Fear of falling can also cause you to avoid activities and stay at home. This can make your muscles weaker and actually raise your risk for a fall.  What can increase my risk?  There are a number of risk factors that increase your risk for falling. The more risk factors you have, the higher your risk for falling. Serious injuries from a fall most often happen to people older than age 65. Children and young adults ages 15-29 are also at higher risk.  Common risk factors include:  · Weakness in the lower body.  · Lack (deficiency) of vitamin D.  · Being generally weak or confused due to long-term (chronic) illness.  · Dizziness or balance problems.  · Poor vision.  · Medicines that cause dizziness or drowsiness. These can include medicines for your blood pressure, heart, anxiety, insomnia, or edema, as well as pain medicines and muscle relaxants.  Other risk factors include:  · Drinking alcohol.  · Having had a fall in the past.  · Having depression.  · Foot pain or improper footwear.  · Working at a dangerous job.  · Having any of the following in your home:  ? Tripping hazards, such as floor clutter or loose rugs.  ? Poor lighting.  ? Pets or clutter.  · Dementia or memory loss.  What actions can I take to lower my risk of falling?         Physical activity  Maintain physical fitness. Do strength and balance exercises.  Consider taking a regular class to build strength and balance. Yoga and fran chi are good options.  Vision  Have your eyes checked every year and your vision prescription updated as needed.  Walking aids and footwear  · Wear nonskid shoes. Do not wear high heels.  · Do not walk around the house in socks or slippers.  · Use a cane or walker as told by your health care provider.  Home safety  · Attach secure railings on both sides of your stairs.  · Install grab bars for your tub, shower, and toilet. Use a bath mat in your tub or shower.  · Use good lighting in all rooms. Keep a flashlight near your bed.  · Make sure there is a clear path from your bed to the bathroom. Use night-lights.  · Do not use throw rugs. Make sure all carpeting is taped or tacked down securely.  · Remove all clutter from walkways and stairways, including extension cords.  · Repair uneven or broken steps.  · Avoid walking on icy or slippery surfaces. Walk on the grass instead of on icy or slick sidewalks. Where you can, use ice melt to get rid of ice on walkways.  · Use a cordless phone.  Questions to ask your health care provider  · Can you help me check my risk for a fall?  · Do any of my medicines make me more likely to fall?  · Should I take a vitamin D supplement?  · What exercises can I do to improve my strength and balance?  · Should I make an appointment to have my vision checked?  · Do I need a bone density test to check for weak bones or osteoporosis?  · Would it help to use a cane or a walker?  Where to find more information  · Centers for Disease Control and Prevention, STEADI: www.cdc.gov  · Community-Based Fall Prevention Programs: www.cdc.gov  · National [place] on Aging: zl2ikxc.shadia.nih.gov  Contact a health care provider if:  · You fall at home.  · You are afraid of falling at home.  · You feel weak, drowsy, or dizzy.  Summary  · People 65 and older are at high risk for falling. However, older people are not the only ones  injured in falls. Children and young adults have a higher-than-normal risk too.  · Talk with your health care provider about your risks for falling and how to lower those risks.  · Taking certain precautions at home can lower your risk for falling.  · If you fall, always tell your health care provider.  This information is not intended to replace advice given to you by your health care provider. Make sure you discuss any questions you have with your health care provider.  Document Revised: 06/24/2020 Document Reviewed: 06/24/2020  Elsevier Patient Education © 2020 [employer]

## 2021-01-17 NOTE — OUTREACH NOTE
Prep Survey      Responses   Jew facility patient discharged from?  [facility]   Is LACE score < 7 ?  No   Emergency Room discharge w/ pulse ox?  No   Eligibility  [facility]   Date of Admission  01/15/21   Date of Discharge  01/17/21   Discharge Disposition  Home or Self Care   Discharge diagnosis  s/p R carotid endartectomy    Does the patient have one of the following disease processes/diagnoses(primary or secondary)?  General Surgery   Does the patient have Home health ordered?  No   Is there a DME ordered?  No   Prep survey completed?  Yes          Melissa Champion RN

## 2021-01-18 ENCOUNTER — TRANSITIONAL CARE MANAGEMENT TELEPHONE ENCOUNTER (OUTPATIENT)
Dept: CALL CENTER | Facility: HOSPITAL | Age: 76
End: 2021-01-18

## 2021-01-18 LAB
ACT BLD: 131 SECONDS (ref 82–152)
ACT BLD: 307 SECONDS (ref 82–152)

## 2021-01-18 NOTE — OUTREACH NOTE
Call Center TCM Note      Responses   [facility] patient discharged from?  [place]   Does the patient have one of the following disease processes/diagnoses(primary or secondary)?  General Surgery   TCM attempt successful?  Yes   Discharge diagnosis  s/p R carotid endartectomy    Meds reviewed with patient/caregiver?  Yes   Does the patient have all medications related to this admission filled (includes all antibiotics, pain medications, etc.)  N/A   Prescription comments  No changes made to pt regular meds   Does the patient have a follow up appointment scheduled with their surgeon?  No   What is preventing the patient from scheduling follow up appointments?  Haven't had time   Has the patient kept scheduled appointments due by today?  Yes   Has home health visited the patient within 72 hours of discharge?  N/A   Psychosocial issues?  Yes   Did the patient receive a copy of their discharge instructions?  Yes   Nursing interventions  Reviewed instructions with patient   What is the patient's perception of their health status since discharge?  Improving   Is the patient /caregiver able to teach back basic post-op care?  Continue use of incentive spirometry at least 1 week post discharge, Drive as instructed by MD in discharge instructions, No tub bath, swimming, or hot tub until instructed by MD, Do not remove steri-strips, Lifting as instructed by MD in discharge instructions, Keep incision areas clean,dry and protected, Take showers only when approved by MD-sponge bathe until then, Practice 'cough and deep breath'   Is the patient/caregiver able to teach back signs and symptoms of incisional infection?  Increased redness, swelling or pain at the incisonal site, Pus or odor from incision, Fever, Increased drainage or bleeding, Incisional warmth   Is the patient/caregiver able to teach back steps to recovery at home?  Set small, achievable goals for return to baseline health, Practice good oral hygiene, Eat a  well-balance diet, Rest and rebuild strength, gradually increase activity, Weigh daily, Make a list of questions for surgeon's appointment   If the patient is a current smoker, are they able to teach back resources for cessation?  Not a smoker   Is the patient/caregiver able to teach back the hierarchy of who to call/visit for symptoms/problems? PCP, Specialist, Home health nurse, Urgent Care, ED, 911  Yes   TCM call completed?  Yes   Wrap up additional comments  Pt doing well s/p rt artery revascularization. Per wife pt walking well, no issues. They will call tomorrow to sched POST OP. Pt unsure about who he will have as PCP with Dr Fuller retipierre. They will speak to his offcie.           Marcelle Kolb MA    1/18/2021, 15:51 EST

## 2021-01-18 NOTE — PROGRESS NOTES
Case Management Discharge Note      Final Note: Pt discharged home ,no known needs.  MARISELA Stephens RN         Selected Continued Care - Discharged on 1/17/2021 Admission date: 1/15/2021 - Discharge disposition: Home or Self Care    Destination    No services have been selected for the patient.              Durable Medical Equipment    No services have been selected for the patient.              Dialysis/Infusion    No services have been selected for the patient.              Home Medical Care    No services have been selected for the patient.              Therapy    No services have been selected for the patient.              Community Resources    No services have been selected for the patient.                  Transportation Services  Private: Car    Final Discharge Disposition Code: 01 - home or self-care

## 2021-01-26 ENCOUNTER — READMISSION MANAGEMENT (OUTPATIENT)
Dept: CALL CENTER | Facility: HOSPITAL | Age: 76
End: 2021-01-26

## 2021-01-26 NOTE — OUTREACH NOTE
General Surgery Week 2 Survey      Responses   [facility] patient discharged from?  [place]   Does the patient have one of the following disease processes/diagnoses(primary or secondary)?  General Surgery   Week 2 attempt successful?  Yes   Call start time  1720   Call end time  1721   Discharge diagnosis  s/p R carotid endartectomy    Meds reviewed with patient/caregiver?  Yes   Is the patient having any side effects they believe may be caused by any medication additions or changes?  No   Does the patient have all medications related to this admission filled (includes all antibiotics, pain medications, etc.)  Yes   Is the patient taking all medications as directed (includes completed medication regime)?  Yes   Does the patient have a follow up appointment scheduled with their surgeon?  Yes   Has the patient kept scheduled appointments due by today?  Yes   Has home health visited the patient within 72 hours of discharge?  N/A   Psychosocial issues?  No   Did the patient receive a copy of their discharge instructions?  Yes   Nursing interventions  Reviewed instructions with patient   What is the patient's perception of their health status since discharge?  Improving   Nursing interventions  Nurse provided patient education   Is the patient /caregiver able to teach back basic post-op care?  Continue use of incentive spirometry at least 1 week post discharge, Drive as instructed by MD in discharge instructions, No tub bath, swimming, or hot tub until instructed by MD, Do not remove steri-strips, Lifting as instructed by MD in discharge instructions, Keep incision areas clean,dry and protected, Take showers only when approved by MD-sponge bathe until then, Practice 'cough and deep breath'   Is the patient/caregiver able to teach back signs and symptoms of incisional infection?  Increased redness, swelling or pain at the incisonal site, Pus or odor from incision, Fever, Increased drainage or bleeding, Incisional  warmth   Is the patient/caregiver able to teach back steps to recovery at home?  Set small, achievable goals for return to baseline health, Practice good oral hygiene, Eat a well-balance diet, Rest and rebuild strength, gradually increase activity, Weigh daily, Make a list of questions for surgeon's appointment   If the patient is a current smoker, are they able to teach back resources for cessation?  Not a smoker   Is the patient/caregiver able to teach back the hierarchy of who to call/visit for symptoms/problems? PCP, Specialist, Home health nurse, Urgent Care, ED, 911  Yes   Week 2 call completed?  Yes          Jack Avitia RN

## 2021-02-26 ENCOUNTER — NURSE TRIAGE (OUTPATIENT)
Dept: CALL CENTER | Facility: HOSPITAL | Age: 76
End: 2021-02-26

## 2021-02-26 ENCOUNTER — APPOINTMENT (OUTPATIENT)
Dept: GENERAL RADIOLOGY | Facility: HOSPITAL | Age: 76
End: 2021-02-26

## 2021-02-26 ENCOUNTER — HOSPITAL ENCOUNTER (INPATIENT)
Facility: HOSPITAL | Age: 76
LOS: 5 days | Discharge: HOME | End: 2021-03-03
Attending: EMERGENCY MEDICINE | Admitting: STUDENT IN AN ORGANIZED HEALTH CARE EDUCATION/TRAINING PROGRAM

## 2021-02-26 DIAGNOSIS — L03.115 CELLULITIS OF RIGHT FOOT: Primary | ICD-10-CM

## 2021-02-26 DIAGNOSIS — I73.9 PAD (PERIPHERAL ARTERY DISEASE) (HCC): ICD-10-CM

## 2021-02-26 LAB
ALBUMIN SERPL-MCNC: 3.9 G/DL (ref 3.5–5.2)
ALBUMIN/GLOB SERPL: 1.4 G/DL
ALP SERPL-CCNC: 100 U/L (ref 39–117)
ALT SERPL W P-5'-P-CCNC: 23 U/L (ref 1–41)
ANION GAP SERPL CALCULATED.3IONS-SCNC: 8.1 MMOL/L (ref 5–15)
AST SERPL-CCNC: 20 U/L (ref 1–40)
BASOPHILS # BLD AUTO: 0.07 10*3/MM3 (ref 0–0.2)
BASOPHILS NFR BLD AUTO: 0.9 % (ref 0–1.5)
BILIRUB SERPL-MCNC: 0.5 MG/DL (ref 0–1.2)
BUN SERPL-MCNC: 21 MG/DL (ref 8–23)
BUN/CREAT SERPL: 16.8 (ref 7–25)
CALCIUM SPEC-SCNC: 8.5 MG/DL (ref 8.6–10.5)
CHLORIDE SERPL-SCNC: 103 MMOL/L (ref 98–107)
CO2 SERPL-SCNC: 25.9 MMOL/L (ref 22–29)
CREAT SERPL-MCNC: 1.25 MG/DL (ref 0.76–1.27)
CRP SERPL-MCNC: 0.48 MG/DL (ref 0–0.5)
DEPRECATED RDW RBC AUTO: 45 FL (ref 37–54)
EOSINOPHIL # BLD AUTO: 0.22 10*3/MM3 (ref 0–0.4)
EOSINOPHIL NFR BLD AUTO: 2.7 % (ref 0.3–6.2)
ERYTHROCYTE [DISTWIDTH] IN BLOOD BY AUTOMATED COUNT: 13.2 % (ref 12.3–15.4)
ERYTHROCYTE [SEDIMENTATION RATE] IN BLOOD: 28 MM/HR (ref 0–20)
GFR SERPL CREATININE-BSD FRML MDRD: 56 ML/MIN/1.73
GLOBULIN UR ELPH-MCNC: 2.8 GM/DL
GLUCOSE BLDC GLUCOMTR-MCNC: 164 MG/DL (ref 70–130)
GLUCOSE SERPL-MCNC: 108 MG/DL (ref 65–99)
HCT VFR BLD AUTO: 36.4 % (ref 37.5–51)
HGB BLD-MCNC: 12.2 G/DL (ref 13–17.7)
IMM GRANULOCYTES # BLD AUTO: 0.02 10*3/MM3 (ref 0–0.05)
IMM GRANULOCYTES NFR BLD AUTO: 0.2 % (ref 0–0.5)
LYMPHOCYTES # BLD AUTO: 2.12 10*3/MM3 (ref 0.7–3.1)
LYMPHOCYTES NFR BLD AUTO: 26.1 % (ref 19.6–45.3)
MCH RBC QN AUTO: 31 PG (ref 26.6–33)
MCHC RBC AUTO-ENTMCNC: 33.5 G/DL (ref 31.5–35.7)
MCV RBC AUTO: 92.6 FL (ref 79–97)
MONOCYTES # BLD AUTO: 0.46 10*3/MM3 (ref 0.1–0.9)
MONOCYTES NFR BLD AUTO: 5.7 % (ref 5–12)
NEUTROPHILS NFR BLD AUTO: 5.24 10*3/MM3 (ref 1.7–7)
NEUTROPHILS NFR BLD AUTO: 64.4 % (ref 42.7–76)
NRBC BLD AUTO-RTO: 0 /100 WBC (ref 0–0.2)
PLATELET # BLD AUTO: 187 10*3/MM3 (ref 140–450)
PMV BLD AUTO: 9.6 FL (ref 6–12)
POTASSIUM SERPL-SCNC: 3.9 MMOL/L (ref 3.5–5.2)
PROT SERPL-MCNC: 6.7 G/DL (ref 6–8.5)
RBC # BLD AUTO: 3.93 10*6/MM3 (ref 4.14–5.8)
SARS-COV-2 ORF1AB RESP QL NAA+PROBE: NOT DETECTED
SODIUM SERPL-SCNC: 137 MMOL/L (ref 136–145)
WBC # BLD AUTO: 8.13 10*3/MM3 (ref 3.4–10.8)

## 2021-02-26 PROCEDURE — 82962 GLUCOSE BLOOD TEST: CPT

## 2021-02-26 PROCEDURE — 87040 BLOOD CULTURE FOR BACTERIA: CPT | Performed by: PHYSICIAN ASSISTANT

## 2021-02-26 PROCEDURE — 73630 X-RAY EXAM OF FOOT: CPT

## 2021-02-26 PROCEDURE — 99284 EMERGENCY DEPT VISIT MOD MDM: CPT

## 2021-02-26 PROCEDURE — 85652 RBC SED RATE AUTOMATED: CPT | Performed by: PHYSICIAN ASSISTANT

## 2021-02-26 PROCEDURE — 85025 COMPLETE CBC W/AUTO DIFF WBC: CPT | Performed by: PHYSICIAN ASSISTANT

## 2021-02-26 PROCEDURE — U0004 COV-19 TEST NON-CDC HGH THRU: HCPCS | Performed by: PHYSICIAN ASSISTANT

## 2021-02-26 PROCEDURE — 25010000002 ENOXAPARIN PER 10 MG: Performed by: INTERNAL MEDICINE

## 2021-02-26 PROCEDURE — 36415 COLL VENOUS BLD VENIPUNCTURE: CPT

## 2021-02-26 PROCEDURE — 80053 COMPREHEN METABOLIC PANEL: CPT | Performed by: PHYSICIAN ASSISTANT

## 2021-02-26 PROCEDURE — 86140 C-REACTIVE PROTEIN: CPT | Performed by: PHYSICIAN ASSISTANT

## 2021-02-26 RX ORDER — ACETAMINOPHEN 325 MG/1
650 TABLET ORAL EVERY 4 HOURS PRN
Status: DISCONTINUED | OUTPATIENT
Start: 2021-02-26 | End: 2021-03-03 | Stop reason: HOSPADM

## 2021-02-26 RX ORDER — INSULIN LISPRO 100 [IU]/ML
0-9 INJECTION, SOLUTION INTRAVENOUS; SUBCUTANEOUS
Status: DISCONTINUED | OUTPATIENT
Start: 2021-02-26 | End: 2021-02-28

## 2021-02-26 RX ORDER — ATORVASTATIN CALCIUM 80 MG/1
80 TABLET, FILM COATED ORAL DAILY
Status: DISCONTINUED | OUTPATIENT
Start: 2021-02-27 | End: 2021-03-03 | Stop reason: HOSPADM

## 2021-02-26 RX ORDER — DEXTROSE MONOHYDRATE 25 G/50ML
25 INJECTION, SOLUTION INTRAVENOUS
Status: DISCONTINUED | OUTPATIENT
Start: 2021-02-26 | End: 2021-02-28

## 2021-02-26 RX ORDER — BUSPIRONE HYDROCHLORIDE 5 MG/1
5 TABLET ORAL 2 TIMES DAILY
Status: DISCONTINUED | OUTPATIENT
Start: 2021-02-26 | End: 2021-03-03 | Stop reason: HOSPADM

## 2021-02-26 RX ORDER — ESCITALOPRAM OXALATE 10 MG/1
10 TABLET ORAL DAILY
Status: DISCONTINUED | OUTPATIENT
Start: 2021-02-27 | End: 2021-03-03 | Stop reason: HOSPADM

## 2021-02-26 RX ORDER — SODIUM CHLORIDE 0.9 % (FLUSH) 0.9 %
10 SYRINGE (ML) INJECTION AS NEEDED
Status: DISCONTINUED | OUTPATIENT
Start: 2021-02-26 | End: 2021-03-03 | Stop reason: HOSPADM

## 2021-02-26 RX ORDER — LORAZEPAM 0.5 MG/1
0.5 TABLET ORAL EVERY 8 HOURS PRN
Status: DISCONTINUED | OUTPATIENT
Start: 2021-02-26 | End: 2021-03-03 | Stop reason: HOSPADM

## 2021-02-26 RX ORDER — CLINDAMYCIN PHOSPHATE 600 MG/50ML
600 INJECTION INTRAVENOUS ONCE
Status: COMPLETED | OUTPATIENT
Start: 2021-02-26 | End: 2021-02-26

## 2021-02-26 RX ORDER — NICOTINE POLACRILEX 4 MG
15 LOZENGE BUCCAL
Status: DISCONTINUED | OUTPATIENT
Start: 2021-02-26 | End: 2021-02-28

## 2021-02-26 RX ORDER — ONDANSETRON 4 MG/1
4 TABLET, FILM COATED ORAL EVERY 6 HOURS PRN
Status: DISCONTINUED | OUTPATIENT
Start: 2021-02-26 | End: 2021-03-03 | Stop reason: HOSPADM

## 2021-02-26 RX ORDER — NITROGLYCERIN 0.4 MG/1
0.4 TABLET SUBLINGUAL
Status: DISCONTINUED | OUTPATIENT
Start: 2021-02-26 | End: 2021-03-03 | Stop reason: HOSPADM

## 2021-02-26 RX ORDER — UREA 10 %
3 LOTION (ML) TOPICAL NIGHTLY PRN
Status: DISCONTINUED | OUTPATIENT
Start: 2021-02-26 | End: 2021-03-03 | Stop reason: HOSPADM

## 2021-02-26 RX ORDER — MULTIPLE VITAMINS W/ MINERALS TAB 9MG-400MCG
1 TAB ORAL DAILY
Status: DISCONTINUED | OUTPATIENT
Start: 2021-02-27 | End: 2021-03-03 | Stop reason: HOSPADM

## 2021-02-26 RX ORDER — CLOPIDOGREL BISULFATE 75 MG/1
75 TABLET ORAL DAILY
Status: DISCONTINUED | OUTPATIENT
Start: 2021-02-27 | End: 2021-03-02

## 2021-02-26 RX ORDER — CLINDAMYCIN PHOSPHATE 600 MG/50ML
600 INJECTION INTRAVENOUS EVERY 8 HOURS
Status: DISCONTINUED | OUTPATIENT
Start: 2021-02-27 | End: 2021-02-27

## 2021-02-26 RX ORDER — PANTOPRAZOLE SODIUM 40 MG/1
40 TABLET, DELAYED RELEASE ORAL EVERY MORNING
Status: DISCONTINUED | OUTPATIENT
Start: 2021-02-27 | End: 2021-03-03 | Stop reason: HOSPADM

## 2021-02-26 RX ORDER — ONDANSETRON 2 MG/ML
4 INJECTION INTRAMUSCULAR; INTRAVENOUS EVERY 6 HOURS PRN
Status: DISCONTINUED | OUTPATIENT
Start: 2021-02-26 | End: 2021-03-03 | Stop reason: HOSPADM

## 2021-02-26 RX ORDER — ASPIRIN 81 MG/1
81 TABLET, CHEWABLE ORAL NIGHTLY
Status: DISCONTINUED | OUTPATIENT
Start: 2021-02-26 | End: 2021-03-03 | Stop reason: HOSPADM

## 2021-02-26 RX ORDER — ISOSORBIDE MONONITRATE 30 MG/1
30 TABLET, EXTENDED RELEASE ORAL NIGHTLY
Status: DISCONTINUED | OUTPATIENT
Start: 2021-02-26 | End: 2021-03-03 | Stop reason: HOSPADM

## 2021-02-26 RX ADMIN — ASPIRIN 81 MG: 81 TABLET, CHEWABLE ORAL at 23:09

## 2021-02-26 RX ADMIN — CLINDAMYCIN IN 5 PERCENT DEXTROSE 600 MG: 12 INJECTION, SOLUTION INTRAVENOUS at 19:36

## 2021-02-26 RX ADMIN — ISOSORBIDE MONONITRATE 30 MG: 30 TABLET ORAL at 23:48

## 2021-02-26 RX ADMIN — BUSPIRONE HYDROCHLORIDE 5 MG: 5 TABLET ORAL at 23:48

## 2021-02-26 RX ADMIN — ENOXAPARIN SODIUM 40 MG: 40 INJECTION SUBCUTANEOUS at 23:09

## 2021-02-26 NOTE — TELEPHONE ENCOUNTER
Right foot sore, he is diabetic and has had trouble with his foot before. He does not have feeling in his feet due to diabetes. Patient has Alzeheimers and he did not tell wife that he had a wound.She is not sure how long he has had the wound or how it occurred. She saw it last night, put neosporin and covered with gauze. Today it looks so much worse, wife is very concerned. He has had infection before I that got in his bone. No appts available today at MD office. He is between MD's his MD retired they have an appt with Dr Franco on  to establish care. Care advise given.  Advised to take to  clinic today.    Reason for Disposition  • [1] Red area or streak AND [2] no fever    Additional Information  • Negative: [1] Widespread rash AND [2] bright red, sunburn-like AND [3] too weak to stand  • Negative: Sounds like a life-threatening emergency to the triager  • Negative: Stitches (or staples) and not infected  • Negative: Skin glue used to close wound and not infected  • Negative:  surgical wound infection suspected  • Negative: Surgical wound infection suspected (post-op)  • Negative: Animal bite wound infection suspected  • Negative: Chronic wound care and Negative Pressure Wound Therapy (NPWT) symptoms and questions  • Negative: [1] Widespread rash AND [2] bright red, sunburn-like  • Negative: Severe pain in the wound  • Negative: Black (necrotic) or blisters develop in wound  • Negative: Patient sounds very sick or weak to the triager  • Negative: [1] Looks infected (e.g., spreading redness, red streak, pus) AND [2] fever  • Negative: [1] Red streak runs from the wound AND [2] longer than 1 inch (2.5 cm)  • Negative: [1] Skin around the wound has become red AND [2] larger than 2 inches (5 cm)  • Negative: [1] Face wound AND [2] looks infected (e.g., spreading redness)  • Negative: [1] Finger wound AND [2] entire finger swollen    Answer Assessment - Initial Assessment Questions  1. LOCATION:  "\"Where is the wound located?\"   Right foot up above toes in the center  2. WOUND APPEARANCE: \"What does the wound look like?\"   Red inflammed area, looks like he has been burnt  3. SIZE: If redness is present, ask: \"What is the size of the red area?\" (Inches, centimeters, or compare to size of a coin)    half dollar size  4. SPREAD: \"What's changed in the last day?\"  \"Do you see any red streaks coming from the wound?\"  She thinks there is pus. She covered it with neosporin and gauze last night  5. ONSET: \"When did it start to look infected?\"   Wife thinks it is infected  6. MECHANISM: \"How did the wound start, what was the cause?\"  Had a fall on the ice doesn't know if that is what caused the wound  7. PAIN: \"Is there any pain?\" If so, ask: \"How bad is the pain?\"   (Scale 1-10; or mild, moderate, severe)   he doesn't feel pain due to diabetes.  8. FEVER: \"Do you have a fever?\" If so, ask: \"What is your temperature, how was it measured, and when did it start?\"    Has not checked, temperature  97.7 during call.  9. OTHER SYMPTOMS: \"Do you have any other symptoms?\" (e.g., shaking chills, weakness, rash elsewhere on body)   denies  10. PREGNANCY: \"Is there any chance you are pregnant?\" \"When was your last menstrual period?\"  na    Protocols used: WOUND INFECTION-ADULT-AH      "

## 2021-02-27 ENCOUNTER — APPOINTMENT (OUTPATIENT)
Dept: CARDIOLOGY | Facility: HOSPITAL | Age: 76
End: 2021-02-27

## 2021-02-27 LAB
ANION GAP SERPL CALCULATED.3IONS-SCNC: 8.3 MMOL/L (ref 5–15)
BH CV LOWER ARTERIAL LEFT ABI RATIO: 0.74
BH CV LOWER ARTERIAL LEFT DORSALIS PEDIS SYS MAX: 131 MMHG
BH CV LOWER ARTERIAL LEFT GREAT TOE SYS MAX: 101 MMHG
BH CV LOWER ARTERIAL LEFT POST TIBIAL SYS MAX: 52 MMHG
BH CV LOWER ARTERIAL LEFT TBI RATIO: 0.57
BH CV LOWER ARTERIAL RIGHT ABI RATIO: 0.8
BH CV LOWER ARTERIAL RIGHT DORSALIS PEDIS SYS MAX: 64 MMHG
BH CV LOWER ARTERIAL RIGHT GREAT TOE SYS MAX: 50 MMHG
BH CV LOWER ARTERIAL RIGHT POST TIBIAL SYS MAX: 141 MMHG
BH CV LOWER ARTERIAL RIGHT TBI RATIO: 0.28
BUN SERPL-MCNC: 19 MG/DL (ref 8–23)
BUN/CREAT SERPL: 16.4 (ref 7–25)
CALCIUM SPEC-SCNC: 8.3 MG/DL (ref 8.6–10.5)
CHLORIDE SERPL-SCNC: 104 MMOL/L (ref 98–107)
CO2 SERPL-SCNC: 25.7 MMOL/L (ref 22–29)
CREAT SERPL-MCNC: 1.16 MG/DL (ref 0.76–1.27)
DEPRECATED RDW RBC AUTO: 43.1 FL (ref 37–54)
ERYTHROCYTE [DISTWIDTH] IN BLOOD BY AUTOMATED COUNT: 12.7 % (ref 12.3–15.4)
GFR SERPL CREATININE-BSD FRML MDRD: 62 ML/MIN/1.73
GLUCOSE BLDC GLUCOMTR-MCNC: 104 MG/DL (ref 70–130)
GLUCOSE BLDC GLUCOMTR-MCNC: 105 MG/DL (ref 70–130)
GLUCOSE BLDC GLUCOMTR-MCNC: 177 MG/DL (ref 70–130)
GLUCOSE BLDC GLUCOMTR-MCNC: 201 MG/DL (ref 70–130)
GLUCOSE SERPL-MCNC: 100 MG/DL (ref 65–99)
HBA1C MFR BLD: 6.1 % (ref 4.8–5.6)
HCT VFR BLD AUTO: 36 % (ref 37.5–51)
HGB BLD-MCNC: 12.3 G/DL (ref 13–17.7)
MCH RBC QN AUTO: 31.8 PG (ref 26.6–33)
MCHC RBC AUTO-ENTMCNC: 34.2 G/DL (ref 31.5–35.7)
MCV RBC AUTO: 93 FL (ref 79–97)
PLATELET # BLD AUTO: 184 10*3/MM3 (ref 140–450)
PMV BLD AUTO: 9.7 FL (ref 6–12)
POTASSIUM SERPL-SCNC: 4 MMOL/L (ref 3.5–5.2)
RBC # BLD AUTO: 3.87 10*6/MM3 (ref 4.14–5.8)
SODIUM SERPL-SCNC: 138 MMOL/L (ref 136–145)
UPPER ARTERIAL LEFT ARM BRACHIAL SYS MAX: 170 MMHG
UPPER ARTERIAL RIGHT ARM BRACHIAL SYS MAX: 177 MMHG
WBC # BLD AUTO: 6.32 10*3/MM3 (ref 3.4–10.8)

## 2021-02-27 PROCEDURE — 63710000001 INSULIN LISPRO (HUMAN) PER 5 UNITS: Performed by: INTERNAL MEDICINE

## 2021-02-27 PROCEDURE — 99222 1ST HOSP IP/OBS MODERATE 55: CPT | Performed by: INTERNAL MEDICINE

## 2021-02-27 PROCEDURE — 82962 GLUCOSE BLOOD TEST: CPT

## 2021-02-27 PROCEDURE — 97161 PT EVAL LOW COMPLEX 20 MIN: CPT

## 2021-02-27 PROCEDURE — 97110 THERAPEUTIC EXERCISES: CPT

## 2021-02-27 PROCEDURE — 93922 UPR/L XTREMITY ART 2 LEVELS: CPT

## 2021-02-27 PROCEDURE — 80048 BASIC METABOLIC PNL TOTAL CA: CPT | Performed by: INTERNAL MEDICINE

## 2021-02-27 PROCEDURE — 25010000002 ENOXAPARIN PER 10 MG: Performed by: INTERNAL MEDICINE

## 2021-02-27 PROCEDURE — 85027 COMPLETE CBC AUTOMATED: CPT | Performed by: INTERNAL MEDICINE

## 2021-02-27 PROCEDURE — 83036 HEMOGLOBIN GLYCOSYLATED A1C: CPT | Performed by: INTERNAL MEDICINE

## 2021-02-27 PROCEDURE — 25010000003 CEFAZOLIN IN DEXTROSE 2-4 GM/100ML-% SOLUTION: Performed by: INTERNAL MEDICINE

## 2021-02-27 PROCEDURE — 97116 GAIT TRAINING THERAPY: CPT

## 2021-02-27 PROCEDURE — 36415 COLL VENOUS BLD VENIPUNCTURE: CPT | Performed by: INTERNAL MEDICINE

## 2021-02-27 RX ORDER — CEFAZOLIN SODIUM 2 G/100ML
2 INJECTION, SOLUTION INTRAVENOUS EVERY 8 HOURS
Status: DISCONTINUED | OUTPATIENT
Start: 2021-02-27 | End: 2021-03-03 | Stop reason: HOSPADM

## 2021-02-27 RX ADMIN — ESCITALOPRAM 10 MG: 10 TABLET, FILM COATED ORAL at 10:00

## 2021-02-27 RX ADMIN — INSULIN LISPRO 2 UNITS: 100 INJECTION, SOLUTION INTRAVENOUS; SUBCUTANEOUS at 17:45

## 2021-02-27 RX ADMIN — ENOXAPARIN SODIUM 40 MG: 40 INJECTION SUBCUTANEOUS at 20:21

## 2021-02-27 RX ADMIN — BUSPIRONE HYDROCHLORIDE 5 MG: 5 TABLET ORAL at 10:00

## 2021-02-27 RX ADMIN — INSULIN LISPRO 4 UNITS: 100 INJECTION, SOLUTION INTRAVENOUS; SUBCUTANEOUS at 12:20

## 2021-02-27 RX ADMIN — CEFAZOLIN SODIUM 2 G: 2 INJECTION, SOLUTION INTRAVENOUS at 17:45

## 2021-02-27 RX ADMIN — ASPIRIN 81 MG: 81 TABLET, CHEWABLE ORAL at 20:21

## 2021-02-27 RX ADMIN — CLINDAMYCIN IN 5 PERCENT DEXTROSE 600 MG: 12 INJECTION, SOLUTION INTRAVENOUS at 12:20

## 2021-02-27 RX ADMIN — CLINDAMYCIN IN 5 PERCENT DEXTROSE 600 MG: 12 INJECTION, SOLUTION INTRAVENOUS at 04:23

## 2021-02-27 RX ADMIN — MUPIROCIN 1 APPLICATION: 20 OINTMENT TOPICAL at 17:45

## 2021-02-27 RX ADMIN — ATORVASTATIN CALCIUM 80 MG: 80 TABLET, FILM COATED ORAL at 10:00

## 2021-02-27 RX ADMIN — ISOSORBIDE MONONITRATE 30 MG: 30 TABLET ORAL at 20:21

## 2021-02-27 RX ADMIN — Medication 3 MG: at 20:21

## 2021-02-27 RX ADMIN — Medication 1 TABLET: at 10:00

## 2021-02-27 RX ADMIN — CEFAZOLIN SODIUM 2 G: 2 INJECTION, SOLUTION INTRAVENOUS at 23:58

## 2021-02-27 RX ADMIN — BUSPIRONE HYDROCHLORIDE 5 MG: 5 TABLET ORAL at 20:21

## 2021-02-27 RX ADMIN — CLOPIDOGREL 75 MG: 75 TABLET, FILM COATED ORAL at 10:00

## 2021-02-27 RX ADMIN — PANTOPRAZOLE SODIUM 40 MG: 40 TABLET, DELAYED RELEASE ORAL at 06:26

## 2021-02-28 ENCOUNTER — APPOINTMENT (OUTPATIENT)
Dept: CARDIOLOGY | Facility: HOSPITAL | Age: 76
End: 2021-02-28

## 2021-02-28 LAB
ANION GAP SERPL CALCULATED.3IONS-SCNC: 9.3 MMOL/L (ref 5–15)
BH CV RIGHT CCA HIDDEN LRR: 1 CM/S
BH CV VAS CAROTID RIGHT DISTAL STENT EDV: 13.5 CM/S
BH CV VAS CAROTID RIGHT DISTAL STENT PSV: 80.3 CM/S
BH CV VAS CAROTID RIGHT DISTAL TO STENT NATIVE VESSEL E: 19.3 CM/S
BH CV VAS CAROTID RIGHT DISTAL TO STENT PSV: 82.7 CM/S
BH CV VAS CAROTID RIGHT MID STENT EDV: 14.7 CM/S
BH CV VAS CAROTID RIGHT MID STENT PSV: 88.5 CM/S
BH CV VAS CAROTID RIGHT PROXIMAL STENT EDV: 9.38 CM/S
BH CV VAS CAROTID RIGHT PROXIMAL STENT PSV: 70.9 CM/S
BH CV VAS CAROTID RIGHT STENT NATIVE VESSEL PROXIMAL EDV: 15.2 CM/S
BH CV VAS CAROTID RIGHT STENT NATIVE VESSEL PROXIMAL PS: 78.6 CM/S
BH CV XLRA MEAS - DIST GSV CALF DIST LEFT: 0.11 CM
BH CV XLRA MEAS - DIST GSV CALF DIST RIGHT: 0.17 CM
BH CV XLRA MEAS - DIST GSV THIGH DIST LEFT: 0.13 CM
BH CV XLRA MEAS - DIST GSV THIGH DIST RIGHT: 0.17 CM
BH CV XLRA MEAS - DIST LSV CALF DIST RIGHT: 0.09 CM
BH CV XLRA MEAS - GSV ANKLE DIST LEFT: 0.09 CM
BH CV XLRA MEAS - GSV ANKLE DIST RIGHT: 0.18 CM
BH CV XLRA MEAS - GSV KNEE DIST LEFT: 0.1 CM
BH CV XLRA MEAS - GSV KNEE DIST RIGHT: 0.13 CM
BH CV XLRA MEAS - GSV ORIGIN DIST LEFT: 0.49 CM
BH CV XLRA MEAS - GSV ORIGIN DIST RIGHT: 0.3 CM
BH CV XLRA MEAS - MID GSV CALF LEFT: 0.08 CM
BH CV XLRA MEAS - MID GSV CALF RIGHT: 0.15 CM
BH CV XLRA MEAS - MID GSV THIGH  LEFT: 0.18 CM
BH CV XLRA MEAS - MID GSV THIGH  RIGHT: 0.14 CM
BH CV XLRA MEAS - MID LSV CALF DIST RIGHT: 0.09 CM
BH CV XLRA MEAS - PROX GSV CALF DIST LEFT: 0.08 CM
BH CV XLRA MEAS - PROX GSV CALF DIST RIGHT: 0.17 CM
BH CV XLRA MEAS - PROX GSV THIGH  LEFT: 0.33 CM
BH CV XLRA MEAS - PROX GSV THIGH  RIGHT: 0.18 CM
BH CV XLRA MEAS - PROX LSV CALF DIST LEFT: 0.12 CM
BH CV XLRA MEAS - PROX LSV CALF DIST RIGHT: 0.08 CM
BH CV XLRA MEAS LEFT CCA RATIO VEL: -121 CM/SEC
BH CV XLRA MEAS LEFT DIST CCA EDV: -30.4 CM/SEC
BH CV XLRA MEAS LEFT DIST CCA PSV: -121 CM/SEC
BH CV XLRA MEAS LEFT DIST ICA EDV: -14.5 CM/SEC
BH CV XLRA MEAS LEFT DIST ICA PSV: -57 CM/SEC
BH CV XLRA MEAS LEFT ICA RATIO VEL: 135 CM/SEC
BH CV XLRA MEAS LEFT ICA/CCA RATIO: -1.1
BH CV XLRA MEAS LEFT MID ICA EDV: 24.4 CM/SEC
BH CV XLRA MEAS LEFT MID ICA PSV: 107 CM/SEC
BH CV XLRA MEAS LEFT PROX CCA EDV: 12.2 CM/SEC
BH CV XLRA MEAS LEFT PROX CCA PSV: 65.2 CM/SEC
BH CV XLRA MEAS LEFT PROX ECA EDV: 12.6 CM/SEC
BH CV XLRA MEAS LEFT PROX ECA PSV: 113 CM/SEC
BH CV XLRA MEAS LEFT PROX ICA EDV: 25.1 CM/SEC
BH CV XLRA MEAS LEFT PROX ICA PSV: 135 CM/SEC
BH CV XLRA MEAS LEFT PROX SCLA PSV: 140 CM/SEC
BH CV XLRA MEAS LEFT VERTEBRAL A EDV: 2.1 CM/SEC
BH CV XLRA MEAS LEFT VERTEBRAL A PSV: 22.9 CM/SEC
BH CV XLRA MEAS RIGHT CCA RATIO VEL: 78.6 CM/SEC
BH CV XLRA MEAS RIGHT DIST CCA EDV: 15.2 CM/SEC
BH CV XLRA MEAS RIGHT DIST CCA PSV: 78.6 CM/SEC
BH CV XLRA MEAS RIGHT DIST ICA EDV: -17.6 CM/SEC
BH CV XLRA MEAS RIGHT DIST ICA PSV: -75.6 CM/SEC
BH CV XLRA MEAS RIGHT ICA RATIO VEL: -88.5 CM/SEC
BH CV XLRA MEAS RIGHT ICA/CCA RATIO: -1.1
BH CV XLRA MEAS RIGHT MID ICA EDV: 14.7 CM/SEC
BH CV XLRA MEAS RIGHT MID ICA PSV: 80.3 CM/SEC
BH CV XLRA MEAS RIGHT PROX CCA EDV: 9.8 CM/SEC
BH CV XLRA MEAS RIGHT PROX CCA PSV: 50.3 CM/SEC
BH CV XLRA MEAS RIGHT PROX ECA EDV: -15 CM/SEC
BH CV XLRA MEAS RIGHT PROX ECA PSV: -279 CM/SEC
BH CV XLRA MEAS RIGHT PROX ICA EDV: -14.7 CM/SEC
BH CV XLRA MEAS RIGHT PROX ICA PSV: -88.5 CM/SEC
BH CV XLRA MEAS RIGHT PROX SCLA PSV: 118 CM/SEC
BH CV XLRA MEAS RIGHT VERTEBRAL A EDV: 11.7 CM/SEC
BH CV XLRA MEAS RIGHT VERTEBRAL A PSV: 68 CM/SEC
BUN SERPL-MCNC: 18 MG/DL (ref 8–23)
BUN/CREAT SERPL: 16.2 (ref 7–25)
CALCIUM SPEC-SCNC: 8.2 MG/DL (ref 8.6–10.5)
CHLORIDE SERPL-SCNC: 105 MMOL/L (ref 98–107)
CO2 SERPL-SCNC: 23.7 MMOL/L (ref 22–29)
CREAT SERPL-MCNC: 1.11 MG/DL (ref 0.76–1.27)
DEPRECATED RDW RBC AUTO: 45.5 FL (ref 37–54)
ERYTHROCYTE [DISTWIDTH] IN BLOOD BY AUTOMATED COUNT: 13.1 % (ref 12.3–15.4)
GFR SERPL CREATININE-BSD FRML MDRD: 65 ML/MIN/1.73
GLUCOSE BLDC GLUCOMTR-MCNC: 116 MG/DL (ref 70–130)
GLUCOSE BLDC GLUCOMTR-MCNC: 123 MG/DL (ref 70–130)
GLUCOSE SERPL-MCNC: 111 MG/DL (ref 65–99)
HCT VFR BLD AUTO: 35.3 % (ref 37.5–51)
HGB BLD-MCNC: 12.1 G/DL (ref 13–17.7)
MCH RBC QN AUTO: 32.3 PG (ref 26.6–33)
MCHC RBC AUTO-ENTMCNC: 34.3 G/DL (ref 31.5–35.7)
MCV RBC AUTO: 94.1 FL (ref 79–97)
PLATELET # BLD AUTO: 181 10*3/MM3 (ref 140–450)
PMV BLD AUTO: 9.7 FL (ref 6–12)
POTASSIUM SERPL-SCNC: 3.7 MMOL/L (ref 3.5–5.2)
QT INTERVAL: 462 MS
RBC # BLD AUTO: 3.75 10*6/MM3 (ref 4.14–5.8)
SODIUM SERPL-SCNC: 138 MMOL/L (ref 136–145)
TSH SERPL DL<=0.05 MIU/L-ACNC: 2.67 UIU/ML (ref 0.27–4.2)
WBC # BLD AUTO: 6.84 10*3/MM3 (ref 3.4–10.8)

## 2021-02-28 PROCEDURE — 25010000003 CEFAZOLIN IN DEXTROSE 2-4 GM/100ML-% SOLUTION: Performed by: INTERNAL MEDICINE

## 2021-02-28 PROCEDURE — 93880 EXTRACRANIAL BILAT STUDY: CPT

## 2021-02-28 PROCEDURE — 25010000002 ENOXAPARIN PER 10 MG: Performed by: INTERNAL MEDICINE

## 2021-02-28 PROCEDURE — 93970 EXTREMITY STUDY: CPT

## 2021-02-28 PROCEDURE — 85027 COMPLETE CBC AUTOMATED: CPT | Performed by: STUDENT IN AN ORGANIZED HEALTH CARE EDUCATION/TRAINING PROGRAM

## 2021-02-28 PROCEDURE — 93005 ELECTROCARDIOGRAM TRACING: CPT | Performed by: STUDENT IN AN ORGANIZED HEALTH CARE EDUCATION/TRAINING PROGRAM

## 2021-02-28 PROCEDURE — 99221 1ST HOSP IP/OBS SF/LOW 40: CPT | Performed by: INTERNAL MEDICINE

## 2021-02-28 PROCEDURE — 84443 ASSAY THYROID STIM HORMONE: CPT | Performed by: STUDENT IN AN ORGANIZED HEALTH CARE EDUCATION/TRAINING PROGRAM

## 2021-02-28 PROCEDURE — 80048 BASIC METABOLIC PNL TOTAL CA: CPT | Performed by: STUDENT IN AN ORGANIZED HEALTH CARE EDUCATION/TRAINING PROGRAM

## 2021-02-28 PROCEDURE — 93010 ELECTROCARDIOGRAM REPORT: CPT | Performed by: INTERNAL MEDICINE

## 2021-02-28 PROCEDURE — 82962 GLUCOSE BLOOD TEST: CPT

## 2021-02-28 PROCEDURE — 99232 SBSQ HOSP IP/OBS MODERATE 35: CPT | Performed by: INTERNAL MEDICINE

## 2021-02-28 RX ORDER — GLIPIZIDE 5 MG/1
5 TABLET ORAL
Status: DISCONTINUED | OUTPATIENT
Start: 2021-02-28 | End: 2021-03-03 | Stop reason: HOSPADM

## 2021-02-28 RX ADMIN — ENOXAPARIN SODIUM 70 MG: 80 INJECTION SUBCUTANEOUS at 19:25

## 2021-02-28 RX ADMIN — CLOPIDOGREL 75 MG: 75 TABLET, FILM COATED ORAL at 08:17

## 2021-02-28 RX ADMIN — CEFAZOLIN SODIUM 2 G: 2 INJECTION, SOLUTION INTRAVENOUS at 08:16

## 2021-02-28 RX ADMIN — PANTOPRAZOLE SODIUM 40 MG: 40 TABLET, DELAYED RELEASE ORAL at 07:00

## 2021-02-28 RX ADMIN — MUPIROCIN 1 APPLICATION: 20 OINTMENT TOPICAL at 08:17

## 2021-02-28 RX ADMIN — SODIUM CHLORIDE, PRESERVATIVE FREE 10 ML: 5 INJECTION INTRAVENOUS at 08:17

## 2021-02-28 RX ADMIN — GLIPIZIDE 5 MG: 5 TABLET ORAL at 16:51

## 2021-02-28 RX ADMIN — ASPIRIN 81 MG: 81 TABLET, CHEWABLE ORAL at 20:18

## 2021-02-28 RX ADMIN — BUSPIRONE HYDROCHLORIDE 5 MG: 5 TABLET ORAL at 08:17

## 2021-02-28 RX ADMIN — Medication 1 TABLET: at 08:17

## 2021-02-28 RX ADMIN — ATORVASTATIN CALCIUM 80 MG: 80 TABLET, FILM COATED ORAL at 08:17

## 2021-02-28 RX ADMIN — ESCITALOPRAM 10 MG: 10 TABLET, FILM COATED ORAL at 08:17

## 2021-02-28 RX ADMIN — ISOSORBIDE MONONITRATE 30 MG: 30 TABLET ORAL at 20:18

## 2021-02-28 RX ADMIN — BUSPIRONE HYDROCHLORIDE 5 MG: 5 TABLET ORAL at 20:18

## 2021-02-28 RX ADMIN — Medication 3 MG: at 20:18

## 2021-02-28 RX ADMIN — CEFAZOLIN SODIUM 2 G: 2 INJECTION, SOLUTION INTRAVENOUS at 16:51

## 2021-03-01 ENCOUNTER — APPOINTMENT (OUTPATIENT)
Dept: CARDIOLOGY | Facility: HOSPITAL | Age: 76
End: 2021-03-01

## 2021-03-01 ENCOUNTER — APPOINTMENT (OUTPATIENT)
Dept: CT IMAGING | Facility: HOSPITAL | Age: 76
End: 2021-03-01

## 2021-03-01 LAB
ANION GAP SERPL CALCULATED.3IONS-SCNC: 9.5 MMOL/L (ref 5–15)
BH CV ECHO MEAS - ACS: 1.3 CM
BH CV ECHO MEAS - AO MAX PG (FULL): 7.5 MMHG
BH CV ECHO MEAS - AO MAX PG: 9.4 MMHG
BH CV ECHO MEAS - AO MEAN PG (FULL): 4 MMHG
BH CV ECHO MEAS - AO MEAN PG: 5 MMHG
BH CV ECHO MEAS - AO ROOT AREA (BSA CORRECTED): 1.8
BH CV ECHO MEAS - AO ROOT AREA: 8.6 CM^2
BH CV ECHO MEAS - AO ROOT DIAM: 3.3 CM
BH CV ECHO MEAS - AO V2 MAX: 153 CM/SEC
BH CV ECHO MEAS - AO V2 MEAN: 105 CM/SEC
BH CV ECHO MEAS - AO V2 VTI: 34.6 CM
BH CV ECHO MEAS - ASC AORTA: 2.9 CM
BH CV ECHO MEAS - AVA(I,A): 1.3 CM^2
BH CV ECHO MEAS - AVA(I,D): 1.3 CM^2
BH CV ECHO MEAS - AVA(V,A): 1.3 CM^2
BH CV ECHO MEAS - AVA(V,D): 1.3 CM^2
BH CV ECHO MEAS - BSA(HAYCOCK): 1.9 M^2
BH CV ECHO MEAS - BSA: 1.9 M^2
BH CV ECHO MEAS - BZI_BMI: 23.6 KILOGRAMS/M^2
BH CV ECHO MEAS - BZI_METRIC_HEIGHT: 175.3 CM
BH CV ECHO MEAS - BZI_METRIC_WEIGHT: 72.6 KG
BH CV ECHO MEAS - EDV(CUBED): 132.7 ML
BH CV ECHO MEAS - EDV(MOD-SP2): 90 ML
BH CV ECHO MEAS - EDV(MOD-SP4): 82 ML
BH CV ECHO MEAS - EDV(TEICH): 123.8 ML
BH CV ECHO MEAS - EF(CUBED): 58.6 %
BH CV ECHO MEAS - EF(MOD-BP): 50.9 %
BH CV ECHO MEAS - EF(MOD-SP2): 51.1 %
BH CV ECHO MEAS - EF(MOD-SP4): 54.9 %
BH CV ECHO MEAS - EF(TEICH): 50 %
BH CV ECHO MEAS - ESV(CUBED): 54.9 ML
BH CV ECHO MEAS - ESV(MOD-SP2): 44 ML
BH CV ECHO MEAS - ESV(MOD-SP4): 37 ML
BH CV ECHO MEAS - ESV(TEICH): 62 ML
BH CV ECHO MEAS - FS: 25.5 %
BH CV ECHO MEAS - IVS/LVPW: 0.9
BH CV ECHO MEAS - IVSD: 0.9 CM
BH CV ECHO MEAS - LAT PEAK E' VEL: 5.3 CM/SEC
BH CV ECHO MEAS - LV DIASTOLIC VOL/BSA (35-75): 43.6 ML/M^2
BH CV ECHO MEAS - LV MASS(C)D: 175.6 GRAMS
BH CV ECHO MEAS - LV MASS(C)DI: 93.5 GRAMS/M^2
BH CV ECHO MEAS - LV MAX PG: 1.9 MMHG
BH CV ECHO MEAS - LV MEAN PG: 1 MMHG
BH CV ECHO MEAS - LV SYSTOLIC VOL/BSA (12-30): 19.7 ML/M^2
BH CV ECHO MEAS - LV V1 MAX: 68.5 CM/SEC
BH CV ECHO MEAS - LV V1 MEAN: 45.9 CM/SEC
BH CV ECHO MEAS - LV V1 VTI: 15.6 CM
BH CV ECHO MEAS - LVIDD: 5.1 CM
BH CV ECHO MEAS - LVIDS: 3.8 CM
BH CV ECHO MEAS - LVLD AP2: 7.1 CM
BH CV ECHO MEAS - LVLD AP4: 7 CM
BH CV ECHO MEAS - LVLS AP2: 5.8 CM
BH CV ECHO MEAS - LVLS AP4: 6.4 CM
BH CV ECHO MEAS - LVOT AREA (M): 2.8 CM^2
BH CV ECHO MEAS - LVOT AREA: 2.8 CM^2
BH CV ECHO MEAS - LVOT DIAM: 1.9 CM
BH CV ECHO MEAS - LVPWD: 1 CM
BH CV ECHO MEAS - MED PEAK E' VEL: 4.2 CM/SEC
BH CV ECHO MEAS - MV A DUR: 0.13 SEC
BH CV ECHO MEAS - MV A MAX VEL: 93.8 CM/SEC
BH CV ECHO MEAS - MV DEC SLOPE: 178 CM/SEC^2
BH CV ECHO MEAS - MV DEC TIME: 222 SEC
BH CV ECHO MEAS - MV E MAX VEL: 68.1 CM/SEC
BH CV ECHO MEAS - MV E/A: 0.73
BH CV ECHO MEAS - MV MAX PG: 3.2 MMHG
BH CV ECHO MEAS - MV MEAN PG: 1 MMHG
BH CV ECHO MEAS - MV P1/2T MAX VEL: 76 CM/SEC
BH CV ECHO MEAS - MV P1/2T: 125.1 MSEC
BH CV ECHO MEAS - MV V2 MAX: 89.7 CM/SEC
BH CV ECHO MEAS - MV V2 MEAN: 46.1 CM/SEC
BH CV ECHO MEAS - MV V2 VTI: 30.5 CM
BH CV ECHO MEAS - MVA P1/2T LCG: 2.9 CM^2
BH CV ECHO MEAS - MVA(P1/2T): 1.8 CM^2
BH CV ECHO MEAS - MVA(VTI): 1.5 CM^2
BH CV ECHO MEAS - RAP SYSTOLE: 3 MMHG
BH CV ECHO MEAS - SI(AO): 157.5 ML/M^2
BH CV ECHO MEAS - SI(CUBED): 41.4 ML/M^2
BH CV ECHO MEAS - SI(LVOT): 23.5 ML/M^2
BH CV ECHO MEAS - SI(MOD-SP2): 24.5 ML/M^2
BH CV ECHO MEAS - SI(MOD-SP4): 24 ML/M^2
BH CV ECHO MEAS - SI(TEICH): 32.9 ML/M^2
BH CV ECHO MEAS - SV(AO): 295.9 ML
BH CV ECHO MEAS - SV(CUBED): 77.8 ML
BH CV ECHO MEAS - SV(LVOT): 44.2 ML
BH CV ECHO MEAS - SV(MOD-SP2): 46 ML
BH CV ECHO MEAS - SV(MOD-SP4): 45 ML
BH CV ECHO MEAS - SV(TEICH): 61.9 ML
BH CV ECHO MEAS - TAPSE (>1.6): 1.7 CM
BH CV ECHO MEASUREMENTS AVERAGE E/E' RATIO: 14.34
BH CV XLRA - RV BASE: 2.8 CM
BH CV XLRA - RV LENGTH: 6 CM
BH CV XLRA - RV MID: 2.2 CM
BH CV XLRA - TDI S': 11.3 CM/SEC
BUN SERPL-MCNC: 20 MG/DL (ref 8–23)
BUN/CREAT SERPL: 14.5 (ref 7–25)
CALCIUM SPEC-SCNC: 8.7 MG/DL (ref 8.6–10.5)
CHLORIDE SERPL-SCNC: 104 MMOL/L (ref 98–107)
CO2 SERPL-SCNC: 24.5 MMOL/L (ref 22–29)
CREAT SERPL-MCNC: 1.38 MG/DL (ref 0.76–1.27)
DEPRECATED RDW RBC AUTO: 43.3 FL (ref 37–54)
ERYTHROCYTE [DISTWIDTH] IN BLOOD BY AUTOMATED COUNT: 13 % (ref 12.3–15.4)
GFR SERPL CREATININE-BSD FRML MDRD: 50 ML/MIN/1.73
GLUCOSE SERPL-MCNC: 111 MG/DL (ref 65–99)
HCT VFR BLD AUTO: 38.4 % (ref 37.5–51)
HGB BLD-MCNC: 12.9 G/DL (ref 13–17.7)
LEFT ATRIUM VOLUME INDEX: 19 ML/M2
LV EF 2D ECHO EST: 51 %
MAXIMAL PREDICTED HEART RATE: 146 BPM
MCH RBC QN AUTO: 30.9 PG (ref 26.6–33)
MCHC RBC AUTO-ENTMCNC: 33.6 G/DL (ref 31.5–35.7)
MCV RBC AUTO: 92.1 FL (ref 79–97)
PLATELET # BLD AUTO: 202 10*3/MM3 (ref 140–450)
PMV BLD AUTO: 9.4 FL (ref 6–12)
POTASSIUM SERPL-SCNC: 3.9 MMOL/L (ref 3.5–5.2)
RBC # BLD AUTO: 4.17 10*6/MM3 (ref 4.14–5.8)
SODIUM SERPL-SCNC: 138 MMOL/L (ref 136–145)
STRESS TARGET HR: 124 BPM
WBC # BLD AUTO: 6.93 10*3/MM3 (ref 3.4–10.8)

## 2021-03-01 PROCEDURE — 93306 TTE W/DOPPLER COMPLETE: CPT | Performed by: INTERNAL MEDICINE

## 2021-03-01 PROCEDURE — 99232 SBSQ HOSP IP/OBS MODERATE 35: CPT | Performed by: INTERNAL MEDICINE

## 2021-03-01 PROCEDURE — 97116 GAIT TRAINING THERAPY: CPT

## 2021-03-01 PROCEDURE — 85027 COMPLETE CBC AUTOMATED: CPT | Performed by: STUDENT IN AN ORGANIZED HEALTH CARE EDUCATION/TRAINING PROGRAM

## 2021-03-01 PROCEDURE — 99232 SBSQ HOSP IP/OBS MODERATE 35: CPT | Performed by: NURSE PRACTITIONER

## 2021-03-01 PROCEDURE — 0 IOPAMIDOL PER 1 ML: Performed by: STUDENT IN AN ORGANIZED HEALTH CARE EDUCATION/TRAINING PROGRAM

## 2021-03-01 PROCEDURE — 25010000003 CEFAZOLIN IN DEXTROSE 2-4 GM/100ML-% SOLUTION: Performed by: INTERNAL MEDICINE

## 2021-03-01 PROCEDURE — 93306 TTE W/DOPPLER COMPLETE: CPT

## 2021-03-01 PROCEDURE — 25010000002 ENOXAPARIN PER 10 MG: Performed by: INTERNAL MEDICINE

## 2021-03-01 PROCEDURE — 75635 CT ANGIO ABDOMINAL ARTERIES: CPT

## 2021-03-01 PROCEDURE — 97110 THERAPEUTIC EXERCISES: CPT

## 2021-03-01 PROCEDURE — 80048 BASIC METABOLIC PNL TOTAL CA: CPT | Performed by: STUDENT IN AN ORGANIZED HEALTH CARE EDUCATION/TRAINING PROGRAM

## 2021-03-01 RX ADMIN — ENOXAPARIN SODIUM 70 MG: 80 INJECTION SUBCUTANEOUS at 17:36

## 2021-03-01 RX ADMIN — CEFAZOLIN SODIUM 2 G: 2 INJECTION, SOLUTION INTRAVENOUS at 17:36

## 2021-03-01 RX ADMIN — BUSPIRONE HYDROCHLORIDE 5 MG: 5 TABLET ORAL at 08:24

## 2021-03-01 RX ADMIN — ESCITALOPRAM 10 MG: 10 TABLET, FILM COATED ORAL at 08:25

## 2021-03-01 RX ADMIN — CEFAZOLIN SODIUM 2 G: 2 INJECTION, SOLUTION INTRAVENOUS at 08:25

## 2021-03-01 RX ADMIN — Medication 1 TABLET: at 08:25

## 2021-03-01 RX ADMIN — GLIPIZIDE 5 MG: 5 TABLET ORAL at 08:24

## 2021-03-01 RX ADMIN — ATORVASTATIN CALCIUM 80 MG: 80 TABLET, FILM COATED ORAL at 08:24

## 2021-03-01 RX ADMIN — ENOXAPARIN SODIUM 70 MG: 80 INJECTION SUBCUTANEOUS at 06:03

## 2021-03-01 RX ADMIN — CEFAZOLIN SODIUM 2 G: 2 INJECTION, SOLUTION INTRAVENOUS at 00:16

## 2021-03-01 RX ADMIN — BUSPIRONE HYDROCHLORIDE 5 MG: 5 TABLET ORAL at 21:07

## 2021-03-01 RX ADMIN — PANTOPRAZOLE SODIUM 40 MG: 40 TABLET, DELAYED RELEASE ORAL at 06:03

## 2021-03-01 RX ADMIN — ISOSORBIDE MONONITRATE 30 MG: 30 TABLET ORAL at 21:07

## 2021-03-01 RX ADMIN — Medication 3 MG: at 21:11

## 2021-03-01 RX ADMIN — CLOPIDOGREL 75 MG: 75 TABLET, FILM COATED ORAL at 08:25

## 2021-03-01 RX ADMIN — MUPIROCIN: 20 OINTMENT TOPICAL at 08:25

## 2021-03-01 RX ADMIN — ASPIRIN 81 MG: 81 TABLET, CHEWABLE ORAL at 21:07

## 2021-03-01 RX ADMIN — IOPAMIDOL 95 ML: 755 INJECTION, SOLUTION INTRAVENOUS at 17:10

## 2021-03-01 NOTE — PLAN OF CARE
VSS, SBP STILL UP, ROOM AIR, AFIB ON MONITOR, DENIES PAIN, ANSWERED ASSESSMENT QUESTIONS APPROPRIATELY, WIFE AT BEDSIDE, NO OTHER ISSUES NOTED WILL CONTINUE TO MONITOR    Problem: Adult Inpatient Plan of Care  Goal: Plan of Care Review  Outcome: Ongoing, Progressing  Flowsheets (Taken 2/28/2021 0614 by Gabrielle Shankar, RN)  Progress: no change  Plan of Care Reviewed With: patient  Outcome Summary: vss. melatonin given at bedtime, pt rested quietly most of night. IV abx given per order. wife at bedside--education on plan of care and medication schedule given to ease spouse concerns. no falls. dsg to R foot intact. am labs.     Problem: Fall Injury Risk  Goal: Absence of Fall and Fall-Related Injury  Intervention: Identify and Manage Contributors to Fall Injury Risk  Recent Flowsheet Documentation  Taken 2/28/2021 2022 by Kendrick Banda RN  Medication Review/Management: medications reviewed  Self-Care Promotion: independence encouraged     Problem: Skin Injury Risk Increased  Goal: Skin Health and Integrity  Intervention: Optimize Skin Protection  Recent Flowsheet Documentation  Taken 3/1/2021 0232 by Kendrick Banda RN  Head of Bed (HOB): HOB elevated  Taken 3/1/2021 0008 by Kendrick Banda RN  Head of Bed (HOB): HOB elevated  Taken 2/28/2021 2211 by Kendrick Banda RN  Head of Bed (HOB): HOB elevated  Taken 2/28/2021 2022 by Kendrick Banda RN  Pressure Reduction Techniques:   frequent weight shift encouraged   weight shift assistance provided  Head of Bed (HOB): HOB elevated  Pressure Reduction Devices:   alternating pressure pump (ADD)   pressure-redistributing mattress utilized   positioning supports utilized  Skin Protection:   adhesive use limited   incontinence pads utilized   tubing/devices free from skin contact   Goal Outcome Evaluation:

## 2021-03-01 NOTE — PROGRESS NOTES
LOS: 3 days     Chief Complaint: Right foot infection     Interval History: Afebrile, no new complaints.  Right foot continues to improve.  Denies any abdominal pain nausea vomiting or diarrhea.  No rashes    Vital Signs  Temp:  [97.4 °F (36.3 °C)-98.7 °F (37.1 °C)] 98.7 °F (37.1 °C)  Heart Rate:  [55-79] 55  Resp:  [16] 16  BP: (130-188)/(72-88) 154/72    Physical Exam:  General: In no acute distress  Cardiovascular: RRR, no LE edema  Respiratory: Breathing comfortably on room air  GI: Soft, NT/ND, + bowel sounds bilaterally  Skin: No rashes   Extremities: R foot with resolved swelling, decreasing erythema    Antibiotics:  Cefazolin 2 g IV every 8 hours     Results Review:    Lab Results   Component Value Date    WBC 6.93 03/01/2021    HGB 12.9 (L) 03/01/2021    HCT 38.4 03/01/2021    MCV 92.1 03/01/2021     03/01/2021     Lab Results   Component Value Date    GLUCOSE 111 (H) 03/01/2021    BUN 20 03/01/2021    CREATININE 1.38 (H) 03/01/2021    EGFRIFNONA 50 (L) 03/01/2021    BCR 14.5 03/01/2021    CO2 24.5 03/01/2021    CALCIUM 8.7 03/01/2021    ALBUMIN 3.90 02/26/2021    AST 20 02/26/2021    ALT 23 02/26/2021       Microbiology:  2/26 BCx NGTD x 2  2/26 COVID neg    Dopplers right JODI moderately reduced, severe digital ischemia, left JODI is moderately reduced moderate digital ischemia    Assessment/Plan   L foot infected wound with cellulitis  Type II DM  PVD complicating above  Diarrhea    Continue cefazolin 2 g IV every 8 hours in the hospital.  At discharge can transition to Keflex 500 mg p.o every 6 hours x6 more days to complete a total 10 days of antibiotics    ID will sign off.  Please do not hesitate to call us with further questions or concerns

## 2021-03-01 NOTE — PROGRESS NOTES
Discharge Planning Assessment  [facility]     Patient Name: Erick Bruno  MRN: [id number]  Today's Date: 3/1/2021    Admit Date: 2/26/2021    Discharge Needs Assessment     Row Name 03/01/21 1454       Living Environment    Lives With  spouse    Name(s) of Who Lives With Patient  Wife Marcelle    Current Living Arrangements  home/apartment/condo    Primary Care Provided by  self    Provides Primary Care For  no one, unable/limited ability to care for self    Family Caregiver if Needed  spouse    Family Caregiver Names  Wife Marcelle can assist    Quality of Family Relationships  helpful;involved;supportive    Able to Return to Prior Arrangements  yes       Resource/Environmental Concerns    Resource/Environmental Concerns  none    Transportation Concerns  car, none       Transition Planning    Patient/Family Anticipates Transition to  home with family    Patient/Family Anticipated Services at Transition  none    Transportation Anticipated  family or friend will provide       Discharge Needs Assessment    Readmission Within the Last 30 Days  no previous admission in last 30 days    Equipment Currently Used at Home  cane, quad;glucometer;shower chair;cpap;walker, rolling;wheelchair, motorized;other (see comments) transport WC    Concerns to be Addressed  denies needs/concerns at this time    Anticipated Changes Related to Illness  none    Equipment Needed After Discharge  none        Discharge Plan     Row Name 03/01/21 1453       Plan    Plan  Home with spouse, denies needs at this time    Patient/Family in Agreement with Plan  yes    Plan Comments  Met with patient and spouse at the bedside. Explained CCP role and verified face sheet. Patient lives at home with his wife Marcelle. There are approx 8 steps to enter the home. Patient has a quad cane, glucometer, shower chair, cpap, rolling walker, transport chair, and motorized scooter that is not currently working. Patient is current with [facility] in [place] and  will be seeing Dr. Franco at [facility] and has an appointment on March 18th. Patient has no previous history with HH or SNF. Currently patient is planning to return home at [place] and his wife will provide transportation. CCP will follow for needs. Camilla Mi RN        Continued Care and Services - Admitted Since 2/26/2021    Coordination has not been started for this encounter.         Demographic Summary     Row Name 03/01/21 1448       General Information    Admission Type  inpatient    Arrived From  emergency department    Referral Source  admission list    Reason for Consult  discharge planning    Preferred Language  English       Contact Information    Permission Granted to Share Info With  family/designee        Functional Status     Row Name 03/01/21 1449       Functional Status    Usual Activity Tolerance  good    Current Activity Tolerance  good       Functional Status, IADL    Medications  independent    Meal Preparation  independent    Housekeeping  independent    Laundry  independent    Shopping  independent       Mental Status    General Appearance WDL  WDL       Mental Status Summary    Recent Changes in Mental Status/Cognitive Functioning  no changes        Psychosocial     Row Name 03/01/21 1450       Behavior WDL    Behavior WDL  WDL       Emotion Mood WDL    Emotion/Mood/Affect WDL  WDL       Speech WDL    Speech WDL  WDL       Perceptual State WDL    Perceptual State WDL  WDL       Thought Process WDL    Thought Process WDL  WDL       Intellectual Performance WDL    Intellectual Performance WDL  WDL    Level of Consciousness  Alert       Coping/Stress    Major Change/Loss/Stressor  none    Patient Personal Strengths  able to adapt    Sources of Support  spouse    Reaction to Health Status  accepting    Understanding of Condition and Treatment  partial understanding of medical condition;partial understanding of treatment       Developmental Stage (Luis's)    Developmental Stage  Stage 8 (86  years-death/Late Adulthood) Integrity vs. Despair        Abuse/Neglect     Row Name 03/01/21 1918       Personal Safety    Feels Unsafe at Home or Work/School  no    Feels Threatened by Someone  no    Does Anyone Try to Keep You From Having Contact with Others or Doing Things Outside Your Home?  no    Physical Signs of Abuse Present  no        Legal    No documentation.       Substance Abuse    No documentation.       Patient Forms    No documentation.           Camilla Mi RN

## 2021-03-01 NOTE — PROGRESS NOTES
Name: Erick Bruno ADMIT: 2021   : 1945  PCP: System, Provider Not In    MRN: [id number] LOS: 3 days   AGE/SEX: 75 y.o. male  ROOM: [facility]     Subjective   Subjective   He was noted to have atrial fibrillation on telemetry, this was confirmed by EKG.  Cardiology was consulted and he was started on full dose Lovenox for anticoagulation.  Otherwise, per his wife's insistence, his inpatient diabetes medication regimen was changed from insulin to glipizide, as he takes glimepiride at home.    Review of Systems   Constitutional: Negative.    Respiratory: Negative.    Cardiovascular: Negative.    Gastrointestinal: Negative.         Objective   Objective   Vital Signs  Temp:  [97.4 °F (36.3 °C)-98.3 °F (36.8 °C)] 98.3 °F (36.8 °C)  Heart Rate:  [57-79] 68  Resp:  [16] 16  BP: (130-188)/(72-88) 148/78  SpO2:  [92 %-97 %] 96 %  on   ;   Device (Oxygen Therapy): room air  Body mass index is 23.67 kg/m².  Physical Exam  Constitutional:       Appearance: Normal appearance.   Cardiovascular:      Rate and Rhythm: Normal rate and regular rhythm.   Pulmonary:      Effort: Pulmonary effort is normal.      Breath sounds: Normal breath sounds.   Abdominal:      General: There is no distension.      Palpations: Abdomen is soft.      Tenderness: There is no abdominal tenderness.   Musculoskeletal:      Comments: Dorsum of right foot has approximately 1.5 cm x 1.5 cm ulcerated lesion with fibrinous scab with surrounding erythema.  Patient has no sensation on his lower extremities   Neurological:      Mental Status: He is alert and oriented to person, place, and time. Mental status is at baseline.         Results Review     I reviewed the patient's new clinical results.  Results from last 7 days   Lab Units 21  0632 21  0440 21  0639 21  1659   WBC 10*3/mm3 6.93 6.84 6.32 8.13   HEMOGLOBIN g/dL 12.9* 12.1* 12.3* 12.2*   PLATELETS 10*3/mm3 202 181 184 187     Results from last 7 days   Lab Units  03/01/21  0554 02/28/21  0440 02/27/21  0639 02/26/21  1659   SODIUM mmol/L 138 138 138 137   POTASSIUM mmol/L 3.9 3.7 4.0 3.9   CHLORIDE mmol/L 104 105 104 103   CO2 mmol/L 24.5 23.7 25.7 25.9   BUN mg/dL 20 18 19 21   CREATININE mg/dL 1.38* 1.11 1.16 1.25   GLUCOSE mg/dL 111* 111* 100* 108*   Estimated Creatinine Clearance: 48.3 mL/min (A) (by C-G formula based on SCr of 1.38 mg/dL (H)).  Results from last 7 days   Lab Units 02/26/21  1659   ALBUMIN g/dL 3.90   BILIRUBIN mg/dL 0.5   ALK PHOS U/L 100   AST (SGOT) U/L 20   ALT (SGPT) U/L 23     Results from last 7 days   Lab Units 03/01/21  0554 02/28/21  0440 02/27/21  0639 02/26/21  1659   CALCIUM mg/dL 8.7 8.2* 8.3* 8.5*   ALBUMIN g/dL  --   --   --  3.90       COVID19   Date Value Ref Range Status   02/26/2021 Not Detected Not Detected - Ref. Range Final     SARS-CoV-2 PCR   Date Value Ref Range Status   01/13/2021 Not Detected Not Detected Final     Comment:     Nucleic acid specific to SARS-CoV-2 (COVID-19) virus was not detected in  this sample by the TaqPath (TM) COVID-19 Combo Kit.          SARS-CoV-2 (COVID-19) nucleic acid testing performed using [employer] Aptima (R) SARS-CoV-2 Assay or [employer] TaqPath (TM)  COVID-19 Combo Kit as indicated above under Emergency Use Authorization (EUA) from the FDA. Aptima (R) and TaqPath (TM) test performance  characteristics verified by [facility] in accordance with the FDAs Guidance Document (Policy for Diagnostic Tests for Coronavirus Disease-2019  during the Public Health Emergency) issued on March 16, 2020. The laboratory is regulated under CLIA as qualified to perform high-complexity testing  Unless otherwise noted, all testing was performed at [facility], CLIA No. [id number], [place] State Licensee No. [id number]     Hemoglobin A1C   Date/Time Value Ref Range Status   02/27/2021 0639 6.10 (H) 4.80 - 5.60 % Final     Glucose   Date/Time Value Ref Range Status   02/28/2021 1110 123 70 - 130 mg/dL Final    02/28/2021 0729 116 70 - 130 mg/dL Final   02/27/2021 1640 177 (H) 70 - 130 mg/dL Final   02/27/2021 1506 105 70 - 130 mg/dL Final   02/27/2021 1112 201 (H) 70 - 130 mg/dL Final   02/27/2021 0716 104 70 - 130 mg/dL Final   02/26/2021 2113 164 (H) 70 - 130 mg/dL Final       Duplex Vein Mapping Lower Extremity - Bilateral CAR  · No adequate greater or small saphenous veins bilaterally.     Duplex Carotid Ultrasound CAR  · Right carotid stent imaging indicates patent flow and no stenosis.  · Proximal left internal carotid artery moderate stenosis.       Scheduled Medications  aspirin, 81 mg, Oral, Nightly  atorvastatin, 80 mg, Oral, Daily  busPIRone, 5 mg, Oral, BID  ceFAZolin, 2 g, Intravenous, Q8H  clopidogrel, 75 mg, Oral, Daily  enoxaparin, 1 mg/kg, Subcutaneous, Q12H  escitalopram, 10 mg, Oral, Daily  glipizide, 5 mg, Oral, QAM AC  isosorbide mononitrate, 30 mg, Oral, Nightly  multivitamin with minerals, 1 tablet, Oral, Daily  mupirocin, , Topical, Daily  pantoprazole, 40 mg, Oral, QAM    Infusions   Diet  Diet Regular; Cardiac, Consistent Carbohydrate, Renal       Assessment/Plan     Active Hospital Problems    Diagnosis  POA   • Cellulitis of right foot [L03.115]  Yes   • CAD (coronary artery disease) [I25.10]  Yes   • PVD (peripheral vascular disease) (CMS/[facility]) [I73.9]  Yes   • Diabetes mellitus (CMS/[facility]) [E11.9]  Yes      Resolved Hospital Problems   No resolved problems to display.       75 y.o. male admitted with <principal problem not specified>.    75-year-old male with past medical history of coronary artery disease,  diabetes complicated by peripheral vascular disease and neuropathy presented to the ER with a right foot abrasion and cellulitis after experiencing a fall.    1.  Right lower extremity cellulitis  ID consult noted  Patient is currently on IV ancef 2g q8h with plan to transition to PO, duration of therapy 7-10 days    2.  Type 2 diabetes  A1c 6.1  Takes p.o. with glimipride at home, holding  on admission as no alternative avaliable  Was initially covered sliding scale while admitted, but was transitioned to glipizide per wife's insistence.    3. Peripheral vascular disease  Vascular surgery consulted, appreciate recommendations  By duplex vein mapping of lower extremities, the veins were of inadequate size in both legs.    Patient to undergo CT angiogram of the leg for assessment of blood flow  Continue aspirin and Plavix.    4.  Coronary artery disease  Continue aspirin, plavix, Imdur, atorvastatin    5.  Paroxysmal atrial fibrillation  To have atrial fibrillation on telemetry, confirmed with EKG  Cardiology consulted, started on full dose Lovenox for anticoagulation  Further recommendations per cardiology for work-up.  We will need long-term anticoagulation at home.      6.  Carotid artery stenosis  He had a right carotid stent in 2020  Vascular surgery following, they were planning to check carotid duplex to assess stent placement.  Continue  aspirin Plavix    Lovenox 40 mg SC daily for DVT prophylaxis.  Full code.  Discussed with patient.  Anticipate discharge home in 1-2 days.      Lewis Espinoza MD  [facility]  03/01/21  09:24 EST    Patient was wearing facemask when I entered the room and throughout our encounter.  I wore protective equipment throughout this patient encounter including a face mask, gloves and protective eyewear.  Hand hygiene was performed before donning protective equipment and after removal when leaving the room.

## 2021-03-01 NOTE — PLAN OF CARE
New onset A-fib today.  Cardiology consulted and echo/lovenox ordered.  Glipizide restarted by Dr. Espinoza.  No c/o pain.  CTM and progress towards goals as tolerated.        Goal Outcome Evaluation:

## 2021-03-01 NOTE — PLAN OF CARE
Goal Outcome Evaluation:        Outcome Summary: Up with PT today. Ambulatory in room with assist x1, gait belt and walker. Complaints of numbness to bilateral feet. Dressing change to right anterior foot. Discoloration noted to left foot. Wife at bedside. Tolerating diet. No complaints.

## 2021-03-01 NOTE — PLAN OF CARE
Goal Outcome Evaluation:  Plan of Care Reviewed With: patient  Progress: improving  Outcome Summary: pt is improving as evidence by increased ambulation distance to 400ft with CGA. pt performed 5 x STS for functional strength and balance with no LOB noted today. pt performed balance exercises in standing and req'd FWW for support. continue to progress pt as tolerated.  ..Patient was wearing a face mask during this therapy encounter. Therapist used appropriate personal protective equipment including eye protection, mask, and gloves.  Mask used was standard procedure mask. Appropriate PPE was worn during the entire therapy session. Hand hygiene was completed before and after therapy session. Patient is not in enhanced droplet precautions.

## 2021-03-01 NOTE — PROGRESS NOTES
Name: Erick Bruno ADMIT: 2021   : 1945  PCP: System, Provider Not In    MRN: [id number] LOS: 3 days   AGE/SEX: 75 y.o. male  ROOM:  [facility] P580/1     CC: Foot ulcer, PAD, cellulitis  Interval History: Patient had vein mapping and carotid duplex yesterday.  CT angiogram not yet performed.  Subjective   Review of Systems  Objective     Vital Signs  Temp:  [97 °F (36.1 °C)-98.3 °F (36.8 °C)] 98.3 °F (36.8 °C)  Heart Rate:  [57-79] 68  Resp:  [16] 16  BP: (130-188)/(66-88) 148/78    No intake/output data recorded.    Physical Exam  Vascular:         Data Reviewed:  CBC    Results from last 7 days   Lab Units 21  0632 21  0440 21  0639 21  1659   WBC 10*3/mm3 6.93 6.84 6.32 8.13   HEMOGLOBIN g/dL 12.9* 12.1* 12.3* 12.2*   PLATELETS 10*3/mm3 202 181 184 187     BMP   Results from last 7 days   Lab Units 21  0554 21  0440 21  0639 21  1659   SODIUM mmol/L 138 138 138 137   POTASSIUM mmol/L 3.9 3.7 4.0 3.9   CHLORIDE mmol/L 104 105 104 103   CO2 mmol/L 24.5 23.7 25.7 25.9   BUN mg/dL 20 18 19 21   CREATININE mg/dL 1.38* 1.11 1.16 1.25   GLUCOSE mg/dL 111* 111* 100* 108*     Infection   Results from last 7 days   Lab Units 21  1924   BLOODCX  No growth at 2 days  No growth at 2 days     Radiology(recent) No radiology results for the last day    Imaging Reviewed: Vein mapping shows an adequate vein in the right leg.  Carotid duplex scan shows patent stent with no in-stent restenosis    Active Hospital Problems    Diagnosis  POA   • Cellulitis of right foot [L03.115]  Yes   • CAD (coronary artery disease) [I25.10]  Yes   • PVD (peripheral vascular disease) (CMS/HCC) [I73.9]  Yes   • Diabetes mellitus (CMS/[facility]) [E11.9]  Yes      Resolved Hospital Problems   No resolved problems to display.      Assessment/Plan   Billing: None    Impression/Plan:  PAD with right foot ulcer: This occurred via trauma and is relatively superficial.  Patient has a stable JODI  of 0.8 with a toe brachial index is reduced at 0.28 with an absolute toe pressure of 50.  While he may have adequate perfusion to heal this in the absence of infection and with ideal wound care, CT angiogram has been ordered and would expect significant findings.    Status post carotid stent: Carotid duplex scan shows no evidence of residual stenosis.  Continue dual antiplatelet therapy and statin.    I will see patient after results of CT angiogram have been obtained in order to determine appropriate next step forward.    Jayden Marie MD  03/01/21  07:02 EST  Office Number [phone]

## 2021-03-01 NOTE — THERAPY TREATMENT NOTE
Patient Name: Erick Bruno  : 1945    MRN: [id number]                              Today's Date: 3/1/2021       Admit Date: 2021    Visit Dx:     ICD-10-CM ICD-9-CM   1. Cellulitis of right foot  L03.115 682.7   2. PAD (peripheral artery disease) (CMS/HCC)  I73.9 443.9     Patient Active Problem List   Diagnosis   • Toe ulcer (CMS/HCC)   • Sleep apnea   • Seizure (CMS/HCC)   • PVD (peripheral vascular disease) (CMS/HCC)   • Peripheral arterial disease (CMS/HCC)   • Macular degeneration   • Hyperlipidemia   • Enlarged prostate   • Diabetes mellitus (CMS/HCC)   • Colon polyps   • Arthritis   • Anxiety   • Acid reflux   • Hx of colonic polyps   • Abnormal nuclear stress test   • CAD (coronary artery disease)   • Carotid stenosis, asymptomatic, right   • Cellulitis of right foot     Past Medical History:   Diagnosis Date   • Abnormal nuclear stress test 2018   • Acid reflux    • Anemia    • Anxiety    • Arthritis    • CAD (coronary artery disease)    • CAD (coronary artery disease)    • Carotid artery disease (CMS/HCC)    • Chest discomfort     both typical and atypical   • Dementia (CMS/HCC)    • Diabetes mellitus (CMS/HCC)    • Enlarged prostate    • History of hepatitis A    • History of seizure 2013    S/P TEMPERATURE   • Hyperlipidemia    • Hypertension    • Macular degeneration    • NSTEMI (non-ST elevated myocardial infarction) (CMS/HCC)    • Panarteritis (CMS/HCC)    • Peripheral arterial disease (CMS/HCC)    • Poor balance    • Precordial pain    • 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   • PVD (peripheral vascular disease) (CMS/HCC)    • Sleep apnea     DOES NOT WEAR CPAP   • SOBOE (shortness of breath on exertion)      Past Surgical History:   Procedure Laterality Date   • APPENDECTOMY     • ATHRECTOMY ILIAC, FEMORAL, TIBIAL ARTERY N/A 10/17/2018    Procedure: AIF ARTERIOGRAM, LEFT SFA  ANGIOPLASTY;  Surgeon: Jayden Marie MD;  Location: [facility];  Service: Vascular   •  CHOLECYSTECTOMY     • CHOLECYSTECTOMY WITH INTRAOPERATIVE CHOLANGIOGRAM N/A 5/9/2018    Procedure: CHOLECYSTECTOMY LAPAROSCOPIC INTRAOPERATIVE CHOLANGIOGRAM;  Surgeon: Daniel Gonzalez MD;  Location: [facility] MAIN OR;  Service: General   • COLONOSCOPY     • COLONOSCOPY N/A 5/8/2018    Procedure: COLONOSCOPY to cecum with polypectomies;  Surgeon: Daniel Gonzalez MD;  Location: [facility] ENDOSCOPY;  Service: Gastroenterology   • ENDOSCOPY N/A 5/8/2018    Procedure: ESOPHAGOGASTRODUODENOSCOPY with biopsies;  Surgeon: Daniel Gonzalez MD;  Location: [facility] ENDOSCOPY;  Service: Gastroenterology   • FEMORAL ARTERY STENT Left     on 3/22/16     General Information     Row Name 03/01/21 1124          Physical Therapy Time and Intention    Document Type  therapy note (daily note)  -     Mode of Treatment  individual therapy;physical therapy  -     Row Name 03/01/21 1124          General Information    Existing Precautions/Restrictions  fall  -     Row Name 03/01/21 1124          Cognition    Orientation Status (Cognition)  oriented x 3  -     Row Name 03/01/21 1124          Safety Issues, Functional Mobility    Impairments Affecting Function (Mobility)  endurance/activity tolerance;strength  -       User Key  (r) = Recorded By, (t) = Taken By, (c) = Cosigned By    Initials Name Provider Type     Radha Oliveira PTA Physical Therapy Assistant        Mobility     Row Name 03/01/21 1125          Bed Mobility    Bed Mobility  sit-supine  -     Supine-Sit [place] (Bed Mobility)  modified independence  -     Sit-Supine [place] (Bed Mobility)  modified independence  -     Row Name 03/01/21 1125          Transfers    Comment (Transfers)  5 x STS for functional training and balance  -     Row Name 03/01/21 1125          Sit-Stand Transfer    Sit-Stand [place] (Transfers)  contact guard;standby assist  -     Assistive Device (Sit-Stand Transfers)  walker, front-wheeled  -     Row Name 03/01/21 1125           Gait/Stairs (Locomotion)    [place] Level (Gait)  contact guard  -     Assistive Device (Gait)  walker, front-wheeled  -     Distance in Feet (Gait)  400ft  -     Deviations/Abnormal Patterns (Gait)  gait speed decreased;stride length decreased  -     [place] Level (Stairs)  not tested  -       User Key  (r) = Recorded By, (t) = Taken By, (c) = Cosigned By    Initials Name Provider Type     Radha Oliveira PTA Physical Therapy Assistant        Obj/Interventions     Row Name 03/01/21 1127          Motor Skills    Therapeutic Exercise  -- STS x5, stanidng marches x7-10, heel raises x10, SLS x 30s/ea  -     Row Name 03/01/21 1127          Balance    Dynamic Standing Balance  mild impairment;supported supported with walker  -       User Key  (r) = Recorded By, (t) = Taken By, (c) = Cosigned By    Initials Name Provider Type     Radha Oliveira PTA Physical Therapy Assistant        Goals/Plan    No documentation.       Clinical Impression     Row Name 03/01/21 1129          Plan of Care Review    Plan of Care Reviewed With  patient  -     Progress  improving  -     Outcome Summary  pt is improving as evidence by increased ambulation distance to 400ft with CGA. pt performed 5 x STS for functional strength and balance with no LOB noted today. pt performed balance exercises in standing and req'd FWW for support. continue to progress pt as tolerated.  -     Row Name 03/01/21 1129          Positioning and Restraints    Pre-Treatment Position  in bed  -     Post Treatment Position  bed  -     In Bed  supine;call light within reach;encouraged to call for assist;exit alarm on HOB elevated  -       User Key  (r) = Recorded By, (t) = Taken By, (c) = Cosigned By    Initials Name Provider Type     Radha Oliveira PTA Physical Therapy Assistant        Outcome Measures     Row Name 03/01/21 1132          How much help from another person do you currently need...    Turning from your back to  your side while in flat bed without using bedrails?  4  -EH     Moving from lying on back to sitting on the side of a flat bed without bedrails?  4  -EH     Moving to and from a bed to a chair (including a wheelchair)?  3  -EH     Standing up from a chair using your arms (e.g., wheelchair, bedside chair)?  3  -EH     Climbing 3-5 steps with a railing?  3  -EH     To walk in hospital room?  3  -EH     AM-PAC 6 Clicks Score (PT)  20  -       User Key  (r) = Recorded By, (t) = Taken By, (c) = Cosigned By    Initials Name Provider Type     Radha Oliveira PTA Physical Therapy Assistant        Physical Therapy Education                 Title: PT OT SLP Therapies (Done)     Topic: Physical Therapy (Done)     Point: Mobility training (Done)     Learning Progress Summary           Patient Acceptance, E,D, VU,DU,NR by  at 3/1/2021 1132    Acceptance, E, VU,NR by  at 2/27/2021 1144    Comment: Safety with mobility, pt education, use of call light                   Point: Body mechanics (Done)     Learning Progress Summary           Patient Acceptance, E,D, VU,DU,NR by  at 3/1/2021 1132    Acceptance, E, VU,NR by  at 2/27/2021 1144    Comment: Safety with mobility, pt education, use of call light                   Point: Precautions (Done)     Learning Progress Summary           Patient Acceptance, E,D, VU,DU,NR by  at 3/1/2021 1132    Acceptance, E, VU,NR by  at 2/27/2021 1144    Comment: Safety with mobility, pt education, use of call light                               User Key     Initials Effective Dates Name Provider Type [facility] 08/19/18 -  Radha Oliveira PTA Physical Therapy Assistant PT     09/17/19 -  Kim Crockett PT Physical Therapist PT              PT Recommendation and Plan     Plan of Care Reviewed With: patient  Progress: improving  Outcome Summary: pt is improving as evidence by increased ambulation distance to 400ft with CGA. pt performed 5 x STS for functional strength and  balance with no LOB noted today. pt performed balance exercises in standing and req'd FWW for support. continue to progress pt as tolerated.     Time Calculation:   PT Charges     Row Name 03/01/21 1133             Time Calculation    Start Time  0905  -      Stop Time  0933  -      Time Calculation (min)  28 min  -      PT Received On  03/01/21  -      PT - Next Appointment  03/03/21  -         Time Calculation- PT    Total Timed Code Minutes- PT  28 minute(s)  -        User Key  (r) = Recorded By, (t) = Taken By, (c) = Cosigned By    Initials Name Provider Type     Radha Oliveira PTA Physical Therapy Assistant        Therapy Charges for Today     Code Description Service Date Service Provider Modifiers Qty    73500328904 HC GAIT TRAINING EA 15 MIN 3/1/2021 Radha Oliveira PTA GP 1    61611313771 HC PT THER PROC EA 15 MIN 3/1/2021 Radha Oliveira PTA GP 1          PT G-Codes  Outcome Measure Options: AM-PAC 6 Clicks Basic Mobility (PT)  AM-PAC 6 Clicks Score (PT): 20    Radha Oliveira PTA  3/1/2021

## 2021-03-02 LAB
GLUCOSE BLDC GLUCOMTR-MCNC: 131 MG/DL (ref 70–130)
GLUCOSE BLDC GLUCOMTR-MCNC: 160 MG/DL (ref 70–130)

## 2021-03-02 PROCEDURE — 25010000002 ENOXAPARIN PER 10 MG: Performed by: INTERNAL MEDICINE

## 2021-03-02 PROCEDURE — 82962 GLUCOSE BLOOD TEST: CPT

## 2021-03-02 PROCEDURE — 99232 SBSQ HOSP IP/OBS MODERATE 35: CPT | Performed by: NURSE PRACTITIONER

## 2021-03-02 PROCEDURE — 25010000003 CEFAZOLIN IN DEXTROSE 2-4 GM/100ML-% SOLUTION: Performed by: INTERNAL MEDICINE

## 2021-03-02 RX ADMIN — CEFAZOLIN SODIUM 2 G: 2 INJECTION, SOLUTION INTRAVENOUS at 15:45

## 2021-03-02 RX ADMIN — ENOXAPARIN SODIUM 70 MG: 80 INJECTION SUBCUTANEOUS at 06:22

## 2021-03-02 RX ADMIN — ISOSORBIDE MONONITRATE 30 MG: 30 TABLET ORAL at 21:40

## 2021-03-02 RX ADMIN — BUSPIRONE HYDROCHLORIDE 5 MG: 5 TABLET ORAL at 21:40

## 2021-03-02 RX ADMIN — MUPIROCIN: 20 OINTMENT TOPICAL at 09:11

## 2021-03-02 RX ADMIN — CEFAZOLIN SODIUM 2 G: 2 INJECTION, SOLUTION INTRAVENOUS at 00:57

## 2021-03-02 RX ADMIN — ASPIRIN 81 MG: 81 TABLET, CHEWABLE ORAL at 21:40

## 2021-03-02 RX ADMIN — SODIUM CHLORIDE, PRESERVATIVE FREE 10 ML: 5 INJECTION INTRAVENOUS at 09:11

## 2021-03-02 RX ADMIN — GLIPIZIDE 5 MG: 5 TABLET ORAL at 09:10

## 2021-03-02 RX ADMIN — APIXABAN 5 MG: 5 TABLET, FILM COATED ORAL at 15:44

## 2021-03-02 RX ADMIN — APIXABAN 5 MG: 5 TABLET, FILM COATED ORAL at 21:40

## 2021-03-02 RX ADMIN — ATORVASTATIN CALCIUM 80 MG: 80 TABLET, FILM COATED ORAL at 09:10

## 2021-03-02 RX ADMIN — Medication 1 TABLET: at 09:10

## 2021-03-02 RX ADMIN — CLOPIDOGREL 75 MG: 75 TABLET, FILM COATED ORAL at 09:10

## 2021-03-02 RX ADMIN — BUSPIRONE HYDROCHLORIDE 5 MG: 5 TABLET ORAL at 09:10

## 2021-03-02 RX ADMIN — PANTOPRAZOLE SODIUM 40 MG: 40 TABLET, DELAYED RELEASE ORAL at 06:22

## 2021-03-02 RX ADMIN — ESCITALOPRAM 10 MG: 10 TABLET, FILM COATED ORAL at 09:10

## 2021-03-02 RX ADMIN — Medication 3 MG: at 21:40

## 2021-03-02 RX ADMIN — CEFAZOLIN SODIUM 2 G: 2 INJECTION, SOLUTION INTRAVENOUS at 09:10

## 2021-03-02 NOTE — PROGRESS NOTES
"    Patient Name: Erick Bruno  :1945  75 y.o.      Patient Care Team:  Richie Franco MD as PCP - General (Internal Medicine)  Michael Wyman III, MD as Consulting Physician (Cardiology)  Jayden Marie MD as Consulting Physician (Vascular Surgery)  Segundo Cunningham OD as Consulting Physician (Ophthalmology)  Daniel Gonzalez MD as Surgeon (General Surgery)  Mayank Guillen DO as Consulting Physician (Neurology)    Chief Complaint: follow up peripheral vascular disease, new AF     Interval History: in AF, HR well controlled. He foot numbness. No pain. Denies chest pain or shortness of breath.        Objective   Vital Signs  Temp:  [97.4 °F (36.3 °C)-98.7 °F (37.1 °C)] 98.6 °F (37 °C)  Heart Rate:  [55-79] 69  Resp:  [16] 16  BP: (148-189)/(72-93) 151/83    Intake/Output Summary (Last 24 hours) at 3/1/2021 2000  Last data filed at 3/1/2021 1903  Gross per 24 hour   Intake --   Output 1800 ml   Net -1800 ml     Flowsheet Rows      First Filed Value   Admission Height  175.3 cm (69\") Documented at 2021 1610   Admission Weight  72.6 kg (160 lb) Documented at 2021 1657          Physical Exam:   General Appearance:    Alert, cooperative, in no acute distress   Lungs:     Clear to auscultation.  Normal respiratory effort and rate.      Heart:    Regular rhythm and normal rate, normal S1 and S2, no murmurs, gallops or rubs.     Chest Wall:    No abnormalities observed   Abdomen:     Soft, nontender, positive bowel sounds.     Extremities:   no cyanosis, clubbing or edema.  No marked joint deformities.  Adequate musculoskeletal strength.       Results Review:    Results from last 7 days   Lab Units 21  0554   SODIUM mmol/L 138   POTASSIUM mmol/L 3.9   CHLORIDE mmol/L 104   CO2 mmol/L 24.5   BUN mg/dL 20   CREATININE mg/dL 1.38*   GLUCOSE mg/dL 111*   CALCIUM mg/dL 8.7         Results from last 7 days   Lab Units 21  0632   WBC 10*3/mm3 6.93   HEMOGLOBIN g/dL 12.9* "   HEMATOCRIT % 38.4   PLATELETS 10*3/mm3 202                           Medication Review:   aspirin, 81 mg, Oral, Nightly  atorvastatin, 80 mg, Oral, Daily  busPIRone, 5 mg, Oral, BID  ceFAZolin, 2 g, Intravenous, Q8H  clopidogrel, 75 mg, Oral, Daily  enoxaparin, 1 mg/kg, Subcutaneous, Q12H  escitalopram, 10 mg, Oral, Daily  glipizide, 5 mg, Oral, QAM AC  isosorbide mononitrate, 30 mg, Oral, Nightly  multivitamin with minerals, 1 tablet, Oral, Daily  mupirocin, , Topical, Daily  pantoprazole, 40 mg, Oral, QAM              Assessment/Plan   1. PAD with right foot ulcer - vascular surgery evaluation underway.   2. Carotid artery disease with recent TVAR  3. Presumed coronary artery disease with recent abnormal stress. Small to medium sized, mildly severe area if ischemia in the inferior wall. Continue medical therapy. May need coronary angiography at some point but nothing urgent.   4. New atrial fibrillation - He has a CHADs 2 Vasc score of 4. He is on therapeutic lovenox. If no vascular intervention needed, would start apixaban 5 mg BID and drop aspirin (if ok with vascular surgery - with recent carotid stent may need triple therapy per their discretion).   5. Hypertension   6. Echo with normal LV function.     DEBORAH Truong  [facility]  03/01/21  20:00 EST

## 2021-03-02 NOTE — PROGRESS NOTES
Name: Erick Bruno ADMIT: 2021   : 1945  PCP: Richie Franco MD    MRN: [id number] LOS: 4 days   AGE/SEX: 75 y.o. male  ROOM:  [facility] P580/1     CC: PAD and right dorsal foot ulcer  Interval History: Overall doing well.  He thinks his foot is markedly improved.  Subjective   Review of Systems   Cardiovascular: Negative for chest pain.     Objective     Vital Signs  Temp:  [97.2 °F (36.2 °C)-98.6 °F (37 °C)] 98.4 °F (36.9 °C)  Heart Rate:  [61-79] 61  Resp:  [16-17] 16  BP: (136-189)/(75-93) 153/75    I/O this shift:  In: 480 [P.O.:480]  Out: 100 [Urine:100]    Physical Exam  Vitals signs reviewed.   Constitutional:       Appearance: He is well-developed.   Eyes:      Conjunctiva/sclera: Conjunctivae normal.   Cardiovascular:      Rate and Rhythm: Normal rate.   Pulmonary:      Effort: Pulmonary effort is normal.   Abdominal:      Palpations: Abdomen is soft.      Tenderness: There is no abdominal tenderness.   Neurological:      Mental Status: He is alert and oriented to person, place, and time.     Vascular: Markedly decreased erythema.  Some proteinaceous exudate and slough over the wound.    Data Reviewed:  CBC    Results from last 7 days   Lab Units 21  0632 21  0440 21  0639 21  1659   WBC 10*3/mm3 6.93 6.84 6.32 8.13   HEMOGLOBIN g/dL 12.9* 12.1* 12.3* 12.2*   PLATELETS 10*3/mm3 202 181 184 187     BMP   Results from last 7 days   Lab Units 21  0554 21  0440 21  0639 21  1659   SODIUM mmol/L 138 138 138 137   POTASSIUM mmol/L 3.9 3.7 4.0 3.9   CHLORIDE mmol/L 104 105 104 103   CO2 mmol/L 24.5 23.7 25.7 25.9   BUN mg/dL 20 18 19 21   CREATININE mg/dL 1.38* 1.11 1.16 1.25   GLUCOSE mg/dL 111* 111* 100* 108*     HbA1C   Lab Results   Component Value Date    HGBA1C 6.10 (H) 2021    HGBA1C 5.82 (H) 2020    HGBA1C 7.80 (H) 2018     Radiology(recent) No radiology results for the last day    Imaging Reviewed: CT angiogram  "reviewed.  Patient has a significant right common iliac artery origin stenosis.  Diameter down here gets down to about 4 x 4 mm.  I agree with the radiologist interpretation of a \"web\" in the external iliac artery.  There is also some moderate disease of the SFA origin and scattered calcifications with moderate stenosis throughout the SFA although patent.  The right posterior tibial artery appears to be occluded.  Good outflow via anterior tibial and peroneal arteries.    Active Hospital Problems    Diagnosis  POA   • Cellulitis of right foot [L03.115]  Yes   • CAD (coronary artery disease) [I25.10]  Yes   • PVD (peripheral vascular disease) (CMS/[facility]) [I73.9]  Yes   • Diabetes mellitus (CMS/[facility]) [E11.9]  Yes      Resolved Hospital Problems   No resolved problems to display.      Assessment/Plan   [place], Subsequent Hospital Care    Impression/Plan:  PAD with right dorsal foot wound: The surrounding cellulitis has markedly improved.  Wound itself is also improved with mupirocin ointment.  Her pressure alone may be adequate for healing.  CT angiogram reviewed.  He does have a significant right proximal common iliac artery stenosis as well as a web in the external iliac artery.  There are some scattered disease and calcifications in the SFA and popliteal but the femoral-popliteal system is all widely patent.  Posterior tibial artery is occluded but good two-vessel runoff via the anterior tibial (primarily) and the peroneal).    I told the patient that we had basically 2 options.  To see how this does over the next 2 weeks with good local wound care and moving towards revascularization if it stalls or does not improve.  Alternatively, I offered him revascularization now.  He strongly prefers the former.    From a medical regimen standpoint, would DC the Plavix as he is now more than 45 days out and had a good scan after transcarotid artery stenting.  Would continue aspirin.  Lower bleeding risk on DOAC plus ASA. " " Would not use \"triple therapy\".    Jayden Marie MD  03/02/21  10:28 EST  Office Number [phone]              "

## 2021-03-02 NOTE — PROGRESS NOTES
Continued Stay Note  [facility]     Patient Name: Erick Bruno  MRN: [id number]  Today's Date: 3/2/2021    Admit Date: 2/26/2021    Discharge Plan     Row Name 03/02/21 1551       Plan    Plan Comments  Xarelto cost checked and is also $147 with insurance. Spoke with spouse and Eliquis was delivered with $0 copay for the first fill. Patient will follow up with physician after discharge regarding medication cost. Camilla Mi RN    Row Name 03/02/21 1526       Plan    Plan Comments  Cost of Eliquis checked and it is a $147 co-pay. Spoke with wife who was at the bedside and she stated they are on a fixed income and can not afford that. Explained CCP could provide a one month free coupon but after that one month they would be responsible for the co-pay and she stated they are on a fixed income and that is not a reasonable cost for them. Discussed other options and that they may be just as expensive. Discussed applying for assistance through the pharm company based on their income. Spouse verbalized understanding. Message to [facility] and Cardiology APRN to update them. CCP will follow and assist with drug costs at [place]. Camilla Mi RN        Discharge Codes    No documentation.             Camilla Mi RN

## 2021-03-02 NOTE — PROGRESS NOTES
Continued Stay Note  [facility]     Patient Name: Erick Bruno  MRN: [id number]  Today's Date: 3/2/2021    Admit Date: 2/26/2021    Discharge Plan     Row Name 03/02/21 1526       Plan    Plan Comments  Cost of Eliquis checked and it is a $147 co-pay. Spoke with wife who was at the bedside and she stated they are on a fixed income and can not afford that. Explained CCP could provide a one month free coupon but after that one month they would be responsible for the co-pay and she stated they are on a fixed income and that is not a reasonable cost for them. Discussed other options and that they may be just as expensive. Discussed applying for assistance through the pharm company based on their income. Spouse verbalized understanding. Message to [facility] and Cardiology APRN to update them. CCP will follow and assist with drug costs at [place]. Camilla Mi RN        Discharge Codes    No documentation.             Camilla Mi RN

## 2021-03-02 NOTE — PROGRESS NOTES
"    Patient Name: Erick Bruno  :1945  75 y.o.      Patient Care Team:  Richie Franco MD as PCP - General (Internal Medicine)  Michael Wyman III, MD as Consulting Physician (Cardiology)  Jayden Marie MD as Consulting Physician (Vascular Surgery)  Segundo Cunningham, YOHANNES as Consulting Physician (Ophthalmology)  Daniel Gonzalez MD as Surgeon (General Surgery)  Mayank Guillen DO as Consulting Physician (Neurology)    Chief Complaint: follow up peripheral vascular disease, new AF     Interval History: in and out of AF over last 24 hrs. Currently in SR.   Objective   Vital Signs  Temp:  [97.2 °F (36.2 °C)-98.6 °F (37 °C)] 98.4 °F (36.9 °C)  Heart Rate:  [61-79] 61  Resp:  [16-17] 16  BP: (136-189)/(75-93) 153/75    Intake/Output Summary (Last 24 hours) at 3/2/2021 1141  Last data filed at 3/2/2021 0933  Gross per 24 hour   Intake 480 ml   Output 2800 ml   Net -2320 ml     Flowsheet Rows      First Filed Value   Admission Height  175.3 cm (69\") Documented at 2021 1610   Admission Weight  72.6 kg (160 lb) Documented at 2021 1657          Physical Exam:   General Appearance:    Alert, cooperative, in no acute distress   Lungs:     Clear to auscultation.  Normal respiratory effort and rate.      Heart:    Regular rhythm and normal rate, normal S1 and S2, no murmurs, gallops or rubs.     Chest Wall:    No abnormalities observed   Abdomen:     Soft, nontender, positive bowel sounds.     Extremities:   no cyanosis, clubbing or edema.  No marked joint deformities.  Adequate musculoskeletal strength.       Results Review:    Results from last 7 days   Lab Units 21  0554   SODIUM mmol/L 138   POTASSIUM mmol/L 3.9   CHLORIDE mmol/L 104   CO2 mmol/L 24.5   BUN mg/dL 20   CREATININE mg/dL 1.38*   GLUCOSE mg/dL 111*   CALCIUM mg/dL 8.7         Results from last 7 days   Lab Units 21  0632   WBC 10*3/mm3 6.93   HEMOGLOBIN g/dL 12.9*   HEMATOCRIT % 38.4   PLATELETS 10*3/mm3 202          "                  Medication Review:   aspirin, 81 mg, Oral, Nightly  atorvastatin, 80 mg, Oral, Daily  busPIRone, 5 mg, Oral, BID  ceFAZolin, 2 g, Intravenous, Q8H  clopidogrel, 75 mg, Oral, Daily  enoxaparin, 1 mg/kg, Subcutaneous, Q12H  escitalopram, 10 mg, Oral, Daily  glipizide, 5 mg, Oral, QAM AC  isosorbide mononitrate, 30 mg, Oral, Nightly  multivitamin with minerals, 1 tablet, Oral, Daily  mupirocin, , Topical, Daily  pantoprazole, 40 mg, Oral, QAM              Assessment/Plan   1. PAD with right foot ulcer - vascular surgery evaluation underway.   2. Carotid artery disease with recent TVAR  3. Presumed coronary artery disease with recent abnormal stress. Small to medium sized, mildly severe area if ischemia in the inferior wall. Continue medical therapy. May need coronary angiography at some point but nothing urgent.   4. New atrial fibrillation - He has a CHADs 2 Vasc score of 4. He is on therapeutic lovenox. If no vascular intervention needed, would start apixaban 5 mg BID and drop aspirin (if ok with vascular surgery - with recent carotid stent may need triple therapy per their discretion).   5. Hypertension   6. Echo with normal LV function.     In and out of AF.   Will need OAC when no interventions planned. Await vascular surgery recommendations.   Ideally would stop aspirin and continue with plavix and apixaban but will need clearance from vascular surgery given recent carotid stent.     Discussed with patient and wife.     DEBORAH Truong  [facility]  03/02/21  11:41 EST     Patient was seen by vascular surgery and offered medical therapy and reassess in 2 weeks vs revascularization now. He preferred to watch and see. Dr. Marie cleared him for apixaban and recommends stopping plavix.     No objection to discharge from cardiac standpoint. Will sign off. Will arrange outpatient follow up in 4 weeks.     Called by CCP. apixaban is $147. Patient reports he can not afford.  rivaroxaban price same. He refused warfarin. He was given free 30 day supply. Will reassess as outpatient. Hopefully he can apply for assistance.

## 2021-03-02 NOTE — PROGRESS NOTES
Pharmacy Counseling: Apixaban    Erick Bruno is a 75 y.o. male, estimated creatinine clearance is 48.3 mL/min (A) (by C-G formula based on SCr of 1.38 mg/dL (H)). weighing 72.7 kg (160 lb 4.8 oz).     has a past medical history of Abnormal nuclear stress test (2018), Acid reflux, Anemia, Anxiety, Arthritis, CAD (coronary artery disease), CAD (coronary artery disease), Carotid artery disease (CMS/HCC), Chest discomfort, Dementia (CMS/HCC), Diabetes mellitus (CMS/HCC), Enlarged prostate, History of hepatitis A, History of seizure (), Hyperlipidemia, Hypertension, Macular degeneration, NSTEMI (non-ST elevated myocardial infarction) (CMS/HCC), Panarteritis (CMS/HCC), Peripheral arterial disease (CMS/HCC), Poor balance, Precordial pain, Pseudobulbar affect, PVD (peripheral vascular disease) (CMS/HCC), Sleep apnea, and SOBOE (shortness of breath on exertion).    Social History     Tobacco Use    Smoking status: Former Smoker     Packs/day: 1.00     Years: 60.00     Pack years: 60.00     Quit date:      Years since quittin.1    Smokeless tobacco: Never Used   Substance Use Topics    Alcohol use: No     Comment: Daily caffeine use    Drug use: No       Results from last 7 days   Lab Units 21  0632 21  0440 21  0639 21  1659   HEMOGLOBIN g/dL 12.9* 12.1* 12.3* 12.2*   HEMATOCRIT % 38.4 35.3* 36.0* 36.4*   PLATELETS 10*3/mm3 202 181 184 187     Results from last 7 days   Lab Units 21  0554 21  0440 21  0639 21  1659   SODIUM mmol/L 138 138 138 137   POTASSIUM mmol/L 3.9 3.7 4.0 3.9   CHLORIDE mmol/L 104 105 104 103   CO2 mmol/L 24.5 23.7 25.7 25.9   BUN mg/dL 20 18 19 21   CREATININE mg/dL 1.38* 1.11 1.16 1.25   CALCIUM mg/dL 8.7 8.2* 8.3* 8.5*   BILIRUBIN mg/dL  --   --   --  0.5   ALK PHOS U/L  --   --   --  100   ALT (SGPT) U/L  --   --   --  23   AST (SGOT) U/L  --   --   --  20   GLUCOSE mg/dL 111* 111* 100* 108*     Anticoagulation history: Lovenox  1mg/kg q12h/plavix/ASA since 2/28    Hospital Anticoagulation:  Consulting provider: Nava Carranza  Start date: 3/2/21  Indication: AFIB requiring full anticoagulation    Education complete?/ Date: yes: 3/2/21    Assessment/Plan:  Performed Eliquis counseling for Erick Bruno on 3/2/21. Went over dose and frequency of the med along with signs of bleeding/clot/stroke, missed dose and OTC meds to to avoid. I left him an ISMP sheet on Eliquis for reference and he showed understanding of the info I told him. He will notify MD if he experiences any problems.    I wore protective equipment throughout this patient encounter including a face mask and protective eyewear. Hand hygiene was performed before entering the room and after leaving the room. I also maintained 6 feet distance from the patient.     Bony Mederos, [facility]  3/2/2021

## 2021-03-02 NOTE — PLAN OF CARE
Goal Outcome Evaluation:        Outcome Summary: Up to chair all day. Dressing change to right anterior foot this am, scant amount of drng. Oral anticoagulation started today. Plan for d/c home soon.

## 2021-03-02 NOTE — PROGRESS NOTES
Name: Erick Bruno ADMIT: 2021   : 1945  PCP: Richie Franco MD    MRN: [id number] LOS: 4 days   AGE/SEX: 75 y.o. male  ROOM: [facility]     Subjective   Subjective    Rt foot less erythematous. Denies fever, dyspnea, chest pain, palpitations. Has chronic decreased sensation waqas feet.     Review of Systems   Constitutional: Negative for chills and fever.   HENT: Negative for congestion.    Respiratory: Negative for shortness of breath.    Cardiovascular: Negative for chest pain and palpitations.   Gastrointestinal: Negative for nausea.   Genitourinary: Negative for difficulty urinating.   Musculoskeletal: Negative for arthralgias and myalgias.   Skin: Positive for wound.   Neurological: Negative for weakness.   Psychiatric/Behavioral: Negative for confusion.        Objective   Objective   Vital Signs  Temp:  [97.2 °F (36.2 °C)-98.6 °F (37 °C)] 98.4 °F (36.9 °C)  Heart Rate:  [61-79] 61  Resp:  [16-17] 16  BP: (136-189)/(75-93) 153/75  SpO2:  [92 %-97 %] 97 %  on   ;   Device (Oxygen Therapy): room air  Body mass index is 23.67 kg/m².  Physical Exam  Vitals signs and nursing note reviewed.   Constitutional:       General: He is not in acute distress.     Comments: Chronically ill appearing   HENT:      Head: Normocephalic.      Mouth/Throat:      Mouth: Mucous membranes are moist.   Eyes:      Conjunctiva/sclera: Conjunctivae normal.   Neck:      Musculoskeletal: Normal range of motion and neck supple.   Cardiovascular:      Rate and Rhythm: Normal rate and regular rhythm.   Pulmonary:      Effort: Pulmonary effort is normal. No respiratory distress.      Breath sounds: Normal breath sounds.   Abdominal:      General: Bowel sounds are normal.      Palpations: Abdomen is soft.   Musculoskeletal:      Right lower leg: No edema.      Left lower leg: No edema.   Skin:     Comments: Rt foot cool   Mild erythema; dressing CDI   Neurological:      Mental Status: He is alert and oriented to person, place,  and time.   Psychiatric:         Mood and Affect: Mood normal.         Behavior: Behavior normal.         Results Review     I reviewed the patient's new clinical results.  Results from last 7 days   Lab Units 03/01/21  0632 02/28/21  0440 02/27/21  0639 02/26/21  1659   WBC 10*3/mm3 6.93 6.84 6.32 8.13   HEMOGLOBIN g/dL 12.9* 12.1* 12.3* 12.2*   PLATELETS 10*3/mm3 202 181 184 187     Results from last 7 days   Lab Units 03/01/21  0554 02/28/21  0440 02/27/21  0639 02/26/21  1659   SODIUM mmol/L 138 138 138 137   POTASSIUM mmol/L 3.9 3.7 4.0 3.9   CHLORIDE mmol/L 104 105 104 103   CO2 mmol/L 24.5 23.7 25.7 25.9   BUN mg/dL 20 18 19 21   CREATININE mg/dL 1.38* 1.11 1.16 1.25   GLUCOSE mg/dL 111* 111* 100* 108*   Estimated Creatinine Clearance: 48.3 mL/min (A) (by C-G formula based on SCr of 1.38 mg/dL (H)).  Results from last 7 days   Lab Units 02/26/21  1659   ALBUMIN g/dL 3.90   BILIRUBIN mg/dL 0.5   ALK PHOS U/L 100   AST (SGOT) U/L 20   ALT (SGPT) U/L 23     Results from last 7 days   Lab Units 03/01/21  0554 02/28/21  0440 02/27/21  0639 02/26/21  1659   CALCIUM mg/dL 8.7 8.2* 8.3* 8.5*   ALBUMIN g/dL  --   --   --  3.90       COVID19   Date Value Ref Range Status   02/26/2021 Not Detected Not Detected - Ref. Range Final     SARS-CoV-2 PCR   Date Value Ref Range Status   01/13/2021 Not Detected Not Detected Final     Comment:     Nucleic acid specific to SARS-CoV-2 (COVID-19) virus was not detected in  this sample by the TaqPath (TM) COVID-19 Combo Kit.          SARS-CoV-2 (COVID-19) nucleic acid testing performed using [employer] (R) SARS-CoV-2 Assay or [employer] TaqPath (TM)  COVID-19 Combo Kit as indicated above under Emergency Use Authorization (EUA) from the FDA. Aptima (R) and TaqPath (TM) test performance  characteristics verified by [facility] in accordance with the FDAs Guidance Document (Policy for Diagnostic Tests for Coronavirus Disease-2019  during the Public Health Emergency)  issued on March 16, 2020. The laboratory is regulated under CLIA as qualified to perform high-complexity testing  Unless otherwise noted, all testing was performed at [facility], CLIA No. [id number], [place] State Licensee No. [id number]     Glucose   Date/Time Value Ref Range Status   03/02/2021 1145 131 (H) 70 - 130 mg/dL Final   02/28/2021 1110 123 70 - 130 mg/dL Final   02/28/2021 0729 116 70 - 130 mg/dL Final   02/27/2021 1640 177 (H) 70 - 130 mg/dL Final   02/27/2021 1506 105 70 - 130 mg/dL Final       CT Angio Abdominal Aorta Bilateral Iliofem Runoff  CT ANGIOGRAPHY OF THE ABDOMEN AND PELVIS AND BILATERAL LEG RUNOFF WITH  INTRAVENOUS CONTRAST AND 3-D RECONSTRUCTIONS     HISTORY: Severe peripheral vascular disease. Ulcer of right foot.     TECHNIQUE: The CT scan was performed through the abdomen and pelvis and  bilateral leg runoff with CT angiography protocol using intravenous  contrast and 3-D reconstructions.      COMPARISON: This is compared to previous CT angiography of the abdomen  and pelvis performed on 10/30/2019.      FINDINGS: The following findings are present:  1. There is prominent atheromatous irregularity of the abdominal aorta.  This includes areas of penetrating ulcer formation as well as an  infrarenal abdominal aortic aneurysm measuring up to 2.9 x 3.3 cm as  best seen on image 92. This is similar to the previous exam. There are  areas of irregular plaque formation and some associated mural thrombus  that are unchanged.  2. There is no stenosis of the celiac or superior mesenteric arteries.  The small caliber inferior mesenteric artery is also patent. There are  single renal arteries to each kidney and there is moderate stenosis near  the origin of the left renal artery which is unchanged.  3. On the right, there is severe stenosis at the origin of the right  common iliac artery measuring approximately 80% which is unchanged.  There is moderate weblike stenosis of the right external iliac artery  as  best seen on image 157. This is somewhat more apparent on today's exam.  There is mild stenosis at the origin of the right internal iliac artery  which is unchanged. There is considerable stenosis at the origin of the  right deep femoral artery. There is also moderately severe stenosis just  beyond the origin of the right superficial femoral artery as well as  calcification along the length of the right SFA with areas of  mild-to-moderate stenosis. The right popliteal artery is patent with  considerable calcification. The proximal trifurcation vessels are patent  with subsequent occlusion of the right posterior tibial artery. The  distal runoff is predominantly via the anterior tibial artery.  4. On the left, there is no significant stenosis of the left common  iliac artery. The left internal and external iliac arteries are patent  without significant stenosis. The left deep and superficial femoral  arteries are patent with moderate stenosis at the origin of the deep  femoral artery. There is calcification along the length of the left SFA  with 2 wall stents present in the lower portion of the left SFA and  these appear patent. There is slight narrowing of the SFA between the 2  stents. The left popliteal artery is patent with calcification and mild  stenosis. There is faint opacification of the proximal trifurcation  vessels with occlusion of the anterior tibial artery and intermittent  occlusion of the posterior tibial artery and distal runoff predominantly  via the peroneal artery.  5. The lung bases are clear. The liver, spleen, pancreas, both adrenal  glands, and both kidneys are unremarkable. The gallbladder has been  removed.  6. There is no retroperitoneal lymphadenopathy. The large and small  bowel loops are normal in caliber. In the pelvis there is slight  enlargement of the prostate measuring 5 cm.                 Radiation dose reduction techniques were utilized, including automated  exposure control  and exposure modulation based on body size.     This report was finalized on 3/1/2021 7:24 PM by Dr. Arturo Alegria M.D.     Adult Transthoracic Echo Complete W/ Cont if Necessary Per Protocol  · Estimated left ventricular EF = 51% Left ventricular systolic function   is normal.  · Left ventricular diastolic function is consistent with (grade I)   impaired relaxation.  · There is moderate calcification of the aortic valve.  · Mild mitral valve regurgitation is present.       Scheduled Medications  aspirin, 81 mg, Oral, Nightly  atorvastatin, 80 mg, Oral, Daily  busPIRone, 5 mg, Oral, BID  ceFAZolin, 2 g, Intravenous, Q8H  enoxaparin, 1 mg/kg, Subcutaneous, Q12H  escitalopram, 10 mg, Oral, Daily  glipizide, 5 mg, Oral, QAM AC  isosorbide mononitrate, 30 mg, Oral, Nightly  multivitamin with minerals, 1 tablet, Oral, Daily  mupirocin, , Topical, Daily  pantoprazole, 40 mg, Oral, QAM    Infusions   Diet  Diet Regular; Cardiac, Consistent Carbohydrate, Renal       Assessment/Plan     Active Hospital Problems    Diagnosis  POA   • **Cellulitis of right foot [L03.115]  Yes   • CAD (coronary artery disease) [I25.10]  Yes   • PVD (peripheral vascular disease) (CMS/HCC) [I73.9]  Yes   • Diabetes mellitus (CMS/HCC) [E11.9]  Yes      Resolved Hospital Problems   No resolved problems to display.       75 y.o. male admitted with Cellulitis of right foot.    75-year-old male with past medical history of coronary artery disease,  diabetes complicated by peripheral vascular disease and neuropathy presented to the ER with a right foot abrasion and cellulitis after experiencing a fall.     1.  Right lower extremity cellulitis  ID consult noted  Patient is currently on IV ancef 2g q8h with plan to transition to PO, duration of therapy total 10 days  Blood cultures remain negative      2.  Type 2 diabetes  A1c 6.1  Takes p.o. with glimipride at home, holding on admission as no alternative avaliable  Was initially covered sliding scale  while admitted, but was transitioned to glipizide per wife's insistence.     3. Peripheral vascular disease  Vascular surgery consulted, appreciate recommendations  By duplex vein mapping of lower extremities, the veins were of inadequate size in both legs.    S/P CTA; likely revascularization in the future. Continue LWC for now and re-evaluate if no improvement  Vascular recommending dc Plavix since 45 days out from stenting; would continue ASA and add DOAC     4.  Coronary artery disease  Continue aspirin, plavix, Imdur, atorvastatin     5.  Paroxysmal atrial fibrillation  Has been in and out of atrial fibrillation on telemetry, confirmed with EKG  Cardiology consulted, started on full dose Lovenox for anticoagulation  Further recommendations per cardiology for work-up.  See above Vascular recommendation     6.  Carotid artery stenosis  He had a right carotid stent in 2020    Cr with mild bump up today 1.38 likely due to contrast; recheck tomorrow. Encourage po fluids     Lovenox 40 mg SC daily for DVT prophylaxis.  Full code.  Discussed with patient, family  Anticipate discharge home tomorrow if cleared by consultants       DEBORAH Lyons  [facility]  03/02/21  13:10 EST

## 2021-03-03 ENCOUNTER — READMISSION MANAGEMENT (OUTPATIENT)
Dept: CALL CENTER | Facility: HOSPITAL | Age: 76
End: 2021-03-03

## 2021-03-03 VITALS
WEIGHT: 160.3 LBS | OXYGEN SATURATION: 96 % | BODY MASS INDEX: 23.74 KG/M2 | RESPIRATION RATE: 16 BRPM | HEART RATE: 85 BPM | SYSTOLIC BLOOD PRESSURE: 113 MMHG | DIASTOLIC BLOOD PRESSURE: 67 MMHG | HEIGHT: 69 IN | TEMPERATURE: 97.7 F

## 2021-03-03 DIAGNOSIS — Z23 IMMUNIZATION DUE: ICD-10-CM

## 2021-03-03 LAB
ANION GAP SERPL CALCULATED.3IONS-SCNC: 9.5 MMOL/L (ref 5–15)
BACTERIA SPEC AEROBE CULT: NORMAL
BACTERIA SPEC AEROBE CULT: NORMAL
BUN SERPL-MCNC: 17 MG/DL (ref 8–23)
BUN/CREAT SERPL: 14 (ref 7–25)
CALCIUM SPEC-SCNC: 8.3 MG/DL (ref 8.6–10.5)
CHLORIDE SERPL-SCNC: 101 MMOL/L (ref 98–107)
CO2 SERPL-SCNC: 25.5 MMOL/L (ref 22–29)
CREAT SERPL-MCNC: 1.21 MG/DL (ref 0.76–1.27)
GFR SERPL CREATININE-BSD FRML MDRD: 59 ML/MIN/1.73
GLUCOSE SERPL-MCNC: 116 MG/DL (ref 65–99)
POTASSIUM SERPL-SCNC: 3.8 MMOL/L (ref 3.5–5.2)
SODIUM SERPL-SCNC: 136 MMOL/L (ref 136–145)

## 2021-03-03 PROCEDURE — 80048 BASIC METABOLIC PNL TOTAL CA: CPT | Performed by: NURSE PRACTITIONER

## 2021-03-03 PROCEDURE — 97116 GAIT TRAINING THERAPY: CPT

## 2021-03-03 PROCEDURE — 25010000003 CEFAZOLIN IN DEXTROSE 2-4 GM/100ML-% SOLUTION: Performed by: INTERNAL MEDICINE

## 2021-03-03 PROCEDURE — 97110 THERAPEUTIC EXERCISES: CPT

## 2021-03-03 RX ORDER — METFORMIN HYDROCHLORIDE 500 MG/1
500 TABLET, EXTENDED RELEASE ORAL
Start: 2021-03-04 | End: 2021-04-08 | Stop reason: SDUPTHER

## 2021-03-03 RX ORDER — CEPHALEXIN 500 MG/1
500 CAPSULE ORAL 4 TIMES DAILY
Qty: 24 CAPSULE | Refills: 0 | Status: SHIPPED | OUTPATIENT
Start: 2021-03-03 | End: 2021-03-09

## 2021-03-03 RX ADMIN — ESCITALOPRAM 10 MG: 10 TABLET, FILM COATED ORAL at 09:11

## 2021-03-03 RX ADMIN — GLIPIZIDE 5 MG: 5 TABLET ORAL at 09:11

## 2021-03-03 RX ADMIN — Medication 1 TABLET: at 09:11

## 2021-03-03 RX ADMIN — PANTOPRAZOLE SODIUM 40 MG: 40 TABLET, DELAYED RELEASE ORAL at 06:38

## 2021-03-03 RX ADMIN — ATORVASTATIN CALCIUM 80 MG: 80 TABLET, FILM COATED ORAL at 09:11

## 2021-03-03 RX ADMIN — CEFAZOLIN SODIUM 2 G: 2 INJECTION, SOLUTION INTRAVENOUS at 09:11

## 2021-03-03 RX ADMIN — CEFAZOLIN SODIUM 2 G: 2 INJECTION, SOLUTION INTRAVENOUS at 00:57

## 2021-03-03 RX ADMIN — MUPIROCIN: 20 OINTMENT TOPICAL at 09:11

## 2021-03-03 RX ADMIN — APIXABAN 5 MG: 5 TABLET, FILM COATED ORAL at 09:11

## 2021-03-03 RX ADMIN — BUSPIRONE HYDROCHLORIDE 5 MG: 5 TABLET ORAL at 09:11

## 2021-03-03 NOTE — PROGRESS NOTES
Case Management Discharge Note      Final Note: Discharge home with family support.         Selected Continued Care - Discharged on 3/3/2021 Admission date: 2/26/2021 - Discharge disposition: Home or Self Care    Destination    No services have been selected for the patient.              Durable Medical Equipment    No services have been selected for the patient.              Dialysis/Infusion    No services have been selected for the patient.              Home Medical Care    No services have been selected for the patient.              Therapy    No services have been selected for the patient.              Community Resources    No services have been selected for the patient.                  Transportation Services  Private: Car    Final Discharge Disposition Code: 01 - home or self-care

## 2021-03-03 NOTE — DISCHARGE SUMMARY
Patient Name: Erick Bruno  : 1945  MRN: [id number]    Date of Admission: 2021  Date of Discharge:  3/3/2021  Primary Care Physician: Richie Franco MD      Chief Complaint:   Wound Infection      Discharge Diagnoses     Active Hospital Problems    Diagnosis  POA   • **Cellulitis of right foot [L03.115]  Yes   • CAD (coronary artery disease) [I25.10]  Yes   • PVD (peripheral vascular disease) (CMS/[facility]) [I73.9]  Yes   • Diabetes mellitus (CMS/[facility]) [E11.9]  Yes      Resolved Hospital Problems   No resolved problems to display.        Hospital Course     Mr. Bruno is a 75 y.o. male with a history of CAD, DM, HTN, PAD, anemia  who presented to [facility] initially complaining of redness, tenderness rt foot following fall.  Please see the admitting history and physical for further details.  He was found to have rt foot cellulitis and was admitted to the hospital for further evaluation and treatment.  He was started on antibiotics. He was evaluated by ID w/recommendations to transition to po Keflex at discharge for total 10 days duration of antibiotics. Blood cultures remained negative. Vascular has followed. Continue local wound care w/mupirocin ointment. CTA was performed w/results as reported below. Plan is to monitor wound over next 2 weeks and discuss revascularization if no improvement or healing stalls. He developed new onset afib this admission, Cardiology has been managing. Echo showed normal LV function. Cardiology and Vascular have agreed on stopping Plavix (more than 45 days out from stents) and to continue ASA w/addition of DOAC for afib. He has been prescribed Eliquis and will follow up with Cardiology for continued management. He will benefit from outpatient PT to evaluate and treat decreased strength, endurance, balance, mobility. Medically he is stable for discharge home.  Day of Discharge     Subjective:  Feels well. Denies fever, pain, dyspnea. D/W pt and wife re:  discharge plan; both agree.    Review of Systems   Constitutional: Negative for fever.   HENT: Negative for congestion.    Respiratory: Negative for shortness of breath.    Cardiovascular: Negative for chest pain.   Gastrointestinal: Negative for constipation.   Genitourinary: Negative for difficulty urinating.   Musculoskeletal: Negative for arthralgias.   Skin: Positive for wound.   Neurological: Negative for headaches.   Psychiatric/Behavioral: Negative for sleep disturbance.       Physical Exam:  Temp:  [96.5 °F (35.8 °C)-98.8 °F (37.1 °C)] 97.7 °F (36.5 °C)  Heart Rate:  [53-85] 85  Resp:  [16-18] 16  BP: (113-174)/(52-86) 113/67  Body mass index is 23.67 kg/m².  Physical Exam  Vitals signs and nursing note reviewed.   Constitutional:       General: He is not in acute distress.     Comments: Chronically ill appearing   HENT:      Head: Normocephalic.      Mouth/Throat:      Mouth: Mucous membranes are moist.   Eyes:      Conjunctiva/sclera: Conjunctivae normal.   Neck:      Musculoskeletal: Normal range of motion and neck supple.   Cardiovascular:      Rate and Rhythm: Normal rate and regular rhythm.   Pulmonary:      Effort: Pulmonary effort is normal. No respiratory distress.      Breath sounds: Normal breath sounds.   Abdominal:      General: Bowel sounds are normal.      Palpations: Abdomen is soft.   Musculoskeletal:      Right lower leg: No edema.      Left lower leg: No edema.   Skin:     General: Skin is warm and dry.   Neurological:      Mental Status: He is alert. Mental status is at baseline.   Psychiatric:         Mood and Affect: Mood normal.         Consultants     Consult Orders (all) (From admission, onward)     Start     Ordered    02/28/21 1454  Inpatient Cardiology Consult  Once,   Status:  Canceled     Specialty:  Cardiology  Provider:  Michael Wyman III, MD    02/28/21 1454    02/27/21 0641  Inpatient Vascular Surgery Consult  Once     Specialty:  Vascular Surgery  Provider:  Frank  Jayden Gonzalez MD    02/27/21 0642    02/26/21 1944  Inpatient Infectious Diseases Consult  Once     Specialty:  Infectious Diseases  Provider:  Krista Xie MD    02/26/21 1943 02/26/21 1748  [facility] (on-call MD unless specified) Details  Once     Specialty:  Hospitalist  Provider:  (Not yet assigned)    02/26/21 1747 02/26/21 1748  Family Medicine Consult  Once     Specialty:  Family Medicine  Provider:  (Not yet assigned)    02/26/21 1747              Procedures     Imaging Results (All)     Procedure Component Value Units Date/Time    CT Angio Abdominal Aorta Bilateral Iliofem Runoff [[id number]] Collected: 03/01/21 1841     Updated: 03/01/21 1927    Narrative:      CT ANGIOGRAPHY OF THE ABDOMEN AND PELVIS AND BILATERAL LEG RUNOFF WITH  INTRAVENOUS CONTRAST AND 3-D RECONSTRUCTIONS     HISTORY: Severe peripheral vascular disease. Ulcer of right foot.     TECHNIQUE: The CT scan was performed through the abdomen and pelvis and  bilateral leg runoff with CT angiography protocol using intravenous  contrast and 3-D reconstructions.      COMPARISON: This is compared to previous CT angiography of the abdomen  and pelvis performed on 10/30/2019.      FINDINGS: The following findings are present:  1. There is prominent atheromatous irregularity of the abdominal aorta.  This includes areas of penetrating ulcer formation as well as an  infrarenal abdominal aortic aneurysm measuring up to 2.9 x 3.3 cm as  best seen on image 92. This is similar to the previous exam. There are  areas of irregular plaque formation and some associated mural thrombus  that are unchanged.  2. There is no stenosis of the celiac or superior mesenteric arteries.  The small caliber inferior mesenteric artery is also patent. There are  single renal arteries to each kidney and there is moderate stenosis near  the origin of the left renal artery which is unchanged.  3. On the right, there is severe stenosis at the origin of the right  common iliac  artery measuring approximately 80% which is unchanged.  There is moderate weblike stenosis of the right external iliac artery as  best seen on image 157. This is somewhat more apparent on today's exam.  There is mild stenosis at the origin of the right internal iliac artery  which is unchanged. There is considerable stenosis at the origin of the  right deep femoral artery. There is also moderately severe stenosis just  beyond the origin of the right superficial femoral artery as well as  calcification along the length of the right SFA with areas of  mild-to-moderate stenosis. The right popliteal artery is patent with  considerable calcification. The proximal trifurcation vessels are patent  with subsequent occlusion of the right posterior tibial artery. The  distal runoff is predominantly via the anterior tibial artery.  4. On the left, there is no significant stenosis of the left common  iliac artery. The left internal and external iliac arteries are patent  without significant stenosis. The left deep and superficial femoral  arteries are patent with moderate stenosis at the origin of the deep  femoral artery. There is calcification along the length of the left SFA  with 2 wall stents present in the lower portion of the left SFA and  these appear patent. There is slight narrowing of the SFA between the 2  stents. The left popliteal artery is patent with calcification and mild  stenosis. There is faint opacification of the proximal trifurcation  vessels with occlusion of the anterior tibial artery and intermittent  occlusion of the posterior tibial artery and distal runoff predominantly  via the peroneal artery.  5. The lung bases are clear. The liver, spleen, pancreas, both adrenal  glands, and both kidneys are unremarkable. The gallbladder has been  removed.  6. There is no retroperitoneal lymphadenopathy. The large and small  bowel loops are normal in caliber. In the pelvis there is slight  enlargement of the  prostate measuring 5 cm.                 Radiation dose reduction techniques were utilized, including automated  exposure control and exposure modulation based on body size.     This report was finalized on 3/1/2021 7:24 PM by Dr. Arturo Alegria M.D.       XR Foot 3+ View Right [[id number]] Collected: 02/26/21 1750     Updated: 02/26/21 1756    Narrative:      RIGHT FOOT: 3 VIEWS     HISTORY: 75-year-old with cellulitis of the dorsal foot. Fall 3 days ago  with abrasion on the top of the foot.     COMPARISON: None.      FINDINGS: Midfoot alignment appears within normal limits. No fracture or  acute osseous abnormality is evident. There is no radiographic evidence  for osteomyelitis. There is a degenerative plantar calcaneal spur.  Microvascular calcifications are extensive.       Impression:      No fracture or radiographic evidence for osteomyelitis.  Extensive microvascular calcifications.     This report was finalized on 2/26/2021 5:53 PM by Dr. Marco Antonio Villegas M.D.             Pertinent Labs     Results from last 7 days   Lab Units 03/01/21  0632 02/28/21  0440 02/27/21  0639 02/26/21  1659   WBC 10*3/mm3 6.93 6.84 6.32 8.13   HEMOGLOBIN g/dL 12.9* 12.1* 12.3* 12.2*   PLATELETS 10*3/mm3 202 181 184 187     Results from last 7 days   Lab Units 03/03/21  0659 03/01/21  0554 02/28/21  0440 02/27/21  0639   SODIUM mmol/L 136 138 138 138   POTASSIUM mmol/L 3.8 3.9 3.7 4.0   CHLORIDE mmol/L 101 104 105 104   CO2 mmol/L 25.5 24.5 23.7 25.7   BUN mg/dL 17 20 18 19   CREATININE mg/dL 1.21 1.38* 1.11 1.16   GLUCOSE mg/dL 116* 111* 111* 100*   Estimated Creatinine Clearance: 55.1 mL/min (by C-G formula based on SCr of 1.21 mg/dL).  Results from last 7 days   Lab Units 02/26/21  1659   ALBUMIN g/dL 3.90   BILIRUBIN mg/dL 0.5   ALK PHOS U/L 100   AST (SGOT) U/L 20   ALT (SGPT) U/L 23     Results from last 7 days   Lab Units 03/03/21  0659 03/01/21  0554 02/28/21  0440 02/27/21  0639 02/26/21  1659   CALCIUM mg/dL 8.3* 8.7  8.2* 8.3* 8.5*   ALBUMIN g/dL  --   --   --   --  3.90               Invalid input(s): LDLCALC  Results from last 7 days   Lab Units 02/26/21 1924   BLOODCX  No growth at 4 days  No growth at 4 days       Test Results Pending at Discharge     Pending Labs     Order Current Status    Blood Culture - Blood, Hand, Right Preliminary result    Blood Culture - Blood, Wrist, Right Preliminary result          Discharge Details        Discharge Medications      New Medications      Instructions Start Date   cephalexin 500 MG capsule  Commonly known as: Keflex   500 mg, Oral, 4 Times Daily      Eliquis 5 MG tablet tablet  Generic drug: apixaban   5 mg, Oral, Every 12 Hours Scheduled      mupirocin 2 % ointment  Commonly known as: BACTROBAN   Topical, Daily   Start Date: March 4, 2021        Changes to Medications      Instructions Start Date   glimepiride 2 MG tablet  Commonly known as: AMARYL  What changed: when to take this   TAKE 1 TABLET BY MOUTH ONCE DAILY IN THE MORNING BEFORE BREAKFAST      metFORMIN  MG 24 hr tablet  Commonly known as: GLUCOPHAGE-XR  What changed: See the new instructions.   500 mg, Oral, Daily With Breakfast, Per spouse, only taking 500mg tab once in am, 4 tabs with supper caused diarrhea   Start Date: March 4, 2021        Continue These Medications      Instructions Start Date   aspirin 81 MG chewable tablet   81 mg, Oral, Nightly, PER JUAN DYKES TO CONTINUE TAKING PER MD      atorvastatin 80 MG tablet  Commonly known as: LIPITOR   Take 1 tablet by mouth once daily      busPIRone 5 MG tablet  Commonly known as: BUSPAR   Take 1 tablet by mouth twice daily      Coenzyme Q10 200 MG capsule   200 mg, Oral, Nightly, HOLDING FOR SURGERY      escitalopram 10 MG tablet  Commonly known as: LEXAPRO   10 mg, Oral, Daily      isosorbide mononitrate 30 MG 24 hr tablet  Commonly known as: IMDUR   Take 1 tablet by mouth nightly      LORazepam 0.5 MG tablet  Commonly known as: ATIVAN   0.5 mg, Oral,  "Every 8 Hours PRN      melatonin 5 MG tablet tablet   5 mg, Oral, Nightly      multivitamin with minerals tablet tablet   1 tablet, Oral, Daily      nitroglycerin 0.4 MG SL tablet  Commonly known as: Nitrostat   0.4 mg, Sublingual, Every 5 Minutes PRN, Take no more than 3 doses in 15 minutes.      omeprazole 20 MG capsule  Commonly known as: priLOSEC   Take 1 capsule by mouth once daily      saw palmetto 160 MG capsule   160 mg, Oral, Nightly, HOLD FOR SURGERY      vitamin E 1000 UNIT capsule   1,200 Units, Oral, Nightly, HOLD FOR SURGERY         Stop These Medications    clopidogrel 75 MG tablet  Commonly known as: PLAVIX            Allergies   Allergen Reactions   • Penicillins Anaphylaxis and Hives   • Percocet [Oxycodone-Acetaminophen] Itching   • Vancomycin Rash   • Biaxin [Clarithromycin] Itching   • Crestor [Rosuvastatin] Other (See Comments)     \"CAN'T REMEMBER THE REACTION\"   • Levaquin [Levofloxacin In D5w] Other (See Comments)     MUSCLE STIFFNESS   • Latex Rash     Band aids cause blistering, ok with paper tape         Discharge Disposition:  Home or Self Care    Discharge Diet:  No active diet order      Discharge Activity:       CODE STATUS:    Code Status and Medical Interventions:   Ordered at: 02/26/21 1945     Code Status:    CPR     Medical Interventions (Level of Support Prior to Arrest):    Full       Future Appointments   Date Time Provider Department Center   3/10/2021  9:10 AM [facility]   3/18/2021 11:00 AM Richie Franco MD [facility]  [facility]   4/6/2021 10:30 AM Aaliyah Christiansen APRN [facility]  [facility] None     Additional Instructions for the Follow-ups that You Need to Schedule     Ambulatory Referral to Physical Therapy Evaluate and treat; Strengthening (balance)   As directed      Specialty needed: Evaluate and treat    Exercises: Strengthening (balance)           Follow-up Information     Jayden Marie MD Follow up in 2 week(s).    Specialty: " Vascular Surgery  Contact information:  [address]  [facility] [id number] [phone]             Richie Franco MD. Schedule an appointment as soon as possible for a visit in 1 week(s).    Specialty: Internal Medicine  Contact information:  [address]  [address]  [facility] [id number] [phone]             Michael Wyman III, MD. Schedule an appointment as soon as possible for a visit in 4 week(s).    Specialty: Cardiology  Contact information:  [address]  [place] [address]  [phone]                   Additional Instructions for the Follow-ups that You Need to Schedule     Ambulatory Referral to Physical Therapy Evaluate and treat; Strengthening (balance)   As directed      Specialty needed: Evaluate and treat    Exercises: Strengthening (balance)           Time Spent on Discharge:  Greater than 30 minutes      DEBORAH Lyons  [facility]  03/03/21  14:33 EST

## 2021-03-03 NOTE — THERAPY TREATMENT NOTE
Patient Name: Erick Bruno  : 1945    MRN: [id number]                              Today's Date: 3/3/2021       Admit Date: 2021    Visit Dx:     ICD-10-CM ICD-9-CM   1. Cellulitis of right foot  L03.115 682.7   2. PAD (peripheral artery disease) (CMS/HCC)  I73.9 443.9     Patient Active Problem List   Diagnosis   • Toe ulcer (CMS/HCC)   • Sleep apnea   • Seizure (CMS/HCC)   • PVD (peripheral vascular disease) (CMS/HCC)   • Peripheral arterial disease (CMS/HCC)   • Macular degeneration   • Hyperlipidemia   • Enlarged prostate   • Diabetes mellitus (CMS/HCC)   • Colon polyps   • Arthritis   • Anxiety   • Acid reflux   • Hx of colonic polyps   • Abnormal nuclear stress test   • CAD (coronary artery disease)   • Carotid stenosis, asymptomatic, right   • Cellulitis of right foot     Past Medical History:   Diagnosis Date   • Abnormal nuclear stress test 2018   • Acid reflux    • Anemia    • Anxiety    • Arthritis    • CAD (coronary artery disease)    • CAD (coronary artery disease)    • Carotid artery disease (CMS/HCC)    • Chest discomfort     both typical and atypical   • Dementia (CMS/HCC)    • Diabetes mellitus (CMS/HCC)    • Enlarged prostate    • History of hepatitis A    • History of seizure 2013    S/P TEMPERATURE   • Hyperlipidemia    • Hypertension    • Macular degeneration    • NSTEMI (non-ST elevated myocardial infarction) (CMS/HCC)    • Panarteritis (CMS/HCC)    • Peripheral arterial disease (CMS/HCC)    • Poor balance    • Precordial pain    • 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   • PVD (peripheral vascular disease) (CMS/[facility])    • Sleep apnea     DOES NOT WEAR CPAP   • SOBOE (shortness of breath on exertion)      Past Surgical History:   Procedure Laterality Date   • APPENDECTOMY     • ATHRECTOMY ILIAC, FEMORAL, TIBIAL ARTERY N/A 10/17/2018    Procedure: AIF ARTERIOGRAM, LEFT SFA  ANGIOPLASTY;  Surgeon: Jayden Marie MD;  Location: [facility];  Service: Vascular   •  CHOLECYSTECTOMY     • CHOLECYSTECTOMY WITH INTRAOPERATIVE CHOLANGIOGRAM N/A 5/9/2018    Procedure: CHOLECYSTECTOMY LAPAROSCOPIC INTRAOPERATIVE CHOLANGIOGRAM;  Surgeon: Daniel Gonzalez MD;  Location: [facility] MAIN OR;  Service: General   • COLONOSCOPY     • COLONOSCOPY N/A 5/8/2018    Procedure: COLONOSCOPY to cecum with polypectomies;  Surgeon: Daniel Gonzalez MD;  Location: [facility] ENDOSCOPY;  Service: Gastroenterology   • ENDOSCOPY N/A 5/8/2018    Procedure: ESOPHAGOGASTRODUODENOSCOPY with biopsies;  Surgeon: Daniel Gonzalez MD;  Location: [facility] ENDOSCOPY;  Service: Gastroenterology   • FEMORAL ARTERY STENT Left     on 3/22/16     General Information     Row Name 03/03/21 1322          Physical Therapy Time and Intention    Document Type  therapy note (daily note)  -     Mode of Treatment  individual therapy;physical therapy  -     Row Name 03/03/21 1322          General Information    Existing Precautions/Restrictions  fall  -     Row Name 03/03/21 1322          Cognition    Orientation Status (Cognition)  oriented x 3  -     Row Name 03/03/21 1322          Safety Issues, Functional Mobility    Impairments Affecting Function (Mobility)  endurance/activity tolerance;strength;balance  -       User Key  (r) = Recorded By, (t) = Taken By, (c) = Cosigned By    Initials Name Provider Type     Radha Oliveira PTA Physical Therapy Assistant        Mobility     Row Name 03/03/21 1322          Bed Mobility    Supine-Sit [place] (Bed Mobility)  modified independence  -     Sit-Supine [place] (Bed Mobility)  not tested  -     Assistive Device (Bed Mobility)  head of bed elevated  -     Row Name 03/03/21 1322          Sit-Stand Transfer    Sit-Stand [place] (Transfers)  contact guard  -     Assistive Device (Sit-Stand Transfers)  walker, front-wheeled  -     Row Name 03/03/21 1322          Gait/Stairs (Locomotion)    [place] Level (Gait)  contact guard  -     Assistive Device (Gait)   walker, front-wheeled  -     Distance in Feet (Gait)  400  -     Deviations/Abnormal Patterns (Gait)  gait speed decreased;stride length decreased;raven decreased  -     Bilateral Gait Deviations  heel strike decreased;forward flexed posture  -     [place] Level (Stairs)  contact guard;verbal cues  -     Handrail Location (Stairs)  right side (ascending)  -     Number of Steps (Stairs)  4  -     Ascending Technique (Stairs)  step-to-step  -     Descending Technique (Stairs)  step-to-step  -       User Key  (r) = Recorded By, (t) = Taken By, (c) = Cosigned By    Initials Name Provider Type     Radha Oliveira PTA Physical Therapy Assistant        Obj/Interventions     Row Name 03/03/21 1323          Motor Skills    Therapeutic Exercise  -- BLE: standing exercises: Toe/heel raises, mini quats (X10), reclined in chair: QS and glute sets (X10)  -       User Key  (r) = Recorded By, (t) = Taken By, (c) = Cosigned By    Initials Name Provider Type     Radha Oliveira PTA Physical Therapy Assistant        Goals/Plan    No documentation.       Clinical Impression     Row Name 03/03/21 1324          Plan of Care Review    Plan of Care Reviewed With  patient  -     Progress  improving  -     Outcome Summary  Pt tolerated treatment with no complaints. Pt is mod. independent with bed mobility and CGA with sit<-> stand transfers. Pt ambulated 400ft with rwx, CGA. Pt navigated 4 steps with CGA and HRS. Pt mildly unsteady but no LOB. Pt performed several standing balance exercises in which pt had min c/o difficulty. Pt would continue to benefit from skilled PT (out patient) to improve strength and balance.  -     Row Name 03/03/21 1324          Positioning and Restraints    Pre-Treatment Position  in bed  -EH     Post Treatment Position  chair  -EH     In Chair  reclined;call light within reach;encouraged to call for assist;exit alarm on  -       User Key  (r) = Recorded By, (t) = Taken By, (c)  = Cosigned By    Initials Name Provider Type     Radha Oliveira PTA Physical Therapy Assistant        Outcome Measures     Row Name 03/03/21 1326          How much help from another person do you currently need...    Turning from your back to your side while in flat bed without using bedrails?  4  -EH     Moving from lying on back to sitting on the side of a flat bed without bedrails?  4  -EH     Moving to and from a bed to a chair (including a wheelchair)?  3  -EH     Standing up from a chair using your arms (e.g., wheelchair, bedside chair)?  3  -EH     Climbing 3-5 steps with a railing?  3  -EH     To walk in hospital room?  3  -EH     AM-PAC 6 Clicks Score (PT)  20  -       User Key  (r) = Recorded By, (t) = Taken By, (c) = Cosigned By    Initials Name Provider Type    Radha Nunez PTA Physical Therapy Assistant        Physical Therapy Education                 Title: PT OT SLP Therapies (Done)     Topic: Physical Therapy (Done)     Point: Mobility training (Done)     Learning Progress Summary           Patient Acceptance, E,D, VU,DU,NR by  at 3/3/2021 1327    Acceptance, E,D, VU,DU,NR by  at 3/1/2021 1132    Acceptance, E, VU,NR by  at 2/27/2021 1144    Comment: Safety with mobility, pt education, use of call light                   Point: Body mechanics (Done)     Learning Progress Summary           Patient Acceptance, E,D, VU,DU,NR by  at 3/3/2021 1327    Acceptance, E,D, VU,DU,NR by  at 3/1/2021 1132    Acceptance, E, VU,NR by  at 2/27/2021 1144    Comment: Safety with mobility, pt education, use of call light                   Point: Precautions (Done)     Learning Progress Summary           Patient Acceptance, E,D, VU,DU,NR by  at 3/3/2021 1327    Acceptance, E,D, VU,DU,NR by  at 3/1/2021 1132    Acceptance, E, VU,NR by  at 2/27/2021 1144    Comment: Safety with mobility, pt education, use of call light                               User Key     Initials Effective Dates Name  Provider Type Discipline     08/19/18 -  Radha Oliveira PTA Physical Therapy Assistant PT     09/17/19 -  Kim Crockett PT Physical Therapist PT              PT Recommendation and Plan     Plan of Care Reviewed With: patient  Progress: improving  Outcome Summary: Pt tolerated treatment with no complaints. Pt is mod. independent with bed mobility and CGA with sit<-> stand transfers. Pt ambulated 400ft with rwx, CGA. Pt navigated 4 steps with CGA and HRS. Pt mildly unsteady but no LOB. Pt performed several standing balance exercises in which pt had min c/o difficulty. Pt would continue to benefit from skilled PT (out patient) to improve strength and balance.     Time Calculation:   PT Charges     Row Name 03/03/21 1321             Time Calculation    Start Time  0850  -      Stop Time  0913  -      Time Calculation (min)  23 min  -      PT Received On  03/03/21  -      PT - Next Appointment  03/05/21  -         Time Calculation- PT    Total Timed Code Minutes- PT  23 minute(s)  -        User Key  (r) = Recorded By, (t) = Taken By, (c) = Cosigned By    Initials Name Provider Type     Radha Oliveira PTA Physical Therapy Assistant        Therapy Charges for Today     Code Description Service Date Service Provider Modifiers Qty    97725816188 HC GAIT TRAINING EA 15 MIN 3/3/2021 Radha Oliveira PTA GP 1    64826927729 HC PT THER PROC EA 15 MIN 3/3/2021 Radha Oliveira PTA GP 1          PT G-Codes  Outcome Measure Options: AM-PAC 6 Clicks Basic Mobility (PT)  AM-PAC 6 Clicks Score (PT): 20    Radha Oliveira PTA  3/3/2021

## 2021-03-03 NOTE — OUTREACH NOTE
Prep Survey      Responses   [facility] patient discharged from?  [place]   Is LACE score < 7 ?  No   Emergency Room discharge w/ pulse ox?  No   Eligibility  [facility]   Date of Admission  02/26/21   Date of Discharge  03/03/21   Discharge Disposition  Home or Self Care   Discharge diagnosis  Cellulitis of right foot    Does the patient have one of the following disease processes/diagnoses(primary or secondary)?  Other   Does the patient have Home health ordered?  No   Is there a DME ordered?  No   Prep survey completed?  Yes          Renetta Montero RN

## 2021-03-03 NOTE — PLAN OF CARE
Goal Outcome Evaluation:  Plan of Care Reviewed With: patient  Progress: improving  Outcome Summary: Pt tolerated treatment with no complaints. Pt is mod. independent with bed mobility and CGA with sit<-> stand transfers. Pt ambulated 400ft with rwx, CGA. Pt navigated 4 steps with CGA and HRS. Pt mildly unsteady but no LOB. Pt performed several standing balance exercises in which pt had min c/o difficulty. Pt would continue to benefit from skilled PT (out patient) to improve strength and balance.  ..Patient was wearing a face mask during this therapy encounter. Therapist used appropriate personal protective equipment including eye protection, mask, and gloves.  Mask used was standard procedure mask. Appropriate PPE was worn during the entire therapy session. Hand hygiene was completed before and after therapy session. Patient is not in enhanced droplet precautions.

## 2021-03-04 ENCOUNTER — TRANSITIONAL CARE MANAGEMENT TELEPHONE ENCOUNTER (OUTPATIENT)
Dept: CALL CENTER | Facility: HOSPITAL | Age: 76
End: 2021-03-04

## 2021-03-04 NOTE — OUTREACH NOTE
Call Center TCM Note      Responses   [facility] patient discharged from?  [place]   Does the patient have one of the following disease processes/diagnoses(primary or secondary)?  Other   TCM attempt successful?  No   Unsuccessful attempts  Attempt 1          Marcelle Kolb MA    3/4/2021, 12:36 EST

## 2021-03-04 NOTE — OUTREACH NOTE
Call Center TCM Note      Responses   [facility] patient discharged from?  [place]   Does the patient have one of the following disease processes/diagnoses(primary or secondary)?  Other   TCM attempt successful?  No   Unsuccessful attempts  Attempt 2          Marcelle Kolb MA    3/4/2021, 16:13 EST

## 2021-03-05 ENCOUNTER — TRANSITIONAL CARE MANAGEMENT TELEPHONE ENCOUNTER (OUTPATIENT)
Dept: CALL CENTER | Facility: HOSPITAL | Age: 76
End: 2021-03-05

## 2021-03-05 NOTE — OUTREACH NOTE
Call Center TCM Note      Responses   [facility] patient discharged from?  [place]   Does the patient have one of the following disease processes/diagnoses(primary or secondary)?  Other   TCM attempt successful?  Yes   Call start time  0915   Call end time  0927   Discharge diagnosis  Cellulitis of right foot    Meds reviewed with patient/caregiver?  Yes   Does the patient have all medications ordered at discharge?  Yes   Is the patient taking all medications as directed (includes completed medication regime)?  Yes   Does the patient have a primary care provider?   Yes   Does the patient have an appointment with their PCP within 7 days of discharge?  Greater than 7 days   Comments regarding PCP  Patient  has a new patient appt/ hospital followup with Dr Franco on 3/18/2021   What is preventing the patient from scheduling follow up appointments within 7 days of discharge?  Earlier appointment not available   Nursing Interventions  Verified appointment date/time/provider   Has the patient kept scheduled appointments due by today?  N/A   Has home health visited the patient within 72 hours of discharge?  N/A   Psychosocial issues?  No   Comments  Wife is doing dressing changes. She states wound is healing.    Did the patient receive a copy of their discharge instructions?  Yes   Nursing interventions  Reviewed instructions with patient   What is the patient's perception of their health status since discharge?  Improving   Is the patient/caregiver able to teach back signs and symptoms related to disease process for when to call PCP?  Yes   Is the patient/caregiver able to teach back signs and symptoms related to disease process for when to call 911?  Yes   Is the patient/caregiver able to teach back the hierarchy of who to call/visit for symptoms/problems? PCP, Specialist, Home health nurse, Urgent Care, ED, 911  Yes   If the patient is a current smoker, are they able to teach back resources for cessation?  Not  a smoker   TCM call completed?  Yes          Willian Noland RN    3/5/2021, 09:30 EST

## 2021-03-10 ENCOUNTER — IMMUNIZATION (OUTPATIENT)
Dept: VACCINE CLINIC | Facility: HOSPITAL | Age: 76
End: 2021-03-10

## 2021-03-10 ENCOUNTER — READMISSION MANAGEMENT (OUTPATIENT)
Dept: CALL CENTER | Facility: HOSPITAL | Age: 76
End: 2021-03-10

## 2021-03-10 PROCEDURE — 0001A: CPT | Performed by: INTERNAL MEDICINE

## 2021-03-10 PROCEDURE — 91300 HC SARSCOV02 VAC 30MCG/0.3ML IM: CPT | Performed by: INTERNAL MEDICINE

## 2021-03-10 NOTE — OUTREACH NOTE
Medical Week 2 Survey      Responses   [facility] patient discharged from?  [place]   Does the patient have one of the following disease processes/diagnoses(primary or secondary)?  Other   Week 2 attempt successful?  Yes   Call start time  1719   Discharge diagnosis  Cellulitis of right foot    Call end time  1724   Meds reviewed with patient/caregiver?  Yes   Is the patient having any side effects they believe may be caused by any medication additions or changes?  No   Does the patient have all medications ordered at discharge?  Yes   Is the patient taking all medications as directed (includes completed medication regime)?  Yes   Medication comments  COVID vaccine today 03/10/2021   Does the patient have a primary care provider?   Yes   Has the patient kept scheduled appointments due by today?  N/A   Comments  Wife is applying salve and  gauze pads to foot   What is the patient's perception of their health status since discharge?  Improving   Week 2 Call Completed?  Yes          Joan Parra RN

## 2021-03-12 RX ORDER — BUSPIRONE HYDROCHLORIDE 5 MG/1
TABLET ORAL
Qty: 30 TABLET | Refills: 0 | Status: SHIPPED | OUTPATIENT
Start: 2021-03-12 | End: 2021-03-18 | Stop reason: SDUPTHER

## 2021-03-18 ENCOUNTER — OFFICE VISIT (OUTPATIENT)
Dept: FAMILY MEDICINE CLINIC | Facility: CLINIC | Age: 76
End: 2021-03-18

## 2021-03-18 ENCOUNTER — TELEPHONE (OUTPATIENT)
Dept: FAMILY MEDICINE CLINIC | Facility: CLINIC | Age: 76
End: 2021-03-18

## 2021-03-18 ENCOUNTER — READMISSION MANAGEMENT (OUTPATIENT)
Dept: CALL CENTER | Facility: HOSPITAL | Age: 76
End: 2021-03-18

## 2021-03-18 VITALS
BODY MASS INDEX: 23.25 KG/M2 | HEART RATE: 57 BPM | OXYGEN SATURATION: 98 % | DIASTOLIC BLOOD PRESSURE: 76 MMHG | SYSTOLIC BLOOD PRESSURE: 122 MMHG | HEIGHT: 69 IN | WEIGHT: 157 LBS | TEMPERATURE: 97.8 F

## 2021-03-18 DIAGNOSIS — I71.40 ABDOMINAL AORTIC ANEURYSM (AAA) 3.0 CM TO 5.5 CM IN DIAMETER IN MALE (HCC): ICD-10-CM

## 2021-03-18 DIAGNOSIS — G30.1 LATE ONSET ALZHEIMER'S DEMENTIA WITHOUT BEHAVIORAL DISTURBANCE (HCC): ICD-10-CM

## 2021-03-18 DIAGNOSIS — E11.51 TYPE 2 DIABETES MELLITUS WITH DIABETIC PERIPHERAL ANGIOPATHY WITHOUT GANGRENE, WITHOUT LONG-TERM CURRENT USE OF INSULIN (HCC): ICD-10-CM

## 2021-03-18 DIAGNOSIS — L97.512 SKIN ULCER OF TOE OF RIGHT FOOT WITH FAT LAYER EXPOSED (HCC): ICD-10-CM

## 2021-03-18 DIAGNOSIS — I48.20 CHRONIC ATRIAL FIBRILLATION (HCC): ICD-10-CM

## 2021-03-18 DIAGNOSIS — I73.9 PERIPHERAL ARTERIAL DISEASE (HCC): Primary | ICD-10-CM

## 2021-03-18 DIAGNOSIS — F41.9 ANXIETY: ICD-10-CM

## 2021-03-18 DIAGNOSIS — E78.49 OTHER HYPERLIPIDEMIA: Chronic | ICD-10-CM

## 2021-03-18 DIAGNOSIS — L03.115 CELLULITIS OF RIGHT FOOT: ICD-10-CM

## 2021-03-18 DIAGNOSIS — F02.80 LATE ONSET ALZHEIMER'S DEMENTIA WITHOUT BEHAVIORAL DISTURBANCE (HCC): ICD-10-CM

## 2021-03-18 DIAGNOSIS — I25.118 CORONARY ARTERY DISEASE INVOLVING NATIVE CORONARY ARTERY OF NATIVE HEART WITH OTHER FORM OF ANGINA PECTORIS (HCC): ICD-10-CM

## 2021-03-18 PROCEDURE — 99214 OFFICE O/P EST MOD 30 MIN: CPT | Performed by: INTERNAL MEDICINE

## 2021-03-18 RX ORDER — NITROGLYCERIN 0.4 MG/1
0.4 TABLET SUBLINGUAL
Qty: 30 TABLET | Refills: 0 | Status: SHIPPED | OUTPATIENT
Start: 2021-03-18 | End: 2022-04-22

## 2021-03-18 RX ORDER — LORAZEPAM 0.5 MG/1
0.5 TABLET ORAL EVERY 8 HOURS PRN
Qty: 30 TABLET | Refills: 0 | Status: SHIPPED | OUTPATIENT
Start: 2021-03-18 | End: 2022-04-22

## 2021-03-18 RX ORDER — BUSPIRONE HYDROCHLORIDE 5 MG/1
5 TABLET ORAL 2 TIMES DAILY
Qty: 60 TABLET | Refills: 6 | Status: SHIPPED | OUTPATIENT
Start: 2021-03-18 | End: 2021-10-14

## 2021-03-18 NOTE — PROGRESS NOTES
Subjective   Erick Bruno is a 75 y.o. male.     Chief Complaint   Patient presents with   • Atrial Fibrillation   • MED CONCERNS   • Diabetes     METFORMIN QUESTIONS   • Nail Problem       History of Present Illness   Wife was present during the history-taking and subsequent discussion (and for part of the physical exam) with this patient.  Patient agrees to the presence of the individual during this visit.  Patient has dementia and has difficulty with history.    Is a new patient to our office but coming from within the [facility] organization.  Has extensive history of dementia, diabetes, hyperlipidemia, peripheral artery disease, coronary disease, peripheral vascular disease, carotid artery stenosis, macular degeneration, BPH, anxiety, and seizures.  Was seen in [facility] and admitted from 2/26/2021 3 3/3/2021 with cellulitis of the right foot.  Was put on antibiotics and eventually discharged on Keflex.  Was noted to have atrial fibrillation and was changed from Plavix to Eliquis 5 mg twice daily with aspirin.  Does have follow-up with Dr. Frank Carpenter of vascular secondary to a CTA of the abdomen showing abdominal aorta with prominent atheromatous with penetrating ulcer and infrarenal aortic abdominal aneurysm of 2.9 cm x 3.3 cm.  He also had a moderate left renal artery stenosis, severe stenosis of the right common iliac artery, moderate stenosis of the right external iliac artery, arterial stenosis of the right superficial femoral artery and the deep femoral artery on the right.  The toe ulcer is slowly healing.    Has appointment with cardiology on 4/6/2021 and with vascular on 3/22/2021.  The eliquis is covered but too expensive and on fixed income.    Here for follow-up of diabetes.  Patient states to have been compliant with medications.  Blood sugar monitoring - patient states has been running on average 115.  With a range of 85 - 160.  No episodes of hypoglycemia, nausea, vomiting, new  rashes, syncope or other issues.  Denies any difficulties with the current medication regimen of metformin  mg Daily and glimepiride 2 mg daily. The A1c on 2/27/2021of 6.1% then discharged on 1000 mg BID but cannot tolerate secondary to severe GI issues.    The following portions of the patient's history were reviewed and updated as appropriate: allergies, current medications, past family history, past medical history, past social history, past surgical history and problem list.    Depression Screen:  PHQ-2/PHQ-9 Depression Screening 2/21/2020   Little interest or pleasure in doing things 2   Feeling down, depressed, or hopeless 2   Trouble falling or staying asleep, or sleeping too much 2   Feeling tired or having little energy 0   Poor appetite or overeating 0   Feeling bad about yourself - or that you are a failure or have let yourself or your family down 0   Trouble concentrating on things, such as reading the newspaper or watching television 3   Moving or speaking so slowly that other people could have noticed. Or the opposite - being so fidgety or restless that you have been moving around a lot more than usual 0   Thoughts that you would be better off dead, or of hurting yourself in some way 0   Total Score 9   If you checked off any problems, how difficult have these problems made it for you to do your work, take care of things at home, or get along with other people? Somewhat difficult       Past Medical History:   Diagnosis Date   • Abnormal nuclear stress test 5/21/2018   • Acid reflux    • Anemia    • Anxiety    • Arthritis    • CAD (coronary artery disease)    • CAD (coronary artery disease)    • Carotid artery disease (CMS/HCC)    • Chest discomfort     both typical and atypical   • Dementia (CMS/HCC)    • Diabetes mellitus (CMS/HCC)    • Enlarged prostate    • History of hepatitis A    • History of seizure 2013    S/P TEMPERATURE   • Hyperlipidemia    • Hypertension    • Macular degeneration    •  NSTEMI (non-ST elevated myocardial infarction) (CMS/HCC)    • Panarteritis (CMS/HCC)    • Peripheral arterial disease (CMS/HCC)    • Poor balance    • Precordial pain    • 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   • PVD (peripheral vascular disease) (CMS/HCC)    • Sleep apnea     DOES NOT WEAR CPAP   • SOBOE (shortness of breath on exertion)        Past Surgical History:   Procedure Laterality Date   • APPENDECTOMY     • ATHRECTOMY ILIAC, FEMORAL, TIBIAL ARTERY N/A 10/17/2018    Procedure: AIF ARTERIOGRAM, LEFT SFA  ANGIOPLASTY;  Surgeon: Jayden Marie MD;  Location: [facility] OR;  Service: Vascular   • CHOLECYSTECTOMY     • CHOLECYSTECTOMY WITH INTRAOPERATIVE CHOLANGIOGRAM N/A 2018    Procedure: CHOLECYSTECTOMY LAPAROSCOPIC INTRAOPERATIVE CHOLANGIOGRAM;  Surgeon: Daniel Gonzalez MD;  Location: [facility] MAIN OR;  Service: General   • COLONOSCOPY     • COLONOSCOPY N/A 2018    Procedure: COLONOSCOPY to cecum with polypectomies;  Surgeon: Daniel Gonzalez MD;  Location: [facility] ENDOSCOPY;  Service: Gastroenterology   • ENDOSCOPY N/A 2018    Procedure: ESOPHAGOGASTRODUODENOSCOPY with biopsies;  Surgeon: Daniel Gonzalez MD;  Location: [facility] ENDOSCOPY;  Service: Gastroenterology   • FEMORAL ARTERY STENT Left     on 3/22/16       Family History   Problem Relation Age of Onset   • Hypertension Mother    • Heart failure Father    • Cancer Maternal Aunt    • Diabetes Maternal Grandfather    • Diabetes Daughter    • Malig Hyperthermia Neg Hx        Social History     Socioeconomic History   • Marital status:      Spouse name: Not on file   • Number of children: Not on file   • Years of education: Not on file   • Highest education level: Not on file   Tobacco Use   • Smoking status: Former Smoker     Packs/day: 1.00     Years: 60.00     Pack years: 60.00     Quit date:      Years since quittin.2   • Smokeless tobacco: Never Used   Vaping Use   • Vaping Use: Never used   Substance and Sexual  Activity   • Alcohol use: No     Comment: Daily caffeine use   • Drug use: No   • Sexual activity: Defer       Current Outpatient Medications   Medication Sig Dispense Refill   • apixaban (ELIQUIS) 5 MG tablet tablet Take 1 tablet by mouth Every 12 (Twelve) Hours. Indications: Atrial Fibrillation 60 tablet 0   • aspirin 81 MG chewable tablet Chew 81 mg Every Night. PER JUAN DYKES TO CONTINUE TAKING PER MD     • atorvastatin (LIPITOR) 80 MG tablet Take 1 tablet by mouth once daily (Patient taking differently: Take 80 mg by mouth Daily.) 90 tablet 0   • busPIRone (BUSPAR) 5 MG tablet Take 1 tablet by mouth 2 (Two) Times a Day. 60 tablet 6   • Coenzyme Q10 200 MG capsule Take 200 mg by mouth Every Night. HOLDING FOR SURGERY     • escitalopram (LEXAPRO) 10 MG tablet Take 10 mg by mouth Daily.     • glimepiride (AMARYL) 2 MG tablet TAKE 1 TABLET BY MOUTH ONCE DAILY IN THE MORNING BEFORE BREAKFAST (Patient taking differently: Take 2 mg by mouth Every Morning Before Breakfast.) 90 tablet 0   • isosorbide mononitrate (IMDUR) 30 MG 24 hr tablet Take 1 tablet by mouth nightly (Patient taking differently: Take 30 mg by mouth Every Night.) 90 tablet 0   • LORazepam (ATIVAN) 0.5 MG tablet Take 1 tablet by mouth Every 8 (Eight) Hours As Needed for Anxiety. 30 tablet 0   • melatonin 5 MG tablet tablet Take 5 mg by mouth Every Night.     • metFORMIN ER (GLUCOPHAGE-XR) 500 MG 24 hr tablet Take 1 tablet by mouth Daily With Breakfast. Per spouse, only taking 500mg tab once in am, 4 tabs with supper caused diarrhea     • multivitamin with minerals (MULTIVITAMIN ADULTS PO) Take 1 tablet by mouth Daily.     • mupirocin (BACTROBAN) 2 % ointment Apply  topically to the appropriate area as directed Daily. 22 g 0   • nitroglycerin (Nitrostat) 0.4 MG SL tablet Place 1 tablet under the tongue Every 5 (Five) Minutes As Needed for Chest Pain. Take no more than 3 doses in 15 minutes. 30 tablet 0   • omeprazole (priLOSEC) 20 MG capsule Take 1  "capsule by mouth once daily (Patient taking differently: Take 20 mg by mouth Daily.) 90 capsule 1   • saw palmetto 160 MG capsule Take 160 mg by mouth Every Night. HOLD FOR SURGERY     • vitamin E 1000 UNIT capsule Take 1,200 Units by mouth Every Night. HOLD FOR SURGERY       No current facility-administered medications for this visit.       Review of Systems   Constitutional: Negative for activity change, appetite change, fatigue, fever, unexpected weight gain and unexpected weight loss.   HENT: Negative for nosebleeds, rhinorrhea, trouble swallowing and voice change.    Eyes: Negative for visual disturbance.   Respiratory: Negative for cough, chest tightness, shortness of breath and wheezing.    Cardiovascular: Negative for chest pain, palpitations and leg swelling.   Gastrointestinal: Negative for abdominal pain, blood in stool, constipation, diarrhea, nausea, vomiting, GERD and indigestion.   Genitourinary: Negative for dysuria, frequency and hematuria.   Musculoskeletal: Negative for arthralgias, back pain and myalgias.   Skin: Negative for rash and bruise.        Healing sore on top of the right foot.  Thickened toenails bilaterally.   Neurological: Positive for memory problem. Negative for dizziness, tremors, weakness, light-headedness, numbness and headache.   Hematological: Negative for adenopathy. Does not bruise/bleed easily.   Psychiatric/Behavioral: Negative for sleep disturbance and depressed mood. The patient is not nervous/anxious.        Objective   /76 (BP Location: Left arm, Patient Position: Sitting, Cuff Size: Adult)   Pulse 57   Temp 97.8 °F (36.6 °C) (Temporal)   Ht 175.3 cm (69.02\")   Wt 71.2 kg (157 lb)   SpO2 98%   BMI 23.17 kg/m²     Physical Exam  Vitals and nursing note reviewed.   Constitutional:       General: He is not in acute distress.     Appearance: He is well-developed. He is not diaphoretic.   HENT:      Head: Normocephalic and atraumatic.      Right Ear: External " ear normal.      Left Ear: External ear normal.      Nose: Nose normal.   Eyes:      Conjunctiva/sclera: Conjunctivae normal.      Pupils: Pupils are equal, round, and reactive to light.   Neck:      Thyroid: No thyromegaly.      Trachea: No tracheal deviation.   Cardiovascular:      Rate and Rhythm: Normal rate and regular rhythm.      Heart sounds: Normal heart sounds. No murmur heard.   No friction rub. No gallop.       Comments: Poor pulses in the feet bilaterally.  Pulmonary:      Effort: Pulmonary effort is normal. No respiratory distress.      Breath sounds: Normal breath sounds.   Abdominal:      General: Bowel sounds are normal.      Palpations: Abdomen is soft. There is no mass.      Tenderness: There is no abdominal tenderness. There is no guarding.   Musculoskeletal:         General: Normal range of motion.      Cervical back: Normal range of motion and neck supple.   Lymphadenopathy:      Cervical: No cervical adenopathy.   Skin:     General: Skin is warm and dry.      Capillary Refill: Capillary refill takes less than 2 seconds.      Findings: No rash.      Comments: Wound top of the right foot   Neurological:      Mental Status: He is alert and oriented to person, place, and time.      Motor: No abnormal muscle tone.      Deep Tendon Reflexes: Reflexes normal.   Psychiatric:         Behavior: Behavior normal.         Thought Content: Thought content normal.         Judgment: Judgment normal.         Recent Results (from the past 2016 hour(s))   COVID-19,BIOTAP, NP/OP SWAB IN TRANSPORT MEDIA OR SALINE 24-36 HR TAT - Swab, Nasopharynx    Collection Time: 12/26/20  1:25 PM    Specimen: Nasopharynx; Swab   Result Value Ref Range    SARS-CoV-2 PCR Not Detected Not Detected   Stress Test With Myocardial Perfusion One Day    Collection Time: 12/29/20 11:23 AM   Result Value Ref Range    Nuclear Prior Study 1      CV STRESS PROTOCOL 1 Pharmacologic     Stage 1 1     HR Stage 1 71     BP Stage 1 130/78      Duration Min Stage 1 0     Duration Sec Stage 1 10     Stress Dose Regadenoson Stage 1 0.4     Stress Comments Stage 1 10 sec bolus injection     Baseline HR 52 bpm    Baseline /80 mmHg    Peak HR 71 bpm    Percent Max Pred HR 48.63 %    Percent Target HR 57 %    Peak /78 mmHg    Recovery HR 63 bpm    Recovery /80 mmHg    Target HR (85%) 124 bpm    Max. Pred. HR (100%) 146 bpm    Exercise duration (sec) 10 sec    Nuc Stress EF 54 %   Basic Metabolic Panel    Collection Time: 01/13/21 11:16 AM    Specimen: Blood   Result Value Ref Range    Glucose 104 (H) 65 - 99 mg/dL    BUN 18 8 - 23 mg/dL    Creatinine 1.21 0.76 - 1.27 mg/dL    Sodium 135 (L) 136 - 145 mmol/L    Potassium 5.0 3.5 - 5.2 mmol/L    Chloride 99 98 - 107 mmol/L    CO2 27.0 22.0 - 29.0 mmol/L    Calcium 8.9 8.6 - 10.5 mg/dL    eGFR Non African Amer 59 (L) >60 mL/min/1.73    BUN/Creatinine Ratio 14.9 7.0 - 25.0    Anion Gap 9.0 5.0 - 15.0 mmol/L   CBC (No Diff)    Collection Time: 01/13/21 11:17 AM    Specimen: Blood   Result Value Ref Range    WBC 5.85 3.40 - 10.80 10*3/mm3    RBC 4.13 (L) 4.14 - 5.80 10*6/mm3    Hemoglobin 13.1 13.0 - 17.7 g/dL    Hematocrit 38.3 37.5 - 51.0 %    MCV 92.7 79.0 - 97.0 fL    MCH 31.7 26.6 - 33.0 pg    MCHC 34.2 31.5 - 35.7 g/dL    RDW 12.6 12.3 - 15.4 %    RDW-SD 42.4 37.0 - 54.0 fl    MPV 9.6 6.0 - 12.0 fL    Platelets 182 140 - 450 10*3/mm3   COVID-19,BIOTAP, NP/OP SWAB IN TRANSPORT MEDIA OR SALINE 24-36 HR TAT - Swab, Nasopharynx    Collection Time: 01/13/21 11:34 AM    Specimen: Nasopharynx; Swab   Result Value Ref Range    SARS-CoV-2 PCR Not Detected Not Detected   POC Glucose Once    Collection Time: 01/15/21  8:06 AM    Specimen: Blood   Result Value Ref Range    Glucose 129 70 - 130 mg/dL   Type & Screen    Collection Time: 01/15/21  8:55 AM    Specimen: Blood   Result Value Ref Range    ABO Type O     RH type Positive     Antibody Screen Negative     T&S Expiration Date 1/18/2021 11:59:59 PM     POC Activated Clotting Time    Collection Time: 01/15/21 10:33 AM    Specimen: Blood   Result Value Ref Range    Activated Clotting Time  131 82 - 152 Seconds   POC Activated Clotting Time    Collection Time: 01/15/21 11:09 AM    Specimen: Blood   Result Value Ref Range    Activated Clotting Time  307 (H) 82 - 152 Seconds   POC Glucose Once    Collection Time: 01/15/21 12:43 PM    Specimen: Blood   Result Value Ref Range    Glucose 112 70 - 130 mg/dL   POC Glucose Once    Collection Time: 01/15/21  5:00 PM    Specimen: Blood   Result Value Ref Range    Glucose 89 70 - 130 mg/dL   Basic Metabolic Panel    Collection Time: 01/16/21  4:46 AM    Specimen: Blood   Result Value Ref Range    Glucose 85 65 - 99 mg/dL    BUN 18 8 - 23 mg/dL    Creatinine 1.06 0.76 - 1.27 mg/dL    Sodium 134 (L) 136 - 145 mmol/L    Potassium 3.9 3.5 - 5.2 mmol/L    Chloride 100 98 - 107 mmol/L    CO2 24.2 22.0 - 29.0 mmol/L    Calcium 7.3 (L) 8.6 - 10.5 mg/dL    eGFR Non African Amer 68 >60 mL/min/1.73    BUN/Creatinine Ratio 17.0 7.0 - 25.0    Anion Gap 9.8 5.0 - 15.0 mmol/L   CBC Auto Differential    Collection Time: 01/16/21  4:46 AM    Specimen: Blood   Result Value Ref Range    WBC 5.21 3.40 - 10.80 10*3/mm3    RBC 3.24 (L) 4.14 - 5.80 10*6/mm3    Hemoglobin 10.2 (L) 13.0 - 17.7 g/dL    Hematocrit 29.4 (L) 37.5 - 51.0 %    MCV 90.7 79.0 - 97.0 fL    MCH 31.5 26.6 - 33.0 pg    MCHC 34.7 31.5 - 35.7 g/dL    RDW 12.5 12.3 - 15.4 %    RDW-SD 40.9 37.0 - 54.0 fl    MPV 9.4 6.0 - 12.0 fL    Platelets 138 (L) 140 - 450 10*3/mm3    Neutrophil % 60.6 42.7 - 76.0 %    Lymphocyte % 26.9 19.6 - 45.3 %    Monocyte % 7.3 5.0 - 12.0 %    Eosinophil % 3.8 0.3 - 6.2 %    Basophil % 1.0 0.0 - 1.5 %    Immature Grans % 0.4 0.0 - 0.5 %    Neutrophils, Absolute 3.16 1.70 - 7.00 10*3/mm3    Lymphocytes, Absolute 1.40 0.70 - 3.10 10*3/mm3    Monocytes, Absolute 0.38 0.10 - 0.90 10*3/mm3    Eosinophils, Absolute 0.20 0.00 - 0.40 10*3/mm3    Basophils,  Absolute 0.05 0.00 - 0.20 10*3/mm3    Immature Grans, Absolute 0.02 0.00 - 0.05 10*3/mm3    nRBC 0.0 0.0 - 0.2 /100 WBC   POC Glucose Once    Collection Time: 01/16/21 11:48 AM    Specimen: Blood   Result Value Ref Range    Glucose 119 70 - 130 mg/dL   POC Glucose Once    Collection Time: 01/16/21  4:29 PM    Specimen: Blood   Result Value Ref Range    Glucose 94 70 - 130 mg/dL   Comprehensive Metabolic Panel    Collection Time: 02/26/21  4:59 PM    Specimen: Arm, Right; Blood   Result Value Ref Range    Glucose 108 (H) 65 - 99 mg/dL    BUN 21 8 - 23 mg/dL    Creatinine 1.25 0.76 - 1.27 mg/dL    Sodium 137 136 - 145 mmol/L    Potassium 3.9 3.5 - 5.2 mmol/L    Chloride 103 98 - 107 mmol/L    CO2 25.9 22.0 - 29.0 mmol/L    Calcium 8.5 (L) 8.6 - 10.5 mg/dL    Total Protein 6.7 6.0 - 8.5 g/dL    Albumin 3.90 3.50 - 5.20 g/dL    ALT (SGPT) 23 1 - 41 U/L    AST (SGOT) 20 1 - 40 U/L    Alkaline Phosphatase 100 39 - 117 U/L    Total Bilirubin 0.5 0.0 - 1.2 mg/dL    eGFR Non African Amer 56 (L) >60 mL/min/1.73    Globulin 2.8 gm/dL    A/G Ratio 1.4 g/dL    BUN/Creatinine Ratio 16.8 7.0 - 25.0    Anion Gap 8.1 5.0 - 15.0 mmol/L   Sedimentation Rate    Collection Time: 02/26/21  4:59 PM    Specimen: Arm, Right; Blood   Result Value Ref Range    Sed Rate 28 (H) 0 - 20 mm/hr   C-reactive Protein    Collection Time: 02/26/21  4:59 PM    Specimen: Arm, Right; Blood   Result Value Ref Range    C-Reactive Protein 0.48 0.00 - 0.50 mg/dL   CBC Auto Differential    Collection Time: 02/26/21  4:59 PM    Specimen: Arm, Right; Blood   Result Value Ref Range    WBC 8.13 3.40 - 10.80 10*3/mm3    RBC 3.93 (L) 4.14 - 5.80 10*6/mm3    Hemoglobin 12.2 (L) 13.0 - 17.7 g/dL    Hematocrit 36.4 (L) 37.5 - 51.0 %    MCV 92.6 79.0 - 97.0 fL    MCH 31.0 26.6 - 33.0 pg    MCHC 33.5 31.5 - 35.7 g/dL    RDW 13.2 12.3 - 15.4 %    RDW-SD 45.0 37.0 - 54.0 fl    MPV 9.6 6.0 - 12.0 fL    Platelets 187 140 - 450 10*3/mm3    Neutrophil % 64.4 42.7 - 76.0 %     Lymphocyte % 26.1 19.6 - 45.3 %    Monocyte % 5.7 5.0 - 12.0 %    Eosinophil % 2.7 0.3 - 6.2 %    Basophil % 0.9 0.0 - 1.5 %    Immature Grans % 0.2 0.0 - 0.5 %    Neutrophils, Absolute 5.24 1.70 - 7.00 10*3/mm3    Lymphocytes, Absolute 2.12 0.70 - 3.10 10*3/mm3    Monocytes, Absolute 0.46 0.10 - 0.90 10*3/mm3    Eosinophils, Absolute 0.22 0.00 - 0.40 10*3/mm3    Basophils, Absolute 0.07 0.00 - 0.20 10*3/mm3    Immature Grans, Absolute 0.02 0.00 - 0.05 10*3/mm3    nRBC 0.0 0.0 - 0.2 /100 WBC   COVID-19,[facility]-HOUSE, NP/OP SWAB IN UTM/VTM/SALINE TRANSPORT MEDIA,24 HR TAT - Swab, Nasopharynx    Collection Time: 02/26/21  5:49 PM    Specimen: Nasopharynx; Swab   Result Value Ref Range    COVID19 Not Detected Not Detected - Ref. Range   Blood Culture - Blood, Wrist, Right    Collection Time: 02/26/21  7:24 PM    Specimen: Wrist, Right; Blood   Result Value Ref Range    Blood Culture No growth at 5 days    Blood Culture - Blood, Hand, Right    Collection Time: 02/26/21  7:24 PM    Specimen: Hand, Right; Blood   Result Value Ref Range    Blood Culture No growth at 5 days    POC Glucose Once    Collection Time: 02/26/21  9:13 PM    Specimen: Blood   Result Value Ref Range    Glucose 164 (H) 70 - 130 mg/dL   Hemoglobin A1c    Collection Time: 02/27/21  6:39 AM    Specimen: Blood   Result Value Ref Range    Hemoglobin A1C 6.10 (H) 4.80 - 5.60 %   Basic Metabolic Panel    Collection Time: 02/27/21  6:39 AM    Specimen: Blood   Result Value Ref Range    Glucose 100 (H) 65 - 99 mg/dL    BUN 19 8 - 23 mg/dL    Creatinine 1.16 0.76 - 1.27 mg/dL    Sodium 138 136 - 145 mmol/L    Potassium 4.0 3.5 - 5.2 mmol/L    Chloride 104 98 - 107 mmol/L    CO2 25.7 22.0 - 29.0 mmol/L    Calcium 8.3 (L) 8.6 - 10.5 mg/dL    eGFR Non African Amer 62 >60 mL/min/1.73    BUN/Creatinine Ratio 16.4 7.0 - 25.0    Anion Gap 8.3 5.0 - 15.0 mmol/L   CBC (No Diff)    Collection Time: 02/27/21  6:39 AM    Specimen: Blood   Result Value Ref  Range    WBC 6.32 3.40 - 10.80 10*3/mm3    RBC 3.87 (L) 4.14 - 5.80 10*6/mm3    Hemoglobin 12.3 (L) 13.0 - 17.7 g/dL    Hematocrit 36.0 (L) 37.5 - 51.0 %    MCV 93.0 79.0 - 97.0 fL    MCH 31.8 26.6 - 33.0 pg    MCHC 34.2 31.5 - 35.7 g/dL    RDW 12.7 12.3 - 15.4 %    RDW-SD 43.1 37.0 - 54.0 fl    MPV 9.7 6.0 - 12.0 fL    Platelets 184 140 - 450 10*3/mm3   POC Glucose Once    Collection Time: 02/27/21  7:16 AM    Specimen: Blood   Result Value Ref Range    Glucose 104 70 - 130 mg/dL   POC Glucose Once    Collection Time: 02/27/21 11:12 AM    Specimen: Blood   Result Value Ref Range    Glucose 201 (H) 70 - 130 mg/dL   Doppler Ankle Brachial Index Single Level CAR    Collection Time: 02/27/21  1:25 PM   Result Value Ref Range    RIGHT DORSALIS PEDIS SYS MAX 64 mmHg    RIGHT POST TIBIAL SYS  mmHg    RIGHT GREAT TOE SYS MAX 50 mmHg    RIGHT JODI RATIO 0.8     RIGHT TBI RATIO 0.28     LEFT DORSALIS PEDIS SYS  mmHg    LEFT POST TIBIAL SYS MAX 52 mmHg    LEFT GREAT TOE SYS  mmHg    LEFT JODI RATIO 0.74     LEFT TBI RATIO 0.57     Upper arterial right arm brachial sys max 177 mmHg    Upper arterial left arm brachial sys max 170 mmHg   POC Glucose Once    Collection Time: 02/27/21  3:06 PM    Specimen: Blood   Result Value Ref Range    Glucose 105 70 - 130 mg/dL   POC Glucose Once    Collection Time: 02/27/21  4:40 PM    Specimen: Blood   Result Value Ref Range    Glucose 177 (H) 70 - 130 mg/dL   CBC (No Diff)    Collection Time: 02/28/21  4:40 AM    Specimen: Blood   Result Value Ref Range    WBC 6.84 3.40 - 10.80 10*3/mm3    RBC 3.75 (L) 4.14 - 5.80 10*6/mm3    Hemoglobin 12.1 (L) 13.0 - 17.7 g/dL    Hematocrit 35.3 (L) 37.5 - 51.0 %    MCV 94.1 79.0 - 97.0 fL    MCH 32.3 26.6 - 33.0 pg    MCHC 34.3 31.5 - 35.7 g/dL    RDW 13.1 12.3 - 15.4 %    RDW-SD 45.5 37.0 - 54.0 fl    MPV 9.7 6.0 - 12.0 fL    Platelets 181 140 - 450 10*3/mm3   Basic Metabolic Panel    Collection Time: 02/28/21  4:40 AM    Specimen:  Blood   Result Value Ref Range    Glucose 111 (H) 65 - 99 mg/dL    BUN 18 8 - 23 mg/dL    Creatinine 1.11 0.76 - 1.27 mg/dL    Sodium 138 136 - 145 mmol/L    Potassium 3.7 3.5 - 5.2 mmol/L    Chloride 105 98 - 107 mmol/L    CO2 23.7 22.0 - 29.0 mmol/L    Calcium 8.2 (L) 8.6 - 10.5 mg/dL    eGFR Non African Amer 65 >60 mL/min/1.73    BUN/Creatinine Ratio 16.2 7.0 - 25.0    Anion Gap 9.3 5.0 - 15.0 mmol/L   TSH    Collection Time: 02/28/21  4:40 AM    Specimen: Blood   Result Value Ref Range    TSH 2.670 0.270 - 4.200 uIU/mL   POC Glucose Once    Collection Time: 02/28/21  7:29 AM    Specimen: Blood   Result Value Ref Range    Glucose 116 70 - 130 mg/dL   POC Glucose Once    Collection Time: 02/28/21 11:10 AM    Specimen: Blood   Result Value Ref Range    Glucose 123 70 - 130 mg/dL   ECG 12 Lead    Collection Time: 02/28/21  2:41 PM   Result Value Ref Range    QT Interval 462 ms   Duplex Carotid Ultrasound CAR    Collection Time: 02/28/21  4:14 PM   Result Value Ref Range    Prox CCA PSV 65.2 cm/sec    Prox CCA PSV 50.3 cm/sec    Prox CCA EDV 12.2 cm/sec    Prox CCA EDV 9.8 cm/sec    Dist CCA PSV -121.0 cm/sec    Dist CCA PSV 78.6 cm/sec    Dist CCA EDV -30.4 cm/sec    Dist CCA EDV 15.2 cm/sec    CCA ratio vikki -121.0 cm/sec    CCA ratio vikki 78.6 cm/sec    Prox ECA .0 cm/sec    Prox ECA PSV -279.0 cm/sec    Prox ECA EDV 12.6 cm/sec    Prox ECA EDV -15.0 cm/sec    Prox ICA .0 cm/sec    Prox ICA PSV -88.5 cm/sec    Prox ICA EDV 25.1 cm/sec    Prox ICA EDV -14.7 cm/sec    Mid ICA .0 cm/sec    Mid ICA PSV 80.3 cm/sec    Mid ICA EDV 24.4 cm/sec    Mid ICA EDV 14.7 cm/sec    Dist ICA PSV -57.0 cm/sec    Dist ICA PSV -75.6 cm/sec    Dist ICA EDV -14.5 cm/sec    Dist ICA EDV -17.6 cm/sec    ICA ratio vikki 135.0 cm/sec    ICA ratio vikki -88.5 cm/sec    Vertebral A PSV 22.9 cm/sec    Vertebral A PSV 68.0 cm/sec    Vertebral A EDV 2.1 cm/sec    Vertebral A EDV 11.7 cm/sec    ICA/CCA ratio -1.1     ICA/CCA ratio  -1.1     Prox SCLA .0 cm/sec    Prox SCLA .0 cm/sec    Rt.native proximal to stent PSV 78.6 cm/s    Rt. Proximal Stent PSV 70.9 cm/s    Rt. Mid Stent PSV 88.5 cm/s    Rt Distal Stent PSV 80.3 cm/s    Rt Dis To Stent PSV 82.7 cm/s    Rt Prx to Stent EDV 15.2 cm/s    Rt Prx Stent EDV 9.38 cm/s    Rt Mid Stent EDV 14.7 cm/s    Rt. Distal Stent EDV 13.5 cm/s    Rt distal to stent EDV 19.3 cm/s    Right CCA STENT 1 cm/s   Duplex Vein Mapping Lower Extremity - Bilateral CAR    Collection Time: 02/28/21  6:04 PM   Result Value Ref Range    GSV ORIGIN DIST RIGHT 0.30 cm    DIST GSV THIGH DIST RIGHT 0.17 cm    MID GSV THIGH  RIGHT 0.14 cm    PROX GSV THIGH  RIGHT 0.18 cm    GSV KNEE DIST RIGHT 0.13 cm    DIST GSV CALF DIST RIGHT 0.17 cm    GSV ANKLE DIST RIGHT 0.18 cm    PROX LSV CALF DIST RIGHT 0.08 cm    MID LSV CALF DIST RIGHT 0.09 cm    DIST LSV CALF DIST RIGHT 0.09 cm    GSV ORIGIN DIST LEFT 0.49 cm    DIST GSV THIGH DIST LEFT 0.13 cm    MID GSV THIGH  LEFT 0.18 cm    PROX GSV THIGH  LEFT 0.33 cm    GSV KNEE DIST LEFT 0.10 cm    PROX GSV CALF DIST LEFT 0.08 cm    DIST GSV CALF DIST LEFT 0.11 cm    GSV ANKLE DIST LEFT 0.09 cm    PROX LSV CALF DIST LEFT 0.12 cm    MID GSV CALF RIGHT 0.15 cm    MID GSV CALF LEFT 0.08 cm    PROX GSV CALF DIST RIGHT 0.17 cm   Basic Metabolic Panel    Collection Time: 03/01/21  5:54 AM    Specimen: Arm, Left; Blood   Result Value Ref Range    Glucose 111 (H) 65 - 99 mg/dL    BUN 20 8 - 23 mg/dL    Creatinine 1.38 (H) 0.76 - 1.27 mg/dL    Sodium 138 136 - 145 mmol/L    Potassium 3.9 3.5 - 5.2 mmol/L    Chloride 104 98 - 107 mmol/L    CO2 24.5 22.0 - 29.0 mmol/L    Calcium 8.7 8.6 - 10.5 mg/dL    eGFR Non African Amer 50 (L) >60 mL/min/1.73    BUN/Creatinine Ratio 14.5 7.0 - 25.0    Anion Gap 9.5 5.0 - 15.0 mmol/L   CBC (No Diff)    Collection Time: 03/01/21  6:32 AM    Specimen: Blood   Result Value Ref Range    WBC 6.93 3.40 - 10.80 10*3/mm3    RBC 4.17 4.14 - 5.80 10*6/mm3     Hemoglobin 12.9 (L) 13.0 - 17.7 g/dL    Hematocrit 38.4 37.5 - 51.0 %    MCV 92.1 79.0 - 97.0 fL    MCH 30.9 26.6 - 33.0 pg    MCHC 33.6 31.5 - 35.7 g/dL    RDW 13.0 12.3 - 15.4 %    RDW-SD 43.3 37.0 - 54.0 fl    MPV 9.4 6.0 - 12.0 fL    Platelets 202 140 - 450 10*3/mm3   Adult Transthoracic Echo Complete W/ Cont if Necessary Per Protocol    Collection Time: 03/01/21 11:20 AM   Result Value Ref Range    BSA 1.9 m^2    IVSd 0.9 cm    LVIDd 5.1 cm    LVIDs 3.8 cm    LVPWd 1.0 cm    IVS/LVPW 0.9     FS 25.5 %    EDV(Teich) 123.8 ml    ESV(Teich) 62.0 ml    EF(Teich) 50.0 %    EDV(cubed) 132.7 ml    ESV(cubed) 54.9 ml    EF(cubed) 58.6 %    LV mass(C)d 175.6 grams    LV mass(C)dI 93.5 grams/m^2    SV(Teich) 61.9 ml    SI(Teich) 32.9 ml/m^2    SV(cubed) 77.8 ml    SI(cubed) 41.4 ml/m^2    Ao root diam 3.3 cm    Ao root area 8.6 cm^2    ACS 1.3 cm    asc Aorta Diam 2.9 cm    LVOT diam 1.9 cm    LVOT area 2.8 cm^2    LVOT area(traced) 2.8 cm^2    LVLd ap4 7.0 cm    EDV(MOD-sp4) 82.0 ml    LVLs ap4 6.4 cm    ESV(MOD-sp4) 37.0 ml    EF(MOD-sp4) 54.9 %    LVLd ap2 7.1 cm    EDV(MOD-sp2) 90.0 ml    LVLs ap2 5.8 cm    ESV(MOD-sp2) 44.0 ml    EF(MOD-sp2) 51.1 %    SV(MOD-sp4) 45.0 ml    SI(MOD-sp4) 24.0 ml/m^2    SV(MOD-sp2) 46.0 ml    SI(MOD-sp2) 24.5 ml/m^2    Ao root area (BSA corrected) 1.8     LV Carvalho Vol (BSA corrected) 43.6 ml/m^2    LV Sys Vol (BSA corrected) 19.7 ml/m^2    EF(MOD-bp) 50.9 %    MV A dur 0.13 sec    MV E max vikki 68.1 cm/sec    MV A max vikki 93.8 cm/sec    MV E/A 0.73     MV V2 max 89.7 cm/sec    MV max PG 3.2 mmHg    MV V2 mean 46.1 cm/sec    MV mean PG 1.0 mmHg    MV V2 VTI 30.5 cm    MVA(VTI) 1.5 cm^2    MV P1/2t max vikki 76.0 cm/sec    MV P1/2t 125.1 msec    MVA(P1/2t) 1.8 cm^2    MV dec slope 178.0 cm/sec^2    MV dec time 222 sec    Ao pk vikki 153.0 cm/sec    Ao max PG 9.4 mmHg    Ao max PG (full) 7.5 mmHg    Ao V2 mean 105.0 cm/sec    Ao mean PG 5.0 mmHg    Ao mean PG (full) 4.0 mmHg    Ao V2 VTI 34.6  cm    ABDULAZIZ(I,A) 1.3 cm^2    ABDULAZIZ(I,D) 1.3 cm^2    ABDULAZIZ(V,A) 1.3 cm^2    ABDULAZIZ(V,D) 1.3 cm^2    LV V1 max PG 1.9 mmHg    LV V1 mean PG 1.0 mmHg    LV V1 max 68.5 cm/sec    LV V1 mean 45.9 cm/sec    LV V1 VTI 15.6 cm    SV(Ao) 295.9 ml    SI(Ao) 157.5 ml/m^2    SV(LVOT) 44.2 ml    SI(LVOT) 23.5 ml/m^2    RAP systole 3.0 mmHg    MVA P1/2T LCG 2.9 cm^2    RV Base 2.8 cm    RV Length 6.0 cm    RV Mid 2.2 cm    Lat Peak E' Ricardo 5.3 cm/sec    Med Peak E' Ricardo 4.2 cm/sec    RV S' 11.3 cm/sec     CV ECHO MAC - BZI_BMI 23.6 kilograms/m^2     CV ECHO MAC - BSA(HAYCOCK) 1.9 m^2     CV ECHO MAC - BZI_METRIC_WEIGHT 72.6 kg     CV ECHO MAC - BZI_METRIC_HEIGHT 175.3 cm    Avg E/e' ratio 14.34     Target HR (85%) 124 bpm    Max. Pred. HR (100%) 146 bpm    LA Volume Index 19.0 mL/m2    TAPSE (>1.6) 1.70 cm    Echo EF Estimated 51 %   POC Glucose Once    Collection Time: 03/02/21 11:45 AM    Specimen: Blood   Result Value Ref Range    Glucose 131 (H) 70 - 130 mg/dL   POC Glucose Once    Collection Time: 03/02/21  4:31 PM    Specimen: Blood   Result Value Ref Range    Glucose 160 (H) 70 - 130 mg/dL   Basic Metabolic Panel    Collection Time: 03/03/21  6:59 AM    Specimen: Blood   Result Value Ref Range    Glucose 116 (H) 65 - 99 mg/dL    BUN 17 8 - 23 mg/dL    Creatinine 1.21 0.76 - 1.27 mg/dL    Sodium 136 136 - 145 mmol/L    Potassium 3.8 3.5 - 5.2 mmol/L    Chloride 101 98 - 107 mmol/L    CO2 25.5 22.0 - 29.0 mmol/L    Calcium 8.3 (L) 8.6 - 10.5 mg/dL    eGFR Non African Amer 59 (L) >60 mL/min/1.73    BUN/Creatinine Ratio 14.0 7.0 - 25.0    Anion Gap 9.5 5.0 - 15.0 mmol/L     Assessment/Plan   Diagnoses and all orders for this visit:    1. Peripheral arterial disease (CMS/[facility]) (Primary)  -     Ambulatory Referral to Podiatry    2. Type 2 diabetes mellitus with diabetic peripheral angiopathy without gangrene, without long-term current use of insulin (CMS/[facility])  -     Ambulatory Referral to Podiatry    3. Cellulitis of right  foot  -     Ambulatory Referral to Podiatry    4. Skin ulcer of toe of right foot with fat layer exposed (CMS/[facility])    5. Abdominal aortic aneurysm (AAA) 3.0 cm to 5.5 cm in diameter in male (CMS/[facility])    6. Chronic atrial fibrillation (CMS/[facility])    7. Other hyperlipidemia    8. Anxiety  -     LORazepam (ATIVAN) 0.5 MG tablet; Take 1 tablet by mouth Every 8 (Eight) Hours As Needed for Anxiety.  Dispense: 30 tablet; Refill: 0  -     busPIRone (BUSPAR) 5 MG tablet; Take 1 tablet by mouth 2 (Two) Times a Day.  Dispense: 60 tablet; Refill: 6    9. Late onset Alzheimer's dementia without behavioral disturbance (CMS/[facility])  -     LORazepam (ATIVAN) 0.5 MG tablet; Take 1 tablet by mouth Every 8 (Eight) Hours As Needed for Anxiety.  Dispense: 30 tablet; Refill: 0    10. Coronary artery disease involving native coronary artery of native heart with other form of angina pectoris (CMS/[facility])  -     nitroglycerin (Nitrostat) 0.4 MG SL tablet; Place 1 tablet under the tongue Every 5 (Five) Minutes As Needed for Chest Pain. Take no more than 3 doses in 15 minutes.  Dispense: 30 tablet; Refill: 0    Discussed with patient and his wife concerning his current care, hospitalization, history, medications.  Diabetes has been fairly well controlled but his dosing for his Metformin has been changed inexplicably at the hospital.  Will return the metformin to 500 mg once a day and the glimipirde 2 mg daily due to the A1c so good previously on this dosage and intolerant of higher doses of the metformin.  Patient with known ulcer in the right foot top of the foot and known severe peripheral artery disease with an aortic abdominal aneurysm.  I recommend that he does stay on the Eliquis but to discuss this with cardiology for his atrial fibrillation.  Will refer to podiatry for foot care and follow up with vascular and cardiology as scheduled.  We discussed his history of anxiety with his dementia.  He has been tolerating well utilizing the buspirone  and utilizing lorazepam extremely rarely.  Given a short small amount of lorazepam use only as needed.  Given a refill his nitroglycerin that is 3 years old to use only as needed.  Continue all the other medications at this time.         · COVID-19 Precautions - Patient was compliant in wearing a mask. When I saw the patient, I used appropriate personal protective equipment (PPE) including mask and eye shield (standard procedure).  Additionally, I used gown and gloves if indicated.  Hand hygiene was completed before and after seeing the patient.  · Dictated utilizing Dragon Dictation

## 2021-03-18 NOTE — OUTREACH NOTE
Medical Week 3 Survey      Responses   [facility] patient discharged from?  [place]   Does the patient have one of the following disease processes/diagnoses(primary or secondary)?  Other   Week 3 attempt successful?  No   Unsuccessful attempts  Attempt 1          Elizabet Hendrix RN

## 2021-03-20 ENCOUNTER — READMISSION MANAGEMENT (OUTPATIENT)
Dept: CALL CENTER | Facility: HOSPITAL | Age: 76
End: 2021-03-20

## 2021-03-20 NOTE — OUTREACH NOTE
Medical Week 3 Survey      Responses   [facility] patient discharged from?  [place]   Does the patient have one of the following disease processes/diagnoses(primary or secondary)?  Other   Week 3 attempt successful?  No   Unsuccessful attempts  Attempt 2          Marcelle Robert RN

## 2021-03-31 ENCOUNTER — IMMUNIZATION (OUTPATIENT)
Dept: VACCINE CLINIC | Facility: HOSPITAL | Age: 76
End: 2021-03-31

## 2021-03-31 PROCEDURE — 91300 HC SARSCOV02 VAC 30MCG/0.3ML IM: CPT | Performed by: INTERNAL MEDICINE

## 2021-03-31 PROCEDURE — 0002A: CPT | Performed by: INTERNAL MEDICINE

## 2021-04-06 ENCOUNTER — OFFICE VISIT (OUTPATIENT)
Dept: CARDIOLOGY | Facility: CLINIC | Age: 76
End: 2021-04-06

## 2021-04-06 VITALS
DIASTOLIC BLOOD PRESSURE: 84 MMHG | HEART RATE: 56 BPM | OXYGEN SATURATION: 99 % | SYSTOLIC BLOOD PRESSURE: 142 MMHG | BODY MASS INDEX: 23.4 KG/M2 | HEIGHT: 69 IN | RESPIRATION RATE: 16 BRPM | WEIGHT: 158 LBS

## 2021-04-06 DIAGNOSIS — I71.40 ABDOMINAL AORTIC ANEURYSM (AAA) 3.0 CM TO 5.5 CM IN DIAMETER IN MALE (HCC): ICD-10-CM

## 2021-04-06 DIAGNOSIS — I10 ESSENTIAL HYPERTENSION: ICD-10-CM

## 2021-04-06 DIAGNOSIS — I73.9 PERIPHERAL ARTERIAL DISEASE (HCC): Chronic | ICD-10-CM

## 2021-04-06 DIAGNOSIS — R94.39 ABNORMAL NUCLEAR STRESS TEST: Chronic | ICD-10-CM

## 2021-04-06 DIAGNOSIS — I48.0 PAF (PAROXYSMAL ATRIAL FIBRILLATION) (HCC): Primary | ICD-10-CM

## 2021-04-06 DIAGNOSIS — I25.10 CORONARY ARTERY DISEASE INVOLVING NATIVE CORONARY ARTERY OF NATIVE HEART WITHOUT ANGINA PECTORIS: Chronic | ICD-10-CM

## 2021-04-06 PROCEDURE — 99214 OFFICE O/P EST MOD 30 MIN: CPT | Performed by: NURSE PRACTITIONER

## 2021-04-06 PROCEDURE — 93000 ELECTROCARDIOGRAM COMPLETE: CPT | Performed by: NURSE PRACTITIONER

## 2021-04-06 RX ORDER — METOPROLOL SUCCINATE 25 MG/1
25 TABLET, EXTENDED RELEASE ORAL NIGHTLY
Qty: 90 TABLET | Refills: 3 | Status: SHIPPED | OUTPATIENT
Start: 2021-04-06 | End: 2022-04-12

## 2021-04-06 NOTE — PROGRESS NOTES
Date of Office Visit: 2021  Encounter Provider: DEBORAH Green  Place of Service: [facility] CARDIOLOGY  Patient Name: Erick Bruno  :1945  Primary Cardiologist: Dr. Michael Wyman    Chief Complaint   Patient presents with   • Hospital Follow Up Visit   :     HPI: Erick Bruno is a pleasant 75 y.o. male who presents today for hospital follow-up visit.  He is a new patient to me and I reviewed his medical records.    He has known history of peripheral vessel vascular disease, carotid stenosis, abdominal aortic aneurysm, diabetes, hypertension, and hyperlipidemia. He is an established patient of Dr. Michael Wyman.  In , he had an abnormal stress test in the inferior wall and an LVEF of 48% with no ischemia noted.  At that time the patient elected not to proceed with any additional diagnostic therapy and was treated with medical therapy.      In 2020, he was seen by Dr. Michael Wyman for routine follow-up and perioperative cardiac risk assessment.  Nuclear stress test was completed and showed small to medium size, mild to moderately severe area of ischemia in the inferior wall and LVEF of 54%. Compared to the study in May 2017 there was significant change with new ischemia noted.  Dr. Wyman reviewed the test and felt that there was a mild area of ischemia in the inferior wall, but felt that he was at acceptable risk to proceed with the carotid endarterectomy scheduled for 2021 because he needed that surgery to be completed and he denied anginal symptoms.    In 2021, he presented to [facility] ED with complaints of redness and tender right foot following a fall.  He was diagnosed with right foot cellulitis and admitted and treated with antibiotic therapy.  Blood cultures were negative.  During his hospitalization he developed new onset atrial fibrillation and cardiology was consulted.  Echocardiogram showed normal LVEF grade 1  diastolic dysfunction, moderate calcification of the aortic valve, and mild mitral regurgitation. CT of the abdomen and pelvis showed penetrating ulcer formation and infrarenal abdominal aortic aneurysm, moderate stenosis of the left renal artery, severe stenosis of the right common iliac artery, mild stenosis of the right internal iliac artery at the origin, considerable stenosis of the right deep femoral artery, and 2 wall stents in the left SFA.  Carotid duplex showed right carotid stent with no stenosis and left carotid moderate stenosis.  Cardiology and vascular surgery agreed on stopping the clopidogrel and to continue with aspirin and apixaban for stroke prevention.  He was discharged and recommended physical therapy in the outpatient setting.    Today is his hospital follow-up visit and his wife accompanies him.  He denies chest pain, shortness of air, palpitations, edema, dizziness, syncope, or bleeding.  He reports intermittent claudication symptoms.  He reports that the apixaban may be too expensive for him.  Cellulitis of his right foot has improved.    Past Medical History:   Diagnosis Date   • Abnormal nuclear stress test 5/21/2018   • Acid reflux    • Anemia    • Anxiety    • Arthritis    • CAD (coronary artery disease)    • Carotid artery disease (CMS/HCC)    • Dementia (CMS/HCC)    • Diabetes mellitus (CMS/HCC)    • Enlarged prostate    • History of hepatitis A    • History of seizure 2013    S/P TEMPERATURE   • Hyperlipidemia    • Hypertension    • Macular degeneration    • New onset atrial fibrillation (CMS/HCC) 02/2021   • NSTEMI (non-ST elevated myocardial infarction) (CMS/HCC)    • Panarteritis (CMS/HCC)    • Peripheral arterial disease (CMS/HCC)    • Poor balance    • 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   • PVD (peripheral vascular disease) (CMS/HCC)    • Sleep apnea     DOES NOT WEAR CPAP   • SSS (sick sinus syndrome) (CMS/HCC)        Past Surgical History:   Procedure Laterality Date   •  APPENDECTOMY     • ATHRECTOMY ILIAC, FEMORAL, TIBIAL ARTERY N/A 10/17/2018    Procedure: AIF ARTERIOGRAM, LEFT SFA  ANGIOPLASTY;  Surgeon: Jayden Marie MD;  Location: [facility] MAIN OR;  Service: Vascular   • CHOLECYSTECTOMY     • CHOLECYSTECTOMY WITH INTRAOPERATIVE CHOLANGIOGRAM N/A 2018    Procedure: CHOLECYSTECTOMY LAPAROSCOPIC INTRAOPERATIVE CHOLANGIOGRAM;  Surgeon: Daniel Gonzalez MD;  Location: [facility] MAIN OR;  Service: General   • COLONOSCOPY     • COLONOSCOPY N/A 2018    Procedure: COLONOSCOPY to cecum with polypectomies;  Surgeon: Dainel Gonzalez MD;  Location: [facility] ENDOSCOPY;  Service: Gastroenterology   • ENDOSCOPY N/A 2018    Procedure: ESOPHAGOGASTRODUODENOSCOPY with biopsies;  Surgeon: Daniel Gonzalez MD;  Location: [facility] ENDOSCOPY;  Service: Gastroenterology   • FEMORAL ARTERY STENT Left     on 3/22/16       Social History     Socioeconomic History   • Marital status:      Spouse name: Not on file   • Number of children: Not on file   • Years of education: Not on file   • Highest education level: Not on file   Tobacco Use   • Smoking status: Former Smoker     Packs/day: 1.00     Years: 60.00     Pack years: 60.00     Quit date:      Years since quittin.2   • Smokeless tobacco: Never Used   Vaping Use   • Vaping Use: Never used   Substance and Sexual Activity   • Alcohol use: No     Comment: Daily caffeine use   • Drug use: No   • Sexual activity: Defer       Family History   Problem Relation Age of Onset   • Hypertension Mother    • Heart failure Father    • Cancer Maternal Aunt    • Diabetes Maternal Grandfather    • Diabetes Daughter    • Malig Hyperthermia Neg Hx        The following portion of the patient's history were reviewed and updated as appropriate: past medical history, past surgical history, past social history, past family history, allergies, current medications, and problem list.    Review of Systems   Constitutional: Negative.   Cardiovascular:  "Positive for claudication.   Respiratory: Negative.    Endocrine: Negative.    Hematologic/Lymphatic: Negative.    Neurological: Negative.        Allergies   Allergen Reactions   • Penicillins Anaphylaxis and Hives   • Percocet [Oxycodone-Acetaminophen] Itching   • Vancomycin Rash   • Biaxin [Clarithromycin] Itching   • Crestor [Rosuvastatin] Other (See Comments)     \"CAN'T REMEMBER THE REACTION\"   • Levaquin [Levofloxacin In D5w] Other (See Comments)     MUSCLE STIFFNESS   • Latex Rash     Band aids cause blistering, ok with paper tape         Current Outpatient Medications:   •  apixaban (ELIQUIS) 5 MG tablet tablet, Take 1 tablet by mouth Every 12 (Twelve) Hours. Indications: Atrial Fibrillation, Disp: 60 tablet, Rfl: 0  •  aspirin 81 MG chewable tablet, Chew 81 mg Every Night. PER DR. MULLER, OK TO CONTINUE TAKING PER MD, Disp: , Rfl:   •  atorvastatin (LIPITOR) 80 MG tablet, Take 1 tablet by mouth once daily (Patient taking differently: Take 80 mg by mouth Daily.), Disp: 90 tablet, Rfl: 0  •  busPIRone (BUSPAR) 5 MG tablet, Take 1 tablet by mouth 2 (Two) Times a Day., Disp: 60 tablet, Rfl: 6  •  Coenzyme Q10 200 MG capsule, Take 200 mg by mouth Every Night. HOLDING FOR SURGERY, Disp: , Rfl:   •  escitalopram (LEXAPRO) 10 MG tablet, Take 10 mg by mouth Daily., Disp: , Rfl:   •  glimepiride (AMARYL) 2 MG tablet, TAKE 1 TABLET BY MOUTH ONCE DAILY IN THE MORNING BEFORE BREAKFAST (Patient taking differently: Take 2 mg by mouth Every Morning Before Breakfast.), Disp: 90 tablet, Rfl: 0  •  isosorbide mononitrate (IMDUR) 30 MG 24 hr tablet, Take 1 tablet by mouth nightly (Patient taking differently: Take 30 mg by mouth Every Night.), Disp: 90 tablet, Rfl: 0  •  LORazepam (ATIVAN) 0.5 MG tablet, Take 1 tablet by mouth Every 8 (Eight) Hours As Needed for Anxiety., Disp: 30 tablet, Rfl: 0  •  melatonin 5 MG tablet tablet, Take 5 mg by mouth Every Night., Disp: , Rfl:   •  metFORMIN ER (GLUCOPHAGE-XR) 500 MG 24 hr tablet, " "Take 1 tablet by mouth Daily With Breakfast. Per spouse, only taking 500mg tab once in am, 4 tabs with supper caused diarrhea, Disp: , Rfl:   •  multivitamin with minerals (MULTIVITAMIN ADULTS PO), Take 1 tablet by mouth Daily., Disp: , Rfl:   •  mupirocin (BACTROBAN) 2 % ointment, Apply  topically to the appropriate area as directed Daily., Disp: 22 g, Rfl: 0  •  nitroglycerin (Nitrostat) 0.4 MG SL tablet, Place 1 tablet under the tongue Every 5 (Five) Minutes As Needed for Chest Pain. Take no more than 3 doses in 15 minutes., Disp: 30 tablet, Rfl: 0  •  omeprazole (priLOSEC) 20 MG capsule, Take 1 capsule by mouth once daily (Patient taking differently: Take 20 mg by mouth Daily.), Disp: 90 capsule, Rfl: 1  •  RELION GLUCOSE TEST STRIPS test strip, 1 each by Other route Daily., Disp: , Rfl:   •  saw palmetto 160 MG capsule, Take 160 mg by mouth Every Night. HOLD FOR SURGERY, Disp: , Rfl:   •  metoprolol succinate XL (TOPROL-XL) 25 MG 24 hr tablet, Take 1 tablet by mouth Every Night., Disp: 90 tablet, Rfl: 3        Objective:     Vitals:    04/06/21 1047   BP: 142/84   BP Location: Right arm   Patient Position: Sitting   Cuff Size: Adult   Pulse: 56   Resp: 16   SpO2: 99%   Weight: 71.7 kg (158 lb)   Height: 175.3 cm (69\")     Body mass index is 23.33 kg/m².    PHYSICAL EXAM:    Vitals Reviewed.   General Appearance: No acute distress, well developed and well nourished. Obese.   Eyes: Conjunctiva and lids: No erythema, swelling, or discharge. Sclera non-icteric.   HENT: Atraumatic, normocephalic. External eyes, ears, and nose normal. No hearing loss noted. Mucous membranes normal. Lips not cyanotic. Neck supple with no tenderness.  Respiratory: No signs of respiratory distress. Respiration rhythm and depth normal.   Clear to auscultation. No rales, crackles, rhonchi, or wheezing auscultated.   Cardiovascular:  Jugular Venous Pressure: Normal  Heart Rate and Rhythm: Bradycardic.  Normal rhythm.  Heart Sounds: Normal " S1 and S2. No S3 or S4 noted.  Murmurs: No murmurs noted. No rubs, thrills, or gallops.   Arterial Pulses: Carotid pulses normal. No carotid bruit noted. Posterior tibialis and dorsalis pedis pulses normal.   Lower Extremities: No edema noted.  Gastrointestinal:  Abdomen soft, non-distended, non-tender. Normal bowel sounds.    Musculoskeletal: Normal movement of extremities  Skin and Nails: General appearance normal. No pallor, cyanosis, diaphoresis. Skin temperature normal. No clubbing of fingernails.   Psychiatric: Patient alert and oriented to person, place, and time. Speech and behavior appropriate. Normal mood and affect.       ECG 12 Lead    Date/Time: 4/6/2021 10:53 AM  Performed by: Aaliyah Christiansen APRN  Authorized by: Aaliyah Christiansen APRN   Comparison: compared with previous ECG from 2/28/2021  Comparison to previous ECG: Atrial fibrillation with heart rate 60  Rhythm: sinus rhythm  Rate: bradycardic  BPM: 56  Conduction: conduction normal  ST Segments: ST segments normal  T Waves: T waves normal  QRS axis: normal  Other: no other findings    Clinical impression: non-specific ECG              Assessment:       Diagnosis Plan   1. PAF (paroxysmal atrial fibrillation) (CMS/[facility])  ECG 12 Lead   2. Abnormal nuclear stress test     3. Coronary artery disease involving native coronary artery of native heart without angina pectoris     4. Peripheral arterial disease (CMS/[facility])     5. Abdominal aortic aneurysm (AAA) 3.0 cm to 5.5 cm in diameter in male (CMS/[facility])     6. Essential hypertension            Plan:       1.  Paroxysmal Atrial Fibrillation: Diagnosed in February 2021 in the setting of cellulitis.  He is currently in a normal sinus rhythm at this time and denies recent palpitations. He has a [id number] score of 4, putting him at high risk for thromboembolic event annually.  He is currently on apixaban and working to see if we can get him qualified for patient assistance.  Samples were provided today.  If  he does not qualify and the medication is unaffordable, we will look into other options.    2/30.  Abnormal Nuclear Stress Test and Presumed Coronary Artery Disease: Nuclear stress test was completed and showed small to medium size, mild to moderately severe area of ischemia in the inferior wall and LVEF of 54%. Compared to the study in May 2017 there was significant change with new ischemia noted.  Dr. Wyman reviewed the test and felt that there was a mild area of ischemia in the inferior wall, but felt that he was at acceptable risk to proceed with the carotid endarterectomy scheduled for January 2021 because he needed that surgery to be completed and he denied anginal symptoms.    Today I discussed with Dr. Wyman.  Patient denies anginal symptoms.  He will continue with the aspirin, atorvastatin, isosorbide and I will add metoprolol succinate 25 mg for secondary prevention.  If he develops anginal symptoms, we would then recommend left heart catheterization.    4/5.  Peripheral Arterial Disease and Aneurysms: Followed by vascular surgery.    6.  Hypertension: His blood pressure was elevated today.  Hopefully by adding the beta-blocker, his blood pressure may be better controlled.  He will continue to monitor and call if it remains elevated.    7.  I would like him to follow-up with Dr. Michael Wyman in 3 to 4 months post hospitalization.    As always, it has been a pleasure to participate in your patient's care. Thank you.       Sincerely,       DEBORAH Hastings  [facility] Cardiology      · COVID-19 Precautions - Patient was compliant in wearing a mask. When I saw the patient, I used appropriate personal protective equipment (PPE) including mask and eye shield (standard procedure).  Additionally, I used gown and gloves if indicated.  Hand hygiene was completed before and after seeing the patient.  · Dictated utilizing Dragon Dictation

## 2021-04-11 PROBLEM — I48.0 PAROXYSMAL ATRIAL FIBRILLATION: Status: ACTIVE | Noted: 2021-03-18

## 2021-04-11 RX ORDER — ATORVASTATIN CALCIUM 80 MG/1
80 TABLET, FILM COATED ORAL DAILY
Qty: 90 TABLET | Refills: 0 | Status: SHIPPED | OUTPATIENT
Start: 2021-04-11 | End: 2021-07-12 | Stop reason: SDUPTHER

## 2021-04-11 RX ORDER — GLIMEPIRIDE 2 MG/1
2 TABLET ORAL
Qty: 90 TABLET | Refills: 0 | Status: SHIPPED | OUTPATIENT
Start: 2021-04-11 | End: 2021-07-12 | Stop reason: SDUPTHER

## 2021-04-11 RX ORDER — ISOSORBIDE MONONITRATE 30 MG/1
30 TABLET, EXTENDED RELEASE ORAL NIGHTLY
Qty: 90 TABLET | Refills: 3 | Status: SHIPPED | OUTPATIENT
Start: 2021-04-11 | End: 2022-04-11

## 2021-04-11 RX ORDER — METFORMIN HYDROCHLORIDE 500 MG/1
500 TABLET, EXTENDED RELEASE ORAL
Start: 2021-04-11 | End: 2021-04-16 | Stop reason: SDUPTHER

## 2021-04-16 ENCOUNTER — TELEPHONE (OUTPATIENT)
Dept: FAMILY MEDICINE CLINIC | Facility: CLINIC | Age: 76
End: 2021-04-16

## 2021-04-16 NOTE — TELEPHONE ENCOUNTER
em Mcgarry for hub to read    I spoke with the pharmacy and reviewed the listed medications.  They only need metformin and the omeprazole sent back in.

## 2021-04-16 NOTE — TELEPHONE ENCOUNTER
PATIENTS WIFE GERI STATES [facility] MAD A LOT OF ERRORS.. THE PHARMACIST INFORMED HER THAT SHE HAD BEEN ON MATERNITY LEAVE AND WHEN SHE CAME BACK EVERYTHING WAS MESSED UP... WE SENT OVER REFILLS FOR 5 OF PATIENTS MEDS..    >atorvastatin (LIPITOR) 80 MG tablet   >glimepiride (AMARYL) 2 MG tablet   >isosorbide mononitrate (IMDUR) 30 MG 24 hr tablet   >metFORMIN ER (GLUCOPHAGE-XR) 500 MG 24 hr tablet    >omeprazole (priLOSEC) 20 MG capsule   >busPIRone (BUSPAR) 5 MG tablet     [facility] IS REQUESTING NEW SCRIPTS FOR ALL ABOVE, OMEPRAZOLE WAS SENT TO [facility] AND PATIENT REALLY NEEDS IT ASAP.     PLEASE SEND ALL NEW SCRIPTS.   [facility] [address] [place] [address] [address] - [phone]  - [phone] FX     PATIENTS WIFE GERI CAN BE REACHED AT: [phone]

## 2021-04-19 RX ORDER — OMEPRAZOLE 20 MG/1
20 CAPSULE, DELAYED RELEASE ORAL DAILY
Qty: 90 CAPSULE | Refills: 1 | Status: SHIPPED | OUTPATIENT
Start: 2021-04-19 | End: 2021-10-14

## 2021-04-19 RX ORDER — METFORMIN HYDROCHLORIDE 500 MG/1
500 TABLET, EXTENDED RELEASE ORAL
Qty: 90 TABLET | Refills: 1 | Status: SHIPPED | OUTPATIENT
Start: 2021-04-19 | End: 2021-10-14

## 2021-04-21 ENCOUNTER — TELEPHONE (OUTPATIENT)
Dept: CARDIOLOGY | Facility: CLINIC | Age: 76
End: 2021-04-21

## 2021-04-21 NOTE — TELEPHONE ENCOUNTER
Please call patient.      · How is he tolerating the metoprolol?    · How is his blood pressure and heart rates?  · Is he tolerating the apixaban?  We provided him samples.  · We were going to see if he qualified for patient assistance.  Can you please ask him?  · Thank you

## 2021-04-26 NOTE — TELEPHONE ENCOUNTER
Spoke with pt and he states he is doing well on the apixaban and metoprolol. His blood pressure is running around 130/75 and isn't able to get his HR. He is going to fill out the patient assistance and get it back to us at his next scheduled visit.

## 2021-04-26 NOTE — TELEPHONE ENCOUNTER
Perfect.  Thank you for calling patient.  He will follow-up with Dr. Wyman as scheduled in July 2021.

## 2021-07-12 RX ORDER — ATORVASTATIN CALCIUM 80 MG/1
80 TABLET, FILM COATED ORAL DAILY
Qty: 90 TABLET | Refills: 0 | Status: SHIPPED | OUTPATIENT
Start: 2021-07-12 | End: 2021-10-14

## 2021-07-12 RX ORDER — GLIMEPIRIDE 2 MG/1
2 TABLET ORAL
Qty: 90 TABLET | Refills: 0 | Status: SHIPPED | OUTPATIENT
Start: 2021-07-12 | End: 2021-10-26 | Stop reason: SDUPTHER

## 2021-07-12 NOTE — TELEPHONE ENCOUNTER
Caller: Marcelle Bruno    Relationship: Emergency Contact    Best call back number: [phone]    Medication needed:   Requested Prescriptions     Pending Prescriptions Disp Refills   • glimepiride (AMARYL) 2 MG tablet 90 tablet 0     Sig: Take 1 tablet by mouth Every Morning Before Breakfast.   • atorvastatin (LIPITOR) 80 MG tablet 90 tablet 0     Sig: Take 1 tablet by mouth Daily.       When do you need the refill by: ASAP    What additional details did the patient provide when requesting the medication:     PATIENT HAS ABOUT 6-7 DAYS LEFT ON THESE.  A DIFFERENT DOCTOR PRESCRIBED THESE MEDICATIONS  LAST OV 03/31/21  NEXT SCHEDULED: 07/22/21    Does the patient have less than a 3 day supply:  [] Yes  [x] No    What is the patient's preferred pharmacy: [facility] [address] [place] [address] [address] - [phone]  - [phone] FX

## 2021-07-22 ENCOUNTER — OFFICE VISIT (OUTPATIENT)
Dept: FAMILY MEDICINE CLINIC | Facility: CLINIC | Age: 76
End: 2021-07-22

## 2021-07-22 VITALS
OXYGEN SATURATION: 97 % | HEIGHT: 69 IN | SYSTOLIC BLOOD PRESSURE: 132 MMHG | WEIGHT: 167.6 LBS | TEMPERATURE: 97.9 F | DIASTOLIC BLOOD PRESSURE: 84 MMHG | HEART RATE: 58 BPM | BODY MASS INDEX: 24.82 KG/M2

## 2021-07-22 DIAGNOSIS — Z79.01 CHRONIC ANTICOAGULATION: ICD-10-CM

## 2021-07-22 DIAGNOSIS — E78.49 OTHER HYPERLIPIDEMIA: ICD-10-CM

## 2021-07-22 DIAGNOSIS — I73.9 PVD (PERIPHERAL VASCULAR DISEASE) (HCC): ICD-10-CM

## 2021-07-22 DIAGNOSIS — R82.998 DARK URINE: ICD-10-CM

## 2021-07-22 DIAGNOSIS — B35.1 ONYCHOMYCOSIS: ICD-10-CM

## 2021-07-22 DIAGNOSIS — E11.51 TYPE 2 DIABETES MELLITUS WITH DIABETIC PERIPHERAL ANGIOPATHY WITHOUT GANGRENE, WITHOUT LONG-TERM CURRENT USE OF INSULIN (HCC): Primary | ICD-10-CM

## 2021-07-22 LAB
BILIRUB BLD-MCNC: NEGATIVE MG/DL
CLARITY, POC: CLEAR
COLOR UR: YELLOW
GLUCOSE UR STRIP-MCNC: NEGATIVE MG/DL
KETONES UR QL: NEGATIVE
LEUKOCYTE EST, POC: NEGATIVE
NITRITE UR-MCNC: NEGATIVE MG/ML
PH UR: 6.5 [PH] (ref 5–8)
PROT UR STRIP-MCNC: NEGATIVE MG/DL
RBC # UR STRIP: NEGATIVE /UL
SP GR UR: 1.01 (ref 1–1.03)
UROBILINOGEN UR QL: NORMAL

## 2021-07-22 PROCEDURE — 81003 URINALYSIS AUTO W/O SCOPE: CPT | Performed by: INTERNAL MEDICINE

## 2021-07-22 PROCEDURE — 99214 OFFICE O/P EST MOD 30 MIN: CPT | Performed by: INTERNAL MEDICINE

## 2021-07-22 NOTE — PROGRESS NOTES
Subjective   Erick Bruno is a 76 y.o. male.     Chief Complaint   Patient presents with   • Anxiety     f/u   • Diabetes   • dark urine       History of Present Illness   Past history of dementia, diabetes, hyperlipidemia, peripheral artery disease, coronary artery disease, peripheral vascular disease, carotid artery stenosis, aortic atheromatosis and infrarenal aortic abdominal aneurysm, moderate left renal artery stenosis, severe right iliac artery stenosis, moderate stenosis of right external iliac artery, arterial stenosis of the right superficial femoral artery and deep femoral artery on the right, macular degeneration, BPH, anxiety, and seizures.  Followed by vascular surgery Dr. Frank Carpenter    Here for follow-up of diabetes.  Patient states to have been compliant with medications.  Blood sugar monitoring - patient states has been running on average 115.  With a range of 85 - 160.  No episodes of hypoglycemia, nausea, vomiting, new rashes, syncope or other issues.  Denies any difficulties with the current medication regimen of metformin  mg Daily and glimepiride 2 mg daily. The A1c on 2/27/2021of 6.1% then discharged on 1000 mg BID but cannot tolerate secondary to severe GI issues.  Last A1c of 6.1% 2/27/21.    Follow-up for cholesterol.  Currently, has been feeling well without any myalgias, muscle aches, weakness, numbness, chest pain, short of breath or other issues.  Currently, is adherent with medication regimen of atorvastatin 80 mg and denies medication side effects. Is due for lab follow-up.    Follow-up for hypertension.  Currently, has been feeling well and asymptomatic without any headaches, vision changes, cough, chest pain, shortness of breath, swelling, focal neurologic deficit, memory loss or syncope.  Has been taking the medications regularly and adherent with the regimen metoprolol succinate XL 25 mg daily.  Denies medication side effects and no significant interval events.         History of anxiety and currently on buspirone 5 mg twice daily and lorazepam 0.5 mg every 8 hours as needed.    The following portions of the patient's history were reviewed and updated as appropriate: allergies, current medications, past family history, past medical history, past social history, past surgical history and problem list.      Past Medical History:   Diagnosis Date   • Abnormal nuclear stress test 5/21/2018   • Acid reflux    • Anemia    • Anxiety    • Arthritis    • CAD (coronary artery disease)    • Carotid artery disease (CMS/HCC)    • Dementia (CMS/HCC)    • Diabetes mellitus (CMS/HCC)    • Enlarged prostate    • History of hepatitis A    • History of seizure 2013    S/P TEMPERATURE   • Hyperlipidemia    • Hypertension    • Macular degeneration    • New onset atrial fibrillation (CMS/HCC) 02/2021   • NSTEMI (non-ST elevated myocardial infarction) (CMS/HCC)    • Panarteritis (CMS/HCC)    • Peripheral arterial disease (CMS/HCC)    • Poor balance    • 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   • PVD (peripheral vascular disease) (CMS/HCC)    • Sleep apnea     DOES NOT WEAR CPAP   • SSS (sick sinus syndrome) (CMS/HCC)        Past Surgical History:   Procedure Laterality Date   • APPENDECTOMY     • ATHRECTOMY ILIAC, FEMORAL, TIBIAL ARTERY N/A 10/17/2018    Procedure: AIF ARTERIOGRAM, LEFT SFA  ANGIOPLASTY;  Surgeon: Jayden Marie MD;  Location: [facility] OR;  Service: Vascular   • CHOLECYSTECTOMY     • CHOLECYSTECTOMY WITH INTRAOPERATIVE CHOLANGIOGRAM N/A 5/9/2018    Procedure: CHOLECYSTECTOMY LAPAROSCOPIC INTRAOPERATIVE CHOLANGIOGRAM;  Surgeon: Daniel Gonzalez MD;  Location: [facility] OR;  Service: General   • COLONOSCOPY     • COLONOSCOPY N/A 5/8/2018    Procedure: COLONOSCOPY to cecum with polypectomies;  Surgeon: Daniel Gonzalez MD;  Location: [facility] ENDOSCOPY;  Service: Gastroenterology   • ENDOSCOPY N/A 5/8/2018    Procedure: ESOPHAGOGASTRODUODENOSCOPY with biopsies;  Surgeon:  Daniel Gonzalez MD;  Location: [facility] ENDOSCOPY;  Service: Gastroenterology   • FEMORAL ARTERY STENT Left     on 3/22/16       Family History   Problem Relation Age of Onset   • Hypertension Mother    • Heart failure Father    • Cancer Maternal Aunt    • Diabetes Maternal Grandfather    • Diabetes Daughter    • Malig Hyperthermia Neg Hx        Social History     Socioeconomic History   • Marital status:      Spouse name: Not on file   • Number of children: Not on file   • Years of education: Not on file   • Highest education level: Not on file   Tobacco Use   • Smoking status: Former Smoker     Packs/day: 1.00     Years: 60.00     Pack years: 60.00     Quit date:      Years since quittin.5   • Smokeless tobacco: Never Used   Vaping Use   • Vaping Use: Never used   Substance and Sexual Activity   • Alcohol use: No     Comment: Daily caffeine use   • Drug use: No   • Sexual activity: Defer       Current Outpatient Medications   Medication Sig Dispense Refill   • apixaban (ELIQUIS) 5 MG tablet tablet Take 1 tablet by mouth Every 12 (Twelve) Hours. Indications: Atrial Fibrillation 60 tablet 11   • aspirin 81 MG chewable tablet Chew 81 mg Every Night. PER DR. MULLER OK TO CONTINUE TAKING PER MD     • atorvastatin (LIPITOR) 80 MG tablet Take 1 tablet by mouth Daily. 90 tablet 0   • busPIRone (BUSPAR) 5 MG tablet Take 1 tablet by mouth 2 (Two) Times a Day. 60 tablet 6   • Coenzyme Q10 200 MG capsule Take 200 mg by mouth Every Night. HOLDING FOR SURGERY     • escitalopram (LEXAPRO) 10 MG tablet Take 10 mg by mouth Daily.     • glimepiride (AMARYL) 2 MG tablet Take 1 tablet by mouth Every Morning Before Breakfast. 90 tablet 0   • isosorbide mononitrate (IMDUR) 30 MG 24 hr tablet Take 1 tablet by mouth Every Night. 90 tablet 3   • LORazepam (ATIVAN) 0.5 MG tablet Take 1 tablet by mouth Every 8 (Eight) Hours As Needed for Anxiety. 30 tablet 0   • melatonin 5 MG tablet tablet Take 5 mg by mouth Every Night.      • metFORMIN ER (GLUCOPHAGE-XR) 500 MG 24 hr tablet Take 1 tablet by mouth Daily With Breakfast. Per spouse, only taking 500mg tab once in am, 4 tabs with supper caused diarrhea 90 tablet 1   • metoprolol succinate XL (TOPROL-XL) 25 MG 24 hr tablet Take 1 tablet by mouth Every Night. 90 tablet 3   • multivitamin with minerals (MULTIVITAMIN ADULTS PO) Take 1 tablet by mouth Daily.     • mupirocin (BACTROBAN) 2 % ointment Apply  topically to the appropriate area as directed Daily. 22 g 0   • nitroglycerin (Nitrostat) 0.4 MG SL tablet Place 1 tablet under the tongue Every 5 (Five) Minutes As Needed for Chest Pain. Take no more than 3 doses in 15 minutes. 30 tablet 0   • omeprazole (priLOSEC) 20 MG capsule Take 1 capsule by mouth Daily. 90 capsule 1   • RELION GLUCOSE TEST STRIPS test strip 1 each by Other route Daily.     • saw palmetto 160 MG capsule Take 160 mg by mouth Every Night. HOLD FOR SURGERY       No current facility-administered medications for this visit.       Review of Systems   Constitutional: Negative for activity change, appetite change, fatigue, fever, unexpected weight gain and unexpected weight loss.   HENT: Negative for nosebleeds, rhinorrhea, trouble swallowing and voice change.    Eyes: Negative for visual disturbance.   Respiratory: Negative for cough, chest tightness, shortness of breath and wheezing.    Cardiovascular: Negative for chest pain, palpitations and leg swelling.   Gastrointestinal: Negative for abdominal pain, blood in stool, constipation, diarrhea, nausea, vomiting, GERD and indigestion.   Genitourinary: Negative for dysuria, frequency and hematuria.   Musculoskeletal: Negative for arthralgias, back pain and myalgias.   Skin: Negative for rash and bruise.   Neurological: Positive for memory problem. Negative for dizziness, tremors, weakness, light-headedness, numbness and headache.   Hematological: Negative for adenopathy. Does not bruise/bleed easily.   Psychiatric/Behavioral:  "Negative for sleep disturbance and depressed mood. The patient is not nervous/anxious.        Objective   /84 (BP Location: Left arm, Patient Position: Sitting, Cuff Size: Adult)   Pulse 58   Temp 97.9 °F (36.6 °C) (Temporal)   Ht 175.3 cm (69.02\")   Wt 76 kg (167 lb 9.6 oz)   SpO2 97%   BMI 24.74 kg/m²     Physical Exam  Vitals and nursing note reviewed.   Constitutional:       General: He is not in acute distress.     Appearance: He is well-developed. He is not diaphoretic.   HENT:      Head: Normocephalic and atraumatic.      Right Ear: External ear normal.      Left Ear: External ear normal.      Nose: Nose normal.   Eyes:      Conjunctiva/sclera: Conjunctivae normal.      Pupils: Pupils are equal, round, and reactive to light.   Neck:      Thyroid: No thyromegaly.      Trachea: No tracheal deviation.   Cardiovascular:      Rate and Rhythm: Normal rate and regular rhythm.      Pulses: Decreased pulses.           Popliteal pulses are 1+ on the right side and 1+ on the left side.        Dorsalis pedis pulses are 1+ on the right side and 1+ on the left side.        Posterior tibial pulses are 1+ on the right side and 1+ on the left side.      Heart sounds: Normal heart sounds. No murmur heard.   No friction rub. No gallop.    Pulmonary:      Effort: Pulmonary effort is normal. No respiratory distress.      Breath sounds: Normal breath sounds.   Abdominal:      General: Bowel sounds are normal.      Palpations: Abdomen is soft. There is no mass.      Tenderness: There is no abdominal tenderness. There is no guarding.   Musculoskeletal:         General: Normal range of motion.      Cervical back: Normal range of motion and neck supple.      Comments: Foot exam with healed skin on top of the foot left foot small scratch on the medial aspect there is superficial and no evidence of infection.   Lymphadenopathy:      Cervical: No cervical adenopathy.   Skin:     General: Skin is warm and dry.      Capillary " Refill: Capillary refill takes less than 2 seconds.      Findings: No rash.      Comments: Thickened yellow nails of all nails of the feet but primarily in the first toes   Neurological:      Mental Status: He is alert and oriented to person, place, and time.      Motor: No abnormal muscle tone.      Deep Tendon Reflexes: Reflexes normal.   Psychiatric:         Behavior: Behavior normal.         Thought Content: Thought content normal.         Judgment: Judgment normal.         Recent Results (from the past 2016 hour(s))   POC Urinalysis Dipstick, Automated    Collection Time: 07/22/21  1:54 PM    Specimen: Urine   Result Value Ref Range    Color Yellow Yellow, Straw, Dark Yellow, Vy    Clarity, UA Clear Clear    Specific Gravity  1.015 1.005 - 1.030    pH, Urine 6.5 5.0 - 8.0    Leukocytes Negative Negative    Nitrite, UA Negative Negative    Protein, POC Negative Negative mg/dL    Glucose, UA Negative Negative, 1000 mg/dL (3+) mg/dL    Ketones, UA Negative Negative    Urobilinogen, UA Normal Normal    Bilirubin Negative Negative    Blood, UA Negative Negative     Assessment/Plan   Diagnoses and all orders for this visit:    1. Type 2 diabetes mellitus with diabetic peripheral angiopathy without gangrene, without long-term current use of insulin (CMS/[facility]) (Primary)  -     Hemoglobin A1c  -     Comprehensive Metabolic Panel  -     Lipid Panel  -     Microalbumin / Creatinine Urine Ratio - Urine, Clean Catch  -     Ambulatory Referral to Podiatry    2. PVD (peripheral vascular disease) (CMS/[facility])  -     Comprehensive Metabolic Panel  -     Lipid Panel    3. Other hyperlipidemia  -     Comprehensive Metabolic Panel  -     Lipid Panel    4. Chronic anticoagulation  -     CBC & Differential    5. Dark urine  -     POC Urinalysis Dipstick, Automated    6. Onychomycosis  -     Ambulatory Referral to Podiatry    Type 2 diabetes previously controlled.  We will check the A1c today continue his current medications  unchanged.  Encouraged to follow his diet.    Significant peripheral vascular disease continue follow-up with vascular's physician and cardiology.  We will check a lipid panel today.    Chronic anticoagulation with Eliquis we will check the CBC as well.  No evidence of blood in the urine.    Patient with onychomycosis thickened toenails and with history of diabetes I recommend a follow-up with a podiatrist for current nail care.           · COVID-19 Precautions - Patient was compliant in wearing a mask. When I saw the patient, I used appropriate personal protective equipment (PPE) including mask and eye shield (standard procedure).  Additionally, I used gown and gloves if indicated.  Hand hygiene was completed before and after seeing the patient.  · Dictated utilizing Dragon Dictation

## 2021-07-23 LAB
ALBUMIN SERPL-MCNC: 4.1 G/DL (ref 3.7–4.7)
ALBUMIN/CREAT UR: 10 MG/G CREAT (ref 0–29)
ALBUMIN/GLOB SERPL: 1.6 {RATIO} (ref 1.2–2.2)
ALP SERPL-CCNC: 120 IU/L (ref 48–121)
ALT SERPL-CCNC: 21 IU/L (ref 0–44)
AST SERPL-CCNC: 21 IU/L (ref 0–40)
BASOPHILS # BLD AUTO: 0.1 X10E3/UL (ref 0–0.2)
BASOPHILS NFR BLD AUTO: 1 %
BILIRUB SERPL-MCNC: 0.6 MG/DL (ref 0–1.2)
BUN SERPL-MCNC: 19 MG/DL (ref 8–27)
BUN/CREAT SERPL: 14 (ref 10–24)
CALCIUM SERPL-MCNC: 8.7 MG/DL (ref 8.6–10.2)
CHLORIDE SERPL-SCNC: 100 MMOL/L (ref 96–106)
CHOLEST SERPL-MCNC: 147 MG/DL (ref 100–199)
CO2 SERPL-SCNC: 25 MMOL/L (ref 20–29)
CREAT SERPL-MCNC: 1.33 MG/DL (ref 0.76–1.27)
CREAT UR-MCNC: 73.1 MG/DL
EOSINOPHIL # BLD AUTO: 0.2 X10E3/UL (ref 0–0.4)
EOSINOPHIL NFR BLD AUTO: 4 %
ERYTHROCYTE [DISTWIDTH] IN BLOOD BY AUTOMATED COUNT: 13.1 % (ref 11.6–15.4)
GLOBULIN SER CALC-MCNC: 2.6 G/DL (ref 1.5–4.5)
GLUCOSE SERPL-MCNC: 150 MG/DL (ref 65–99)
HBA1C MFR BLD: 6.4 % (ref 4.8–5.6)
HCT VFR BLD AUTO: 35.4 % (ref 37.5–51)
HDLC SERPL-MCNC: 40 MG/DL
HGB BLD-MCNC: 12 G/DL (ref 13–17.7)
IMM GRANULOCYTES # BLD AUTO: 0 X10E3/UL (ref 0–0.1)
IMM GRANULOCYTES NFR BLD AUTO: 0 %
LDLC SERPL CALC-MCNC: 89 MG/DL (ref 0–99)
LYMPHOCYTES # BLD AUTO: 1.7 X10E3/UL (ref 0.7–3.1)
LYMPHOCYTES NFR BLD AUTO: 26 %
MCH RBC QN AUTO: 31.3 PG (ref 26.6–33)
MCHC RBC AUTO-ENTMCNC: 33.9 G/DL (ref 31.5–35.7)
MCV RBC AUTO: 92 FL (ref 79–97)
MICROALBUMIN UR-MCNC: 7.1 UG/ML
MONOCYTES # BLD AUTO: 0.4 X10E3/UL (ref 0.1–0.9)
MONOCYTES NFR BLD AUTO: 6 %
NEUTROPHILS # BLD AUTO: 4.1 X10E3/UL (ref 1.4–7)
NEUTROPHILS NFR BLD AUTO: 63 %
PLATELET # BLD AUTO: 210 X10E3/UL (ref 150–450)
POTASSIUM SERPL-SCNC: 4.5 MMOL/L (ref 3.5–5.2)
PROT SERPL-MCNC: 6.7 G/DL (ref 6–8.5)
RBC # BLD AUTO: 3.84 X10E6/UL (ref 4.14–5.8)
SODIUM SERPL-SCNC: 137 MMOL/L (ref 134–144)
TRIGL SERPL-MCNC: 95 MG/DL (ref 0–149)
VLDLC SERPL CALC-MCNC: 18 MG/DL (ref 5–40)
WBC # BLD AUTO: 6.5 X10E3/UL (ref 3.4–10.8)

## 2021-10-14 DIAGNOSIS — F41.9 ANXIETY: ICD-10-CM

## 2021-10-14 RX ORDER — METFORMIN HYDROCHLORIDE 500 MG/1
TABLET, EXTENDED RELEASE ORAL
Qty: 90 TABLET | Refills: 0 | Status: SHIPPED | OUTPATIENT
Start: 2021-10-14 | End: 2021-10-26 | Stop reason: SDUPTHER

## 2021-10-14 RX ORDER — OMEPRAZOLE 20 MG/1
CAPSULE, DELAYED RELEASE ORAL
Qty: 90 CAPSULE | Refills: 0 | Status: SHIPPED | OUTPATIENT
Start: 2021-10-14 | End: 2021-10-26 | Stop reason: SDUPTHER

## 2021-10-14 RX ORDER — BUSPIRONE HYDROCHLORIDE 5 MG/1
TABLET ORAL
Qty: 60 TABLET | Refills: 0 | Status: SHIPPED | OUTPATIENT
Start: 2021-10-14 | End: 2021-10-26 | Stop reason: SDUPTHER

## 2021-10-14 RX ORDER — ATORVASTATIN CALCIUM 80 MG/1
TABLET, FILM COATED ORAL
Qty: 90 TABLET | Refills: 0 | Status: SHIPPED | OUTPATIENT
Start: 2021-10-14 | End: 2021-10-26 | Stop reason: SDUPTHER

## 2021-10-15 ENCOUNTER — OFFICE VISIT (OUTPATIENT)
Dept: CARDIOLOGY | Facility: CLINIC | Age: 76
End: 2021-10-15

## 2021-10-15 VITALS
WEIGHT: 165 LBS | HEIGHT: 69 IN | HEART RATE: 56 BPM | SYSTOLIC BLOOD PRESSURE: 126 MMHG | DIASTOLIC BLOOD PRESSURE: 78 MMHG | BODY MASS INDEX: 24.44 KG/M2

## 2021-10-15 DIAGNOSIS — I71.40 ABDOMINAL AORTIC ANEURYSM (AAA) 3.0 CM TO 5.5 CM IN DIAMETER IN MALE (HCC): ICD-10-CM

## 2021-10-15 DIAGNOSIS — E11.51 TYPE 2 DIABETES MELLITUS WITH DIABETIC PERIPHERAL ANGIOPATHY WITHOUT GANGRENE, WITHOUT LONG-TERM CURRENT USE OF INSULIN (HCC): ICD-10-CM

## 2021-10-15 DIAGNOSIS — I73.9 PERIPHERAL ARTERIAL DISEASE (HCC): Chronic | ICD-10-CM

## 2021-10-15 DIAGNOSIS — I25.10 CORONARY ARTERY DISEASE INVOLVING NATIVE CORONARY ARTERY OF NATIVE HEART WITHOUT ANGINA PECTORIS: Primary | Chronic | ICD-10-CM

## 2021-10-15 DIAGNOSIS — I48.0 PAROXYSMAL ATRIAL FIBRILLATION (HCC): ICD-10-CM

## 2021-10-15 DIAGNOSIS — E78.49 OTHER HYPERLIPIDEMIA: Chronic | ICD-10-CM

## 2021-10-15 DIAGNOSIS — I73.9 PVD (PERIPHERAL VASCULAR DISEASE) (HCC): ICD-10-CM

## 2021-10-15 PROCEDURE — 99214 OFFICE O/P EST MOD 30 MIN: CPT | Performed by: INTERNAL MEDICINE

## 2021-10-15 PROCEDURE — 93000 ELECTROCARDIOGRAM COMPLETE: CPT | Performed by: INTERNAL MEDICINE

## 2021-10-15 RX ORDER — CLOPIDOGREL BISULFATE 75 MG/1
75 TABLET ORAL DAILY
COMMUNITY
End: 2021-10-15

## 2021-10-15 RX ORDER — CHLORAL HYDRATE 500 MG
1000 CAPSULE ORAL
COMMUNITY
End: 2022-04-18

## 2021-10-15 NOTE — PROGRESS NOTES
Subjective:     Encounter Date:  10/15/21        Patient ID: Erick Bruno is a 76 y.o. male.    Chief Complaint: abnormal stress test  History of Present Illness    Dear ,    I had the pleasure of seeing this patient in the office today for follow-up of his cardiac status.  He has a history of CAD, significant peripheral arterial disease, and paroxysmal atrial fibrillation.    He has been doing well without any complaints.  No chest pain or chest discomfort.  He was seen by vascular surgery about 3 weeks ago.  No change in therapy at this point.  He has not had any lower extremity edema.  No orthopnea or PND.    He is been seen in the past for chest discomfort.  In 2017 he had a stress test performed that showed a small area of hypokinesis in the inferior wall with an ejection fraction of 48% and no ischemia.  At the time he elected not to proceed with any additional diagnostic testing.  He's been treated with medical therapy.    In December 2020, he was seen for routine follow-up and perioperative cardiac risk assessment.  Nuclear stress test was completed and showed small to medium size, mild to moderately severe area of ischemia in the inferior wall and LVEF of 54%. Compared to the study in May 2017 there was significant change with new ischemia noted.    It was felt that it was low risk to proceed with the really necessary carotid endarterectomy, and he went through that surgical procedure without any cardiac issues.     In February 2021, he presented to [facility] ED with complaints of redness and tender right foot following a fall.  He was diagnosed with right foot cellulitis and admitted and treated with antibiotic therapy.  Blood cultures were negative.  During his hospitalization he developed new onset atrial fibrillation and cardiology was consulted.  Echocardiogram showed normal LVEF grade 1 diastolic dysfunction, moderate calcification of the aortic valve, and mild mitral  regurgitation. CT of the abdomen and pelvis showed penetrating ulcer formation and infrarenal abdominal aortic aneurysm, moderate stenosis of the left renal artery, severe stenosis of the right common iliac artery, mild stenosis of the right internal iliac artery at the origin, considerable stenosis of the right deep femoral artery, and 2 wall stents in the left SFA.  Carotid duplex showed right carotid stent with no stenosis and left carotid moderate stenosis.  Cardiology and vascular surgery agreed on stopping the clopidogrel and to continue with aspirin and apixaban for stroke prevention.      Patient has a history of peripheral arterial disease, including bilateral lower extremity disease as well as bilateral carotid artery disease.  He has occlusion of the left vertebral artery.  He has a stent placed in his right carotid.  He has abdominal aortic aneurysm followed by vascular.    The following portions of the patient's history were reviewed and updated as appropriate: allergies, current medications, past family history, past medical history, past social history, past surgical history and problem list.    Past Medical History:   Diagnosis Date   • Abnormal nuclear stress test 5/21/2018   • Acid reflux    • Anemia    • Anxiety    • Arthritis    • CAD (coronary artery disease)    • Carotid artery disease (HCC)    • Dementia ([facility])    • Diabetes mellitus ([facility])    • Enlarged prostate    • History of hepatitis A    • History of seizure 2013    S/P TEMPERATURE   • Hyperlipidemia    • Hypertension    • Macular degeneration    • New onset atrial fibrillation ([facility]) 02/2021   • NSTEMI (non-ST elevated myocardial infarction) ([facility])    • Panarteritis ([facility])    • Peripheral arterial disease ([facility])    • Poor balance    • 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   • PVD (peripheral vascular disease) ([facility])    • Sleep apnea     DOES NOT WEAR CPAP   • SSS (sick sinus syndrome) ([facility])        Past Surgical History:   Procedure Laterality  "Date   • APPENDECTOMY     • ATHRECTOMY ILIAC, FEMORAL, TIBIAL ARTERY N/A 10/17/2018    Procedure: AIF ARTERIOGRAM, LEFT SFA  ANGIOPLASTY;  Surgeon: Jayden Marie MD;  Location: [facility] MAIN OR;  Service: Vascular   • CHOLECYSTECTOMY     • CHOLECYSTECTOMY WITH INTRAOPERATIVE CHOLANGIOGRAM N/A 5/9/2018    Procedure: CHOLECYSTECTOMY LAPAROSCOPIC INTRAOPERATIVE CHOLANGIOGRAM;  Surgeon: Daniel Gonzalez MD;  Location: [facility] MAIN OR;  Service: General   • COLONOSCOPY     • COLONOSCOPY N/A 5/8/2018    Procedure: COLONOSCOPY to cecum with polypectomies;  Surgeon: Daniel Gonzalez MD;  [facility];  Service: Gastroenterology   • ENDOSCOPY N/A 5/8/2018    Procedure: ESOPHAGOGASTRODUODENOSCOPY with biopsies;  Surgeon: Daniel Gonzalez MD;  Location: [facility] ENDOSCOPY;  Service: Gastroenterology   • FEMORAL ARTERY STENT Left     on 3/22/16             ECG 12 Lead    Date/Time: 10/15/2021 12:31 PM  Performed by: Michael Wyman III, MD  Authorized by: Michael Wyman III, MD   Comparison: compared with previous ECG   Similar to previous ECG  Rhythm: sinus rhythm  Rate: normal  Conduction: conduction normal  ST Segments: ST segments normal  T Waves: T waves normal  QRS axis: normal  Other: no other findings    Clinical impression: normal ECG               Objective:     Vitals:    10/15/21 1111   BP: 126/78   Pulse: 56   Weight: 74.8 kg (165 lb)   Height: 175.3 cm (69.02\")     General Appearance:    Alert, cooperative, in no acute distress   Head:    Normocephalic, without obvious abnormality, atraumatic   Eyes:            Lids and lashes normal, conjunctivae and sclerae normal, no   icterus, no pallor, corneas clear, PERRLA   Ears:    Ears appear intact with no abnormalities noted   Throat:   No oral lesions, no thrush, oral mucosa moist   Neck:   No adenopathy, supple, trachea midline, no thyromegaly, no   carotid bruit, no JVD   Back:     No kyphosis present, no scoliosis present, no skin lesions, erythema or " scars, no tenderness to percussion or palpation, range of motion normal   Lungs:     Clear to auscultation,respirations regular, even and unlabored    Heart:    Regular rhythm and normal rate, normal S1 and S2, no murmur, no gallop, no rub, no click   Chest Wall:    No abnormalities observed   Abdomen:     Normal bowel sounds, no masses, no organomegaly, soft        non-tender, non-distended, no guarding, no rebound  tenderness   Extremities:   Moves all extremities well, no edema, no cyanosis, no redness   Pulses:  Diminished peripheral pulses both lower extremities, bilateral carotid bruits   Skin:  Psychiatric:   No bleeding, bruising or rash    Alert and oriented x 3, normal mood and affect           Lab Review:           Results for orders placed during the hospital encounter of 02/26/21    Adult Transthoracic Echo Complete W/ Cont if Necessary Per Protocol    Interpretation Summary  · Estimated left ventricular EF = 51% Left ventricular systolic function is normal.  · Left ventricular diastolic function is consistent with (grade I) impaired relaxation.  · There is moderate calcification of the aortic valve.  · Mild mitral valve regurgitation is present.    Lab Results   Component Value Date    GLUCOSE 116 (H) 03/03/2021    BUN 19 07/22/2021    CREATININE 1.33 (H) 07/22/2021    EGFRIFNONA 52 (L) 07/22/2021    EGFRIFAFRI 60 07/22/2021    BCR 14 07/22/2021    K 4.5 07/22/2021    CO2 25 07/22/2021    CALCIUM 8.7 07/22/2021    PROTENTOTREF 6.7 07/22/2021    ALBUMIN 4.1 07/22/2021    LABIL2 1.6 07/22/2021    AST 21 07/22/2021    ALT 21 07/22/2021             Assessment:          Diagnosis Plan   1. Coronary artery disease involving native coronary artery of native heart without angina pectoris  ECG 12 Lead   2. PVD (peripheral vascular disease) (HCC)  ECG 12 Lead   3. Other hyperlipidemia  ECG 12 Lead   4. Type 2 diabetes mellitus with diabetic peripheral angiopathy without gangrene, without long-term current use of  insulin ([facility])  ECG 12 Lead   5. Abdominal aortic aneurysm (AAA) 3.0 cm to 5.5 cm in diameter in male ([facility])  ECG 12 Lead   6. Peripheral arterial disease ([facility])  ECG 12 Lead   7. Paroxysmal atrial fibrillation ([facility])  ECG 12 Lead          Plan:       1. Coronary Artery Disease  Assessment  • The patient has no angina    Plan  • Lifestyle modifications discussed include adhering to a heart healthy diet, avoidance of tobacco products, maintenance of a healthy weight, medication compliance, regular exercise and regular monitoring of cholesterol and blood pressure    Subjective - Objective  • There is a history of past MI  • Current antiplatelet therapy includes aspirin 81 mg    2.  Peripheral vascular disease, with both disease in bilateral lower extremities as well as high-grade carotid artery disease, abdominal aortic aneurysm, prior right coronary artery stent, occluded left vertebral, followed by vascular  3.  Diabetes mellitus with circulatory complication, continue risk factor modification  4.  Paroxysmal atrial fibrillation, remains in sinus rhythm, continue apixaban  5.  Mixed hyperlipidemia, LDL reviewed, continue lipid-lowering therapy    Thank you very much for allowing us to participate in the care of this pleasant patient.  Please don't hesitate to call if I can be of assistance in any way.      Current Outpatient Medications:   •  apixaban (ELIQUIS) 5 MG tablet tablet, Take 1 tablet by mouth Every 12 (Twelve) Hours. Indications: Atrial Fibrillation, Disp: 60 tablet, Rfl: 11  •  aspirin 81 MG chewable tablet, Chew 81 mg Every Night. PER JUAN DYKES TO CONTINUE TAKING PER MD, Disp: , Rfl:   •  atorvastatin (LIPITOR) 80 MG tablet, Take 1 tablet by mouth once daily, Disp: 90 tablet, Rfl: 0  •  busPIRone (BUSPAR) 5 MG tablet, Take 1 tablet by mouth twice daily, Disp: 60 tablet, Rfl: 0  •  Coenzyme Q10 200 MG capsule, Take 200 mg by mouth Every Night. HOLDING FOR SURGERY, Disp: , Rfl:   •  escitalopram  (LEXAPRO) 10 MG tablet, Take 10 mg by mouth Daily., Disp: , Rfl:   •  glimepiride (AMARYL) 2 MG tablet, Take 1 tablet by mouth Every Morning Before Breakfast., Disp: 90 tablet, Rfl: 0  •  isosorbide mononitrate (IMDUR) 30 MG 24 hr tablet, Take 1 tablet by mouth Every Night., Disp: 90 tablet, Rfl: 3  •  LORazepam (ATIVAN) 0.5 MG tablet, Take 1 tablet by mouth Every 8 (Eight) Hours As Needed for Anxiety., Disp: 30 tablet, Rfl: 0  •  melatonin 5 MG tablet tablet, Take 5 mg by mouth Every Night., Disp: , Rfl:   •  metFORMIN ER (GLUCOPHAGE-XR) 500 MG 24 hr tablet, TAKE 1 TABLET BY MOUTH WITH BREAKFAST, Disp: 90 tablet, Rfl: 0  •  metoprolol succinate XL (TOPROL-XL) 25 MG 24 hr tablet, Take 1 tablet by mouth Every Night., Disp: 90 tablet, Rfl: 3  •  multivitamin with minerals (MULTIVITAMIN ADULTS PO), Take 1 tablet by mouth Daily., Disp: , Rfl:   •  mupirocin (BACTROBAN) 2 % ointment, Apply  topically to the appropriate area as directed Daily., Disp: 22 g, Rfl: 0  •  nitroglycerin (Nitrostat) 0.4 MG SL tablet, Place 1 tablet under the tongue Every 5 (Five) Minutes As Needed for Chest Pain. Take no more than 3 doses in 15 minutes., Disp: 30 tablet, Rfl: 0  •  Omega-3 Fatty Acids (fish oil) 1000 MG capsule capsule, Take 1,000 mg by mouth Daily With Breakfast., Disp: , Rfl:   •  omeprazole (priLOSEC) 20 MG capsule, Take 1 capsule by mouth once daily, Disp: 90 capsule, Rfl: 0  •  RELION GLUCOSE TEST STRIPS test strip, 1 each by Other route Daily., Disp: , Rfl:   •  saw palmetto 160 MG capsule, Take 160 mg by mouth Every Night. HOLD FOR SURGERY, Disp: , Rfl:          EMR Dragon/Transcription disclaimer:    Much of this encounter note is an electronic transcription/translation of spoken language to printed text. The electronic translation of spoken language may permit erroneous, or at times, nonsensical words or phrases to be inadvertently transcribed; Although I have reviewed the note for such errors, some may still exist.

## 2021-10-26 ENCOUNTER — TELEPHONE (OUTPATIENT)
Dept: FAMILY MEDICINE CLINIC | Facility: CLINIC | Age: 76
End: 2021-10-26

## 2021-10-26 DIAGNOSIS — F41.9 ANXIETY: ICD-10-CM

## 2021-10-26 RX ORDER — OMEPRAZOLE 20 MG/1
20 CAPSULE, DELAYED RELEASE ORAL DAILY
Qty: 90 CAPSULE | Refills: 3 | Status: SHIPPED | OUTPATIENT
Start: 2021-10-26 | End: 2023-01-03

## 2021-10-26 RX ORDER — GLIMEPIRIDE 2 MG/1
2 TABLET ORAL
Qty: 90 TABLET | Refills: 3 | Status: SHIPPED | OUTPATIENT
Start: 2021-10-26 | End: 2023-01-03

## 2021-10-26 RX ORDER — BUSPIRONE HYDROCHLORIDE 5 MG/1
5 TABLET ORAL 2 TIMES DAILY
Qty: 180 TABLET | Refills: 3 | Status: SHIPPED | OUTPATIENT
Start: 2021-10-26 | End: 2022-10-10

## 2021-10-26 RX ORDER — ATORVASTATIN CALCIUM 80 MG/1
80 TABLET, FILM COATED ORAL DAILY
Qty: 90 TABLET | Refills: 3 | Status: SHIPPED | OUTPATIENT
Start: 2021-10-26 | End: 2023-01-03

## 2021-10-26 RX ORDER — METFORMIN HYDROCHLORIDE 500 MG/1
500 TABLET, EXTENDED RELEASE ORAL
Qty: 90 TABLET | Refills: 3 | Status: SHIPPED | OUTPATIENT
Start: 2021-10-26 | End: 2023-01-03

## 2021-10-26 NOTE — TELEPHONE ENCOUNTER
PATIENTS WIFE IS CALLING IN SHE STATES THAT SHE NOTICED THAT PATIENT DOES NOT HAVE ANYMORE REFILLS AFTER THE ONES HE JUST REFILLED AND IS NOT SURE WHY THAT IS NEVER BEEN THIS WAY BEFORE.    SHE ALSO STATES WHEN THEY WERE THERE LAST THEY WERE NOT TOLD WHEN NEXT APPT WOULD NEED TO BE AND ARE TRYING TO GET SOME CLARIFICATION ON THIS.    PLEASE ADVISE    CALLBACK NUMBER IS  [phone]

## 2021-10-27 ENCOUNTER — IMMUNIZATION (OUTPATIENT)
Dept: VACCINE CLINIC | Facility: HOSPITAL | Age: 76
End: 2021-10-27

## 2021-10-27 PROCEDURE — 0004A ADM SARSCOV2 30MCG/0.3ML BOOSTER: CPT | Performed by: INTERNAL MEDICINE

## 2021-10-27 PROCEDURE — 91300 HC SARSCOV02 VAC 30MCG/0.3ML IM: CPT | Performed by: INTERNAL MEDICINE

## 2021-11-19 ENCOUNTER — TELEPHONE (OUTPATIENT)
Dept: FAMILY MEDICINE CLINIC | Facility: CLINIC | Age: 76
End: 2021-11-19

## 2021-11-19 NOTE — TELEPHONE ENCOUNTER
Caller: Marcelle Bruno    Relationship to patient: Emergency Contact    Best call back [phone] (H)    Chief complaint: FLU SHOT    Type of visit: NURSE VISIT    Requested date:      Additional notes:

## 2021-11-19 NOTE — TELEPHONE ENCOUNTER
Hub staff attempted to follow warm transfer process and was unsuccessful     Caller: Marcelle Bruno    Relationship to patient: Emergency Contact    Best call back number: [phone] (H)    Patient is needing: RETURN CALL FROM GENNY SALAZAR

## 2021-11-22 ENCOUNTER — CLINICAL SUPPORT (OUTPATIENT)
Dept: FAMILY MEDICINE CLINIC | Facility: CLINIC | Age: 76
End: 2021-11-22

## 2021-11-22 DIAGNOSIS — Z23 IMMUNIZATION DUE: Primary | ICD-10-CM

## 2021-11-22 PROCEDURE — G0008 ADMIN INFLUENZA VIRUS VAC: HCPCS | Performed by: INTERNAL MEDICINE

## 2021-11-22 PROCEDURE — 90662 IIV NO PRSV INCREASED AG IM: CPT | Performed by: INTERNAL MEDICINE

## 2022-02-07 ENCOUNTER — OFFICE VISIT (OUTPATIENT)
Dept: FAMILY MEDICINE CLINIC | Facility: CLINIC | Age: 77
End: 2022-02-07

## 2022-02-07 VITALS
WEIGHT: 165 LBS | HEART RATE: 54 BPM | TEMPERATURE: 98 F | OXYGEN SATURATION: 97 % | SYSTOLIC BLOOD PRESSURE: 122 MMHG | HEIGHT: 69 IN | BODY MASS INDEX: 24.44 KG/M2 | DIASTOLIC BLOOD PRESSURE: 70 MMHG

## 2022-02-07 DIAGNOSIS — Z00.00 MEDICARE ANNUAL WELLNESS VISIT, SUBSEQUENT: Primary | ICD-10-CM

## 2022-02-07 DIAGNOSIS — E11.40 TYPE 2 DIABETES MELLITUS WITH DIABETIC NEUROPATHY, WITHOUT LONG-TERM CURRENT USE OF INSULIN: ICD-10-CM

## 2022-02-07 DIAGNOSIS — G30.1 LATE ONSET ALZHEIMER'S DEMENTIA WITHOUT BEHAVIORAL DISTURBANCE: ICD-10-CM

## 2022-02-07 DIAGNOSIS — R29.6 FREQUENT FALLS: ICD-10-CM

## 2022-02-07 DIAGNOSIS — R53.82 CHRONIC FATIGUE: ICD-10-CM

## 2022-02-07 DIAGNOSIS — F02.80 LATE ONSET ALZHEIMER'S DEMENTIA WITHOUT BEHAVIORAL DISTURBANCE: ICD-10-CM

## 2022-02-07 DIAGNOSIS — R26.89 BALANCE DISORDER: ICD-10-CM

## 2022-02-07 DIAGNOSIS — E11.51 TYPE 2 DIABETES MELLITUS WITH DIABETIC PERIPHERAL ANGIOPATHY WITHOUT GANGRENE, WITHOUT LONG-TERM CURRENT USE OF INSULIN: ICD-10-CM

## 2022-02-07 DIAGNOSIS — E78.49 OTHER HYPERLIPIDEMIA: Chronic | ICD-10-CM

## 2022-02-07 PROBLEM — L03.115 CELLULITIS OF RIGHT FOOT: Status: RESOLVED | Noted: 2021-02-26 | Resolved: 2022-02-07

## 2022-02-07 PROCEDURE — G0439 PPPS, SUBSEQ VISIT: HCPCS | Performed by: INTERNAL MEDICINE

## 2022-02-07 PROCEDURE — 1160F RVW MEDS BY RX/DR IN RCRD: CPT | Performed by: INTERNAL MEDICINE

## 2022-02-07 PROCEDURE — 1170F FXNL STATUS ASSESSED: CPT | Performed by: INTERNAL MEDICINE

## 2022-02-07 NOTE — PATIENT INSTRUCTIONS
Medicare Wellness  Personal Prevention Plan of Service     Date of Office Visit:  2022  Encounter Provider:  Richie Franco MD  Place of Service:  [facility] PRIMARY [facility]ARE  Patient Name: Erick Bruno  :  1945    As part of the Medicare Wellness portion of your visit today, we are providing you with this personalized preventive plan of services (PPPS). This plan is based upon recommendations of the [place] Preventive Services Task Force (USPSTF) and the Advisory Committee on Immunization Practices (ACIP).    This lists the preventive care services that should be considered, and provides dates of when you are due. Items listed as completed are up-to-date and do not require any further intervention.    Health Maintenance   Topic Date Due   • TDAP/TD VACCINES (1 - Tdap) Never done   • ZOSTER VACCINE (1 of 2) Never done   • HEPATITIS C SCREENING  Never done   • DIABETIC EYE EXAM  Never done   • HEMOGLOBIN A1C  2022   • LUNG CANCER SCREENING  2023 (Originally 1996)   • DIABETIC FOOT EXAM  2022   • LIPID PANEL  2022   • URINE MICROALBUMIN  2022   • ANNUAL WELLNESS VISIT  2023   • COLORECTAL CANCER SCREENING  2028   • COVID-19 Vaccine  Completed   • INFLUENZA VACCINE  Completed   • Pneumococcal Vaccine 65+  Completed   • AAA SCREEN (ONE-TIME)  Completed       Orders Placed This Encounter   Procedures   • Hemoglobin A1c     Order Specific Question:   Release to patient     Answer:   Immediate   • Comprehensive Metabolic Panel     Order Specific Question:   Release to patient     Answer:   Immediate   • Lipid Panel   • Vitamin B12     Order Specific Question:   Release to patient     Answer:   Immediate   • Ambulatory Referral to Physical Therapy Evaluate and treat     Referral Priority:   Routine     Referral Type:   Physical Therapy     Referral Reason:   Specialty Services Required     Requested Specialty:   Physical Therapy      Number of Visits Requested:   1       Return in about 6 months (around 8/7/2022) for Next scheduled follow up.

## 2022-02-07 NOTE — PROGRESS NOTES
Subsequent Medicare Wellness Visit   The ABC's of the Annual Wellness Visit    Chief Complaint   Patient presents with   • Annual Exam       HPI:  Erick Bruno, -1945, is a 76 y.o. male who presents for a Subsequent Medicare Wellness Visit.    Wife was present during the history-taking and subsequent discussion (and for part of the physical exam) with this patient.  Patient agrees to the presence of the individual during this visit.    Past history of dementia, diabetes, hyperlipidemia, peripheral artery disease, coronary artery disease, peripheral vascular disease, carotid artery stenosis, aortic atheromatosis and infrarenal aortic abdominal aneurysm, moderate left renal artery stenosis, severe right iliac artery stenosis, moderate stenosis of right external iliac artery, arterial stenosis of the right superficial femoral artery and deep femoral artery on the right, macular degeneration, BPH, anxiety, and seizures.  Followed by vascular surgery Dr. Frank Carpenter     Here for follow-up of diabetes.  Patient states to have been compliant with medications.  Blood sugar monitoring - patient states has not been following.  With a range of 85 - 160.  No episodes of hypoglycemia, nausea, vomiting, new rashes, syncope or other issues.  Denies any difficulties with the current medication regimen of metformin  mg Daily and glimepiride 2 mg daily. Cannot tolerate metformin at higher doses secondary to severe GI issues.  Last A1c of 6.4% 21.     Follow-up for cholesterol.  Currently, has been feeling well without any myalgias, muscle aches, weakness, numbness, chest pain, short of breath or other issues.  Currently, is adherent with medication regimen of atorvastatin 80 mg and denies medication side effects.  Is due for lab follow-up.     Follow-up for hypertension.  Currently, has been feeling well and asymptomatic without any headaches, vision changes, cough, chest pain, shortness of breath, swelling,  focal neurologic deficit, memory loss or syncope.  Has been taking the medications regularly and adherent with the regimen metoprolol succinate XL 25 mg daily.  Denies medication side effects and no significant interval events.        History of anxiety and currently on buspirone 5 mg twice daily and lorazepam 0.5 mg every 8 hours as needed.    Recent Hospitalizations:  No hospitalization(s) within the last year..    Current Medical Providers:  Patient Care Team:  Richie Franco MD as PCP - General (Internal Medicine)  Michael Wyman III, MD as Consulting Physician (Cardiology)  Jayden Marie MD as Consulting Physician (Vascular Surgery)  Segundo Cunningham, YOHANNES as Consulting Physician (Ophthalmology)  Daniel Gonzalez MD as Surgeon (General Surgery)  Mayank Guillen DO as Consulting Physician (Neurology)    Health Habits and Functional and Cognitive Screening and Depression Screening:  Functional & Cognitive Status 2/7/2022   Do you have difficulty preparing food and eating? No   Do you have difficulty bathing yourself, getting dressed or grooming yourself? Yes   Do you have difficulty using the toilet? No   Do you have difficulty moving around from place to place? Yes   Do you have trouble with steps or getting out of a bed or a chair? No   Current Diet Well Balanced Diet   Dental Exam Up to date   Eye Exam Up to date   Exercise (times per week) 0 times per week   Current Exercise Activities Include -   Do you need help using the phone?  No   Are you deaf or do you have serious difficulty hearing?  Yes   Do you need help with transportation? Yes   Do you need help shopping? No   Do you need help preparing meals?  No   Do you need help with housework?  No   Do you need help with laundry? No   Do you need help taking your medications? No   Do you need help managing money? No   Do you ever drive or ride in a car without wearing a seat belt? No   Have you felt unusual stress, anger or loneliness in the  "last month? No   Who do you live with? Spouse   If you need help, do you have trouble finding someone available to you? No   Have you been bothered in the last four weeks by sexual problems? No   Do you have difficulty concentrating, remembering or making decisions? Yes   Difficulty with balance and falls easily about once every 2 weeks.  Wife states he \"falls into the chair\".  Patient still does not want the balance therapy.  After discussion patient wishes to try.  Memory issues chronically.  Chronic hearing issues.  Wife transports him and assists in the home.    Compared to one year ago, the patient feels his physical health is the same and his mental health is the same.    Depression Screen:  PHQ-2/PHQ-9 Depression Screening 2/7/2022   Little interest or pleasure in doing things 0   Feeling down, depressed, or hopeless 0   Trouble falling or staying asleep, or sleeping too much 0   Feeling tired or having little energy 3   Poor appetite or overeating 0   Feeling bad about yourself - or that you are a failure or have let yourself or your family down 0   Trouble concentrating on things, such as reading the newspaper or watching television 0   Moving or speaking so slowly that other people could have noticed. Or the opposite - being so fidgety or restless that you have been moving around a lot more than usual 0   Thoughts that you would be better off dead, or of hurting yourself in some way 0   Total Score 3   If you checked off any problems, how difficult have these problems made it for you to do your work, take care of things at home, or get along with other people? -       Falls Risk Assessment:  RICHIE Fall Risk Clinician Key Questions   Have you fallen in the past year?: Yes  Do you feel unsteady with walking?: Yes  Frequent falls in home with balance issues.    Past Medical/Family/Social History:  The following portions of the patient's history were reviewed and updated as appropriate: allergies, current " "medications, past family history, past medical history, past social history, past surgical history and problem list.    Allergies   Allergen Reactions   • Penicillins Anaphylaxis and Hives   • Percocet [Oxycodone-Acetaminophen] Itching   • Vancomycin Rash   • Biaxin [Clarithromycin] Itching   • Crestor [Rosuvastatin] Other (See Comments)     \"CAN'T REMEMBER THE REACTION\"   • Levaquin [Levofloxacin In D5w] Other (See Comments)     MUSCLE STIFFNESS   • Latex Rash     Band aids cause blistering, ok with paper tape         Current Outpatient Medications:   •  apixaban (ELIQUIS) 5 MG tablet tablet, Take 1 tablet by mouth Every 12 (Twelve) Hours. Indications: Atrial Fibrillation, Disp: 60 tablet, Rfl: 11  •  aspirin 81 MG chewable tablet, Chew 81 mg Every Night. PER DR. MULLER, OK TO CONTINUE TAKING PER MD, Disp: , Rfl:   •  atorvastatin (LIPITOR) 80 MG tablet, Take 1 tablet by mouth Daily., Disp: 90 tablet, Rfl: 3  •  busPIRone (BUSPAR) 5 MG tablet, Take 1 tablet by mouth 2 (Two) Times a Day., Disp: 180 tablet, Rfl: 3  •  Coenzyme Q10 200 MG capsule, Take 200 mg by mouth Every Night. HOLDING FOR SURGERY, Disp: , Rfl:   •  escitalopram (LEXAPRO) 10 MG tablet, Take 10 mg by mouth Daily., Disp: , Rfl:   •  glimepiride (AMARYL) 2 MG tablet, Take 1 tablet by mouth Every Morning Before Breakfast., Disp: 90 tablet, Rfl: 3  •  isosorbide mononitrate (IMDUR) 30 MG 24 hr tablet, Take 1 tablet by mouth Every Night., Disp: 90 tablet, Rfl: 3  •  LORazepam (ATIVAN) 0.5 MG tablet, Take 1 tablet by mouth Every 8 (Eight) Hours As Needed for Anxiety., Disp: 30 tablet, Rfl: 0  •  melatonin 5 MG tablet tablet, Take 5 mg by mouth Every Night., Disp: , Rfl:   •  metFORMIN ER (GLUCOPHAGE-XR) 500 MG 24 hr tablet, Take 1 tablet by mouth Daily With Breakfast., Disp: 90 tablet, Rfl: 3  •  metoprolol succinate XL (TOPROL-XL) 25 MG 24 hr tablet, Take 1 tablet by mouth Every Night., Disp: 90 tablet, Rfl: 3  •  multivitamin with minerals (MULTIVITAMIN " ADULTS PO), Take 1 tablet by mouth Daily., Disp: , Rfl:   •  mupirocin (BACTROBAN) 2 % ointment, Apply  topically to the appropriate area as directed Daily., Disp: 22 g, Rfl: 0  •  nitroglycerin (Nitrostat) 0.4 MG SL tablet, Place 1 tablet under the tongue Every 5 (Five) Minutes As Needed for Chest Pain. Take no more than 3 doses in 15 minutes., Disp: 30 tablet, Rfl: 0  •  Omega-3 Fatty Acids (fish oil) 1000 MG capsule capsule, Take 1,000 mg by mouth Daily With Breakfast., Disp: , Rfl:   •  omeprazole (priLOSEC) 20 MG capsule, Take 1 capsule by mouth Daily., Disp: 90 capsule, Rfl: 3  •  RELION GLUCOSE TEST STRIPS test strip, 1 each by Other route Daily., Disp: , Rfl:   •  saw palmetto 160 MG capsule, Take 160 mg by mouth Every Night. HOLD FOR SURGERY, Disp: , Rfl:     Aspirin use counseling: Taking ASA appropriately as indicated    Current medication list contains no high risk medications.  No harmful drug interactions have been identified.     Family History   Problem Relation Age of Onset   • Hypertension Mother    • Heart failure Father    • Cancer Maternal Aunt    • Diabetes Maternal Grandfather    • Diabetes Daughter    • Malig Hyperthermia Neg Hx        Social History     Tobacco Use   • Smoking status: Former Smoker     Packs/day: 1.00     Years: 60.00     Pack years: 60.00     Quit date:      Years since quittin.1   • Smokeless tobacco: Never Used   Substance Use Topics   • Alcohol use: No     Comment: Daily caffeine use       Past Surgical History:   Procedure Laterality Date   • APPENDECTOMY     • ATHRECTOMY ILIAC, FEMORAL, TIBIAL ARTERY N/A 10/17/2018    Procedure: AIF ARTERIOGRAM, LEFT SFA  ANGIOPLASTY;  Surgeon: Jayden Marie MD;  Location: [facility] OR;  Service: Vascular   • CHOLECYSTECTOMY     • CHOLECYSTECTOMY WITH INTRAOPERATIVE CHOLANGIOGRAM N/A 2018    Procedure: CHOLECYSTECTOMY LAPAROSCOPIC INTRAOPERATIVE CHOLANGIOGRAM;  Surgeon: Daniel Gonzalez MD;  Location: [facility]  OR;  Service: General   • COLONOSCOPY     • COLONOSCOPY N/A 5/8/2018    Procedure: COLONOSCOPY to cecum with polypectomies;  Surgeon: Daniel Gonzalez MD;  Location:  [facility];  Service: Gastroenterology   • ENDOSCOPY N/A 5/8/2018    Procedure: ESOPHAGOGASTRODUODENOSCOPY with biopsies;  Surgeon: Daniel Gonzalez MD;  Location: [facility] ENDOSCOPY;  Service: Gastroenterology   • FEMORAL ARTERY STENT Left     on 3/22/16       Patient Active Problem List   Diagnosis   • Toe ulcer (HCC)   • Sleep apnea   • Seizure (HCC)   • PVD (peripheral vascular disease) (HCC)   • Peripheral arterial disease (HCC)   • Macular degeneration   • Hyperlipidemia   • Enlarged prostate   • Diabetes mellitus (HCC)   • Colon polyps   • Arthritis   • Anxiety   • Acid reflux   • Hx of colonic polyps   • Abnormal nuclear stress test   • CAD (coronary artery disease)   • Carotid stenosis, asymptomatic, right   • Paroxysmal atrial fibrillation (HCC)   • Abdominal aortic aneurysm (AAA) 3.0 cm to 5.5 cm in diameter in male (HCC)   • Dementia (HCC)   • Medicare annual wellness visit, subsequent       Review of Systems   Constitutional: Negative for activity change, appetite change, fatigue, fever, unexpected weight gain and unexpected weight loss.   HENT: Negative for congestion, nosebleeds, rhinorrhea, trouble swallowing and voice change.    Eyes: Negative for visual disturbance.   Respiratory: Negative for cough, chest tightness, shortness of breath and wheezing.    Cardiovascular: Negative for chest pain, palpitations and leg swelling.   Gastrointestinal: Negative for abdominal pain, blood in stool, constipation, diarrhea, nausea, vomiting, GERD and indigestion.   Genitourinary: Negative for dysuria, frequency and hematuria.   Musculoskeletal: Negative for arthralgias, back pain and myalgias.        Frequent falls.   Skin: Negative for rash and wound.   Neurological: Positive for memory problem. Negative for dizziness, tremors, weakness,  "light-headedness, numbness and headache.        Chronic numbness in feet and now in fingers.   Hematological: Negative for adenopathy. Does not bruise/bleed easily.   Psychiatric/Behavioral: Negative for sleep disturbance, suicidal ideas and depressed mood. The patient is not nervous/anxious.        Objective     Vitals:    02/07/22 1015   BP: 122/70   BP Location: Left arm   Patient Position: Sitting   Cuff Size: Adult   Pulse: 54   Temp: 98 °F (36.7 °C)   TempSrc: Temporal   SpO2: 97%   Weight: 74.8 kg (165 lb)   Height: 175.3 cm (69.02\")   PainSc: 0-No pain       Patient's Body mass index is 24.35 kg/m². indicating that he is within normal range (BMI 18.5-24.9). No BMI management plan needed..      No exam data present    The patient has no evidence of cognitve impairment.     Physical Exam  Vitals and nursing note reviewed.   Constitutional:       General: He is not in acute distress.     Appearance: He is well-developed. He is not diaphoretic.   HENT:      Head: Normocephalic and atraumatic.      Right Ear: External ear normal.      Left Ear: External ear normal.      Nose: Nose normal.   Eyes:      Conjunctiva/sclera: Conjunctivae normal.      Pupils: Pupils are equal, round, and reactive to light.   Neck:      Thyroid: No thyromegaly.      Trachea: No tracheal deviation.   Cardiovascular:      Rate and Rhythm: Normal rate and regular rhythm.      Heart sounds: Normal heart sounds. No murmur heard.  No friction rub. No gallop.       Comments: Poor pulses in feet and legs and not palpable in the DP and PT bilaterally.  Pulmonary:      Effort: Pulmonary effort is normal. No respiratory distress.      Breath sounds: Normal breath sounds.   Abdominal:      General: Bowel sounds are normal.      Palpations: Abdomen is soft. There is no mass.      Tenderness: There is no abdominal tenderness. There is no guarding.   Musculoskeletal:         General: Normal range of motion.      Cervical back: Normal range of motion " and neck supple.   Lymphadenopathy:      Cervical: No cervical adenopathy.   Skin:     General: Skin is warm and dry.      Capillary Refill: Capillary refill takes less than 2 seconds.      Findings: No rash.   Neurological:      Mental Status: He is alert and oriented to person, place, and time.      Motor: No abnormal muscle tone.      Deep Tendon Reflexes: Reflexes normal.   Psychiatric:         Behavior: Behavior normal.         Thought Content: Thought content normal.         Judgment: Judgment normal.         Recent Lab Results:     Lab Results   Component Value Date    CHOL 145 02/21/2020    TRIG 95 07/22/2021    HDL 40 07/22/2021    VLDL 18 07/22/2021    LDLHDL 2.22 02/21/2020       Assessment/Plan   Age-appropriate Screening Schedule:  Refer to the list below for future screening recommendations based on patient's age, sex and/or medical conditions.      Health Maintenance   Topic Date Due   • TDAP/TD VACCINES (1 - Tdap) Never done   • ZOSTER VACCINE (1 of 2) Never done   • DIABETIC EYE EXAM  Never done   • HEMOGLOBIN A1C  01/22/2022   • DIABETIC FOOT EXAM  07/22/2022   • LIPID PANEL  07/22/2022   • URINE MICROALBUMIN  07/22/2022   • INFLUENZA VACCINE  Completed       Medicare Risks and Personalized Health Plan:  Fall Risk  Glaucoma Risk  Immunizations Discussed/Encouraged (specific immunizations; Tdap and Shingrix )      CMS-Preventive Services Quick Reference  Medicare Preventive Services Addressed:  Annual Wellness Visit (AWV)  Glaucoma screening (for individuals with diabetes mellitus, family history of glaucoma, -Americans (> or =) age 50, -Americans (> or =) age 65)    Advance Care Planning:  ACP discussion was held with the patient during this visit. Patient has an advance directive in EMR which is still valid.     Diagnoses and all orders for this visit:    1. Medicare annual wellness visit, subsequent (Primary)    2. Type 2 diabetes mellitus with diabetic peripheral angiopathy  without gangrene, without long-term current use of insulin (HCC)  -     Hemoglobin A1c  -     Comprehensive Metabolic Panel  -     Lipid Panel    3. Other hyperlipidemia  -     Comprehensive Metabolic Panel  -     Lipid Panel    4. Late onset Alzheimer's dementia without behavioral disturbance (HCC)    5. Balance disorder  -     Ambulatory Referral to Physical Therapy Evaluate and treat  -     Vitamin B12    6. Frequent falls  -     Ambulatory Referral to Physical Therapy Evaluate and treat    7. Type 2 diabetes mellitus with diabetic neuropathy, without long-term current use of insulin (HCC)  -     Ambulatory Referral to Physical Therapy Evaluate and treat  -     Vitamin B12    8. Chronic fatigue  -     Vitamin B12      .Annual wellness visit reviewed with patient.  All past history, medications, social history, and problem list were reviewed.  Discussed advanced directives and living will.  Patient has living will: Living will: Directive already scanned in chart.  Discussed fall risk and precautions encourage removing throw rugs and using grab bars within the home and bathroom.  Will check the labs as ordered above to evaluate the blood sugars, kidney, liver, cholesterol for screening.  Discussed flu shot recommended to get the high-dose influenza vaccine annually in the fall.  The patient has a COVID HM Topic on their chart, and they are fully vaccinated.  Prevnar-13 and pneumovax-23 up to date and appropriate.  Tdap and Shingrix vaccination series recommended.  Encourage follow-up with the eye doctor on annual basis for glaucoma evaluation.  Discussed weight and encouraged exercise as tolerated while following a healthy diet.  Colon cancer screening discussed and current status: colonoscopy completed 5/19/2018.  Follow up with current specialists as needed.    An After Visit Summary and PPPS with all of these plans were given to the patient.      Follow Up:  Return in about 6 months (around 8/7/2022) for Next  scheduled follow up.           · COVID-19 Precautions - Patient was compliant in wearing a mask. When I saw the patient, I used appropriate personal protective equipment (PPE) including mask and eye shield (standard procedure).  Additionally, I used gown and gloves if indicated.  Hand hygiene was completed before and after seeing the patient.  · Dictated utilizing Dragon Dictation

## 2022-02-08 LAB
ALBUMIN SERPL-MCNC: 4.3 G/DL (ref 3.7–4.7)
ALBUMIN/GLOB SERPL: 1.4 {RATIO} (ref 1.2–2.2)
ALP SERPL-CCNC: 124 IU/L (ref 44–121)
ALT SERPL-CCNC: 20 IU/L (ref 0–44)
AST SERPL-CCNC: 23 IU/L (ref 0–40)
BILIRUB SERPL-MCNC: 0.6 MG/DL (ref 0–1.2)
BUN SERPL-MCNC: 22 MG/DL (ref 8–27)
BUN/CREAT SERPL: 17 (ref 10–24)
CALCIUM SERPL-MCNC: 9.1 MG/DL (ref 8.6–10.2)
CHLORIDE SERPL-SCNC: 96 MMOL/L (ref 96–106)
CHOLEST SERPL-MCNC: 157 MG/DL (ref 100–199)
CO2 SERPL-SCNC: 21 MMOL/L (ref 20–29)
CREAT SERPL-MCNC: 1.28 MG/DL (ref 0.76–1.27)
GLOBULIN SER CALC-MCNC: 3 G/DL (ref 1.5–4.5)
GLUCOSE SERPL-MCNC: 84 MG/DL (ref 65–99)
HBA1C MFR BLD: 6.3 % (ref 4.8–5.6)
HDLC SERPL-MCNC: 39 MG/DL
LDLC SERPL CALC-MCNC: 93 MG/DL (ref 0–99)
POTASSIUM SERPL-SCNC: 4.5 MMOL/L (ref 3.5–5.2)
PROT SERPL-MCNC: 7.3 G/DL (ref 6–8.5)
SODIUM SERPL-SCNC: 134 MMOL/L (ref 134–144)
TRIGL SERPL-MCNC: 138 MG/DL (ref 0–149)
VIT B12 SERPL-MCNC: 824 PG/ML (ref 232–1245)
VLDLC SERPL CALC-MCNC: 25 MG/DL (ref 5–40)

## 2022-04-04 ENCOUNTER — TRANSCRIBE ORDERS (OUTPATIENT)
Dept: ADMINISTRATIVE | Facility: HOSPITAL | Age: 77
End: 2022-04-04

## 2022-04-04 DIAGNOSIS — I73.9 PAD (PERIPHERAL ARTERY DISEASE): Primary | ICD-10-CM

## 2022-04-07 ENCOUNTER — HOSPITAL ENCOUNTER (OUTPATIENT)
Dept: CT IMAGING | Facility: HOSPITAL | Age: 77
Discharge: HOME | End: 2022-04-07
Admitting: SURGERY

## 2022-04-07 DIAGNOSIS — I73.9 PAD (PERIPHERAL ARTERY DISEASE): ICD-10-CM

## 2022-04-07 LAB — CREAT BLDA-MCNC: 1.3 MG/DL (ref 0.6–1.3)

## 2022-04-07 PROCEDURE — 82565 ASSAY OF CREATININE: CPT

## 2022-04-07 PROCEDURE — 0 IOPAMIDOL PER 1 ML: Performed by: SURGERY

## 2022-04-07 PROCEDURE — 75635 CT ANGIO ABDOMINAL ARTERIES: CPT

## 2022-04-07 RX ADMIN — IOPAMIDOL 95 ML: 755 INJECTION, SOLUTION INTRAVENOUS at 14:51

## 2022-04-11 RX ORDER — ISOSORBIDE MONONITRATE 30 MG/1
TABLET, EXTENDED RELEASE ORAL
Qty: 90 TABLET | Refills: 1 | Status: SHIPPED | OUTPATIENT
Start: 2022-04-11 | End: 2022-10-10

## 2022-04-12 RX ORDER — METOPROLOL SUCCINATE 25 MG/1
TABLET, EXTENDED RELEASE ORAL
Qty: 90 TABLET | Refills: 1 | Status: SHIPPED | OUTPATIENT
Start: 2022-04-12 | End: 2022-10-10

## 2022-04-18 ENCOUNTER — PRE-ADMISSION TESTING (OUTPATIENT)
Dept: PREADMISSION TESTING | Facility: HOSPITAL | Age: 77
End: 2022-04-18

## 2022-04-18 VITALS
TEMPERATURE: 98.2 F | RESPIRATION RATE: 20 BRPM | HEART RATE: 66 BPM | SYSTOLIC BLOOD PRESSURE: 157 MMHG | WEIGHT: 172.2 LBS | BODY MASS INDEX: 25.51 KG/M2 | HEIGHT: 69 IN | DIASTOLIC BLOOD PRESSURE: 69 MMHG | OXYGEN SATURATION: 98 %

## 2022-04-18 LAB
ANION GAP SERPL CALCULATED.3IONS-SCNC: 8 MMOL/L (ref 5–15)
BUN SERPL-MCNC: 21 MG/DL (ref 8–23)
BUN/CREAT SERPL: 16.8 (ref 7–25)
CALCIUM SPEC-SCNC: 8.5 MG/DL (ref 8.6–10.5)
CHLORIDE SERPL-SCNC: 102 MMOL/L (ref 98–107)
CO2 SERPL-SCNC: 26 MMOL/L (ref 22–29)
CREAT SERPL-MCNC: 1.25 MG/DL (ref 0.76–1.27)
DEPRECATED RDW RBC AUTO: 43.8 FL (ref 37–54)
EGFRCR SERPLBLD CKD-EPI 2021: 60 ML/MIN/1.73
ERYTHROCYTE [DISTWIDTH] IN BLOOD BY AUTOMATED COUNT: 13 % (ref 12.3–15.4)
GLUCOSE SERPL-MCNC: 132 MG/DL (ref 65–99)
HCT VFR BLD AUTO: 36.5 % (ref 37.5–51)
HGB BLD-MCNC: 12.3 G/DL (ref 13–17.7)
MCH RBC QN AUTO: 31.1 PG (ref 26.6–33)
MCHC RBC AUTO-ENTMCNC: 33.7 G/DL (ref 31.5–35.7)
MCV RBC AUTO: 92.4 FL (ref 79–97)
PLATELET # BLD AUTO: 208 10*3/MM3 (ref 140–450)
PMV BLD AUTO: 9.4 FL (ref 6–12)
POTASSIUM SERPL-SCNC: 4.8 MMOL/L (ref 3.5–5.2)
QT INTERVAL: 449 MS
RBC # BLD AUTO: 3.95 10*6/MM3 (ref 4.14–5.8)
SARS-COV-2 ORF1AB RESP QL NAA+PROBE: NOT DETECTED
SODIUM SERPL-SCNC: 136 MMOL/L (ref 136–145)
WBC NRBC COR # BLD: 6.77 10*3/MM3 (ref 3.4–10.8)

## 2022-04-18 PROCEDURE — 93010 ELECTROCARDIOGRAM REPORT: CPT | Performed by: INTERNAL MEDICINE

## 2022-04-18 PROCEDURE — C9803 HOPD COVID-19 SPEC COLLECT: HCPCS

## 2022-04-18 PROCEDURE — 36415 COLL VENOUS BLD VENIPUNCTURE: CPT

## 2022-04-18 PROCEDURE — U0004 COV-19 TEST NON-CDC HGH THRU: HCPCS

## 2022-04-18 PROCEDURE — 93005 ELECTROCARDIOGRAM TRACING: CPT

## 2022-04-18 PROCEDURE — 80048 BASIC METABOLIC PNL TOTAL CA: CPT

## 2022-04-18 PROCEDURE — 85027 COMPLETE CBC AUTOMATED: CPT

## 2022-04-18 RX ORDER — ASPIRIN 81 MG/1
81 TABLET ORAL DAILY
COMMUNITY

## 2022-04-18 RX ORDER — CHLORHEXIDINE GLUCONATE 500 MG/1
CLOTH TOPICAL
COMMUNITY
End: 2022-04-20 | Stop reason: HOSPADM

## 2022-04-18 NOTE — DISCHARGE INSTRUCTIONS
Take the following medications the morning of surgery:  OMEPRAZOLE, BUSPAR    If you are on prescription narcotic pain medication to control your pain you may also take that medication the morning of surgery.    General Instructions:  Do not eat solid food after midnight the night before surgery.  You may drink clear liquids day of surgery but must stop at least one hour before your hospital arrival time.  It is beneficial for you to have a clear drink that contains carbohydrates the day of surgery.  We suggest a 12 to 20 ounce bottle of Gatorade or Powerade for non-diabetic patients or a 12 to 20 ounce bottle of G2 or Powerade Zero for diabetic patients. (Pediatric patients, are not advised to drink a 12 to 20 ounce carbohydrate drink)    Clear liquids are liquids you can see through.  Nothing red in color.     Plain water                               Sports drinks  Sodas                                   Gelatin (Jell-O)  Fruit juices without pulp such as white grape juice and apple juice  Popsicles that contain no fruit or yogurt  Tea or coffee (no cream or milk added)  Gatorade / Powerade  G2 / Powerade Zero    Infants may have breast milk up to four hours before surgery.  Infants drinking formula may drink formula up to six hours before surgery.   Patients who avoid smoking, chewing tobacco and alcohol for 4 weeks prior to surgery have a reduced risk of post-operative complications.  Quit smoking as many days before surgery as you can.  Do not smoke, use chewing tobacco or drink alcohol the day of surgery.   If applicable bring your C-PAP/ BI-PAP machine.  Bring any papers given to you in the doctor’s office.  Wear clean comfortable clothes.  Do not wear contact lenses, false eyelashes or make-up.  Bring a case for your glasses.   Bring crutches or walker if applicable.  Remove all piercings.  Leave jewelry and any other valuables at home.  Hair extensions with metal clips must be removed prior to  surgery.  The Pre-Admission Testing nurse will instruct you to bring medications if unable to obtain an accurate list in Pre-Admission Testing.        Preventing a Surgical Site Infection:  For 2 to 3 days before surgery, avoid shaving with a razor because the razor can irritate skin and make it easier to develop an infection.    Any areas of open skin can increase the risk of a post-operative wound infection by allowing bacteria to enter and travel throughout the body.  Notify your surgeon if you have any skin wounds / rashes even if it is not near the expected surgical site.  The area will need assessed to determine if surgery should be delayed until it is healed.  The night prior to surgery shower using a fresh bar of anti-bacterial soap (such as Dial) and clean washcloth.  Sleep in a clean bed with clean clothing.  Do not allow pets to sleep with you.  Shower on the morning of surgery using a fresh bar of anti-bacterial soap (such as Dial) and clean washcloth.  Dry with a clean towel and dress in clean clothing.  Ask your surgeon if you will be receiving antibiotics prior to surgery.  Make sure you, your family, and all healthcare providers clean their hands with soap and water or an alcohol based hand  before caring for you or your wound.    Day of surgery:  Your arrival time is approximately two hours before your scheduled surgery time.  Upon arrival, a Pre-op nurse and Anesthesiologist will review your health history, obtain vital signs, and answer questions you may have.  The only belongings needed at this time will be a list of your home medications and if applicable your C-PAP/BI-PAP machine.  A Pre-op nurse will start an IV and you may receive medication in preparation for surgery, including something to help you relax.     Please be aware that surgery does come with discomfort.  We want to make every effort to control your discomfort so please discuss any uncontrolled symptoms with your nurse.    Your doctor will most likely have prescribed pain medications.      If you are going home after surgery you will receive individualized written care instructions before being discharged.  A responsible adult must drive you to and from the hospital on the day of your surgery and stay with you for 24 hours.  Discharge prescriptions can be filled by the hospital pharmacy during regular pharmacy hours.  If you are having surgery late in the day/evening your prescription may be e-prescribed to your pharmacy.  Please verify your pharmacy hours or chose a 24 hour pharmacy to avoid not having access to your prescription because your pharmacy has closed for the day.    If you are staying overnight following surgery, you will be transported to your hospital room following the recovery period.  [facility] has [facility] rooms.    If you have any questions please call Pre-Admission Testing at [phone].  Deductibles and co-payments are collected on the day of service. Please be prepared to pay the required co-pay, deductible or deposit on the day of service as defined by your plan.    Patient Education for Self-Quarantine Process    Following your COVID testing, we strongly recommend that you wear a mask when you are with other people and practice social distancing.   Limit your activities to only required outings.  Wash your hands with soap and water frequently for at least 20 seconds.   Avoid touching your eyes, nose and mouth with unwashed hands.  Do not share anything - utensils, drinking glasses, food from the same bowl.   Sanitize household surfaces daily. Include all high touch areas (door handles, light switches, phones, countertops, etc.)    Call your surgeon immediately if you experience any of the following symptoms:  Sore Throat  Shortness of Breath or difficulty breathing  Cough  Chills  Body soreness or muscle pain  Headache  Fever  New loss of taste or smell  Do not arrive for your surgery  ill.  Your procedure will need to be rescheduled to another time.  You will need to call your physician before the day of surgery to avoid any unnecessary exposure to hospital staff as well as other patients.      CHLORHEXIDINE CLOTH INSTRUCTIONS  The morning of surgery follow these instructions using the Chlorhexidine cloths you've been given.  These steps reduce bacteria on the body.  Do not use the cloths near your eyes, ears mouth, genitalia or on open wounds.  Throw the cloths away after use but do not try to flush them down a toilet.      Open and remove one cloth at a time from the package.    Leave the cloth unfolded and begin the bathing.  Massage the skin with the cloths using gentle pressure to remove bacteria.  Do not scrub harshly.   Follow the steps below with one 2% CHG cloth per area (6 total cloths).  One cloth for neck, shoulders and chest.  One cloth for both arms, hands, fingers and underarms (do underarms last).  One cloth for the abdomen followed by groin.  One cloth for right leg and foot including between the toes.  One cloth for left leg and foot including between the toes.  The last cloth is to be used for the back of the neck, back and buttocks.    Allow the CHG to air dry 3 minutes on the skin which will give it time to work and decrease the chance of irritation.  The skin may feel sticky until it is dry.  Do not rinse with water or any other liquid or you will lose the beneficial effects of the CHG.  If mild skin irritation occurs, do rinse the skin to remove the CHG.  Report this to the nurse at time of admission.  Do not apply lotions, creams, ointments, deodorants or perfumes after using the clothes. Dress in clean clothes before coming to the hospital.

## 2022-04-20 ENCOUNTER — APPOINTMENT (OUTPATIENT)
Dept: GENERAL RADIOLOGY | Facility: HOSPITAL | Age: 77
End: 2022-04-20

## 2022-04-20 ENCOUNTER — ANESTHESIA (OUTPATIENT)
Dept: PERIOP | Facility: HOSPITAL | Age: 77
End: 2022-04-20

## 2022-04-20 ENCOUNTER — ANESTHESIA EVENT (OUTPATIENT)
Dept: PERIOP | Facility: HOSPITAL | Age: 77
End: 2022-04-20

## 2022-04-20 ENCOUNTER — HOSPITAL ENCOUNTER (OUTPATIENT)
Facility: HOSPITAL | Age: 77
Setting detail: HOSPITAL OUTPATIENT SURGERY
Discharge: HOME | End: 2022-04-20
Attending: SURGERY | Admitting: SURGERY

## 2022-04-20 VITALS
OXYGEN SATURATION: 100 % | DIASTOLIC BLOOD PRESSURE: 61 MMHG | SYSTOLIC BLOOD PRESSURE: 162 MMHG | TEMPERATURE: 97.8 F | HEART RATE: 56 BPM | RESPIRATION RATE: 20 BRPM

## 2022-04-20 LAB
GLUCOSE BLDC GLUCOMTR-MCNC: 103 MG/DL (ref 70–130)
GLUCOSE BLDC GLUCOMTR-MCNC: 152 MG/DL (ref 70–130)

## 2022-04-20 PROCEDURE — C1894 INTRO/SHEATH, NON-LASER: HCPCS | Performed by: SURGERY

## 2022-04-20 PROCEDURE — C1769 GUIDE WIRE: HCPCS | Performed by: SURGERY

## 2022-04-20 PROCEDURE — C1760 CLOSURE DEV, VASC: HCPCS | Performed by: SURGERY

## 2022-04-20 PROCEDURE — 25010000002 ONDANSETRON PER 1 MG: Performed by: NURSE ANESTHETIST, CERTIFIED REGISTERED

## 2022-04-20 PROCEDURE — 75716 ARTERY X-RAYS ARMS/LEGS: CPT

## 2022-04-20 PROCEDURE — 25010000002 HEPARIN (PORCINE) PER 1000 UNITS: Performed by: SURGERY

## 2022-04-20 PROCEDURE — 82962 GLUCOSE BLOOD TEST: CPT

## 2022-04-20 PROCEDURE — 25010000002 PROPOFOL 10 MG/ML EMULSION: Performed by: NURSE ANESTHETIST, CERTIFIED REGISTERED

## 2022-04-20 PROCEDURE — C1887 CATHETER, GUIDING: HCPCS | Performed by: SURGERY

## 2022-04-20 PROCEDURE — 0 LIDOCAINE 1 % SOLUTION 20 ML VIAL: Performed by: SURGERY

## 2022-04-20 PROCEDURE — 25010000002 HYDRALAZINE PER 20 MG: Performed by: NURSE ANESTHETIST, CERTIFIED REGISTERED

## 2022-04-20 PROCEDURE — 0 IOPAMIDOL PER 1 ML: Performed by: SURGERY

## 2022-04-20 RX ORDER — DIPHENHYDRAMINE HYDROCHLORIDE 50 MG/ML
12.5 INJECTION INTRAMUSCULAR; INTRAVENOUS
Status: DISCONTINUED | OUTPATIENT
Start: 2022-04-20 | End: 2022-04-20 | Stop reason: HOSPADM

## 2022-04-20 RX ORDER — SODIUM CHLORIDE 0.9 % (FLUSH) 0.9 %
3 SYRINGE (ML) INJECTION EVERY 12 HOURS SCHEDULED
Status: DISCONTINUED | OUTPATIENT
Start: 2022-04-20 | End: 2022-04-20 | Stop reason: HOSPADM

## 2022-04-20 RX ORDER — LIDOCAINE HYDROCHLORIDE 10 MG/ML
0.5 INJECTION, SOLUTION EPIDURAL; INFILTRATION; INTRACAUDAL; PERINEURAL ONCE AS NEEDED
Status: DISCONTINUED | OUTPATIENT
Start: 2022-04-20 | End: 2022-04-20 | Stop reason: HOSPADM

## 2022-04-20 RX ORDER — PROMETHAZINE HYDROCHLORIDE 25 MG/1
25 SUPPOSITORY RECTAL ONCE AS NEEDED
Status: DISCONTINUED | OUTPATIENT
Start: 2022-04-20 | End: 2022-04-20 | Stop reason: HOSPADM

## 2022-04-20 RX ORDER — EPHEDRINE SULFATE 50 MG/ML
5 INJECTION, SOLUTION INTRAVENOUS ONCE AS NEEDED
Status: DISCONTINUED | OUTPATIENT
Start: 2022-04-20 | End: 2022-04-20 | Stop reason: HOSPADM

## 2022-04-20 RX ORDER — LIDOCAINE HYDROCHLORIDE 20 MG/ML
INJECTION, SOLUTION INFILTRATION; PERINEURAL AS NEEDED
Status: DISCONTINUED | OUTPATIENT
Start: 2022-04-20 | End: 2022-04-20 | Stop reason: SURG

## 2022-04-20 RX ORDER — LORAZEPAM 0.5 MG/1
0.5 TABLET ORAL ONCE AS NEEDED
Status: COMPLETED | OUTPATIENT
Start: 2022-04-20 | End: 2022-04-20

## 2022-04-20 RX ORDER — ONDANSETRON 2 MG/ML
INJECTION INTRAMUSCULAR; INTRAVENOUS AS NEEDED
Status: DISCONTINUED | OUTPATIENT
Start: 2022-04-20 | End: 2022-04-20 | Stop reason: SURG

## 2022-04-20 RX ORDER — PROMETHAZINE HYDROCHLORIDE 12.5 MG/1
25 TABLET ORAL ONCE AS NEEDED
Status: DISCONTINUED | OUTPATIENT
Start: 2022-04-20 | End: 2022-04-20 | Stop reason: HOSPADM

## 2022-04-20 RX ORDER — HYDROCODONE BITARTRATE AND ACETAMINOPHEN 5; 325 MG/1; MG/1
1 TABLET ORAL ONCE AS NEEDED
Status: DISCONTINUED | OUTPATIENT
Start: 2022-04-20 | End: 2022-04-20 | Stop reason: HOSPADM

## 2022-04-20 RX ORDER — LABETALOL HYDROCHLORIDE 5 MG/ML
5 INJECTION, SOLUTION INTRAVENOUS
Status: DISCONTINUED | OUTPATIENT
Start: 2022-04-20 | End: 2022-04-20 | Stop reason: HOSPADM

## 2022-04-20 RX ORDER — SODIUM CHLORIDE, SODIUM LACTATE, POTASSIUM CHLORIDE, CALCIUM CHLORIDE 600; 310; 30; 20 MG/100ML; MG/100ML; MG/100ML; MG/100ML
9 INJECTION, SOLUTION INTRAVENOUS CONTINUOUS
Status: DISCONTINUED | OUTPATIENT
Start: 2022-04-20 | End: 2022-04-20 | Stop reason: HOSPADM

## 2022-04-20 RX ORDER — FENTANYL CITRATE 50 UG/ML
50 INJECTION, SOLUTION INTRAMUSCULAR; INTRAVENOUS
Status: DISCONTINUED | OUTPATIENT
Start: 2022-04-20 | End: 2022-04-20 | Stop reason: HOSPADM

## 2022-04-20 RX ORDER — FAMOTIDINE 10 MG/ML
20 INJECTION, SOLUTION INTRAVENOUS ONCE
Status: COMPLETED | OUTPATIENT
Start: 2022-04-20 | End: 2022-04-20

## 2022-04-20 RX ORDER — FLUMAZENIL 0.1 MG/ML
0.2 INJECTION INTRAVENOUS AS NEEDED
Status: DISCONTINUED | OUTPATIENT
Start: 2022-04-20 | End: 2022-04-20 | Stop reason: HOSPADM

## 2022-04-20 RX ORDER — DIPHENHYDRAMINE HCL 25 MG
25 CAPSULE ORAL
Status: DISCONTINUED | OUTPATIENT
Start: 2022-04-20 | End: 2022-04-20 | Stop reason: HOSPADM

## 2022-04-20 RX ORDER — PROPOFOL 10 MG/ML
VIAL (ML) INTRAVENOUS AS NEEDED
Status: DISCONTINUED | OUTPATIENT
Start: 2022-04-20 | End: 2022-04-20 | Stop reason: SURG

## 2022-04-20 RX ORDER — NALOXONE HCL 0.4 MG/ML
0.2 VIAL (ML) INJECTION AS NEEDED
Status: DISCONTINUED | OUTPATIENT
Start: 2022-04-20 | End: 2022-04-20 | Stop reason: HOSPADM

## 2022-04-20 RX ORDER — ONDANSETRON 2 MG/ML
4 INJECTION INTRAMUSCULAR; INTRAVENOUS ONCE AS NEEDED
Status: DISCONTINUED | OUTPATIENT
Start: 2022-04-20 | End: 2022-04-20 | Stop reason: HOSPADM

## 2022-04-20 RX ORDER — MIDAZOLAM HYDROCHLORIDE 1 MG/ML
0.5 INJECTION INTRAMUSCULAR; INTRAVENOUS
Status: DISCONTINUED | OUTPATIENT
Start: 2022-04-20 | End: 2022-04-20 | Stop reason: HOSPADM

## 2022-04-20 RX ORDER — HYDRALAZINE HYDROCHLORIDE 20 MG/ML
5 INJECTION INTRAMUSCULAR; INTRAVENOUS
Status: DISCONTINUED | OUTPATIENT
Start: 2022-04-20 | End: 2022-04-20 | Stop reason: HOSPADM

## 2022-04-20 RX ORDER — HYDROMORPHONE HYDROCHLORIDE 1 MG/ML
0.5 INJECTION, SOLUTION INTRAMUSCULAR; INTRAVENOUS; SUBCUTANEOUS
Status: DISCONTINUED | OUTPATIENT
Start: 2022-04-20 | End: 2022-04-20 | Stop reason: HOSPADM

## 2022-04-20 RX ORDER — HYDROCODONE BITARTRATE AND ACETAMINOPHEN 10; 325 MG/1; MG/1
1 TABLET ORAL EVERY 4 HOURS PRN
Status: DISCONTINUED | OUTPATIENT
Start: 2022-04-20 | End: 2022-04-20 | Stop reason: HOSPADM

## 2022-04-20 RX ORDER — SODIUM CHLORIDE 0.9 % (FLUSH) 0.9 %
3-10 SYRINGE (ML) INJECTION AS NEEDED
Status: DISCONTINUED | OUTPATIENT
Start: 2022-04-20 | End: 2022-04-20 | Stop reason: HOSPADM

## 2022-04-20 RX ADMIN — PROPOFOL 50 MG: 10 INJECTION, EMULSION INTRAVENOUS at 07:34

## 2022-04-20 RX ADMIN — LIDOCAINE HYDROCHLORIDE 60 MG: 20 INJECTION, SOLUTION INFILTRATION; PERINEURAL at 07:28

## 2022-04-20 RX ADMIN — SODIUM CHLORIDE, POTASSIUM CHLORIDE, SODIUM LACTATE AND CALCIUM CHLORIDE 9 ML/HR: 600; 310; 30; 20 INJECTION, SOLUTION INTRAVENOUS at 07:19

## 2022-04-20 RX ADMIN — LORAZEPAM 0.5 MG: 0.5 TABLET ORAL at 10:18

## 2022-04-20 RX ADMIN — PROPOFOL 100 MCG/KG/MIN: 10 INJECTION, EMULSION INTRAVENOUS at 07:36

## 2022-04-20 RX ADMIN — HYDRALAZINE HYDROCHLORIDE 5 MG: 20 INJECTION INTRAMUSCULAR; INTRAVENOUS at 11:12

## 2022-04-20 RX ADMIN — HYDRALAZINE HYDROCHLORIDE 5 MG: 20 INJECTION INTRAMUSCULAR; INTRAVENOUS at 10:08

## 2022-04-20 RX ADMIN — IOPAMIDOL 42 ML: 510 INJECTION, SOLUTION INTRAVASCULAR at 08:12

## 2022-04-20 RX ADMIN — ONDANSETRON 4 MG: 2 INJECTION INTRAMUSCULAR; INTRAVENOUS at 08:45

## 2022-04-20 RX ADMIN — PROPOFOL 50 MG: 10 INJECTION, EMULSION INTRAVENOUS at 07:29

## 2022-04-20 RX ADMIN — FAMOTIDINE 20 MG: 10 INJECTION INTRAVENOUS at 07:19

## 2022-04-20 NOTE — DISCHARGE INSTRUCTIONS
Resume your Metformin in 48 hours    [facility]  Audie Sahu, Nesha Deutsch Rachel, Shakeel,Frank, Nuha  [address]  [place] [address]  [phone]      Discharge Instructions for Angiogram    Go home, rest and take it easy today.      You may experience some dizziness or memory loss from the anesthesia.  This may last for the next 24 hours.  Someone should plan on staying with you for the first 24 hours for your safety.    Do not make any important legal decisions or sign any legal papers for the next 24 hours.      Eat and drink lightly today.  Start off with liquids, jello, soup, crackers or other bland foods at first. You may advance your diet tomorrow as tolerated as long as you do not experience any nausea or vomiting.    You may resume routine medications including blood thinners. However, Glucophage should be not be started for 72 hours after the dye is given.      No lifting over 10 pounds and no strenuous activity for the next 2-3 days.    Try not to strain or bear down when your bowels move or when you empty your bladder.    Limit going up and down steps for 2 days.    No driving for the remainder of the day.  You may resume limited driving tomorrow if necessary.      You may shower tomorrow.  No bath or hot tubs for at least 2 days after the procedure.          Leave the pressure dressing on until tomorrow morning.  You may then take this off, as well as the small see through dressing with gauze tomorrow.  If it doesn’t come off easily, do not pull this off.  If it is stuck, shower and let the warm water loosen it before removal.       Check your groin dressing regularly for bleeding through the dressing (under the pressure dressing).  A small amount of blood contained by the gauze is normal; the whole dressing should not be filled with blood or leaking out under the sides.     If you experience bleeding through the gauze/clear sticky dressing, lay  flat and have someone apply direct pressure for 15 minutes.  If bleeding has stopped after this, you may put a clean gauze and tape over the area.  Continue to lie flat for 1-2 hours.  If bleeding continues after 15 minutes of pressure, call us at the number listed above.  There is an MD available after hours.      If you experience heavy bleeding or large swelling, continue to hold firm pressure and              call 911.  Do not call the MD on call.      If you experience pain or discomfort you may take Tylenol or Ibuprofen, whichever you normally use for minor discomfort, unless otherwise instructed.        If the MD gives you a prescription for narcotic pain pills (Tylenol #3, Vicodin, Hydrocodone, Oxycodone or Percocet), you cannot drive a vehicle or operate machinery while taking these.     Severe pain is not expected after this procedure.  If you experience severe pain, please call our office at [phone].     Remember to contact our office for any of the following:    Fever > 101 degrees  Severe pain that cannot be controlled by taking your pain pills  Severe nausea or vomiting   Significant bleeding of your incisions  Drainage that has a bad smell or is yellow or green in appearance  Any other questions or concerns

## 2022-04-20 NOTE — ANESTHESIA POSTPROCEDURE EVALUATION
Patient: Erick Bruno    Procedure Summary     Date: 04/20/22 Room / Location: [facility] OR 18 [facility] / [facility] HYBRID OR 18/19    Anesthesia Start: 0725 Anesthesia Stop: 0904    Procedure: AIF LEFT LEG ANGIOGRAM (Bilateral ) Diagnosis:     Surgeons: Jayden Marie MD Provider: Don Zhao MD    Anesthesia Type: MAC ASA Status: 4          Anesthesia Type: MAC    Vitals  Vitals Value Taken Time   /64 04/20/22 1031   Temp 36.6 °C (97.8 °F) 04/20/22 0859   Pulse 54 04/20/22 1036   Resp 20 04/20/22 1030   SpO2 100 % 04/20/22 1036   Vitals shown include unvalidated device data.        Post Anesthesia Care and Evaluation    Patient location during evaluation: PACU  Patient participation: complete - patient participated  Level of consciousness: awake and alert  Pain management: adequate  Airway patency: patent  Anesthetic complications: No anesthetic complications    Cardiovascular status: acceptable  Respiratory status: acceptable  Hydration status: acceptable    Comments: --------------------            04/20/22               1030     --------------------   BP:       169/64     Pulse:      53       Resp:       20       Temp:                SpO2:      97%      --------------------

## 2022-04-20 NOTE — ANESTHESIA PREPROCEDURE EVALUATION
Anesthesia Evaluation     Patient summary reviewed and Nursing notes reviewed   history of anesthetic complications (emotional emergence):  NPO Solid Status: > 8 hours  NPO Liquid Status: > 4 hours           Airway   Mallampati: II  Neck ROM: full  No difficulty expected  Dental    (+) upper dentures and poor dentition    Comment: One tooth lower    Pulmonary     breath sounds clear to auscultation  (+) a smoker Former, sleep apnea,   Cardiovascular     Rhythm: regular    (+) hypertension, past MI , CAD, dysrhythmias Atrial Fib, PVD, hyperlipidemia,  carotid artery disease      Neuro/Psych  (+) seizures, psychiatric history Anxiety, dementia,    GI/Hepatic/Renal/Endo    (+)  GERD,  diabetes mellitus,     Musculoskeletal     Abdominal   (+) obese,    Substance History      OB/GYN          Other   arthritis,                      Anesthesia Plan    ASA 4     MAC   (No Vancomycin)  intravenous induction     Anesthetic plan, all risks, benefits, and alternatives have been provided, discussed and informed consent has been obtained with: patient.        CODE STATUS:

## 2022-04-20 NOTE — OP NOTE
Operative Note  Location: [facility]  Date of Admission:  4/20/2022  OR Date: 4/20/2022    Pre-op Diagnosis:  PAD with instent restenosis    Post-op Diagnosis: Same    Procedure:   1) Ultrasound-guided vascular access right common femoral artery  2) AIF and left leg angiogram    Surgeon: Jayden Marie MD    Assistant: Ngoc Darby RN    CPT:  43444-86 Abdominal aortogram (no diagnostic quality arterial imaging prior or significant change in vascular status)  22260-65 Bilateral extremity runoff arteriogram (no diagnostic quality arterial imaging prior or significant change in vascular status)  01021 Ultrasound-guided percutaneous vascular access.      Anesthesia: Monitored Anesthesia Care    Estimated Blood Loss: none    Findings:    Prior CT angiogram was inadequate for diagnosis because of extensive calcifications.  Therefore new diagnostic angiogram done.    Patient with extensive right common femoral artery disease making it very difficult to even access.  I had access high anther extensive calcified plaque.  The patient had an extrailiac artery stenosis that almost look like a dissection of the common iliac artery stenosis as well as some moderate in-stent restenosis on the superior edge of the more distal of the stents in the left leg.  I could not even get a 6 [place] sheath in because of the disease in the right groin and felt like I could damage his artery requiring emergency operation and that the risk would exceed benefits of intervention.  For this reason I aborted the procedure without intervention after the diagnostic angiogram.    Implants:    Nothing was implanted during the procedure    Specimen: * No orders in the log *    Complications:   None    Closure:  5 Thai MYNX closure device    Indications:    The patient is an 76 y.o. male seen for evaluation in-stent restenosis and PAD.       Procedure:    The patient was taken to the operating room and placed supine on the operating table.  Lal catheter was placed. After intravenous sedation, the right femoral artery was evaluated under ultrasound and the femoral head under fluoroscopy. The patient has extensive calcifications and I was trying to access above this. It took multiple passes to try to get into the artery. The micropuncture needle basically bounced off of the calcium and was deflected medially or laterally, depending on the angles. Ultimately I was able to get in the top half of the femoral head with the micropuncture sheath. A right femoral angiogram showed that this was a very diseased vessel distally, but the access was still long enough to consider intervention. A 5-Citizen of the [place] sheath was placed. A Glidewire was used to navigate through the iliac system up into the aorta. There was some angulation that was odd as I was passing the wire in the proximal external iliac and I temporarily was concerned that I was on a dissection plane but then it continued through and went up into the aorta. I then put a pigtail catheter into the distal abdominal aorta and took a digital subtraction angiogram here. The previous CT angiogram was difficult to interpret because of extensive calcifications. This was done in a couple of different angles with pull downs and obliques. The patient had an 80% right common iliac artery stenosis at the origin. There was some poststenotic dilatation. The origin of the right external iliac artery was stenotic and in AP it looked like dissection, but in lateral projections looked like an internal iliac artery dissection with some stenotic dilatation that was just an image overlapping issue. I then crossed over the bifurcation with a RIM catheter in position, a Glidecath in the left common femoral artery. A left lower extremity angiogram was done. The common femoral and superficial femoral artery were heavily calcified and diseased, amongst the most calcium I have seen. The proximal stent was patent without restenosis. The  distal stent was patent but there did appear to be at least a moderate stenosis on the inflow edge of the stent.     At this point I thought it would be worthwhile to try intervention, but I had several concerns. The damage, the disease in the right common femoral artery making closure or placing a larger sheath high risk. I also was not sure that I could treat his right common iliac artery without a kissing stent and accessing the left groin would have similar issues. I placed a stiff Glidewire Advantage up and over the bifurcation into the SFA to give me as much support as possible.  I tried to place a 6-[place] but could not get anything more than the dilator in without the artery pushing down, deflecting and bending significantly. I pushed pretty hard but really felt like if I pushed any harder I risked damaging the artery and requiring urgent surgical repair that I am not sure he would do well with. At this point, I felt like the risk of moving forward with intervention exceeded the risk of his PAD so I elected to stop the procedure. A Mynx device was used for closure successfully. At the end of the case he still had signals in his right foot that were unchanged.      PLAN: I need to have a conversation with the patient to see if we think he would be a suitable candidate for intervention either from the arm or with a right common femoral endarterectomy and potential intervention. My initial impression is that patient has moderate PAD and a moderate inflow stenosis and that we should probably manage him medically given his comorbidities.         Active Hospital Problems    Diagnosis  POA   • PVD (peripheral vascular disease) ([facility]) [I73.9]  Yes      Resolved Hospital Problems   No resolved problems to display.      Jayden Marie MD     Date: 4/20/2022  Time: 09:01 EDT

## 2022-04-21 DIAGNOSIS — I25.118 CORONARY ARTERY DISEASE INVOLVING NATIVE CORONARY ARTERY OF NATIVE HEART WITH OTHER FORM OF ANGINA PECTORIS: ICD-10-CM

## 2022-04-21 DIAGNOSIS — F02.80 LATE ONSET ALZHEIMER'S DEMENTIA WITHOUT BEHAVIORAL DISTURBANCE: ICD-10-CM

## 2022-04-21 DIAGNOSIS — F41.9 ANXIETY: ICD-10-CM

## 2022-04-21 DIAGNOSIS — G30.1 LATE ONSET ALZHEIMER'S DEMENTIA WITHOUT BEHAVIORAL DISTURBANCE: ICD-10-CM

## 2022-04-21 NOTE — TELEPHONE ENCOUNTER
Rx Refill Note  Requested Prescriptions     Pending Prescriptions Disp Refills   • LORazepam (ATIVAN) 0.5 MG tablet [Pharmacy Med Name: LORazepam 0.5 MG Oral Tablet] 30 tablet 0     Sig: TAKE 1 TABLET BY MOUTH EVERY 8 HOURS AS NEEDED FOR ANXIETY      Last office visit with prescribing clinician: 2/7/2022    [facility] [place]  Next office visit with prescribing clinician: lima 8/7/22      Tara Lauren MA  04/21/22, 16:55 EDT

## 2022-04-22 RX ORDER — NITROGLYCERIN 0.4 MG/1
TABLET SUBLINGUAL
Qty: 5 TABLET | Refills: 0 | Status: SHIPPED | OUTPATIENT
Start: 2022-04-22

## 2022-04-22 RX ORDER — LORAZEPAM 0.5 MG/1
TABLET ORAL
Qty: 30 TABLET | Refills: 0 | Status: SHIPPED | OUTPATIENT
Start: 2022-04-22 | End: 2022-05-25

## 2022-04-22 NOTE — TELEPHONE ENCOUNTER
Rx Refill Note  Requested Prescriptions     Pending Prescriptions Disp Refills   • nitroglycerin (NITROSTAT) 0.4 MG SL tablet [Pharmacy Med Name: Nitroglycerin 0.4 MG Sublingual Tablet Sublingual] 5 tablet 0     Sig: DISSOLVE ONE TABLET UNDER THE TONGUE EVERY 5 MINUTES AS NEEDED FOR CHEST PAIN.  DO NOT EXCEED A TOTAL OF 3 DOSES IN 15 MINUTES      Last office visit with prescribing clinician: 2/7/2022      Next office visit with prescribing clinician: 8/8/2022     Tara Lauren MA  04/22/22, 08:22 EDT

## 2022-05-12 RX ORDER — APIXABAN 5 MG/1
TABLET, FILM COATED ORAL
Qty: 60 TABLET | Refills: 11 | Status: SHIPPED | OUTPATIENT
Start: 2022-05-12

## 2022-05-25 DIAGNOSIS — F41.9 ANXIETY: ICD-10-CM

## 2022-05-25 DIAGNOSIS — G30.1 LATE ONSET ALZHEIMER'S DEMENTIA WITHOUT BEHAVIORAL DISTURBANCE: ICD-10-CM

## 2022-05-25 DIAGNOSIS — F02.80 LATE ONSET ALZHEIMER'S DEMENTIA WITHOUT BEHAVIORAL DISTURBANCE: ICD-10-CM

## 2022-05-25 RX ORDER — LORAZEPAM 0.5 MG/1
TABLET ORAL
Qty: 30 TABLET | Refills: 0 | Status: SHIPPED | OUTPATIENT
Start: 2022-05-25

## 2022-05-25 NOTE — TELEPHONE ENCOUNTER
Rx Refill Note  Requested Prescriptions     Pending Prescriptions Disp Refills   • LORazepam (ATIVAN) 0.5 MG tablet [Pharmacy Med Name: LORazepam 0.5 MG Oral Tablet] 30 tablet 0     Sig: TAKE 1 TABLET BY MOUTH EVERY 8 HOURS AS NEEDED FOR ANXIETY      Last office visit with prescribing clinician: 2/7/2022      Next office visit with prescribing clinician: 8/8/2022            Ngoc Ray MA  05/25/22, 12:57 EDT

## 2022-08-08 ENCOUNTER — OFFICE VISIT (OUTPATIENT)
Dept: FAMILY MEDICINE CLINIC | Facility: CLINIC | Age: 77
End: 2022-08-08

## 2022-08-08 VITALS
DIASTOLIC BLOOD PRESSURE: 80 MMHG | OXYGEN SATURATION: 96 % | HEART RATE: 59 BPM | HEIGHT: 69 IN | TEMPERATURE: 98.3 F | BODY MASS INDEX: 24.88 KG/M2 | WEIGHT: 168 LBS | SYSTOLIC BLOOD PRESSURE: 132 MMHG

## 2022-08-08 DIAGNOSIS — E78.49 OTHER HYPERLIPIDEMIA: ICD-10-CM

## 2022-08-08 DIAGNOSIS — Z23 IMMUNIZATION DUE: ICD-10-CM

## 2022-08-08 DIAGNOSIS — E11.51 TYPE 2 DIABETES MELLITUS WITH DIABETIC PERIPHERAL ANGIOPATHY WITHOUT GANGRENE, WITHOUT LONG-TERM CURRENT USE OF INSULIN: Primary | ICD-10-CM

## 2022-08-08 PROCEDURE — 99214 OFFICE O/P EST MOD 30 MIN: CPT | Performed by: INTERNAL MEDICINE

## 2022-08-08 PROCEDURE — 91305 COVID-19 (PFIZER) 12+ YRS: CPT | Performed by: INTERNAL MEDICINE

## 2022-08-08 PROCEDURE — 0051A COVID-19 (PFIZER) 12+ YRS: CPT | Performed by: INTERNAL MEDICINE

## 2022-08-08 NOTE — PROGRESS NOTES
Subjective   Erick Bruno is a 77 y.o. male.     Chief Complaint   Patient presents with   • Diabetes       History of Present Illness     Past history of dementia, diabetes, hyperlipidemia, peripheral artery disease, coronary artery disease, peripheral vascular disease, carotid artery stenosis, aortic atheromatosis and infrarenal aortic abdominal aneurysm, moderate left renal artery stenosis, severe right iliac artery stenosis, moderate stenosis of right external iliac artery, arterial stenosis of the right superficial femoral artery and deep femoral artery on the right, macular degeneration, BPH, anxiety, and seizures.  Followed by vascular surgery Dr. Frank Carpenter     Here for follow-up of diabetes.  Patient states to have been compliant with medications.  Blood sugar monitoring - patient states has not been following regularly.  No episodes of hypoglycemia, nausea, vomiting, new rashes, syncope or other issues.  Denies any difficulties with the current medication regimen of metformin  mg daily and glimepiride 2 mg daily. Cannot tolerate metformin at higher doses secondary to severe GI issues.  Last A1c of 6.3% 2/7/22.     Follow-up for cholesterol.  Currently, has been feeling well without any myalgias, muscle aches, weakness, numbness, chest pain, short of breath or other issues.  Currently, is adherent with medication regimen of atorvastatin 80 mg and denies medication side effects.  Is due for lab follow-up.     Follow-up for hypertension.  Currently, has been feeling well and asymptomatic without any headaches, vision changes, cough, chest pain, shortness of breath, swelling, focal neurologic deficit, memory loss or syncope.  Has been taking the medications regularly and adherent with the regimen metoprolol succinate XL 25 mg daily.  Denies medication side effects and no significant interval events.        History of anxiety and currently on lexapro 10 mg, buspirone 5 mg twice daily and lorazepam 0.5  mg every 8 hours as needed.     Frequent falls in the home but not using the walker in the home.  Not falling out of the home because is using the walker    The following portions of the patient's history were reviewed and updated as appropriate: allergies, current medications, past family history, past medical history, past social history, past surgical history and problem list.    Depression Screen:  PHQ-2/PHQ-9 Depression Screening 2/7/2022   Retired PHQ-9 Total Score 3   Retired Total Score 3       Past Medical History:   Diagnosis Date   • Abnormal nuclear stress test 05/21/2018   • Acid reflux    • Alzheimer's dementia (HCC)    • Anemia    • Anesthesia complication     PT'S WIFE STATES PT CRYING AND EMOTIONAL AFTER SURGERY IN THE PAST   • Anxiety    • Arthritis    • Atrial fibrillation ([facility])    • CAD (coronary artery disease)    • Carotid artery disease (HCC)    • Chronic anticoagulation    • Dementia ([facility])    • Diabetes mellitus ([facility])    • Elevated cholesterol    • Enlarged prostate    • Frequent urination at night    • History of seizure 2013    S/P TEMPERATURE   • Hyperlipidemia    • Hypertension    • Leg pain     BILAT-D/T PVD   • Macular degeneration    • New onset atrial fibrillation ([facility]) 02/2021   • NSTEMI (non-ST elevated myocardial infarction) ([facility])    • Panarteritis ([facility])    • Peripheral arterial disease ([facility])    • Poor balance     HIGH RISK FOR FALLS   • 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   • PVD (peripheral vascular disease) ([facility])    • Sleep apnea     DOES NOT WEAR CPAP   • SSS (sick sinus syndrome) ([facility])        Past Surgical History:   Procedure Laterality Date   • APPENDECTOMY     • ATHRECTOMY ILIAC, FEMORAL, TIBIAL ARTERY N/A 10/17/2018    Procedure: AIF ARTERIOGRAM, LEFT SFA  ANGIOPLASTY;  Surgeon: Jayden Marie MD;  Location: [facility];  Service: Vascular   • ATHRECTOMY ILIAC, FEMORAL, TIBIAL ARTERY Bilateral 4/20/2022    Procedure: AIF LEFT LEG ANGIOGRAM;  Surgeon: Frank  Jayden Gonzalez MD;  Location: [facility] HYBRID OR ;  Service: Vascular;  Laterality: Bilateral;   • CATARACT EXTRACTION WITH INTRAOCULAR LENS IMPLANT Bilateral    • CHOLECYSTECTOMY WITH INTRAOPERATIVE CHOLANGIOGRAM N/A 2018    Procedure: CHOLECYSTECTOMY LAPAROSCOPIC INTRAOPERATIVE CHOLANGIOGRAM;  Surgeon: Daniel Gonzalez MD;  Location: [facility] MAIN OR;  Service: General   • COLONOSCOPY     • COLONOSCOPY N/A 2018    Procedure: COLONOSCOPY to cecum with polypectomies;  Surgeon: Daniel Gonzalez MD;  Location: [facility] ENDOSCOPY;  Service: Gastroenterology   • ENDOSCOPY N/A 2018    Procedure: ESOPHAGOGASTRODUODENOSCOPY with biopsies;  Surgeon: Daniel Gonzalez MD;  Location: [facility] ENDOSCOPY;  Service: Gastroenterology   • FEMORAL ARTERY STENT Left     on 3/22/16       Family History   Problem Relation Age of Onset   • Hypertension Mother    • Diabetes Daughter    • Cancer Maternal Aunt    • Diabetes Maternal Grandfather    • Malig Hyperthermia Neg Hx        Social History     Socioeconomic History   • Marital status:    Tobacco Use   • Smoking status: Former Smoker     Packs/day: 1.00     Years: 60.00     Pack years: 60.00     Quit date:      Years since quittin.6   • Smokeless tobacco: Never Used   Vaping Use   • Vaping Use: Never used   Substance and Sexual Activity   • Alcohol use: No     Comment: Daily caffeine use   • Drug use: No   • Sexual activity: Defer       Current Outpatient Medications   Medication Sig Dispense Refill   • aspirin 81 MG EC tablet Take 81 mg by mouth Daily. OK TO CONTINUE TO TAKE LEADING UP TO SURGERY PER DR. MULLER     • atorvastatin (LIPITOR) 80 MG tablet Take 1 tablet by mouth Daily. 90 tablet 3   • busPIRone (BUSPAR) 5 MG tablet Take 1 tablet by mouth 2 (Two) Times a Day. 180 tablet 3   • Coenzyme Q10 200 MG capsule Take 200 mg by mouth Every Night. HOLDING FOR SURGERY     • Eliquis 5 MG tablet tablet TAKE 1 TABLET BY MOUTH EVERY 12 HOURS FOR  ATRAIL   FIBRILLATION 60 tablet 11   • escitalopram (LEXAPRO) 10 MG tablet Take 10 mg by mouth Every Night.     • glimepiride (AMARYL) 2 MG tablet Take 1 tablet by mouth Every Morning Before Breakfast. 90 tablet 3   • isosorbide mononitrate (IMDUR) 30 MG 24 hr tablet TAKE 1 TABLET BY MOUTH ONCE DAILY AT NIGHT (Patient taking differently: Take 30 mg by mouth Every Night.) 90 tablet 1   • LORazepam (ATIVAN) 0.5 MG tablet TAKE 1 TABLET BY MOUTH EVERY 8 HOURS AS NEEDED FOR ANXIETY 30 tablet 0   • melatonin 5 MG tablet tablet Take 5 mg by mouth Every Night.     • metFORMIN ER (GLUCOPHAGE-XR) 500 MG 24 hr tablet Take 1 tablet by mouth Daily With Breakfast. (Patient taking differently: Take 500 mg by mouth Daily With Breakfast. INSTRUCTED PER  TO HOLD 24 HOURS PRIOR TO SURGERY) 90 tablet 3   • metoprolol succinate XL (TOPROL-XL) 25 MG 24 hr tablet TAKE 1 TABLET BY MOUTH ONCE DAILY AT NIGHT (Patient taking differently: Take 25 mg by mouth Every Night.) 90 tablet 1   • multivitamin with minerals tablet tablet Take 1 tablet by mouth Every Night. HOLD FOR SURGERY     • nitroglycerin (NITROSTAT) 0.4 MG SL tablet DISSOLVE ONE TABLET UNDER THE TONGUE EVERY 5 MINUTES AS NEEDED FOR CHEST PAIN.  DO NOT EXCEED A TOTAL OF 3 DOSES IN 15 MINUTES 5 tablet 0   • omeprazole (priLOSEC) 20 MG capsule Take 1 capsule by mouth Daily. 90 capsule 3   • saw palmetto 160 MG capsule Take 160 mg by mouth Every Night. HOLD FOR SURGERY       No current facility-administered medications for this visit.       Review of Systems   Constitutional: Negative for activity change, appetite change, fatigue, fever, unexpected weight gain and unexpected weight loss.   HENT: Negative for nosebleeds, rhinorrhea, trouble swallowing and voice change.    Eyes: Negative for visual disturbance.   Respiratory: Negative for cough, chest tightness, shortness of breath and wheezing.    Cardiovascular: Negative for chest pain, palpitations and leg swelling.  "  Gastrointestinal: Negative for abdominal pain, blood in stool, constipation, diarrhea, nausea, vomiting, GERD and indigestion.   Genitourinary: Negative for dysuria, frequency and hematuria.   Musculoskeletal: Negative for arthralgias, back pain and myalgias.        History of frequent falls   Skin: Negative for rash and wound.   Neurological: Positive for memory problem. Negative for dizziness, tremors, weakness, light-headedness, numbness and headache.        Chronic numb feet and fingers.   Hematological: Negative for adenopathy. Does not bruise/bleed easily.   Psychiatric/Behavioral: Negative for sleep disturbance and depressed mood. The patient is not nervous/anxious.        Objective   /80 (BP Location: Left arm, Patient Position: Sitting, Cuff Size: Adult)   Pulse 59   Temp 98.3 °F (36.8 °C) (Temporal)   Ht 175.3 cm (69.02\")   Wt 76.2 kg (168 lb)   SpO2 96%   BMI 24.80 kg/m²     Physical Exam  Vitals and nursing note reviewed.   Constitutional:       General: He is not in acute distress.     Appearance: He is well-developed. He is not diaphoretic.   HENT:      Head: Normocephalic and atraumatic.      Right Ear: External ear normal.      Left Ear: External ear normal.      Nose: Nose normal.   Eyes:      Conjunctiva/sclera: Conjunctivae normal.      Pupils: Pupils are equal, round, and reactive to light.   Neck:      Thyroid: No thyromegaly.      Trachea: No tracheal deviation.   Cardiovascular:      Rate and Rhythm: Normal rate and regular rhythm.      Heart sounds: Normal heart sounds. No murmur heard.    No friction rub. No gallop.      Comments: Poor pulses in feet and legs bilaterally not palpable.  Pulmonary:      Effort: Pulmonary effort is normal. No respiratory distress.      Breath sounds: Normal breath sounds.   Abdominal:      General: Bowel sounds are normal.      Palpations: Abdomen is soft. There is no mass.      Tenderness: There is no abdominal tenderness. There is no guarding. "   Musculoskeletal:         General: Normal range of motion.      Cervical back: Normal range of motion and neck supple.      Comments: Using walker   Lymphadenopathy:      Cervical: No cervical adenopathy.   Skin:     General: Skin is warm and dry.      Findings: No rash.   Neurological:      Mental Status: He is alert and oriented to person, place, and time.      Motor: No abnormal muscle tone.      Deep Tendon Reflexes: Reflexes normal.   Psychiatric:         Behavior: Behavior normal.         Thought Content: Thought content normal.         Judgment: Judgment normal.         No results found for this or any previous visit (from the past 2016 hour(s)).  Assessment & Plan   Diagnoses and all orders for this visit:    1. Type 2 diabetes mellitus with diabetic peripheral angiopathy without gangrene, without long-term current use of insulin (HCC) (Primary)  -     Comprehensive Metabolic Panel  -     Lipid Panel  -     Hemoglobin A1c  -     Microalbumin / Creatinine Urine Ratio - Urine, Clean Catch    2. Other hyperlipidemia  -     Comprehensive Metabolic Panel  -     Lipid Panel    3. Immunization due  -     COVID-19 Vaccine (Pfizer) Gray Cap  -     Cancel: Pneumococcal Conjugate Vaccine 20-Valent All      Encouraged to follow diet and low carbohydrates.  Patient will not follow diet as discussed.  Continue the current medications unchanged and discussed care.  Recommend to use the walker within the home.  No changes in medications.  Continue to follow up with specialists as scheduled.  Patient elected to get the COVID-vaccine but declined the pneumonia vaccine today.       · COVID-19 Precautions - Patient was compliant in wearing a mask. When I saw the patient, I used appropriate personal protective equipment (PPE) including mask and eye shield (standard procedure).  Additionally, I used gown and gloves if indicated.  Hand hygiene was completed before and after seeing the patient.  · Dictated utilizing Trevon  Dictation

## 2022-08-09 LAB
ALBUMIN SERPL-MCNC: 4.4 G/DL (ref 3.7–4.7)
ALBUMIN/CREAT UR: 24 MG/G CREAT (ref 0–29)
ALBUMIN/GLOB SERPL: 1.7 {RATIO} (ref 1.2–2.2)
ALP SERPL-CCNC: 134 IU/L (ref 44–121)
ALT SERPL-CCNC: 18 IU/L (ref 0–44)
AST SERPL-CCNC: 24 IU/L (ref 0–40)
BILIRUB SERPL-MCNC: 0.5 MG/DL (ref 0–1.2)
BUN SERPL-MCNC: 19 MG/DL (ref 8–27)
BUN/CREAT SERPL: 14 (ref 10–24)
CALCIUM SERPL-MCNC: 9.1 MG/DL (ref 8.6–10.2)
CHLORIDE SERPL-SCNC: 103 MMOL/L (ref 96–106)
CHOLEST SERPL-MCNC: 149 MG/DL (ref 100–199)
CO2 SERPL-SCNC: 25 MMOL/L (ref 20–29)
CREAT SERPL-MCNC: 1.4 MG/DL (ref 0.76–1.27)
CREAT UR-MCNC: 92.5 MG/DL
EGFRCR SERPLBLD CKD-EPI 2021: 52 ML/MIN/1.73
GLOBULIN SER CALC-MCNC: 2.6 G/DL (ref 1.5–4.5)
GLUCOSE SERPL-MCNC: 144 MG/DL (ref 65–99)
HBA1C MFR BLD: 7.1 % (ref 4.8–5.6)
HDLC SERPL-MCNC: 41 MG/DL
LDLC SERPL CALC-MCNC: 90 MG/DL (ref 0–99)
MICROALBUMIN UR-MCNC: 22.6 UG/ML
POTASSIUM SERPL-SCNC: 4.4 MMOL/L (ref 3.5–5.2)
PROT SERPL-MCNC: 7 G/DL (ref 6–8.5)
SODIUM SERPL-SCNC: 141 MMOL/L (ref 134–144)
TRIGL SERPL-MCNC: 96 MG/DL (ref 0–149)
VLDLC SERPL CALC-MCNC: 18 MG/DL (ref 5–40)

## 2022-10-07 DIAGNOSIS — F41.9 ANXIETY: ICD-10-CM

## 2022-10-10 RX ORDER — METOPROLOL SUCCINATE 25 MG/1
TABLET, EXTENDED RELEASE ORAL
Qty: 90 TABLET | Refills: 0 | Status: SHIPPED | OUTPATIENT
Start: 2022-10-10 | End: 2022-10-11

## 2022-10-10 RX ORDER — ISOSORBIDE MONONITRATE 30 MG/1
TABLET, EXTENDED RELEASE ORAL
Qty: 90 TABLET | Refills: 0 | Status: SHIPPED | OUTPATIENT
Start: 2022-10-10 | End: 2022-10-11

## 2022-10-10 RX ORDER — BUSPIRONE HYDROCHLORIDE 5 MG/1
TABLET ORAL
Qty: 180 TABLET | Refills: 0 | Status: SHIPPED | OUTPATIENT
Start: 2022-10-10 | End: 2023-01-03

## 2022-10-11 RX ORDER — ISOSORBIDE MONONITRATE 30 MG/1
TABLET, EXTENDED RELEASE ORAL
Qty: 90 TABLET | Refills: 0 | Status: SHIPPED | OUTPATIENT
Start: 2022-10-11 | End: 2022-10-21 | Stop reason: SDUPTHER

## 2022-10-11 RX ORDER — METOPROLOL SUCCINATE 25 MG/1
TABLET, EXTENDED RELEASE ORAL
Qty: 90 TABLET | Refills: 0 | Status: SHIPPED | OUTPATIENT
Start: 2022-10-11 | End: 2022-10-21 | Stop reason: SDUPTHER

## 2022-10-21 ENCOUNTER — OFFICE VISIT (OUTPATIENT)
Dept: CARDIOLOGY | Facility: CLINIC | Age: 77
End: 2022-10-21

## 2022-10-21 VITALS
WEIGHT: 170 LBS | DIASTOLIC BLOOD PRESSURE: 64 MMHG | BODY MASS INDEX: 25.18 KG/M2 | SYSTOLIC BLOOD PRESSURE: 122 MMHG | HEIGHT: 69 IN | HEART RATE: 60 BPM

## 2022-10-21 DIAGNOSIS — I71.40 ABDOMINAL AORTIC ANEURYSM (AAA) 3.0 CM TO 5.5 CM IN DIAMETER IN MALE: ICD-10-CM

## 2022-10-21 DIAGNOSIS — I73.9 PVD (PERIPHERAL VASCULAR DISEASE): ICD-10-CM

## 2022-10-21 DIAGNOSIS — I25.10 CORONARY ARTERY DISEASE INVOLVING NATIVE CORONARY ARTERY OF NATIVE HEART WITHOUT ANGINA PECTORIS: Primary | ICD-10-CM

## 2022-10-21 DIAGNOSIS — E78.49 OTHER HYPERLIPIDEMIA: ICD-10-CM

## 2022-10-21 DIAGNOSIS — I73.9 PERIPHERAL ARTERIAL DISEASE: ICD-10-CM

## 2022-10-21 DIAGNOSIS — I48.0 PAROXYSMAL ATRIAL FIBRILLATION: ICD-10-CM

## 2022-10-21 DIAGNOSIS — F02.80 LATE ONSET ALZHEIMER'S DEMENTIA WITHOUT BEHAVIORAL DISTURBANCE, PSYCHOTIC DISTURBANCE, MOOD DISTURBANCE, OR ANXIETY, UNSPECIFIED DEMENTIA SEVERITY: ICD-10-CM

## 2022-10-21 DIAGNOSIS — G30.1 LATE ONSET ALZHEIMER'S DEMENTIA WITHOUT BEHAVIORAL DISTURBANCE, PSYCHOTIC DISTURBANCE, MOOD DISTURBANCE, OR ANXIETY, UNSPECIFIED DEMENTIA SEVERITY: ICD-10-CM

## 2022-10-21 DIAGNOSIS — E11.51 TYPE 2 DIABETES MELLITUS WITH DIABETIC PERIPHERAL ANGIOPATHY WITHOUT GANGRENE, WITHOUT LONG-TERM CURRENT USE OF INSULIN: ICD-10-CM

## 2022-10-21 PROCEDURE — 99214 OFFICE O/P EST MOD 30 MIN: CPT | Performed by: INTERNAL MEDICINE

## 2022-10-21 PROCEDURE — 93000 ELECTROCARDIOGRAM COMPLETE: CPT | Performed by: INTERNAL MEDICINE

## 2022-10-21 RX ORDER — METOPROLOL SUCCINATE 25 MG/1
25 TABLET, EXTENDED RELEASE ORAL NIGHTLY
Qty: 90 TABLET | Refills: 3 | Status: SHIPPED | OUTPATIENT
Start: 2022-10-21 | End: 2023-01-17

## 2022-10-21 RX ORDER — ISOSORBIDE MONONITRATE 30 MG/1
30 TABLET, EXTENDED RELEASE ORAL NIGHTLY
Qty: 90 TABLET | Refills: 3 | Status: SHIPPED | OUTPATIENT
Start: 2022-10-21

## 2022-10-21 NOTE — PROGRESS NOTES
Subjective:     Encounter Date:  10/21/22        Patient ID: Erick Bruno is a 77 y.o. male.    Chief Complaint: abnormal stress test  Coronary Artery Disease  His past medical history is significant for past myocardial infarction.       Dear ,    I had the pleasure of seeing this patient in the office today for follow-up of his cardiac status.  He has a history of CAD, significant peripheral arterial disease, and paroxysmal atrial fibrillation.    No cardiac concerns.  No chest pain or chest discomfort.  No shortness of breath.  Main issue is instability.  He walks with a walker.  He has fallen a few times due to instability.  No dizziness or lightheadedness, no syncope.  He is always careful and is never injured himself.    He is been seen in the past for chest discomfort.  In 2017 he had a stress test performed that showed a small area of hypokinesis in the inferior wall with an ejection fraction of 48% and no ischemia.  At the time he elected not to proceed with any additional diagnostic testing.  He's been treated with medical therapy.    In December 2020, he was seen for routine follow-up and perioperative cardiac risk assessment.  Nuclear stress test was completed and showed small to medium size, mild to moderately severe area of ischemia in the inferior wall and LVEF of 54%. Compared to the study in May 2017 there was significant change with new ischemia noted.    It was felt that it was low risk to proceed with the really necessary carotid endarterectomy, and he went through that surgical procedure without any cardiac issues.     In February 2021, he presented to [facility] ED with complaints of redness and tender right foot following a fall.  He was diagnosed with right foot cellulitis and admitted and treated with antibiotic therapy.  Blood cultures were negative.  During his hospitalization he developed new onset atrial fibrillation and cardiology was consulted.   Echocardiogram showed normal LVEF grade 1 diastolic dysfunction, moderate calcification of the aortic valve, and mild mitral regurgitation. CT of the abdomen and pelvis showed penetrating ulcer formation and infrarenal abdominal aortic aneurysm, moderate stenosis of the left renal artery, severe stenosis of the right common iliac artery, mild stenosis of the right internal iliac artery at the origin, considerable stenosis of the right deep femoral artery, and 2 wall stents in the left SFA.  Carotid duplex showed right carotid stent with no stenosis and left carotid moderate stenosis.  Cardiology and vascular surgery agreed on stopping the clopidogrel and to continue with aspirin and apixaban for stroke prevention.      Patient has a history of peripheral arterial disease, including bilateral lower extremity disease as well as bilateral carotid artery disease.  He has occlusion of the left vertebral artery.  He has a stent placed in his right carotid.  He has abdominal aortic aneurysm followed by vascular.    The following portions of the patient's history were reviewed and updated as appropriate: allergies, current medications, past family history, past medical history, past social history, past surgical history and problem list.    Past Medical History:   Diagnosis Date   • Abnormal nuclear stress test 05/21/2018   • Acid reflux    • Alzheimer's dementia (HCC)    • Anemia    • Anesthesia complication     PT'S WIFE STATES PT CRYING AND EMOTIONAL AFTER SURGERY IN THE PAST   • Anxiety    • Arthritis    • Atrial fibrillation (HCC)    • CAD (coronary artery disease)    • Carotid artery disease (HCC)    • Chronic anticoagulation    • Dementia (HCC)    • Diabetes mellitus (HCC)    • Elevated cholesterol    • Enlarged prostate    • Frequent urination at night    • History of seizure 2013    S/P TEMPERATURE   • Hyperlipidemia    • Hypertension    • Leg pain     BILAT-D/T PVD   • Macular degeneration    • New onset atrial  fibrillation (HCC) 02/2021   • NSTEMI (non-ST elevated myocardial infarction) (HCC)    • Panarteritis (HCC)    • Peripheral arterial disease (HCC)    • Poor balance     HIGH RISK FOR FALLS   • 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   • PVD (peripheral vascular disease) (HCC)    • Sleep apnea     DOES NOT WEAR CPAP   • SSS (sick sinus syndrome) (HCC)        Past Surgical History:   Procedure Laterality Date   • APPENDECTOMY     • ATHRECTOMY ILIAC, FEMORAL, TIBIAL ARTERY N/A 10/17/2018    Procedure: AIF ARTERIOGRAM, LEFT SFA  ANGIOPLASTY;  Surgeon: Jayden Marie MD;  Location: [facility] MAIN OR;  Service: Vascular   • ATHRECTOMY ILIAC, FEMORAL, TIBIAL ARTERY Bilateral 4/20/2022    Procedure: AIF LEFT LEG ANGIOGRAM;  Surgeon: Jayden Marie MD;  Location: [facility] HYBRID OR 18/19;  Service: Vascular;  Laterality: Bilateral;   • CATARACT EXTRACTION WITH INTRAOCULAR LENS IMPLANT Bilateral    • CHOLECYSTECTOMY WITH INTRAOPERATIVE CHOLANGIOGRAM N/A 05/09/2018    Procedure: CHOLECYSTECTOMY LAPAROSCOPIC INTRAOPERATIVE CHOLANGIOGRAM;  Surgeon: Daniel Gonzalez MD;  Location: [facility] MAIN OR;  Service: General   • COLONOSCOPY     • COLONOSCOPY N/A 05/08/2018    Procedure: COLONOSCOPY to cecum with polypectomies;  Surgeon: Daniel Gonzalez MD;  Location: [facility] ENDOSCOPY;  Service: Gastroenterology   • ENDOSCOPY N/A 05/08/2018    Procedure: ESOPHAGOGASTRODUODENOSCOPY with biopsies;  Surgeon: Daniel Gonzalez MD;  Location: [facility] ENDOSCOPY;  Service: Gastroenterology   • FEMORAL ARTERY STENT Left     on 3/22/16             ECG 12 Lead    Date/Time: 10/21/2022 11:32 AM  Performed by: Michael Wyman III, MD  Authorized by: Michael Wyman III, MD   Comparison: compared with previous ECG   Similar to previous ECG  Rhythm: sinus rhythm  Rate: normal  Conduction: conduction normal  ST Segments: ST segments normal  T Waves: T waves normal  QRS axis: normal  Other findings: non-specific ST-T wave changes    Clinical impression:  "normal ECG               Objective:     Vitals:    10/21/22 1127   BP: 122/64   BP Location: Left arm   Pulse: 60   Weight: 77.1 kg (170 lb)   Height: 175.3 cm (69.02\")     General Appearance:    Alert, cooperative, in no acute distress   Head:    Normocephalic, without obvious abnormality, atraumatic   Eyes:            Lids and lashes normal, conjunctivae and sclerae normal, no   icterus, no pallor, corneas clear, PERRLA   Ears:    Ears appear intact with no abnormalities noted   Throat:   No oral lesions, no thrush, oral mucosa moist   Neck:   No adenopathy, supple, trachea midline, no thyromegaly, no   carotid bruit, no JVD   Back:     No kyphosis present, no scoliosis present, no skin lesions, erythema or scars, no tenderness to percussion or palpation, range of motion normal   Lungs:     Clear to auscultation,respirations regular, even and unlabored    Heart:    Regular rhythm and normal rate, normal S1 and S2, no murmur, no gallop, no rub, no click   Chest Wall:    No abnormalities observed   Abdomen:     Normal bowel sounds, no masses, no organomegaly, soft        non-tender, non-distended, no guarding, no rebound  tenderness   Extremities:   Moves all extremities well, no edema, no cyanosis, no redness   Pulses:  Diminished peripheral pulses both lower extremities, bilateral carotid bruits   Skin:  Psychiatric:   No bleeding, bruising or rash    Alert and oriented x 3, normal mood and affect           Lab Review:           Results for orders placed during the hospital encounter of 02/26/21    Adult Transthoracic Echo Complete W/ Cont if Necessary Per Protocol    Interpretation Summary  · Estimated left ventricular EF = 51% Left ventricular systolic function is normal.  · Left ventricular diastolic function is consistent with (grade I) impaired relaxation.  · There is moderate calcification of the aortic valve.  · Mild mitral valve regurgitation is present.    Lab Results   Component Value Date    GLUCOSE 144 " (H) 08/08/2022    BUN 19 08/08/2022    CREATININE 1.40 (H) 08/08/2022    EGFRIFNONA 54 (L) 02/07/2022    EGFRIFAFRI 63 02/07/2022    BCR 14 08/08/2022    K 4.4 08/08/2022    CO2 25 08/08/2022    CALCIUM 9.1 08/08/2022    PROTENTOTREF 7.0 08/08/2022    ALBUMIN 4.4 08/08/2022    LABIL2 1.7 08/08/2022    AST 24 08/08/2022    ALT 18 08/08/2022             Assessment:          Diagnosis Plan   1. Coronary artery disease involving native coronary artery of native heart without angina pectoris  isosorbide mononitrate (IMDUR) 30 MG 24 hr tablet    metoprolol succinate XL (TOPROL-XL) 25 MG 24 hr tablet    ECG 12 Lead      2. Paroxysmal atrial fibrillation (HCC)  isosorbide mononitrate (IMDUR) 30 MG 24 hr tablet    metoprolol succinate XL (TOPROL-XL) 25 MG 24 hr tablet    ECG 12 Lead      3. PVD (peripheral vascular disease) (HCC)  isosorbide mononitrate (IMDUR) 30 MG 24 hr tablet    metoprolol succinate XL (TOPROL-XL) 25 MG 24 hr tablet      4. Other hyperlipidemia  isosorbide mononitrate (IMDUR) 30 MG 24 hr tablet    metoprolol succinate XL (TOPROL-XL) 25 MG 24 hr tablet      5. Type 2 diabetes mellitus with diabetic peripheral angiopathy without gangrene, without long-term current use of insulin (HCC)  isosorbide mononitrate (IMDUR) 30 MG 24 hr tablet    metoprolol succinate XL (TOPROL-XL) 25 MG 24 hr tablet      6. Abdominal aortic aneurysm (AAA) 3.0 cm to 5.5 cm in diameter in male  isosorbide mononitrate (IMDUR) 30 MG 24 hr tablet    metoprolol succinate XL (TOPROL-XL) 25 MG 24 hr tablet      7. Peripheral arterial disease (HCC)  isosorbide mononitrate (IMDUR) 30 MG 24 hr tablet    metoprolol succinate XL (TOPROL-XL) 25 MG 24 hr tablet      8. Late onset Alzheimer's dementia without behavioral disturbance, psychotic disturbance, mood disturbance, or anxiety, unspecified dementia severity (HCC)               Plan:       1. Coronary Artery Disease  Assessment  • The patient has no angina    Plan  • Lifestyle modifications  discussed include adhering to a heart healthy diet, avoidance of tobacco products, maintenance of a healthy weight, medication compliance, regular exercise and regular monitoring of cholesterol and blood pressure    Subjective - Objective  • There is a history of past MI  • Current antiplatelet therapy includes aspirin 81 mg    2.  Peripheral vascular disease, with both disease in bilateral lower extremities as well as high-grade carotid artery disease, abdominal aortic aneurysm, prior right coronary artery stent, occluded left vertebral, followed by vascular  3.  Diabetes mellitus with circulatory complication, continue risk factor modification  4.  Paroxysmal atrial fibrillation, remains in sinus rhythm, continue apixaban  5.  Mixed hyperlipidemia, LDL reviewed, continue lipid-lowering therapy    Thank you very much for allowing us to participate in the care of this pleasant patient.  Please don't hesitate to call if I can be of assistance in any way.      Current Outpatient Medications:   •  aspirin 81 MG EC tablet, Take 81 mg by mouth Daily. OK TO CONTINUE TO TAKE LEADING UP TO SURGERY PER DR. MULLER, Disp: , Rfl:   •  atorvastatin (LIPITOR) 80 MG tablet, Take 1 tablet by mouth Daily., Disp: 90 tablet, Rfl: 3  •  busPIRone (BUSPAR) 5 MG tablet, Take 1 tablet by mouth twice daily, Disp: 180 tablet, Rfl: 0  •  Coenzyme Q10 200 MG capsule, Take 200 mg by mouth Every Night. HOLDING FOR SURGERY, Disp: , Rfl:   •  Eliquis 5 MG tablet tablet, TAKE 1 TABLET BY MOUTH EVERY 12 HOURS FOR  ATRAIL  FIBRILLATION, Disp: 60 tablet, Rfl: 11  •  escitalopram (LEXAPRO) 10 MG tablet, Take 10 mg by mouth Every Night., Disp: , Rfl:   •  glimepiride (AMARYL) 2 MG tablet, Take 1 tablet by mouth Every Morning Before Breakfast., Disp: 90 tablet, Rfl: 3  •  isosorbide mononitrate (IMDUR) 30 MG 24 hr tablet, Take 1 tablet by mouth Every Night., Disp: 90 tablet, Rfl: 3  •  LORazepam (ATIVAN) 0.5 MG tablet, TAKE 1 TABLET BY MOUTH EVERY 8  HOURS AS NEEDED FOR ANXIETY, Disp: 30 tablet, Rfl: 0  •  melatonin 5 MG tablet tablet, Take 5 mg by mouth Every Night., Disp: , Rfl:   •  metFORMIN ER (GLUCOPHAGE-XR) 500 MG 24 hr tablet, Take 1 tablet by mouth Daily With Breakfast. (Patient taking differently: Take 1 tablet by mouth Daily With Breakfast. INSTRUCTED PER  TO HOLD 24 HOURS PRIOR TO SURGERY), Disp: 90 tablet, Rfl: 3  •  metoprolol succinate XL (TOPROL-XL) 25 MG 24 hr tablet, Take 1 tablet by mouth Every Night., Disp: 90 tablet, Rfl: 3  •  multivitamin with minerals tablet tablet, Take 1 tablet by mouth Every Night. HOLD FOR SURGERY, Disp: , Rfl:   •  nitroglycerin (NITROSTAT) 0.4 MG SL tablet, DISSOLVE ONE TABLET UNDER THE TONGUE EVERY 5 MINUTES AS NEEDED FOR CHEST PAIN.  DO NOT EXCEED A TOTAL OF 3 DOSES IN 15 MINUTES, Disp: 5 tablet, Rfl: 0  •  omeprazole (priLOSEC) 20 MG capsule, Take 1 capsule by mouth Daily., Disp: 90 capsule, Rfl: 3  •  saw palmetto 160 MG capsule, Take 160 mg by mouth Every Night. HOLD FOR SURGERY, Disp: , Rfl:          EMR Dragon/Transcription disclaimer:    Much of this encounter note is an electronic transcription/translation of spoken language to printed text. The electronic translation of spoken language may permit erroneous, or at times, nonsensical words or phrases to be inadvertently transcribed; Although I have reviewed the note for such errors, some may still exist.

## 2022-12-30 DIAGNOSIS — F41.9 ANXIETY: ICD-10-CM

## 2022-12-30 NOTE — TELEPHONE ENCOUNTER
Rx Refill Note  Requested Prescriptions     Pending Prescriptions Disp Refills   • atorvastatin (LIPITOR) 80 MG tablet [Pharmacy Med Name: Atorvastatin Calcium 80 MG Oral Tablet] 90 tablet 0     Sig: Take 1 tablet by mouth once daily   • busPIRone (BUSPAR) 5 MG tablet [Pharmacy Med Name: busPIRone HCl 5 MG Oral Tablet] 180 tablet 0     Sig: Take 1 tablet by mouth twice daily   • glimepiride (AMARYL) 2 MG tablet [Pharmacy Med Name: Glimepiride 2 MG Oral Tablet] 90 tablet 0     Sig: TAKE 1 TABLET BY MOUTH ONCE DAILY IN THE MORNING BEFORE BREAKFAST   • metFORMIN ER (GLUCOPHAGE-XR) 500 MG 24 hr tablet [Pharmacy Med Name: metFORMIN HCl  MG Oral Tablet Extended Release 24 Hour] 90 tablet 0     Sig: Take 1 tablet by mouth once daily with breakfast   • omeprazole (priLOSEC) 20 MG capsule [Pharmacy Med Name: Omeprazole 20 MG Oral Capsule Delayed Release] 90 capsule 0     Sig: Take 1 capsule by mouth once daily      Last office visit with prescribing clinician: 8/8/2022   Last telemedicine visit with prescribing clinician: 2/9/2023   Next office visit with prescribing clinician: 2/9/2023                         Would you like a call back once the refill request has been completed: [] Yes [] No    If the office needs to give you a call back, can they leave a voicemail: [] Yes [] No    Maxx More, ANNA MARIE/LMR  12/30/22, 10:49 EST

## 2023-01-03 RX ORDER — METFORMIN HYDROCHLORIDE 500 MG/1
TABLET, EXTENDED RELEASE ORAL
Qty: 90 TABLET | Refills: 0 | Status: SHIPPED | OUTPATIENT
Start: 2023-01-03 | End: 2023-01-23 | Stop reason: SDUPTHER

## 2023-01-03 RX ORDER — OMEPRAZOLE 20 MG/1
CAPSULE, DELAYED RELEASE ORAL
Qty: 90 CAPSULE | Refills: 0 | Status: SHIPPED | OUTPATIENT
Start: 2023-01-03 | End: 2023-01-23 | Stop reason: SDUPTHER

## 2023-01-03 RX ORDER — GLIMEPIRIDE 2 MG/1
TABLET ORAL
Qty: 90 TABLET | Refills: 0 | Status: SHIPPED | OUTPATIENT
Start: 2023-01-03 | End: 2023-01-23 | Stop reason: SDUPTHER

## 2023-01-03 RX ORDER — BUSPIRONE HYDROCHLORIDE 5 MG/1
TABLET ORAL
Qty: 180 TABLET | Refills: 0 | Status: SHIPPED | OUTPATIENT
Start: 2023-01-03 | End: 2023-01-23 | Stop reason: SDUPTHER

## 2023-01-03 RX ORDER — ATORVASTATIN CALCIUM 80 MG/1
TABLET, FILM COATED ORAL
Qty: 90 TABLET | Refills: 0 | Status: SHIPPED | OUTPATIENT
Start: 2023-01-03 | End: 2023-01-23 | Stop reason: SDUPTHER

## 2023-01-11 ENCOUNTER — APPOINTMENT (OUTPATIENT)
Dept: CT IMAGING | Facility: HOSPITAL | Age: 78
End: 2023-01-11
Payer: MEDICARE

## 2023-01-11 ENCOUNTER — APPOINTMENT (OUTPATIENT)
Dept: GENERAL RADIOLOGY | Facility: HOSPITAL | Age: 78
End: 2023-01-11
Payer: MEDICARE

## 2023-01-11 ENCOUNTER — HOSPITAL ENCOUNTER (EMERGENCY)
Facility: HOSPITAL | Age: 78
Discharge: HOME | End: 2023-01-11
Attending: EMERGENCY MEDICINE | Admitting: EMERGENCY MEDICINE
Payer: MEDICARE

## 2023-01-11 VITALS
DIASTOLIC BLOOD PRESSURE: 88 MMHG | RESPIRATION RATE: 20 BRPM | WEIGHT: 165 LBS | OXYGEN SATURATION: 98 % | TEMPERATURE: 97.6 F | SYSTOLIC BLOOD PRESSURE: 174 MMHG | HEART RATE: 52 BPM | HEIGHT: 69 IN | BODY MASS INDEX: 24.44 KG/M2

## 2023-01-11 DIAGNOSIS — R42 DIZZINESS: ICD-10-CM

## 2023-01-11 DIAGNOSIS — F02.C0 SEVERE ALZHEIMER'S DEMENTIA, UNSPECIFIED TIMING OF DEMENTIA ONSET, UNSPECIFIED WHETHER BEHAVIORAL, PSYCHOTIC, OR MOOD DISTURBANCE OR ANXIETY: ICD-10-CM

## 2023-01-11 DIAGNOSIS — S92.301A CLOSED DISPLACED FRACTURE OF METATARSAL BONE OF RIGHT FOOT, UNSPECIFIED METATARSAL, INITIAL ENCOUNTER: Primary | ICD-10-CM

## 2023-01-11 DIAGNOSIS — I10 HYPERTENSION, UNSPECIFIED TYPE: ICD-10-CM

## 2023-01-11 DIAGNOSIS — G30.9 SEVERE ALZHEIMER'S DEMENTIA, UNSPECIFIED TIMING OF DEMENTIA ONSET, UNSPECIFIED WHETHER BEHAVIORAL, PSYCHOTIC, OR MOOD DISTURBANCE OR ANXIETY: ICD-10-CM

## 2023-01-11 LAB
ALBUMIN SERPL-MCNC: 4.3 G/DL (ref 3.5–5.2)
ALBUMIN/GLOB SERPL: 1.5 G/DL
ALP SERPL-CCNC: 139 U/L (ref 39–117)
ALT SERPL W P-5'-P-CCNC: 22 U/L (ref 1–41)
ANION GAP SERPL CALCULATED.3IONS-SCNC: 10.6 MMOL/L (ref 5–15)
AST SERPL-CCNC: 24 U/L (ref 1–40)
BASOPHILS # BLD AUTO: 0.04 10*3/MM3 (ref 0–0.2)
BASOPHILS NFR BLD AUTO: 0.7 % (ref 0–1.5)
BILIRUB SERPL-MCNC: 0.7 MG/DL (ref 0–1.2)
BUN SERPL-MCNC: 20 MG/DL (ref 8–23)
BUN/CREAT SERPL: 13.6 (ref 7–25)
CALCIUM SPEC-SCNC: 9.3 MG/DL (ref 8.6–10.5)
CHLORIDE SERPL-SCNC: 99 MMOL/L (ref 98–107)
CO2 SERPL-SCNC: 28.4 MMOL/L (ref 22–29)
CREAT SERPL-MCNC: 1.47 MG/DL (ref 0.76–1.27)
DEPRECATED RDW RBC AUTO: 40 FL (ref 37–54)
EGFRCR SERPLBLD CKD-EPI 2021: 49.1 ML/MIN/1.73
EOSINOPHIL # BLD AUTO: 0.16 10*3/MM3 (ref 0–0.4)
EOSINOPHIL NFR BLD AUTO: 2.7 % (ref 0.3–6.2)
ERYTHROCYTE [DISTWIDTH] IN BLOOD BY AUTOMATED COUNT: 12.1 % (ref 12.3–15.4)
GLOBULIN UR ELPH-MCNC: 2.9 GM/DL
GLUCOSE SERPL-MCNC: 127 MG/DL (ref 65–99)
HCT VFR BLD AUTO: 36.7 % (ref 37.5–51)
HGB BLD-MCNC: 12.7 G/DL (ref 13–17.7)
HOLD SPECIMEN: NORMAL
HOLD SPECIMEN: NORMAL
IMM GRANULOCYTES # BLD AUTO: 0.02 10*3/MM3 (ref 0–0.05)
IMM GRANULOCYTES NFR BLD AUTO: 0.3 % (ref 0–0.5)
LYMPHOCYTES # BLD AUTO: 1.67 10*3/MM3 (ref 0.7–3.1)
LYMPHOCYTES NFR BLD AUTO: 27.8 % (ref 19.6–45.3)
MAGNESIUM SERPL-MCNC: 1.8 MG/DL (ref 1.6–2.4)
MCH RBC QN AUTO: 30.8 PG (ref 26.6–33)
MCHC RBC AUTO-ENTMCNC: 34.6 G/DL (ref 31.5–35.7)
MCV RBC AUTO: 89.1 FL (ref 79–97)
MONOCYTES # BLD AUTO: 0.39 10*3/MM3 (ref 0.1–0.9)
MONOCYTES NFR BLD AUTO: 6.5 % (ref 5–12)
NEUTROPHILS NFR BLD AUTO: 3.73 10*3/MM3 (ref 1.7–7)
NEUTROPHILS NFR BLD AUTO: 62 % (ref 42.7–76)
NRBC BLD AUTO-RTO: 0 /100 WBC (ref 0–0.2)
PLATELET # BLD AUTO: 188 10*3/MM3 (ref 140–450)
PMV BLD AUTO: 8.8 FL (ref 6–12)
POTASSIUM SERPL-SCNC: 4.4 MMOL/L (ref 3.5–5.2)
PROT SERPL-MCNC: 7.2 G/DL (ref 6–8.5)
RBC # BLD AUTO: 4.12 10*6/MM3 (ref 4.14–5.8)
SODIUM SERPL-SCNC: 138 MMOL/L (ref 136–145)
TROPONIN T SERPL-MCNC: 0.01 NG/ML (ref 0–0.03)
WBC NRBC COR # BLD: 6.01 10*3/MM3 (ref 3.4–10.8)
WHOLE BLOOD HOLD COAG: NORMAL
WHOLE BLOOD HOLD SPECIMEN: NORMAL

## 2023-01-11 PROCEDURE — 93010 ELECTROCARDIOGRAM REPORT: CPT | Performed by: INTERNAL MEDICINE

## 2023-01-11 PROCEDURE — 84484 ASSAY OF TROPONIN QUANT: CPT | Performed by: EMERGENCY MEDICINE

## 2023-01-11 PROCEDURE — 80053 COMPREHEN METABOLIC PANEL: CPT | Performed by: EMERGENCY MEDICINE

## 2023-01-11 PROCEDURE — 73630 X-RAY EXAM OF FOOT: CPT

## 2023-01-11 PROCEDURE — 70450 CT HEAD/BRAIN W/O DYE: CPT

## 2023-01-11 PROCEDURE — 93005 ELECTROCARDIOGRAM TRACING: CPT | Performed by: EMERGENCY MEDICINE

## 2023-01-11 PROCEDURE — 71045 X-RAY EXAM CHEST 1 VIEW: CPT

## 2023-01-11 PROCEDURE — 36415 COLL VENOUS BLD VENIPUNCTURE: CPT | Performed by: EMERGENCY MEDICINE

## 2023-01-11 PROCEDURE — 99284 EMERGENCY DEPT VISIT MOD MDM: CPT

## 2023-01-11 PROCEDURE — 85025 COMPLETE CBC W/AUTO DIFF WBC: CPT | Performed by: EMERGENCY MEDICINE

## 2023-01-11 PROCEDURE — 83735 ASSAY OF MAGNESIUM: CPT | Performed by: EMERGENCY MEDICINE

## 2023-01-11 PROCEDURE — 72125 CT NECK SPINE W/O DYE: CPT

## 2023-01-11 RX ORDER — SODIUM CHLORIDE 0.9 % (FLUSH) 0.9 %
10 SYRINGE (ML) INJECTION AS NEEDED
Status: DISCONTINUED | OUTPATIENT
Start: 2023-01-11 | End: 2023-01-11

## 2023-01-11 NOTE — ED TRIAGE NOTES
"Patient to er from [facility] urgent care with c/o he has falling many times resulting in injury to right foot. Reported at urgent care he had x-rays which showed FX in the foot.  Wife reported patient is diabetic and can not feel the pain.\" Reported he has chronic dizziness but is getting worse and balance is off. Patient reported he has hit his head with some of these falls. Patient reported he is on blood thinners. Patient has mask on in triage along with staff.   "

## 2023-01-11 NOTE — DISCHARGE INSTRUCTIONS
Wear your boot at all times.  Call Dr. Avitia's office for an appointment for further evaluation.  Return here immediately for any further falls, shortness of breath, or passing out.

## 2023-01-11 NOTE — ED PROVIDER NOTES
EMERGENCY DEPARTMENT ENCOUNTER    Room Number:  31/31  Date of encounter:  1/11/2023  PCP: Richie Franco MD  Patient Care Team:  Richie Franco MD as PCP - General (Internal Medicine)  Michael Wyman III, MD as Consulting Physician (Cardiology)  Jayden Marie MD as Consulting Physician (Vascular Surgery)  Segundo Cunningham OD as Consulting Physician (Ophthalmology)  Daniel Gonzalez MD as Surgeon (General Surgery)  Mayank Guillen DO as Consulting Physician (Neurology)   Independent Historians: Patient, family    HPI:  Chief Complaint: Foot fracture, dizziness  A complete HPI/ROS/PMH/PSH/SH/FH are unobtainable due to: Alzheimer's dementia    Chronic or social conditions impacting patient care (social determinants of health): Chronic memory impairment    Context: Erick Bruno is a 77 y.o. male who presents to the ED c/o having a foot fracture.  Family reports he has a history of dementia and has had multiple falls.  She reports that he falls because he is dizzy.  She states he has had dizziness  for years and has had many doctors evaluate him for it.  She states that he had a fall on Friday as well as again on Monday.  He injured his chest as well as his foot.  He went to the urgent care center today and was diagnosed with a foot fracture.  He states he has no pain because he is a diabetic.  They were sent here for further evaluation.  He does report he hit his head with some of the falls.  Family reports that his history is not reliable due to his dementia.    Review of prior external notes (non-ED): Primary care note dated 8/8/2022 with hyperlipidemia, type 2 diabetes    Review of prior external test results outside of this encounter: Chemistries dated 8/8/2022 with an elevated creatinine of 1.4.  Hemoglobin A1c dated the same date of 7.1 which is elevated.    PAST MEDICAL HISTORY  Active Ambulatory Problems     Diagnosis Date Noted   • Toe ulcer (HCC)    • Sleep apnea    • Seizure (HCC)    •  PVD (peripheral vascular disease) ([facility])    • Peripheral arterial disease (HCC)    • Macular degeneration    • Hyperlipidemia    • Enlarged prostate    • Diabetes mellitus (HCC)    • Colon polyps    • Arthritis    • Anxiety    • Acid reflux    • Hx of colonic polyps 05/06/2018   • Abnormal nuclear stress test 05/21/2018   • CAD (coronary artery disease)    • Carotid stenosis, asymptomatic, right 01/15/2021   • Paroxysmal atrial fibrillation (HCC) 03/18/2021   • Abdominal aortic aneurysm (AAA) 3.0 cm to 5.5 cm in diameter in male 03/18/2021   • Dementia ([facility])    • Medicare annual wellness visit, subsequent 02/07/2022   • Chronic anticoagulation    • Poor balance      Resolved Ambulatory Problems     Diagnosis Date Noted   • Pancreatitis, acute 05/06/2018   • Pancreatitis 05/06/2018   • Epigastric pain 05/06/2018   • Gallstones 05/06/2018   • Cellulitis of right foot 02/26/2021     Past Medical History:   Diagnosis Date   • Alzheimer's dementia ([facility])    • Anemia    • Anesthesia complication    • Atrial fibrillation ([facility])    • Carotid artery disease ([facility])    • Elevated cholesterol    • Frequent urination at night    • History of seizure 2013   • Hypertension    • Leg pain    • New onset atrial fibrillation (HCC) 02/2021   • NSTEMI (non-ST elevated myocardial infarction) ([facility])    • Panarteritis ([facility])    • Pseudobulbar affect    • SSS (sick sinus syndrome) ([facility])        The patient has started, but not completed, their COVID-19 vaccination series.    PAST SURGICAL HISTORY  Past Surgical History:   Procedure Laterality Date   • APPENDECTOMY     • ATHRECTOMY ILIAC, FEMORAL, TIBIAL ARTERY N/A 10/17/2018    Procedure: AIF ARTERIOGRAM, LEFT SFA  ANGIOPLASTY;  Surgeon: Jayden Marie MD;  Location: [facility] OR;  Service: Vascular   • ATHRECTOMY ILIAC, FEMORAL, TIBIAL ARTERY Bilateral 4/20/2022    Procedure: AIF LEFT LEG ANGIOGRAM;  Surgeon: Jayden Marie MD;  Location: [facility] OR 18/19;  Service:  Vascular;  Laterality: Bilateral;   • CATARACT EXTRACTION WITH INTRAOCULAR LENS IMPLANT Bilateral    • CHOLECYSTECTOMY WITH INTRAOPERATIVE CHOLANGIOGRAM N/A 05/09/2018    Procedure: CHOLECYSTECTOMY LAPAROSCOPIC INTRAOPERATIVE CHOLANGIOGRAM;  Surgeon: Daniel Gonzalez MD;  Location: [facility] MAIN OR;  Service: General   • COLONOSCOPY     • COLONOSCOPY N/A 05/08/2018    Procedure: COLONOSCOPY to cecum with polypectomies;  Surgeon: Daniel Gonzalez MD;  [facility];  Service: Gastroenterology   • ENDOSCOPY N/A 05/08/2018    Procedure: ESOPHAGOGASTRODUODENOSCOPY with biopsies;  Surgeon: Daniel Gonzalez MD;  [facility];  Service: Gastroenterology   • FEMORAL ARTERY STENT Left     on 3/22/16         FAMILY HISTORY  Family History   Problem Relation Age of Onset   • Hypertension Mother    • Diabetes Daughter    • Cancer Maternal Aunt    • Diabetes Maternal Grandfather    • Malig Hyperthermia Neg Hx          SOCIAL HISTORY  Social History     Socioeconomic History   • Marital status:    Tobacco Use   • Smoking status: Former     Packs/day: 1.00     Years: 60.00     Pack years: 60.00     Types: Cigarettes     Quit date: 2008     Years since quitting: 15.0   • Smokeless tobacco: Never   Vaping Use   • Vaping Use: Never used   Substance and Sexual Activity   • Alcohol use: No     Comment: Daily caffeine use   • Drug use: No   • Sexual activity: Defer         ALLERGIES  Biaxin [clarithromycin], Penicillins, Percocet [oxycodone-acetaminophen], Vancomycin, Crestor [rosuvastatin], Levaquin [levofloxacin in d5w], and Latex        REVIEW OF SYSTEMS  Review of Systems   Positive chest pain, no shortness of breath, no nausea, no vomiting, no fever, no chills, no headache  All systems reviewed and negative except for those discussed in HPI.       PHYSICAL EXAM    I have reviewed the triage vital signs and nursing notes.    ED Triage Vitals   Temp Heart Rate Resp BP SpO2   01/11/23 1516 01/11/23 1516  01/11/23 1522 01/11/23 1521 01/11/23 1516   97.6 °F (36.4 °C) 55 20 (!) 197/100 99 %      Temp src Heart Rate Source Patient Position BP Location FiO2 (%)   01/11/23 1516 -- -- -- --   Tympanic           Physical Exam  GENERAL: Awake, alert, no acute distress  SKIN: Warm, dry  HENT: Normocephalic, atraumatic  EYES: no scleral icterus  CV: regular rhythm, regular rate  RESPIRATORY: normal effort, lungs clear, bruising to the chest wall and tenderness at the same site anteriorly.  ABDOMEN: soft, nontender, nondistended  MUSCULOSKELETAL: no deformity.  Right foot with no tenderness.  Moderate swelling.  Pulses and cap refill intact.  NEURO: alert, moves all extremities, follows commands          LAB RESULTS  Recent Results (from the past 24 hour(s))   Comprehensive Metabolic Panel    Collection Time: 01/11/23  3:35 PM    Specimen: Blood   Result Value Ref Range    Glucose 127 (H) 65 - 99 mg/dL    BUN 20 8 - 23 mg/dL    Creatinine 1.47 (H) 0.76 - 1.27 mg/dL    Sodium 138 136 - 145 mmol/L    Potassium 4.4 3.5 - 5.2 mmol/L    Chloride 99 98 - 107 mmol/L    CO2 28.4 22.0 - 29.0 mmol/L    Calcium 9.3 8.6 - 10.5 mg/dL    Total Protein 7.2 6.0 - 8.5 g/dL    Albumin 4.3 3.5 - 5.2 g/dL    ALT (SGPT) 22 1 - 41 U/L    AST (SGOT) 24 1 - 40 U/L    Alkaline Phosphatase 139 (H) 39 - 117 U/L    Total Bilirubin 0.7 0.0 - 1.2 mg/dL    Globulin 2.9 gm/dL    A/G Ratio 1.5 g/dL    BUN/Creatinine Ratio 13.6 7.0 - 25.0    Anion Gap 10.6 5.0 - 15.0 mmol/L    eGFR 49.1 (L) >60.0 mL/min/1.73   Troponin    Collection Time: 01/11/23  3:35 PM    Specimen: Blood   Result Value Ref Range    Troponin T 0.013 0.000 - 0.030 ng/mL   Magnesium    Collection Time: 01/11/23  3:35 PM    Specimen: Blood   Result Value Ref Range    Magnesium 1.8 1.6 - 2.4 mg/dL   Green Top (Gel)    Collection Time: 01/11/23  3:35 PM   Result Value Ref Range    Extra Tube Hold for add-ons.    Lavender Top    Collection Time: 01/11/23  3:35 PM   Result Value Ref Range    Extra  Tube hold for add-on    Gold Top - SST    Collection Time: 01/11/23  3:35 PM   Result Value Ref Range    Extra Tube Hold for add-ons.    Light Blue Top    Collection Time: 01/11/23  3:35 PM   Result Value Ref Range    Extra Tube Hold for add-ons.    CBC Auto Differential    Collection Time: 01/11/23  3:35 PM    Specimen: Blood   Result Value Ref Range    WBC 6.01 3.40 - 10.80 10*3/mm3    RBC 4.12 (L) 4.14 - 5.80 10*6/mm3    Hemoglobin 12.7 (L) 13.0 - 17.7 g/dL    Hematocrit 36.7 (L) 37.5 - 51.0 %    MCV 89.1 79.0 - 97.0 fL    MCH 30.8 26.6 - 33.0 pg    MCHC 34.6 31.5 - 35.7 g/dL    RDW 12.1 (L) 12.3 - 15.4 %    RDW-SD 40.0 37.0 - 54.0 fl    MPV 8.8 6.0 - 12.0 fL    Platelets 188 140 - 450 10*3/mm3    Neutrophil % 62.0 42.7 - 76.0 %    Lymphocyte % 27.8 19.6 - 45.3 %    Monocyte % 6.5 5.0 - 12.0 %    Eosinophil % 2.7 0.3 - 6.2 %    Basophil % 0.7 0.0 - 1.5 %    Immature Grans % 0.3 0.0 - 0.5 %    Neutrophils, Absolute 3.73 1.70 - 7.00 10*3/mm3    Lymphocytes, Absolute 1.67 0.70 - 3.10 10*3/mm3    Monocytes, Absolute 0.39 0.10 - 0.90 10*3/mm3    Eosinophils, Absolute 0.16 0.00 - 0.40 10*3/mm3    Basophils, Absolute 0.04 0.00 - 0.20 10*3/mm3    Immature Grans, Absolute 0.02 0.00 - 0.05 10*3/mm3    nRBC 0.0 0.0 - 0.2 /100 WBC   ECG 12 Lead ED Triage Standing Order; Weak / Dizzy / AMS    Collection Time: 01/11/23  3:40 PM   Result Value Ref Range    QT Interval 482 ms       Ordered the above labs and independently reviewed the results.        RADIOLOGY  XR Foot 3+ View Right    Result Date: 1/11/2023  XR FOOT 3+ VW RIGHT-  INDICATIONS: Trauma  TECHNIQUE: 3 views of the right foot  COMPARISON: 02/26/2021  FINDINGS:  Obliquely oriented fractures are seen at the fourth and fifth metatarsal shafts, with as much as 1 to 2 mm cortical step-off. No other fractures are identified. Moderate calcaneal spurring. No dislocation. Arterial calcifications are conspicuous.       Fractures of the fourth and fifth metatarsals.  This  report was finalized on 1/11/2023 4:13 PM by Dr. Audie Torres M.D.      CT Head Without Contrast, CT Cervical Spine Without Contrast    Result Date: 1/11/2023  CT HEAD WO CONTRAST-, CT CERVICAL SPINE WO CONTRAST-  INDICATIONS: Trauma  TECHNIQUE: Radiation dose reduction techniques were utilized, including automated exposure control and exposure modulation based on body size. Noncontrast head CT, cervical spine CT  COMPARISON: From 11/27/2020  FINDINGS:  Head CT:  No acute intracranial hemorrhage, midline shift or mass effect. No acute territorial infarct is identified. Basal ganglia mineralization is present.  Mild periventricular hypodensities suggest chronic small vessel ischemic change in a patient this age.  Arterial calcifications are seen at the base of the brain.  Ventricles, cisterns, cerebral sulci are stable.  The visualized paranasal sinuses, orbits, mastoid air cells are unremarkable.    Cervical spine CT:  No acute fracture or malalignment is identified.  Facet and uncovertebral joint hypertrophy contribute to neuroforaminal narrowing, more prominent bilaterally at C5/6. Disc osteophyte complex appears to result in mild central stenosis at C3/4, C5/6, C6/7.   Carotid arterial calcifications are present, with right carotid arterial stent in place (patency of the stent is not assessed without intravenous contrast material). Right thyroid nodularity is apparent, suboptimally evaluated with this technique, thyroid ultrasound correlation could be obtained as indicated.  Fibronodular changes are seen at the lung apices.        No acute intracranial hemorrhage or hydrocephalus; chronic changes of the brain. No acute cervical fracture identified; degenerative changes in the cervical spine. If there is further clinical concern, MRI could be considered for further evaluation.  This report was finalized on 1/11/2023 5:16 PM by Dr. Audie Torres M.D.      XR Chest 1 View    Result Date: 1/11/2023  XR  CHEST 1 VW-  HISTORY: Male who is 77 years-old,  weakness  TECHNIQUE: Frontal view the chest  COMPARISON: 10/03/2018  FINDINGS: The heart size is normal. Aorta is calcified. Pulmonary vasculature is unremarkable. No focal pulmonary consolidation, pleural effusion, or pneumothorax. No acute osseous process.      No focal pulmonary consolidation. Follow-up as clinical indications persist.  This report was finalized on 2023 4:14 PM by Dr. Audie Torres M.D.      XR Foot Comp Min 3 Vws RT    Result Date: 2023  REVIEWING YOUR TEST RESULTS IN [facility] IS NOT A SUBSTITUTE FOR DISCUSSING THOSE RESULTS WITH YOUR HEALTH CARE PROVIDER. PLEASE CONTACT YOUR PROVIDER VIA [facility] TO DISCUSS ANY QUESTIONS OR CONCERNS YOU MAY HAVE REGARDING THESE TEST RESULTS.  RADIOLOGY REPORT FACILITY:  [facility]      AGE:77 Y          SEX:M PATIENT NAME/:  MONIKA SNEED H    1945 UNIT NUMBER:  [id number] ACCOUNT NUMBER:  [id number] ACCESSION NUMBER:  [id number] EXAMINATION: XR FOOT COMP MIN 3 VWS RT HISTORY: Right fourth and fifth metatarsal fractures FINDINGS: 3 views of the right foot are submitted for interpretation without comparison. There are mildly displaced oblique fractures of the distal right fourth and fifth metatarsal shafts. Mild multifocal interphalangeal joint osteophyte arthritis. Mild great toe metatarsophalangeal joint osteoarthritis. Moderately sized heel spur. There is  atherosclerosis. IMPRESSION: 1. Mildly displaced oblique fractures of the right fourth and fifth metatarsal shafts. Dictated by: Luis Obrien M.D. Images and Report reviewed and interpreted by: Luis Obrien M.D. <PS><Electronically signed by: Luis Obrien M.D.> 2023 1423 D: 2023 1422 T: 2023 1422      I ordered the above noted radiological studies. Reviewed by me and discussed with radiologist.  See dictation for official radiology  interpretation.      PROCEDURES    Procedures      MEDICATIONS GIVEN IN ER    Medications   sodium chloride 0.9 % flush 10 mL (has no administration in time range)         PROGRESS, DATA ANALYSIS, CONSULTS, AND MEDICAL DECISION MAKING    All labs have been independently reviewed by me.  All radiology studies have been reviewed by me and discussed with radiologist dictating the report.   EKG's independently viewed and interpreted by me.  Discussion below represents my analysis of pertinent findings related to patient's condition, differential diagnosis, treatment plan and final disposition.    Differential diagnosis includes but is not limited to foot fracture, intracranial hemorrhage, UTI, pneumonia, dysrhythmia, non-STEMI, STEMI, unstable angina, PE, acute aortic syndrome, acute renal failure, dehydration    ED Course as of 01/11/23 1734   Wed Jan 11, 2023   1640 EKG          EKG time: 1540  Rhythm/Rate: Normal sinus, rate 50  P waves and WY: Normal P, normal WY  QRS, axis: Narrow QRS, normal axis  ST and T waves: Nonspecific change in the lateral leads    Interpreted Contemporaneously by me, independently viewed  Not significant changed compared to prior 4/18/2022   [TR]   1644 The patient's blood pressure is elevated.  He states that he is due to take his blood pressure medicine.  He has not missed any doses.   [TR]   1659 XR Chest 1 View  My independent interpretation of the chest x-ray is no pneumonia.  No pneumothorax. [TR]   1659 XR Foot 3+ View Right  My independent interpretation of the right foot x-ray is fourth and fifth metatarsal fractures. [TR]   1725 The patient's chemistries and blood counts are unchanged from prior.  Specifically his creatinine and hemoglobin are stable.  His troponin is negative.  His EKG is unchanged.  His CT of his head and cervical spine showed no fracture and no intracranial hemorrhage.  His chest x-ray shows no pneumothorax or pneumonia.  His foot x-ray shows his metatarsal  fractures.  Plan to place him in a boot and get him a walker if he does not already have 1.  Plan outpatient orthopedic follow-up. [TR]   1729 I reviewed the work-up and findings with the patient and family at the bedside.  Answered all questions.  They are agreeable to the plan.  Plan discharge.  He has a walker. [TR]   1733 Ideally we will put this patient in a splint however with his mobility issues this will certainly cause him to fall.  Plan to place him in a walking boot and have him follow-up with orthopedics.  He already has a walker.  I encouraged him to use it at all times.  He reports his dizziness is chronic.  I see no reason for it to be worsened on laboratory and imaging evaluation today. [TR]      ED Course User Index  [TR] To Russo MD           PPE: The patient wore a mask and I wore an N95 mask throughout the entire patient encounter.       AS OF 17:34 EST VITALS:    BP - (!) 211/83  HR - 51  TEMP - 97.6 °F (36.4 °C) (Tympanic)  O2 SATS - 99%        DIAGNOSIS  Final diagnoses:   Closed displaced fracture of metatarsal bone of right foot, unspecified metatarsal, initial encounter   Hypertension, unspecified type   Severe Alzheimer's dementia, unspecified timing of dementia onset, unspecified whether behavioral, psychotic, or mood disturbance or anxiety (HCC)   Dizziness         DISPOSITION  ED Disposition     ED Disposition   Discharge    Condition   Stable    Comment   --                Note Disclaimer: At [facility], we believe that sharing information builds trust and better relationships. You are receiving this note because you recently visited [facility]. It is possible you will see health information before a provider has talked with you about it. This kind of information can be easy to misunderstand. To help you fully understand what it means for your health, we urge you to discuss this note with your provider.       To Russo MD  01/11/23 0990

## 2023-01-12 LAB — QT INTERVAL: 482 MS

## 2023-01-17 ENCOUNTER — TELEPHONE (OUTPATIENT)
Dept: CARDIOLOGY | Facility: CLINIC | Age: 78
End: 2023-01-17
Payer: MEDICARE

## 2023-01-17 DIAGNOSIS — E78.49 OTHER HYPERLIPIDEMIA: ICD-10-CM

## 2023-01-17 DIAGNOSIS — I48.0 PAROXYSMAL ATRIAL FIBRILLATION: ICD-10-CM

## 2023-01-17 DIAGNOSIS — I73.9 PVD (PERIPHERAL VASCULAR DISEASE): ICD-10-CM

## 2023-01-17 DIAGNOSIS — I73.9 PERIPHERAL ARTERIAL DISEASE: ICD-10-CM

## 2023-01-17 DIAGNOSIS — I71.40 ABDOMINAL AORTIC ANEURYSM (AAA) 3.0 CM TO 5.5 CM IN DIAMETER IN MALE: ICD-10-CM

## 2023-01-17 DIAGNOSIS — I25.10 CORONARY ARTERY DISEASE INVOLVING NATIVE CORONARY ARTERY OF NATIVE HEART WITHOUT ANGINA PECTORIS: ICD-10-CM

## 2023-01-17 DIAGNOSIS — E11.51 TYPE 2 DIABETES MELLITUS WITH DIABETIC PERIPHERAL ANGIOPATHY WITHOUT GANGRENE, WITHOUT LONG-TERM CURRENT USE OF INSULIN: ICD-10-CM

## 2023-01-17 RX ORDER — METOPROLOL SUCCINATE 25 MG/1
25 TABLET, EXTENDED RELEASE ORAL 2 TIMES DAILY
Qty: 90 TABLET | Refills: 3
Start: 2023-01-17 | End: 2023-03-31 | Stop reason: SDUPTHER

## 2023-01-17 NOTE — TELEPHONE ENCOUNTER
Notified patient's wife of recommendations. She verbalized understanding. She is going to do metoprolol 25mg BID. She said patient saw the orthopedic specialist yesterday. I told her that she needs to call them and let them know she spoke to our office and we are going to adjust his medications, but we do believe his pain is also playing a factor in his BP.  She said she will call them and make them aware.     Gaby Coker RN  Triage [facility]

## 2023-01-17 NOTE — TELEPHONE ENCOUNTER
Patient's wife called and said that the patient was seen in the ER on 1/11 for broken metatarsals. His BP was elevated in the ER and they advised him to call his cardiologist. Wife states that she called yesterday and left a message, but I do not see anything in the chart. Since he has been home BPs have been in the 159-180/70-80 range. Currently it is 182/89. HRs have been in the 90s. She said before his ER visit his BP was running in the 130s. She does admit that he is in a lot of pain and the ER did not write a prescription for pain. Below are his medications:    Metoprolol 25mg nightly  Imdur 30mg nightly    Gaby Coker RN  Triage [facility]

## 2023-01-17 NOTE — ADDENDUM NOTE
Addended by: WALTER KUMAR on: 1/17/2023 12:58 PM     Modules accepted: Orders     Jacy Frost  : 1951  [place]  [place] [facility] [place] [facility] at [address]  [phone]   [phone]        OUTPATIENT PHYSICAL THERAPY:Recertification    ICD-10: Treatment Diagnosis: Pain in joint, left knee M25.562  ICD-10: Treatment Diagnosis: pain in joint, right knee M25.561  ICD-10: Treatment Diagnosis: difficulty walking, not elsewhere classified R26.2  Precautions/Allergies:   Ciprofloxacin; Adhesive tape-silicones; Ceclor [cefaclor]; and Symbyax [olanzapine-fluoxetine]   MD Orders: evaluate and treat MEDICAL/REFERRING DIAGNOSIS:  Knee pain [M25.569]   DATE OF ONSET: surgery 19  REFERRING PHYSICIAN: Subhash Thompson MD  RETURN PHYSICIAN APPOINTMENT: as needed   RE-CERTIFICATION: : Ms. Lisbeth Renteria has attended 19 physical therapy sessions and continues to demonstrate challenged with her left knee extension. She has improved her left knee flexion to 125 degrees. She continues to demonstrate weakness in her LE, especially in gluteals, quadriceps and core stabilizers. She continues to ambulate with a limp and needs to use her cane for assistance. She is challenged with obtaining terminal knee extension. She would benefit from continued physical therapy for strength and endurance training, gait and balance training and obtaining terminal knee extension in left knee. PROGRESS NOTE: 19: Ms. Lisbeth Renteria has attended 10 physical therapy sessions, she continues to present with challenged with gait and prolonged weight bearing activities. Overall strength is improving, however continues to demonstrate strength deficits in left quadriceps and gluteals. She has improved with sit to stand transfers, continues to struggle with ambulation for duration and terrain surfaces. She would benefit from continued skilled physical therapy to improve overall mobility and function. INITIAL ASSESSMENT:  Ms. Lisbeth Renteria is a 78 y.o. female who presents to physical therapy s/p left TKA on 4/17/19, she underwent 3 weeks of home health physical therapy and continues to be challenged with ambulation, sit to stand transfers, especially out of her car. She also started experiencing right anterior knee pain in March 2019, notes tightness across anterior knee and notes clicking in her right knee. Most discomfort in right knee noted with performing sit to stand transfers. She presents with LE weakness, challenged with transfers, gait and balance. She will benefit from skilled physical therapy to improve her overall mobility and function with daily tasks. PROBLEM LIST (Impacting functional limitations):  1. Decreased Strength  2. Decreased ADL/Functional Activities  3. Decreased Transfer Abilities  4. Decreased Ambulation Ability/Technique  5. Decreased Balance  6. Increased Pain  7. Decreased Activity Tolerance  8. Increased Fatigue  9. Decreased Flexibility/Joint Mobility INTERVENTIONS PLANNED: (Treatment may consist of any combination of the following)  1. Balance Exercise  2. Bed Mobility  3. Cold  4. Gait Training  5. Heat  6. Home Exercise Program (HEP)  7. Manual Therapy  8. Neuromuscular Re-education/Strengthening  9. Range of Motion (ROM)  10. Therapeutic Activites  11. Therapeutic Exercise/Strengthening   TREATMENT PLAN:  Effective Dates: 8/15/19 TO 11/13/2019 (90 days). Frequency/Duration: 1 times a week for 90 Day(s)  GOALS: (Goals have been discussed and agreed upon with patient.)  Short-Term Functional Goals: Time Frame: 4 weeks  1. Patient demonstrates independence with her HEP without verbal cueing from therapist. GOAL MET  2. Patient demonstrates improve left knee ROM 2-120 degrees to perform ADLs. GOAL MET  3. Patient able to ambulate for 10 minutes without onset of bilateral knee pain ONGOING  Discharge Goals: Time Frame: 90 days  1.  Patient demonstrates functional AROM of left knee 0-125 to perform ADLs - ONGOING, flexion GOAL MET  2. Patient demonstrates ability to ambulate for 30 minutes to perform community ambulation. - ONGOING  3. Patient demonstrates ability to perform sit to stand transfers from various surfaces without onset of bilateral knee pain. GOAL MET  4. Improve LEFS outcome measure score from 28/80 to 60/80 to perform ADLs - ONGOING    OUTCOME MEASURE:   Tool Used: Lower Extremity Functional Scale (LEFS)  Score:  Initial: 28/80 Most Recent: 40/80 (Date: 6-25-19)                       46/80 (Date: 8-15-19)   Interpretation of Score: 20 questions each scored on a 5 point scale with 0 representing \"extreme difficulty or unable to perform\" and 4 representing \"no difficulty\". The lower the score, the greater the functional disability. 80/80 represents no disability. Minimal detectable change is 9 points. MEDICAL NECESSITY:   · Patient is expected to demonstrate progress in strength, range of motion, balance, coordination and functional technique to increase independence with sit to stand transfers, ambulation and weight bearing activities . REASON FOR SERVICES/OTHER COMMENTS:  · Patient continues to require skilled intervention due to continues to be challenged with obtaining left LE terminal knee extension, continues to ambulate with limp and challenged with remaining strength and endurance deficits . Cassi Hall Total Duration:  PT Patient Time In/Time Out  Time In: 1030  Time Out: 1130    Rehabilitation Potential For Stated Goals: Excellent  Regarding Lira Door Lettsworth's therapy, I certify that the treatment plan above will be carried out by a therapist or under their direction.   Thank you for this referral,  Edwin Chapa, PT          PAIN/SUBJECTIVE:   Initial: Pain Intensity 1: 2  Pain Location 1: Knee  Pain Orientation 1: Left    Post Session:  2/10   HISTORY:      Current Medications:       Current Outpatient Medications:     SYNTHROID 150 mcg tablet, 1 tablet once a day except for 1/2 tablet on Sundays, Disp: 90 Tab, Rfl: 3    acetaminophen (TYLENOL) 325 mg cap, Take  by mouth., Disp: , Rfl:     PROLENSA 0.07 % ophthalmic solution, , Disp: , Rfl: 1    montelukast (SINGULAIR) 10 mg tablet, Take 1 Tab by mouth daily. , Disp: 90 Tab, Rfl: 1    mometasone (NASONEX) 50 mcg/actuation nasal spray, 2 Sprays by Both Nostrils route daily. , Disp: 1 Container, Rfl: 11    buPROPion (WELLBUTRIN) 75 mg tablet, Take 2 Tabs by mouth two (2) times a day., Disp: 360 Tab, Rfl: 1    fluticasone propion-salmeterol (ADVAIR DISKUS) 250-50 mcg/dose diskus inhaler, Take 1 Puff by inhalation two (2) times a day. RINSE MOUTH WELL AFTER USE, Disp: 1 Inhaler, Rfl: 11    apixaban (ELIQUIS) 5 mg tablet, Take 1 Tab by mouth two (2) times a day., Disp: 180 Tab, Rfl: 3    dofetilide (TIKOSYN) 500 mcg capsule, Take 1 Cap by mouth every twelve (12) hours every twelve (12) hours. (Patient taking differently: Take 500 mcg by mouth every twelve (12) hours every twelve (12) hours. Patient takes 1 tablet at 0800 and 1 tablet at 2000  ), Disp: 60 Cap, Rfl: 11    losartan (COZAAR) 100 mg tablet, Take 1 Tab by mouth daily. , Disp: 30 Tab, Rfl: 11    digestive enzymes, plant, (FIBERZYME CONCENTRATE-HP PO), Take 625 mg by mouth daily. , Disp: , Rfl:     albuterol (VENTOLIN HFA) 90 mcg/actuation inhaler, Take 2 Puffs by inhalation every six (6) hours as needed for Wheezing., Disp: 1 Inhaler, Rfl: 11    Cetirizine (ZYRTEC) 10 mg cap, Take 10 Caps by mouth daily. , Disp: , Rfl:     cpap machine kit, by Does Not Apply route., Disp: , Rfl:    Date Last Reviewed:  8/15/2019   UPDATED OBJECTIVE FINDINGS:     Observation/Orthostatic Postural Assessment:           Lower extremity weight bearing is symmetrical. Observation of gait indicates decreased terminal knee extension in left knee. Patient exhibits a increased lumbar lordosis. Palpation of lower quadrant bony landmarks are left iliac crest elevated.    Palpation: Patient exhibits tenderness to palpation/temperature/crepitance/scar mobility/soft tissue mobility/myofasical to bilateral anterior knees, left greater than right. Improving 8/15/2019  Hamstring flexibility tested supine with straight leg raise is restricted left greater than right. Improving 8/15/2019    ROM:     AROM(PROM) Right 6/24/19 Left 6/24/19   Knee flexion 0-125 2-125 (1-127)   Knee extension 0 Lacks 2   Hip flexion 90 90   Hip external rotation (ER) 20 20   Hip internal rotation (IR) 20 20   Hip extension 5 5   Hip abduction 45 45     Strength:     Manual Muscle Test (*/5) Right Left   Knee extension 4+ 4   Knee flexion 4+ 4+   Hip flexion 4+ 4   Hip ER 4 4+   Hip IR 4 4+   Hip extension 4 4   Hip abduction 4 4   Hip adduction 5 5   Ankle DF 5 5   Ankle PF 5 5   Core stability 4/5     Functional strength with standing heel raise is 4-.

## 2023-01-17 NOTE — TELEPHONE ENCOUNTER
He can increase metoprolol to 50 mg nightly, or 25 mg twice daily.  Reviewing his ER records, he does need to follow-up with an orthopedic specialist.  If he has not done so he should place a call to be seen.  He should also place a call to be seen by his primary care physician which may facilitate getting him an appointment with orthopedics; they will also be able to address his pain issues.    Thanks!  DEBORAH Rizvi

## 2023-01-23 ENCOUNTER — OFFICE VISIT (OUTPATIENT)
Dept: FAMILY MEDICINE CLINIC | Facility: CLINIC | Age: 78
End: 2023-01-23
Payer: MEDICARE

## 2023-01-23 VITALS
DIASTOLIC BLOOD PRESSURE: 76 MMHG | BODY MASS INDEX: 24.37 KG/M2 | HEIGHT: 69 IN | HEART RATE: 55 BPM | OXYGEN SATURATION: 97 % | SYSTOLIC BLOOD PRESSURE: 164 MMHG

## 2023-01-23 DIAGNOSIS — F41.9 ANXIETY: ICD-10-CM

## 2023-01-23 DIAGNOSIS — E78.49 OTHER HYPERLIPIDEMIA: Chronic | ICD-10-CM

## 2023-01-23 DIAGNOSIS — E11.51 TYPE 2 DIABETES MELLITUS WITH DIABETIC PERIPHERAL ANGIOPATHY WITHOUT GANGRENE, WITHOUT LONG-TERM CURRENT USE OF INSULIN: ICD-10-CM

## 2023-01-23 DIAGNOSIS — N18.31 STAGE 3A CHRONIC KIDNEY DISEASE (CKD): ICD-10-CM

## 2023-01-23 DIAGNOSIS — K21.9 GASTROESOPHAGEAL REFLUX DISEASE WITHOUT ESOPHAGITIS: ICD-10-CM

## 2023-01-23 DIAGNOSIS — S92.351A CLOSED FRACTURE OF BASE OF FIFTH METATARSAL BONE OF RIGHT FOOT, INITIAL ENCOUNTER: Primary | ICD-10-CM

## 2023-01-23 DIAGNOSIS — I73.9 PERIPHERAL ARTERIAL DISEASE: Chronic | ICD-10-CM

## 2023-01-23 DIAGNOSIS — K52.1 DIARRHEA DUE TO DRUG: ICD-10-CM

## 2023-01-23 DIAGNOSIS — S92.344A CLOSED NONDISPLACED FRACTURE OF FOURTH METATARSAL BONE OF RIGHT FOOT, INITIAL ENCOUNTER: ICD-10-CM

## 2023-01-23 DIAGNOSIS — E11.42 DIABETIC PERIPHERAL NEUROPATHY: ICD-10-CM

## 2023-01-23 PROBLEM — S92.353A CLOSED FRACTURE OF BASE OF FIFTH METATARSAL BONE: Status: ACTIVE | Noted: 2023-01-16

## 2023-01-23 PROBLEM — M77.41 METATARSALGIA OF RIGHT FOOT: Status: ACTIVE | Noted: 2023-01-16

## 2023-01-23 PROBLEM — S92.343A CLOSED FRACTURE OF FOURTH METATARSAL BONE: Status: ACTIVE | Noted: 2023-01-16

## 2023-01-23 PROCEDURE — 99214 OFFICE O/P EST MOD 30 MIN: CPT | Performed by: INTERNAL MEDICINE

## 2023-01-23 RX ORDER — OMEPRAZOLE 20 MG/1
1 CAPSULE, DELAYED RELEASE ORAL DAILY
COMMUNITY
Start: 2023-01-03 | End: 2023-01-23 | Stop reason: SDUPTHER

## 2023-01-23 RX ORDER — OMEPRAZOLE 20 MG/1
20 CAPSULE, DELAYED RELEASE ORAL DAILY
Qty: 90 CAPSULE | Refills: 1 | Status: SHIPPED | OUTPATIENT
Start: 2023-01-23

## 2023-01-23 RX ORDER — BUSPIRONE HYDROCHLORIDE 5 MG/1
5 TABLET ORAL 2 TIMES DAILY
Qty: 180 TABLET | Refills: 1 | Status: SHIPPED | OUTPATIENT
Start: 2023-01-23

## 2023-01-23 RX ORDER — METFORMIN HYDROCHLORIDE 500 MG/1
1 TABLET, EXTENDED RELEASE ORAL DAILY
COMMUNITY
Start: 2023-01-03 | End: 2023-01-23 | Stop reason: SINTOL

## 2023-01-23 RX ORDER — UBIDECARENONE 200 MG
200 CAPSULE ORAL DAILY
COMMUNITY
End: 2023-01-23 | Stop reason: SDUPTHER

## 2023-01-23 RX ORDER — ATORVASTATIN CALCIUM 80 MG/1
1 TABLET, FILM COATED ORAL DAILY
COMMUNITY
Start: 2023-01-03 | End: 2023-01-23 | Stop reason: SDUPTHER

## 2023-01-23 RX ORDER — BUSPIRONE HYDROCHLORIDE 5 MG/1
1 TABLET ORAL
COMMUNITY
Start: 2023-01-03 | End: 2023-01-23 | Stop reason: SDUPTHER

## 2023-01-23 RX ORDER — CHOLECALCIFEROL (VITAMIN D3) 125 MCG
5 CAPSULE ORAL DAILY
COMMUNITY
End: 2023-01-23 | Stop reason: SDUPTHER

## 2023-01-23 RX ORDER — ATORVASTATIN CALCIUM 80 MG/1
80 TABLET, FILM COATED ORAL DAILY
Qty: 90 TABLET | Refills: 1 | Status: SHIPPED | OUTPATIENT
Start: 2023-01-23

## 2023-01-23 RX ORDER — GLIMEPIRIDE 2 MG/1
3 TABLET ORAL
Qty: 135 TABLET | Refills: 1
Start: 2023-01-23 | End: 2023-03-15

## 2023-01-23 NOTE — PROGRESS NOTES
Subjective   Erick Bruno is a 77 y.o. male.     Chief Complaint   Patient presents with   • Hospital Follow Up Visit   • Diabetes       History of Present Illness     Patient seen in [facility] emergency room on 1/11/2023 after having a foot fracture from multiple falls.  Was seen in the urgent care center was diagnosed with foot fracture and forwarded to the emergency room.  He is diabetic and had no pain.  X-ray of the right foot demonstrated obliquely oriented fractures at the fourth and fifth metatarsal shafts with 1 to 2 mm step-off's, CT of the head demonstrated no intracranial hemorrhages or hydrocephalus only chronic changes of the brain and no cervical fractures were noted.   Was seen by orthopedics and has follow up in 4 days in the office.  Noted to have a sore on top of the right foot from the Boot he was in and now out of the boot and using the hard sole shoe and to stay in the transport chair in the home.  While in the emergency room was noted to have an elevated blood pressure 211/83.  Was noted that the blood pressure remained elevated at home and the metoprolol was subsequently increased to 25 mg twice a day and is here for follow-up.  Having daily dizziness since the ER visit.  This is happening when laying down and when moving about.      Follow-up for hypertension.  Currently, has been feeling well and asymptomatic without any headaches, vision changes, cough, chest pain, shortness of breath, swelling, focal neurologic deficit, memory loss or syncope.  Home BP usually run 140/80's but more elevated since the foot fractures.  Has been taking the medications regularly and adherent with the regimen metoprolol succinate 25 mg twice a day.  Denies medication side effects and no significant interval events.      Patient with carotid artery stenosis with stent, aortic atheromatosis, infrarenal artery stenosis, stenosis of right external iliac artery, right superficial femoral artery and deep  femoral artery on right.  Has follow up with Dr Frank Carpenter vascular in May.    Here for follow-up of diabetes.  Patient states to have been compliant with medications.  Blood sugar monitoring - patient states has not been following regularly but has been running in the 120's.  No episodes of hypoglycemia, nausea, vomiting, new rashes, syncope or other issues.  Denies any difficulties with the current medication regimen of metformin  mg daily and glimepiride 2 mg daily. Cannot tolerate metformin at higher doses secondary to severe GI issues.  Last A1c of 8/8/2022 of 7.1% and was 6.3% on 2/7/22.     Follow-up for cholesterol.  Currently, has been feeling well without any myalgias, muscle aches, weakness, numbness, chest pain, short of breath or other issues.  Currently, is adherent with medication regimen of atorvastatin 80 mg and denies medication side effects.  Last performed on 8/8/2022.     History of anxiety and currently on lexapro 10 mg, buspirone 5 mg twice daily and lorazepam 0.5 mg every 8 hours as needed.    The following portions of the patient's history were reviewed and updated as appropriate: allergies, current medications, past family history, past medical history, past social history, past surgical history and problem list.    Depression Screen:  PHQ-2/PHQ-9 Depression Screening 2/7/2022   Retired PHQ-9 Total Score 3   Retired Total Score 3       Past Medical History:   Diagnosis Date   • Abnormal nuclear stress test 05/21/2018   • Acid reflux    • Alzheimer's dementia (HCC)    • Anemia    • Anesthesia complication     PT'S WIFE STATES PT CRYING AND EMOTIONAL AFTER SURGERY IN THE PAST   • Anxiety    • Arthritis    • Atrial fibrillation (HCC)    • CAD (coronary artery disease)    • Carotid artery disease (HCC)    • Chronic anticoagulation    • Dementia (HCC)    • Diabetes mellitus (HCC)    • Elevated cholesterol    • Enlarged prostate    • Frequent urination at night    • History of seizure  2013    S/P TEMPERATURE   • Hyperlipidemia    • Hypertension    • Leg pain     BILAT-D/T PVD   • Macular degeneration    • New onset atrial fibrillation (HCC) 02/2021   • NSTEMI (non-ST elevated myocardial infarction) (HCC)    • Panarteritis (HCC)    • Peripheral arterial disease (HCC)    • Poor balance     HIGH RISK FOR FALLS   • 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 MAIN OR;  Service: Vascular   • ATHRECTOMY ILIAC, FEMORAL, TIBIAL ARTERY Bilateral 4/20/2022    Procedure: AIF LEFT LEG ANGIOGRAM;  Surgeon: Jayden Marie MD;  Location: [facility] OR 18/19;  Service: Vascular;  Laterality: Bilateral;   • CATARACT EXTRACTION WITH INTRAOCULAR LENS IMPLANT Bilateral    • CHOLECYSTECTOMY WITH INTRAOPERATIVE CHOLANGIOGRAM N/A 05/09/2018    Procedure: CHOLECYSTECTOMY LAPAROSCOPIC INTRAOPERATIVE CHOLANGIOGRAM;  Surgeon: Daniel Gonzalez MD;  Location: [facility] MAIN OR;  Service: General   • COLONOSCOPY     • COLONOSCOPY N/A 05/08/2018    Procedure: COLONOSCOPY to cecum with polypectomies;  Surgeon: Daniel Gonzalez MD;  Location: [facility] ENDOSCOPY;  Service: Gastroenterology   • ENDOSCOPY N/A 05/08/2018    Procedure: ESOPHAGOGASTRODUODENOSCOPY with biopsies;  Surgeon: Daniel Gonzalez MD;  Location: [facility] ENDOSCOPY;  Service: Gastroenterology   • FEMORAL ARTERY STENT Left     on 3/22/16       Family History   Problem Relation Age of Onset   • Hypertension Mother    • Diabetes Daughter    • Cancer Maternal Aunt    • Diabetes Maternal Grandfather    • Malig Hyperthermia Neg Hx        Social History     Socioeconomic History   • Marital status:    Tobacco  Use   • Smoking status: Former     Packs/day: 1.00     Years: 60.00     Pack years: 60.00     Types: Cigarettes     Quit date: 2008     Years since quitting: 15.0   • Smokeless tobacco: Never   Vaping Use   • Vaping Use: Never used   Substance and Sexual Activity   • Alcohol use: No     Comment: Daily caffeine use   • Drug use: No   • Sexual activity: Defer       Current Outpatient Medications   Medication Sig Dispense Refill   • aspirin 81 MG EC tablet Take 81 mg by mouth Daily. OK TO CONTINUE TO TAKE LEADING UP TO SURGERY PER DR. MULLER     • atorvastatin (LIPITOR) 80 MG tablet Take 1 tablet by mouth Daily. 90 tablet 1   • busPIRone (BUSPAR) 5 MG tablet Take 1 tablet by mouth 2 (Two) Times a Day. 180 tablet 1   • Coenzyme Q10 200 MG capsule Take 200 mg by mouth Every Night. HOLDING FOR SURGERY     • Eliquis 5 MG tablet tablet TAKE 1 TABLET BY MOUTH EVERY 12 HOURS FOR  ATRAIL  FIBRILLATION 60 tablet 11   • escitalopram (LEXAPRO) 10 MG tablet Take 10 mg by mouth Every Night.     • glimepiride (AMARYL) 2 MG tablet Take 1.5 tablets by mouth Every Morning Before Breakfast. 135 tablet 1   • isosorbide mononitrate (IMDUR) 30 MG 24 hr tablet Take 1 tablet by mouth Every Night. 90 tablet 3   • LORazepam (ATIVAN) 0.5 MG tablet TAKE 1 TABLET BY MOUTH EVERY 8 HOURS AS NEEDED FOR ANXIETY 30 tablet 0   • melatonin 5 MG tablet tablet Take 5 mg by mouth Every Night.     • metoprolol succinate XL (TOPROL-XL) 25 MG 24 hr tablet Take 1 tablet by mouth 2 (Two) Times a Day. 90 tablet 3   • multivitamin with minerals tablet tablet Take 1 tablet by mouth Every Night. HOLD FOR SURGERY     • nitroglycerin (NITROSTAT) 0.4 MG SL tablet DISSOLVE ONE TABLET UNDER THE TONGUE EVERY 5 MINUTES AS NEEDED FOR CHEST PAIN.  DO NOT EXCEED A TOTAL OF 3 DOSES IN 15 MINUTES 5 tablet 0   • omeprazole (priLOSEC) 20 MG capsule Take 1 capsule by mouth Daily. 90 capsule 1   • saw palmetto 160 MG capsule Take 160 mg by mouth Every Night. HOLD FOR SURGERY    "    No current facility-administered medications for this visit.       Review of Systems    Objective   /76 (BP Location: Left arm, Patient Position: Sitting, Cuff Size: Adult)   Pulse 55   Ht 175.3 cm (69\")   SpO2 97%   BMI 24.37 kg/m²     Physical Exam    Recent Results (from the past 2016 hour(s))   Comprehensive Metabolic Panel    Collection Time: 01/11/23  3:35 PM    Specimen: Blood   Result Value Ref Range    Glucose 127 (H) 65 - 99 mg/dL    BUN 20 8 - 23 mg/dL    Creatinine 1.47 (H) 0.76 - 1.27 mg/dL    Sodium 138 136 - 145 mmol/L    Potassium 4.4 3.5 - 5.2 mmol/L    Chloride 99 98 - 107 mmol/L    CO2 28.4 22.0 - 29.0 mmol/L    Calcium 9.3 8.6 - 10.5 mg/dL    Total Protein 7.2 6.0 - 8.5 g/dL    Albumin 4.3 3.5 - 5.2 g/dL    ALT (SGPT) 22 1 - 41 U/L    AST (SGOT) 24 1 - 40 U/L    Alkaline Phosphatase 139 (H) 39 - 117 U/L    Total Bilirubin 0.7 0.0 - 1.2 mg/dL    Globulin 2.9 gm/dL    A/G Ratio 1.5 g/dL    BUN/Creatinine Ratio 13.6 7.0 - 25.0    Anion Gap 10.6 5.0 - 15.0 mmol/L    eGFR 49.1 (L) >60.0 mL/min/1.73   Troponin    Collection Time: 01/11/23  3:35 PM    Specimen: Blood   Result Value Ref Range    Troponin T 0.013 0.000 - 0.030 ng/mL   Magnesium    Collection Time: 01/11/23  3:35 PM    Specimen: Blood   Result Value Ref Range    Magnesium 1.8 1.6 - 2.4 mg/dL   Green Top (Gel)    Collection Time: 01/11/23  3:35 PM   Result Value Ref Range    Extra Tube Hold for add-ons.    Lavender Top    Collection Time: 01/11/23  3:35 PM   Result Value Ref Range    Extra Tube hold for add-on    Gold Top - SST    Collection Time: 01/11/23  3:35 PM   Result Value Ref Range    Extra Tube Hold for add-ons.    Light Blue Top    Collection Time: 01/11/23  3:35 PM   Result Value Ref Range    Extra Tube Hold for add-ons.    CBC Auto Differential    Collection Time: 01/11/23  3:35 PM    Specimen: Blood   Result Value Ref Range    WBC 6.01 3.40 - 10.80 10*3/mm3    RBC 4.12 (L) 4.14 - 5.80 10*6/mm3    Hemoglobin 12.7 " (L) 13.0 - 17.7 g/dL    Hematocrit 36.7 (L) 37.5 - 51.0 %    MCV 89.1 79.0 - 97.0 fL    MCH 30.8 26.6 - 33.0 pg    MCHC 34.6 31.5 - 35.7 g/dL    RDW 12.1 (L) 12.3 - 15.4 %    RDW-SD 40.0 37.0 - 54.0 fl    MPV 8.8 6.0 - 12.0 fL    Platelets 188 140 - 450 10*3/mm3    Neutrophil % 62.0 42.7 - 76.0 %    Lymphocyte % 27.8 19.6 - 45.3 %    Monocyte % 6.5 5.0 - 12.0 %    Eosinophil % 2.7 0.3 - 6.2 %    Basophil % 0.7 0.0 - 1.5 %    Immature Grans % 0.3 0.0 - 0.5 %    Neutrophils, Absolute 3.73 1.70 - 7.00 10*3/mm3    Lymphocytes, Absolute 1.67 0.70 - 3.10 10*3/mm3    Monocytes, Absolute 0.39 0.10 - 0.90 10*3/mm3    Eosinophils, Absolute 0.16 0.00 - 0.40 10*3/mm3    Basophils, Absolute 0.04 0.00 - 0.20 10*3/mm3    Immature Grans, Absolute 0.02 0.00 - 0.05 10*3/mm3    nRBC 0.0 0.0 - 0.2 /100 WBC   ECG 12 Lead ED Triage Standing Order; Weak / Dizzy / AMS    Collection Time: 01/11/23  3:40 PM   Result Value Ref Range    QT Interval 482 ms     Assessment & Plan   Diagnoses and all orders for this visit:    1. Closed fracture of base of fifth metatarsal bone of right foot, initial encounter (Primary)    2. Closed nondisplaced fracture of fourth metatarsal bone of right foot, initial encounter    3. Type 2 diabetes mellitus with diabetic peripheral angiopathy without gangrene, without long-term current use of insulin (HCC)  -     glimepiride (AMARYL) 2 MG tablet; Take 1.5 tablets by mouth Every Morning Before Breakfast.  Dispense: 135 tablet; Refill: 1    4. Peripheral arterial disease (HCC)    5. Stage 3a chronic kidney disease (CKD) (HCC)    6. Diabetic peripheral neuropathy (HCC)    7. Diarrhea due to drug    8. Anxiety  -     busPIRone (BUSPAR) 5 MG tablet; Take 1 tablet by mouth 2 (Two) Times a Day.  Dispense: 180 tablet; Refill: 1    9. Other hyperlipidemia  -     atorvastatin (LIPITOR) 80 MG tablet; Take 1 tablet by mouth Daily.  Dispense: 90 tablet; Refill: 1    10. Gastroesophageal reflux disease without esophagitis  -      omeprazole (priLOSEC) 20 MG capsule; Take 1 capsule by mouth Daily.  Dispense: 90 capsule; Refill: 1    Discussed the foot fracture and to follow up with orthopedics.  Dizziness discussed and may be due to vascular issues but I did suggest the metoprolol may be causing some bradycardia and contributing to the dizziness.  Recommend maybe following up with vascular sooner and possibly decreasing the metoprolol and adding another medication but patient and wife declines.  Discussed the metformin and it causing diarrhea issues that he is having and the stage 3a CKD.  Recommend stopping the metformin and monitoring the blood sugar.  Recheck the A1c in 2 months.  Recommend diabetic shoes.         · COVID-19 Precautions - Patient was compliant in wearing a mask. When I saw the patient, I used appropriate personal protective equipment (PPE) including mask and eye shield (standard procedure).  Additionally, I used gown and gloves if indicated.  Hand hygiene was completed before and after seeing the patient.  · Dictated utilizing Dragon Dictation

## 2023-02-06 ENCOUNTER — TRANSCRIBE ORDERS (OUTPATIENT)
Dept: ADMINISTRATIVE | Facility: HOSPITAL | Age: 78
End: 2023-02-06
Payer: MEDICARE

## 2023-02-06 DIAGNOSIS — I73.9 PAD (PERIPHERAL ARTERY DISEASE): Primary | ICD-10-CM

## 2023-02-14 ENCOUNTER — OFFICE VISIT (OUTPATIENT)
Dept: WOUND CARE | Facility: HOSPITAL | Age: 78
End: 2023-02-14
Payer: MEDICARE

## 2023-02-14 PROCEDURE — G0463 HOSPITAL OUTPT CLINIC VISIT: HCPCS

## 2023-02-14 PROCEDURE — 97602 WOUND(S) CARE NON-SELECTIVE: CPT

## 2023-02-20 ENCOUNTER — HOSPITAL ENCOUNTER (OUTPATIENT)
Dept: CARDIOLOGY | Facility: HOSPITAL | Age: 78
Discharge: HOME | End: 2023-02-20
Admitting: SURGERY
Payer: MEDICARE

## 2023-02-20 DIAGNOSIS — I73.9 PAD (PERIPHERAL ARTERY DISEASE): ICD-10-CM

## 2023-02-20 LAB
BH CV LOWER ARTERIAL LEFT ABI RATIO: 0.87
BH CV LOWER ARTERIAL LEFT CALF RATIO: 0.84
BH CV LOWER ARTERIAL LEFT DORSALIS PEDIS SYS MAX: 159
BH CV LOWER ARTERIAL LEFT GREAT TOE SYS MAX: 74
BH CV LOWER ARTERIAL LEFT POPLITEAL SYS MAX: 154
BH CV LOWER ARTERIAL LEFT POST TIBIAL SYS MAX: 159
BH CV LOWER ARTERIAL LEFT TBI RATIO: 0.4
BH CV LOWER ARTERIAL RIGHT ABI RATIO: 0.72
BH CV LOWER ARTERIAL RIGHT CALF RATIO: 0.79
BH CV LOWER ARTERIAL RIGHT DORSALIS PEDIS SYS MAX: 72
BH CV LOWER ARTERIAL RIGHT GREAT TOE SYS MAX: 51
BH CV LOWER ARTERIAL RIGHT HIGH THIGH RATIO: 1.02
BH CV LOWER ARTERIAL RIGHT HIGH THIGH SYS MAX: 186
BH CV LOWER ARTERIAL RIGHT LOW THIGH RATIO: 0.83
BH CV LOWER ARTERIAL RIGHT LOW THIGH SYS MAX: 151
BH CV LOWER ARTERIAL RIGHT POPLITEAL SYS MAX: 144
BH CV LOWER ARTERIAL RIGHT POST TIBIAL SYS MAX: 132
BH CV LOWER ARTERIAL RIGHT TBI RATIO: 0.28
MAXIMAL PREDICTED HEART RATE: 144 BPM
STRESS TARGET HR: 122 BPM
UPPER ARTERIAL LEFT ARM BRACHIAL SYS MAX: 183 MMHG
UPPER ARTERIAL RIGHT ARM BRACHIAL SYS MAX: 181 MMHG

## 2023-02-20 PROCEDURE — 93923 UPR/LXTR ART STDY 3+ LVLS: CPT

## 2023-03-14 ENCOUNTER — OFFICE VISIT (OUTPATIENT)
Dept: WOUND CARE | Facility: HOSPITAL | Age: 78
End: 2023-03-14
Payer: MEDICARE

## 2023-03-14 PROCEDURE — G0463 HOSPITAL OUTPT CLINIC VISIT: HCPCS

## 2023-03-15 DIAGNOSIS — E11.51 TYPE 2 DIABETES MELLITUS WITH DIABETIC PERIPHERAL ANGIOPATHY WITHOUT GANGRENE, WITHOUT LONG-TERM CURRENT USE OF INSULIN: ICD-10-CM

## 2023-03-15 RX ORDER — GLIMEPIRIDE 2 MG/1
TABLET ORAL
Qty: 90 TABLET | Refills: 0 | Status: SHIPPED | OUTPATIENT
Start: 2023-03-15 | End: 2023-04-06

## 2023-03-15 RX ORDER — METFORMIN HYDROCHLORIDE 500 MG/1
TABLET, EXTENDED RELEASE ORAL
Qty: 90 TABLET | Refills: 0 | Status: SHIPPED | OUTPATIENT
Start: 2023-03-15 | End: 2023-04-06

## 2023-03-31 DIAGNOSIS — I73.9 PVD (PERIPHERAL VASCULAR DISEASE): ICD-10-CM

## 2023-03-31 DIAGNOSIS — I73.9 PERIPHERAL ARTERIAL DISEASE: ICD-10-CM

## 2023-03-31 DIAGNOSIS — E78.49 OTHER HYPERLIPIDEMIA: ICD-10-CM

## 2023-03-31 DIAGNOSIS — I25.10 CORONARY ARTERY DISEASE INVOLVING NATIVE CORONARY ARTERY OF NATIVE HEART WITHOUT ANGINA PECTORIS: ICD-10-CM

## 2023-03-31 DIAGNOSIS — I48.0 PAROXYSMAL ATRIAL FIBRILLATION: ICD-10-CM

## 2023-03-31 DIAGNOSIS — I71.40 ABDOMINAL AORTIC ANEURYSM (AAA) 3.0 CM TO 5.5 CM IN DIAMETER IN MALE: ICD-10-CM

## 2023-03-31 DIAGNOSIS — E11.51 TYPE 2 DIABETES MELLITUS WITH DIABETIC PERIPHERAL ANGIOPATHY WITHOUT GANGRENE, WITHOUT LONG-TERM CURRENT USE OF INSULIN: ICD-10-CM

## 2023-03-31 RX ORDER — METOPROLOL SUCCINATE 25 MG/1
25 TABLET, EXTENDED RELEASE ORAL 2 TIMES DAILY
Qty: 90 TABLET | Refills: 3 | Status: SHIPPED | OUTPATIENT
Start: 2023-03-31 | End: 2023-03-31 | Stop reason: SDUPTHER

## 2023-03-31 RX ORDER — METOPROLOL SUCCINATE 25 MG/1
25 TABLET, EXTENDED RELEASE ORAL 2 TIMES DAILY
Qty: 180 TABLET | Refills: 1 | Status: SHIPPED | OUTPATIENT
Start: 2023-03-31

## 2023-04-04 ENCOUNTER — OFFICE VISIT (OUTPATIENT)
Dept: WOUND CARE | Facility: HOSPITAL | Age: 78
End: 2023-04-04
Payer: MEDICARE

## 2023-04-04 PROCEDURE — G0463 HOSPITAL OUTPT CLINIC VISIT: HCPCS

## 2023-04-05 DIAGNOSIS — E11.51 TYPE 2 DIABETES MELLITUS WITH DIABETIC PERIPHERAL ANGIOPATHY WITHOUT GANGRENE, WITHOUT LONG-TERM CURRENT USE OF INSULIN: ICD-10-CM

## 2023-04-06 RX ORDER — METFORMIN HYDROCHLORIDE 500 MG/1
TABLET, EXTENDED RELEASE ORAL
Qty: 90 TABLET | Refills: 0 | Status: SHIPPED | OUTPATIENT
Start: 2023-04-06

## 2023-04-06 RX ORDER — GLIMEPIRIDE 2 MG/1
TABLET ORAL
Qty: 90 TABLET | Refills: 0 | Status: SHIPPED | OUTPATIENT
Start: 2023-04-06

## 2023-05-01 RX ORDER — APIXABAN 5 MG/1
TABLET, FILM COATED ORAL
Qty: 60 TABLET | Refills: 0 | Status: SHIPPED | OUTPATIENT
Start: 2023-05-01

## 2023-05-04 ENCOUNTER — OFFICE VISIT (OUTPATIENT)
Dept: FAMILY MEDICINE CLINIC | Facility: CLINIC | Age: 78
End: 2023-05-04
Payer: MEDICARE

## 2023-05-04 VITALS
DIASTOLIC BLOOD PRESSURE: 58 MMHG | HEIGHT: 69 IN | WEIGHT: 170.4 LBS | HEART RATE: 51 BPM | SYSTOLIC BLOOD PRESSURE: 126 MMHG | BODY MASS INDEX: 25.24 KG/M2 | OXYGEN SATURATION: 97 %

## 2023-05-04 DIAGNOSIS — E11.51 TYPE 2 DIABETES MELLITUS WITH DIABETIC PERIPHERAL ANGIOPATHY WITHOUT GANGRENE, WITHOUT LONG-TERM CURRENT USE OF INSULIN: ICD-10-CM

## 2023-05-04 DIAGNOSIS — E78.49 OTHER HYPERLIPIDEMIA: Chronic | ICD-10-CM

## 2023-05-04 DIAGNOSIS — F41.9 ANXIETY: ICD-10-CM

## 2023-05-04 DIAGNOSIS — Z23 IMMUNIZATION DUE: Primary | ICD-10-CM

## 2023-05-04 PROBLEM — G60.9 IDIOPATHIC PERIPHERAL NEUROPATHY: Status: ACTIVE | Noted: 2023-03-08

## 2023-05-04 RX ORDER — GLIMEPIRIDE 2 MG/1
3 TABLET ORAL
Qty: 135 TABLET | Refills: 1 | Status: SHIPPED | OUTPATIENT
Start: 2023-05-04

## 2023-05-04 RX ORDER — BUSPIRONE HYDROCHLORIDE 5 MG/1
5 TABLET ORAL 2 TIMES DAILY
Qty: 180 TABLET | Refills: 1 | Status: SHIPPED | OUTPATIENT
Start: 2023-05-04

## 2023-05-04 RX ORDER — ATORVASTATIN CALCIUM 80 MG/1
80 TABLET, FILM COATED ORAL DAILY
Qty: 90 TABLET | Refills: 1 | Status: SHIPPED | OUTPATIENT
Start: 2023-05-04

## 2023-05-04 NOTE — PROGRESS NOTES
Subsequent Medicare Wellness Visit   The ABC's of the Annual Wellness Visit    Chief Complaint   Patient presents with   • Medicare Wellness-subsequent       HPI:  Erick Bruno, -1945, is a 77 y.o. male who presents for a Subsequent Medicare Wellness Visit.    Patient with right foot fracture and using boot and has been in a wheelchair to avoid surgery secondary to diabetes.  Then developed wound on the right foot and seeing wound care and is healing slowly.    Follow-up for hypertension.  Currently, has been feeling well and asymptomatic without any headaches, vision changes, cough, chest pain, shortness of breath, swelling, focal neurologic deficit, memory loss or syncope.  Home BP usually run 140/80's but more elevated since the foot fractures.  Has been taking the medications regularly and adherent with the regimen metoprolol succinate 25 mg 2 tabs twice a day.  Denies medication side effects and no significant interval events.       Patient with carotid artery stenosis with stent, aortic atheromatosis, infrarenal artery stenosis, stenosis of right external iliac artery, right superficial femoral artery and deep femoral artery on right.  Has follow up with Dr Frank Carpenter vascular in May.     Here for follow-up of diabetes.  Patient states to have been compliant with medications.  Blood sugar monitoring - patient states has not been following regularly.  No episodes of hypoglycemia, nausea, vomiting, new rashes, syncope or other issues.  Denies any difficulties with the current medication regimen of metformin  mg daily and glimepiride 3 mg daily. Cannot tolerate metformin at higher doses secondary to severe GI issues.  Last A1c of 2022 of 7.1% and was 6.3% on 22.     Follow-up for cholesterol.  Currently, has been feeling well without any myalgias, muscle aches, weakness, numbness, chest pain, short of breath or other issues.  Currently, is adherent with medication regimen of atorvastatin  80 mg and denies medication side effects.  Last performed on 2022.     History of anxiety and currently on lexapro 10 mg, buspirone 5 mg twice daily and lorazepam 0.5 mg every 8 hours as needed.    Recent Hospitalizations:  No hospitalization(s) within the last year..    Current Medical Providers:  Patient Care Team:  Richie Franco MD as PCP - General (Internal Medicine)  Michael Wyman III, MD as Consulting Physician (Cardiology)  Jayden Marie MD as Consulting Physician (Vascular Surgery)  Segundo Cunningham OD as Consulting Physician (Ophthalmology)  Daniel Gonzalez MD as Surgeon (General Surgery)  Mayank Guillen DO as Consulting Physician (Neurology)  Jessie Banks APRN as Nurse Practitioner (Orthopedic Surgery)    Health Habits and Functional and Cognitive Screening and Depression Screenin/4/2023     1:00 PM   Functional & Cognitive Status   Do you have difficulty preparing food and eating? Yes   Do you have difficulty bathing yourself, getting dressed or grooming yourself? Yes   Do you have difficulty using the toilet? Yes   Do you have difficulty moving around from place to place? Yes   Do you have trouble with steps or getting out of a bed or a chair? Yes   Current Diet Frequent Junk Food   Dental Exam Not up to date   Eye Exam Not up to date   Exercise (times per week) 0 times per week   Current Exercises Include No Regular Exercise   Do you need help using the phone?  No   Are you deaf or do you have serious difficulty hearing?  Yes   Do you need help with transportation? Yes   Do you need help shopping? Yes   Do you need help preparing meals?  Yes   Do you need help with housework?  Yes   Do you need help with laundry? Yes   Do you need help taking your medications? Yes   Do you need help managing money? Yes   Do you ever drive or ride in a car without wearing a seat belt? No   Have you felt unusual stress, anger or loneliness in the last month? No   Who do you live  "with? Spouse   If you need help, do you have trouble finding someone available to you? No   Have you been bothered in the last four weeks by sexual problems? No   Do you have difficulty concentrating, remembering or making decisions? Yes   Patient history of known dementia and difficulty doing his activities of daily living within the home.    Compared to one year ago, the patient feels his physical health is worse and his mental health is the same.    Depression Screen:      5/4/2023     1:13 PM   PHQ-2/PHQ-9 Depression Screening   Little Interest or Pleasure in Doing Things 0-->not at all   Feeling Down, Depressed or Hopeless 0-->not at all   PHQ-9: Brief Depression Severity Measure Score 0     Falls Risk Assessment:  RICHIE Fall Risk Clinician Key Questions   Have you fallen in the past year?: Yes    Past Medical/Family/Social History:  The following portions of the patient's history were reviewed and updated as appropriate: allergies, current medications, past family history, past medical history, past social history, past surgical history and problem list.    Allergies   Allergen Reactions   • Biaxin [Clarithromycin] Itching   • Penicillins Anaphylaxis and Hives   • Percocet [Oxycodone-Acetaminophen] Itching   • Vancomycin Rash   • Crestor [Rosuvastatin] Other (See Comments)     \"CAN'T REMEMBER THE REACTION\"   • Levaquin [Levofloxacin In D5w] Other (See Comments)     MUSCLE STIFFNESS   • Latex Rash     Band aids cause blistering, ok with paper tape         Current Outpatient Medications:   •  aspirin 81 MG EC tablet, Take 1 tablet by mouth Daily. OK TO CONTINUE TO TAKE LEADING UP TO SURGERY PER DR. MULLER, Disp: , Rfl:   •  atorvastatin (LIPITOR) 80 MG tablet, Take 1 tablet by mouth Daily., Disp: 90 tablet, Rfl: 1  •  busPIRone (BUSPAR) 5 MG tablet, Take 1 tablet by mouth 2 (Two) Times a Day., Disp: 180 tablet, Rfl: 1  •  Coenzyme Q10 200 MG capsule, Take 200 mg by mouth Every Night. HOLDING FOR SURGERY, Disp: " , Rfl:   •  Eliquis 5 MG tablet tablet, TAKE 1 TABLET BY MOUTH EVERY 12 HOURS FOR  ATRAIL  FIBRILLATION, Disp: 60 tablet, Rfl: 0  •  escitalopram (LEXAPRO) 10 MG tablet, Take 1 tablet by mouth Every Night., Disp: , Rfl:   •  glimepiride (AMARYL) 2 MG tablet, Take 1.5 tablets by mouth Every Morning Before Breakfast., Disp: 135 tablet, Rfl: 1  •  isosorbide mononitrate (IMDUR) 30 MG 24 hr tablet, Take 1 tablet by mouth Every Night., Disp: 90 tablet, Rfl: 3  •  LORazepam (ATIVAN) 0.5 MG tablet, TAKE 1 TABLET BY MOUTH EVERY 8 HOURS AS NEEDED FOR ANXIETY, Disp: 30 tablet, Rfl: 0  •  melatonin 5 MG tablet tablet, Take 1 tablet by mouth Every Night., Disp: , Rfl:   •  metFORMIN ER (GLUCOPHAGE-XR) 500 MG 24 hr tablet, Take 1 tablet by mouth once daily with breakfast, Disp: 90 tablet, Rfl: 0  •  metoprolol succinate XL (TOPROL-XL) 25 MG 24 hr tablet, Take 1 tablet by mouth 2 (Two) Times a Day., Disp: 180 tablet, Rfl: 1  •  multivitamin with minerals tablet tablet, Take 1 tablet by mouth Every Night. HOLD FOR SURGERY, Disp: , Rfl:   •  nitroglycerin (NITROSTAT) 0.4 MG SL tablet, DISSOLVE ONE TABLET UNDER THE TONGUE EVERY 5 MINUTES AS NEEDED FOR CHEST PAIN.  DO NOT EXCEED A TOTAL OF 3 DOSES IN 15 MINUTES, Disp: 5 tablet, Rfl: 0  •  omeprazole (priLOSEC) 20 MG capsule, Take 1 capsule by mouth Daily., Disp: 90 capsule, Rfl: 1  •  saw palmetto 160 MG capsule, Take 1 capsule by mouth Every Night. HOLD FOR SURGERY, Disp: , Rfl:     Aspirin use counseling: Taking ASA appropriately as indicated    Patient is taking lorazepam as needed patient understands risks of medication including increased risk for fatigue somnolence falls dependence and addiction.    Family History   Problem Relation Age of Onset   • Hypertension Mother    • Diabetes Daughter    • Cancer Maternal Aunt    • Diabetes Maternal Grandfather    • Malig Hyperthermia Neg Hx        Social History     Tobacco Use   • Smoking status: Former     Packs/day: 1.00     Years:  60.00     Pack years: 60.00     Types: Cigarettes     Quit date: 2008     Years since quitting: 15.3   • Smokeless tobacco: Never   Substance Use Topics   • Alcohol use: No     Comment: Daily caffeine use       Past Surgical History:   Procedure Laterality Date   • APPENDECTOMY     • ATHRECTOMY ILIAC, FEMORAL, TIBIAL ARTERY N/A 10/17/2018    Procedure: AIF ARTERIOGRAM, LEFT SFA  ANGIOPLASTY;  Surgeon: Jayden Marie MD;  Location: [facility] MAIN OR;  Service: Vascular   • ATHRECTOMY ILIAC, FEMORAL, TIBIAL ARTERY Bilateral 4/20/2022    Procedure: AIF LEFT LEG ANGIOGRAM;  Surgeon: Jayden Marie MD;  [facility];  Service: Vascular;  Laterality: Bilateral;   • CATARACT EXTRACTION WITH INTRAOCULAR LENS IMPLANT Bilateral    • CHOLECYSTECTOMY WITH INTRAOPERATIVE CHOLANGIOGRAM N/A 05/09/2018    Procedure: CHOLECYSTECTOMY LAPAROSCOPIC INTRAOPERATIVE CHOLANGIOGRAM;  Surgeon: Daniel Gonzalez MD;  Location: [facility] MAIN OR;  Service: General   • COLONOSCOPY     • COLONOSCOPY N/A 05/08/2018    Procedure: COLONOSCOPY to cecum with polypectomies;  Surgeon: Daniel Gonzalez MD;  Location: [facility] ENDOSCOPY;  Service: Gastroenterology   • ENDOSCOPY N/A 05/08/2018    Procedure: ESOPHAGOGASTRODUODENOSCOPY with biopsies;  Surgeon: Daniel Gonzalez MD;  Location: [facility] ENDOSCOPY;  Service: Gastroenterology   • FEMORAL ARTERY STENT Left     on 3/22/16       Patient Active Problem List   Diagnosis   • Toe ulcer   • Sleep apnea   • Seizure   • PVD (peripheral vascular disease) (HCC)   • Peripheral arterial disease   • Macular degeneration   • Hyperlipidemia   • Enlarged prostate   • Diabetes mellitus   • Colon polyps   • Arthritis   • Anxiety   • Acid reflux   • Hx of colonic polyps   • Abnormal nuclear stress test   • CAD (coronary artery disease)   • Carotid stenosis, asymptomatic, right   • Paroxysmal atrial fibrillation   • Abdominal aortic aneurysm (AAA) 3.0 cm to 5.5 cm in diameter in male   •  "Dementia   • Medicare annual wellness visit, subsequent   • Chronic anticoagulation   • Poor balance   • Closed fracture of base of fifth metatarsal bone   • Closed fracture of fourth metatarsal bone   • Diabetic peripheral neuropathy   • Metatarsalgia of right foot   • Stage 3a chronic kidney disease (CKD)   • Idiopathic peripheral neuropathy     Review of Systems   Constitutional: Negative for activity change, appetite change, fatigue, fever, unexpected weight gain and unexpected weight loss.   HENT: Negative for nosebleeds, rhinorrhea, trouble swallowing and voice change.    Eyes: Negative for visual disturbance.   Respiratory: Negative for cough, chest tightness, shortness of breath and wheezing.    Cardiovascular: Negative for chest pain, palpitations and leg swelling.   Gastrointestinal: Negative for abdominal pain, blood in stool, constipation, diarrhea, nausea, vomiting, GERD and indigestion.   Genitourinary: Negative for dysuria, frequency and hematuria.   Musculoskeletal: Negative for arthralgias, back pain and myalgias.   Skin: Negative for rash and wound.   Neurological: Positive for memory problem. Negative for dizziness, tremors, weakness, light-headedness, numbness and headache.   Hematological: Negative for adenopathy. Does not bruise/bleed easily.   Psychiatric/Behavioral: Negative for sleep disturbance and depressed mood. The patient is not nervous/anxious.      Objective     Vitals:    05/04/23 1310   BP: 126/58   BP Location: Left arm   Patient Position: Sitting   Cuff Size: Adult   Pulse: 51   SpO2: 97%   Weight: 77.3 kg (170 lb 6.4 oz)   Height: 175.3 cm (69\")     BMI is >= 25 and <30. (Overweight) The following options were offered after discussion;: weight loss educational material (shared in after visit summary), exercise counseling/recommendations and nutrition counseling/recommendations    Patient with chronic memory issues and declines many treatments and follow up appointments with the " specialists.    Physical Exam  Vitals and nursing note reviewed.   Constitutional:       General: He is not in acute distress.     Appearance: He is well-developed. He is not diaphoretic.   HENT:      Head: Normocephalic and atraumatic.      Right Ear: External ear normal.      Left Ear: External ear normal.      Nose: Nose normal.   Eyes:      Conjunctiva/sclera: Conjunctivae normal.      Pupils: Pupils are equal, round, and reactive to light.   Neck:      Thyroid: No thyromegaly.      Trachea: No tracheal deviation.   Cardiovascular:      Rate and Rhythm: Normal rate and regular rhythm.      Heart sounds: Normal heart sounds. No murmur heard.    No friction rub. No gallop.   Pulmonary:      Effort: Pulmonary effort is normal. No respiratory distress.      Breath sounds: Normal breath sounds.   Abdominal:      General: Bowel sounds are normal.      Palpations: Abdomen is soft. There is no mass.      Tenderness: There is no abdominal tenderness. There is no guarding.   Musculoskeletal:         General: Normal range of motion.      Cervical back: Normal range of motion and neck supple.      Comments: Using walker and right foot in shoe and brace   Lymphadenopathy:      Cervical: No cervical adenopathy.   Skin:     General: Skin is warm and dry.      Capillary Refill: Capillary refill takes less than 2 seconds.      Findings: No rash.   Neurological:      Mental Status: He is alert and oriented to person, place, and time.      Motor: No abnormal muscle tone.      Deep Tendon Reflexes: Reflexes normal.   Psychiatric:         Behavior: Behavior normal.         Thought Content: Thought content normal.         Judgment: Judgment normal.     Recent Lab Results:     Lab Results   Component Value Date    CHOL 145 02/21/2020    TRIG 96 08/08/2022    HDL 41 08/08/2022    VLDL 18 08/08/2022    LDLHDL 2.22 02/21/2020       Assessment & Plan   Age-appropriate Screening Schedule:  Refer to the list below for future screening  recommendations based on patient's age, sex and/or medical conditions.      Health Maintenance   Topic Date Due   • TDAP/TD VACCINES (1 - Tdap) Never done   • ZOSTER VACCINE (1 of 2) Never done   • HEPATITIS C SCREENING  Never done   • DIABETIC EYE EXAM  Never done   • COVID-19 Vaccine (5 - Booster for Pfizer series) 10/03/2022   • HEMOGLOBIN A1C  02/08/2023   • INFLUENZA VACCINE  08/01/2023   • LIPID PANEL  08/08/2023   • URINE MICROALBUMIN  08/08/2023   • ANNUAL WELLNESS VISIT  05/04/2024   • COLORECTAL CANCER SCREENING  05/19/2028   • Pneumococcal Vaccine 65+  Completed       Medicare Risks and Personalized Health Plan:  Fall Risk  Glaucoma Risk  Immunizations Discussed/Encouraged (specific immunizations; Tdap, Prevnar 20 (Pneumococcal 20-valent conjugate), Shingrix and COVID19 )  Obesity/Overweight     CMS-Preventive Services Quick Reference  Medicare Preventive Services Addressed:  Annual Wellness Visit (AWV)  Glaucoma screening (for individuals with diabetes mellitus, family history of glaucoma, -Americans (> or =) age 50, -Americans (> or =) age 65)    Advance Care Planning:  ACP discussion was held with the patient during this visit. Patient has an advance directive in EMR which is still valid.     Diagnoses and all orders for this visit:    1. Immunization due (Primary)  -     Pneumococcal Conjugate Vaccine 20-Valent All    2. Type 2 diabetes mellitus with diabetic peripheral angiopathy without gangrene, without long-term current use of insulin  -     glimepiride (AMARYL) 2 MG tablet; Take 1.5 tablets by mouth Every Morning Before Breakfast.  Dispense: 135 tablet; Refill: 1  -     Comprehensive Metabolic Panel  -     Lipid Panel  -     Hemoglobin A1c    3. Other hyperlipidemia  -     atorvastatin (LIPITOR) 80 MG tablet; Take 1 tablet by mouth Daily.  Dispense: 90 tablet; Refill: 1  -     Comprehensive Metabolic Panel  -     Lipid Panel    4. Anxiety  -     busPIRone (BUSPAR) 5 MG tablet; Take  1 tablet by mouth 2 (Two) Times a Day.  Dispense: 180 tablet; Refill: 1    Annual wellness visit reviewed with patient.  All past history, medications, social history, and problem list were reviewed.  Discussed advanced directives and living will.  Patient has living will: Living will: Directive already scanned in chart.  Discussed fall risk and precautions encourage removing throw rugs and using grab bars within the home and bathroom.  Will check the labs as ordered above to evaluate the blood sugars, kidney, liver, cholesterol for screening.  Discussed flu shot recommended to get the high-dose influenza vaccine annually in the fall.  The patient has started, but not completed, their COVID-19 vaccination series.  Prevnar-13 and pneumovax-23 up to date and appropriate.  Tdap., prevnar-20 and Shingrix vaccination series recommended.  Encourage follow-up with the eye doctor on annual basis for glaucoma evaluation.  Discussed weight and encouraged exercise as tolerated while following a healthy diet.  Follow up with current specialists as needed.    Recommend diabetic shoes for this patient with diabetes, neuropathy, foot ulcer.    An After Visit Summary and PPPS with all of these plans were given to the patient.      Follow Up:  No follow-ups on file.           · COVID-19 Precautions - Patient was compliant in wearing a mask. When I saw the patient, I used appropriate personal protective equipment (PPE) including mask and eye shield (standard procedure).  Additionally, I used gown and gloves if indicated.  Hand hygiene was completed before and after seeing the patient.  · Dictated utilizing Dragon Dictation

## 2023-05-04 NOTE — PATIENT INSTRUCTIONS
Medicare Wellness  Personal Prevention Plan of Service     Date of Office Visit:    Encounter Provider:  Richie Franco MD  Place of Service:  [facility]  Patient Name: Erick Bruno  :  1945    As part of the Medicare Wellness portion of your visit today, we are providing you with this personalized preventive plan of services (PPPS). This plan is based upon recommendations of the [place] Preventive Services Task Force (USPSTF) and the Advisory Committee on Immunization Practices (ACIP).    This lists the preventive care services that should be considered, and provides dates of when you are due. Items listed as completed are up-to-date and do not require any further intervention.    Health Maintenance   Topic Date Due    TDAP/TD VACCINES (1 - Tdap) Never done    ZOSTER VACCINE (1 of 2) Never done    HEPATITIS C SCREENING  Never done    DIABETIC EYE EXAM  Never done    COVID-19 Vaccine (5 - Booster for Pfizer series) 10/03/2022    HEMOGLOBIN A1C  2023    INFLUENZA VACCINE  2023    LIPID PANEL  2023    URINE MICROALBUMIN  2023    ANNUAL WELLNESS VISIT  2024    COLORECTAL CANCER SCREENING  2028    Pneumococcal Vaccine 65+  Completed       Orders Placed This Encounter   Procedures    Pneumococcal Conjugate Vaccine 20-Valent All    Comprehensive Metabolic Panel     Order Specific Question:   Release to patient     Answer:   Routine Release    Lipid Panel    Hemoglobin A1c     Order Specific Question:   Release to patient     Answer:   Routine Release       Return in about 6 months (around 2023) for Next scheduled follow up.

## 2023-05-05 LAB
ALBUMIN SERPL-MCNC: 4 G/DL (ref 3.7–4.7)
ALBUMIN/GLOB SERPL: 1.5 {RATIO} (ref 1.2–2.2)
ALP SERPL-CCNC: 170 IU/L (ref 44–121)
ALT SERPL-CCNC: 20 IU/L (ref 0–44)
AST SERPL-CCNC: 23 IU/L (ref 0–40)
BILIRUB SERPL-MCNC: 0.6 MG/DL (ref 0–1.2)
BUN SERPL-MCNC: 29 MG/DL (ref 8–27)
BUN/CREAT SERPL: 18 (ref 10–24)
CALCIUM SERPL-MCNC: 8.4 MG/DL (ref 8.6–10.2)
CHLORIDE SERPL-SCNC: 100 MMOL/L (ref 96–106)
CHOLEST SERPL-MCNC: 149 MG/DL (ref 100–199)
CO2 SERPL-SCNC: 21 MMOL/L (ref 20–29)
CREAT SERPL-MCNC: 1.61 MG/DL (ref 0.76–1.27)
EGFRCR SERPLBLD CKD-EPI 2021: 44 ML/MIN/1.73
GLOBULIN SER CALC-MCNC: 2.7 G/DL (ref 1.5–4.5)
GLUCOSE SERPL-MCNC: 207 MG/DL (ref 70–99)
HBA1C MFR BLD: 7.9 % (ref 4.8–5.6)
HDLC SERPL-MCNC: 35 MG/DL
LDLC SERPL CALC-MCNC: 91 MG/DL (ref 0–99)
POTASSIUM SERPL-SCNC: 4.6 MMOL/L (ref 3.5–5.2)
PROT SERPL-MCNC: 6.7 G/DL (ref 6–8.5)
SODIUM SERPL-SCNC: 134 MMOL/L (ref 134–144)
TRIGL SERPL-MCNC: 125 MG/DL (ref 0–149)
VLDLC SERPL CALC-MCNC: 23 MG/DL (ref 5–40)

## 2023-05-09 ENCOUNTER — OFFICE VISIT (OUTPATIENT)
Dept: WOUND CARE | Facility: HOSPITAL | Age: 78
End: 2023-05-09
Payer: MEDICARE

## 2023-05-09 PROCEDURE — G0463 HOSPITAL OUTPT CLINIC VISIT: HCPCS

## 2023-05-23 ENCOUNTER — OFFICE VISIT (OUTPATIENT)
Dept: WOUND CARE | Facility: HOSPITAL | Age: 78
End: 2023-05-23
Payer: MEDICARE

## 2023-05-23 PROCEDURE — G0463 HOSPITAL OUTPT CLINIC VISIT: HCPCS

## 2023-07-25 RX ORDER — APIXABAN 5 MG/1
TABLET, FILM COATED ORAL
Qty: 60 TABLET | Refills: 0 | Status: SHIPPED | OUTPATIENT
Start: 2023-07-25

## 2023-08-28 RX ORDER — APIXABAN 5 MG/1
TABLET, FILM COATED ORAL
Qty: 60 TABLET | Refills: 1 | Status: SHIPPED | OUTPATIENT
Start: 2023-08-28

## 2023-09-28 DIAGNOSIS — K21.9 GASTROESOPHAGEAL REFLUX DISEASE WITHOUT ESOPHAGITIS: ICD-10-CM

## 2023-09-28 RX ORDER — OMEPRAZOLE 20 MG/1
20 CAPSULE, DELAYED RELEASE ORAL DAILY
Qty: 90 CAPSULE | Refills: 0 | Status: SHIPPED | OUTPATIENT
Start: 2023-09-28

## 2023-09-30 DIAGNOSIS — I73.9 PVD (PERIPHERAL VASCULAR DISEASE): ICD-10-CM

## 2023-09-30 DIAGNOSIS — I71.40 ABDOMINAL AORTIC ANEURYSM (AAA) 3.0 CM TO 5.5 CM IN DIAMETER IN MALE: ICD-10-CM

## 2023-09-30 DIAGNOSIS — I73.9 PERIPHERAL ARTERIAL DISEASE: ICD-10-CM

## 2023-09-30 DIAGNOSIS — I25.10 CORONARY ARTERY DISEASE INVOLVING NATIVE CORONARY ARTERY OF NATIVE HEART WITHOUT ANGINA PECTORIS: ICD-10-CM

## 2023-09-30 DIAGNOSIS — E11.51 TYPE 2 DIABETES MELLITUS WITH DIABETIC PERIPHERAL ANGIOPATHY WITHOUT GANGRENE, WITHOUT LONG-TERM CURRENT USE OF INSULIN: ICD-10-CM

## 2023-09-30 DIAGNOSIS — K21.9 GASTROESOPHAGEAL REFLUX DISEASE WITHOUT ESOPHAGITIS: ICD-10-CM

## 2023-09-30 DIAGNOSIS — E78.49 OTHER HYPERLIPIDEMIA: ICD-10-CM

## 2023-09-30 DIAGNOSIS — I48.0 PAROXYSMAL ATRIAL FIBRILLATION: ICD-10-CM

## 2023-10-02 RX ORDER — METFORMIN HYDROCHLORIDE 500 MG/1
TABLET, EXTENDED RELEASE ORAL
Qty: 90 TABLET | Refills: 0 | Status: SHIPPED | OUTPATIENT
Start: 2023-10-02

## 2023-10-02 RX ORDER — OMEPRAZOLE 20 MG/1
20 CAPSULE, DELAYED RELEASE ORAL DAILY
Qty: 90 CAPSULE | Refills: 0 | OUTPATIENT
Start: 2023-10-02

## 2023-10-02 RX ORDER — METOPROLOL SUCCINATE 25 MG/1
TABLET, EXTENDED RELEASE ORAL
Qty: 180 TABLET | Refills: 0 | Status: SHIPPED | OUTPATIENT
Start: 2023-10-02

## 2023-10-16 ENCOUNTER — TELEPHONE (OUTPATIENT)
Dept: FAMILY MEDICINE CLINIC | Facility: CLINIC | Age: 78
End: 2023-10-16
Payer: MEDICARE

## 2023-10-16 NOTE — TELEPHONE ENCOUNTER
SPOKE TO PATIENTS WIFE FOR ABT 20 MINS . IT SEEMS HER PHARMACY HAS BEEN DOING AUTOMATIC REFILLS WITH OLD INSTRUCTIONS. PATIENTS WIFE CAUGHT IT AND WANTS TO KNOW WHAT EXACTLY HE SHOULD BE TAKING AND THE DOSAGE AND PHARMACY CALLED AND CORRECTED. PATIENT HAS BEEN HAVING DIZZY SPELLS. ALSO, PATIENTS WIFE STATED THAT METFORMIN WAS REFILLED AND PATIENT IS NO LONGER TAKING THIS. PATIENT WOULD LIKE PROVIDER TO CALL HER WHEN BACK IN THE OFFICE JUST TO MAKE SURE EVERYTHING WILL BE UPDATED. THANKS SO MUCH . THEY HAVE BEEN INSTRUCTED TO CALL THE PHARMACY AND BE TAKEN OFF AUTOMATIC REFILLS.

## 2023-10-17 DIAGNOSIS — E78.49 OTHER HYPERLIPIDEMIA: Chronic | ICD-10-CM

## 2023-10-17 DIAGNOSIS — E11.51 TYPE 2 DIABETES MELLITUS WITH DIABETIC PERIPHERAL ANGIOPATHY WITHOUT GANGRENE, WITHOUT LONG-TERM CURRENT USE OF INSULIN: Primary | ICD-10-CM

## 2023-10-17 DIAGNOSIS — Z12.5 SPECIAL SCREENING, PROSTATE CANCER: ICD-10-CM

## 2023-10-17 DIAGNOSIS — N18.31 STAGE 3A CHRONIC KIDNEY DISEASE (CKD): ICD-10-CM

## 2023-10-23 ENCOUNTER — OFFICE VISIT (OUTPATIENT)
Dept: FAMILY MEDICINE CLINIC | Facility: CLINIC | Age: 78
End: 2023-10-23
Payer: MEDICARE

## 2023-10-23 VITALS
HEART RATE: 57 BPM | SYSTOLIC BLOOD PRESSURE: 130 MMHG | WEIGHT: 173.6 LBS | BODY MASS INDEX: 25.71 KG/M2 | HEIGHT: 69 IN | OXYGEN SATURATION: 98 % | DIASTOLIC BLOOD PRESSURE: 62 MMHG | TEMPERATURE: 98.2 F

## 2023-10-23 DIAGNOSIS — Z23 IMMUNIZATION DUE: ICD-10-CM

## 2023-10-23 DIAGNOSIS — I25.10 CORONARY ARTERY DISEASE INVOLVING NATIVE CORONARY ARTERY OF NATIVE HEART WITHOUT ANGINA PECTORIS: Chronic | ICD-10-CM

## 2023-10-23 DIAGNOSIS — F41.9 ANXIETY: ICD-10-CM

## 2023-10-23 DIAGNOSIS — E11.51 TYPE 2 DIABETES MELLITUS WITH DIABETIC PERIPHERAL ANGIOPATHY WITHOUT GANGRENE, WITHOUT LONG-TERM CURRENT USE OF INSULIN: Primary | ICD-10-CM

## 2023-10-23 DIAGNOSIS — K21.9 GASTROESOPHAGEAL REFLUX DISEASE WITHOUT ESOPHAGITIS: ICD-10-CM

## 2023-10-23 DIAGNOSIS — E78.49 OTHER HYPERLIPIDEMIA: Chronic | ICD-10-CM

## 2023-10-23 RX ORDER — ATORVASTATIN CALCIUM 80 MG/1
80 TABLET, FILM COATED ORAL DAILY
Qty: 90 TABLET | Refills: 1 | Status: SHIPPED | OUTPATIENT
Start: 2023-10-23

## 2023-10-23 RX ORDER — GLIMEPIRIDE 4 MG/1
4 TABLET ORAL
Qty: 90 TABLET | Refills: 1 | Status: SHIPPED | OUTPATIENT
Start: 2023-10-23

## 2023-10-23 RX ORDER — BUSPIRONE HYDROCHLORIDE 5 MG/1
5 TABLET ORAL 2 TIMES DAILY
Qty: 180 TABLET | Refills: 1 | Status: SHIPPED | OUTPATIENT
Start: 2023-10-23

## 2023-10-23 RX ORDER — OMEPRAZOLE 20 MG/1
20 CAPSULE, DELAYED RELEASE ORAL DAILY
Qty: 90 CAPSULE | Refills: 1 | Status: SHIPPED | OUTPATIENT
Start: 2023-10-23

## 2023-10-23 NOTE — PROGRESS NOTES
Subjective   Erick Bruno is a 78 y.o. male.     Chief Complaint   Patient presents with    Diabetes       History of Present Illness   Follow-up for hypertension.  Currently, has been feeling well and asymptomatic without any headaches, vision changes, cough, chest pain, shortness of breath, swelling, focal neurologic deficit, memory loss or syncope.  Home BP usually run 140/80's but more elevated since the foot fractures.  Has been taking the medications regularly and adherent with the regimen metoprolol succinate 25 mg 2 tabs twice a day.  Denies medication side effects and no significant interval events.       Patient with carotid artery stenosis with stent, aortic atheromatosis, infrarenal artery stenosis, stenosis of right external iliac artery, right superficial femoral artery and deep femoral artery on right.  Has follow up with Dr Frank Carpenter vascular in May.     Here for follow-up of diabetes.  Patient states to have been compliant with medications.  Blood sugar monitoring - patient states has not been following regularly.  No episodes of hypoglycemia, nausea, vomiting, new rashes, syncope or other issues.  Denies any difficulties with the current medication regimen of glimepiride 3 mg daily. Cannot tolerate metformin secondary to severe GI issues.  Last A1c on 10/19/2023 of 6.3% and on 5/4/2023 of 7.9%.     Follow-up for cholesterol.  Currently, has been feeling well without any myalgias, muscle aches, weakness, numbness, chest pain, short of breath or other issues.  Currently, is adherent with medication regimen of atorvastatin 80 mg and denies medication side effects.  Last performed on 10/19/2023.     History of anxiety and currently on lexapro 10 mg, buspirone 5 mg twice daily and lorazepam 0.5 mg every 8 hours as needed.    The following portions of the patient's history were reviewed and updated as appropriate: allergies, current medications, past family history, past medical history, past social  history, past surgical history and problem list.    Depression Screen:      5/4/2023     1:13 PM   PHQ-2/PHQ-9 Depression Screening   Little Interest or Pleasure in Doing Things 0-->not at all   Feeling Down, Depressed or Hopeless 0-->not at all   PHQ-9: Brief Depression Severity Measure Score 0       Past Medical History:   Diagnosis Date    Abnormal nuclear stress test 05/21/2018    Acid reflux     Alzheimer's dementia     Anemia     Anesthesia complication     PT'S WIFE STATES PT CRYING AND EMOTIONAL AFTER SURGERY IN THE PAST    Anxiety     Arthritis     Atrial fibrillation     CAD (coronary artery disease)     Carotid artery disease     Chronic anticoagulation     Dementia     Diabetes mellitus     Elevated cholesterol     Enlarged prostate     Fracture 2023    Foot    Frequent urination at night     History of seizure 2013    S/P TEMPERATURE    Hyperlipidemia     Hypertension     Leg pain     BILAT-D/T PVD    Macular degeneration     New onset atrial fibrillation 02/2021    NSTEMI (non-ST elevated myocardial infarction)     Panarteritis     Peripheral arterial disease     Poor balance     HIGH RISK FOR FALLS    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    PVD (peripheral vascular disease)     Sleep apnea     DOES NOT WEAR CPAP    SSS (sick sinus syndrome)        Past Surgical History:   Procedure Laterality Date    APPENDECTOMY      ATHRECTOMY ILIAC, FEMORAL, TIBIAL ARTERY N/A 10/17/2018    Procedure: AIF ARTERIOGRAM, LEFT SFA  ANGIOPLASTY;  Surgeon: Jayden Marie MD;  Location: [facility] MAIN OR;  Service: Vascular    ATHRECTOMY ILIAC, FEMORAL, TIBIAL ARTERY Bilateral 4/20/2022    Procedure: AIF LEFT LEG ANGIOGRAM;  Surgeon: Jayden Marie MD;  Location: [facility] OR 18/19;  Service: Vascular;  Laterality: Bilateral;    CATARACT EXTRACTION WITH INTRAOCULAR LENS IMPLANT Bilateral     CHOLECYSTECTOMY WITH INTRAOPERATIVE CHOLANGIOGRAM N/A 05/09/2018    Procedure: CHOLECYSTECTOMY LAPAROSCOPIC  INTRAOPERATIVE CHOLANGIOGRAM;  Surgeon: Daniel Gonzalez MD;  Location: [facility] MAIN OR;  Service: General    COLONOSCOPY      COLONOSCOPY N/A 05/08/2018    Procedure: COLONOSCOPY to cecum with polypectomies;  Surgeon: Daniel Gonzalez MD;  Location: [facility] ENDOSCOPY;  Service: Gastroenterology    ENDOSCOPY N/A 05/08/2018    Procedure: ESOPHAGOGASTRODUODENOSCOPY with biopsies;  Surgeon: Daniel Gonzalez MD;  Location: [facility] ENDOSCOPY;  Service: Gastroenterology    FEMORAL ARTERY STENT Left     on 3/22/16       Family History   Problem Relation Age of Onset    Hypertension Mother     Diabetes Daughter     Cancer Maternal Aunt     Diabetes Maternal Grandfather     Malig Hyperthermia Neg Hx        Social History     Socioeconomic History    Marital status:    Tobacco Use    Smoking status: Former     Packs/day: 1.00     Years: 60.00     Additional pack years: 0.00     Total pack years: 60.00     Types: Cigarettes     Quit date: 2008     Years since quitting: 15.8    Smokeless tobacco: Never   Vaping Use    Vaping Use: Never used   Substance and Sexual Activity    Alcohol use: No     Comment: Daily caffeine use    Drug use: No    Sexual activity: Defer       Current Outpatient Medications   Medication Sig Dispense Refill    apixaban (Eliquis) 5 MG tablet tablet TAKE 1 TABLET BY MOUTH EVERY 12 HOURS FOR  ATRIAL  FIBRILLATION 60 tablet 1    aspirin 81 MG EC tablet Take 1 tablet by mouth Daily. OK TO CONTINUE TO TAKE LEADING UP TO SURGERY PER DR. MULLER      atorvastatin (LIPITOR) 80 MG tablet Take 1 tablet by mouth Daily. 90 tablet 1    busPIRone (BUSPAR) 5 MG tablet Take 1 tablet by mouth 2 (Two) Times a Day. 180 tablet 1    Coenzyme Q10 200 MG capsule Take 200 mg by mouth Every Night. HOLDING FOR SURGERY      escitalopram (LEXAPRO) 10 MG tablet Take 1 tablet by mouth Every Night.      glimepiride (AMARYL) 4 MG tablet Take 1 tablet by mouth Every Morning Before Breakfast. 90 tablet 1    isosorbide mononitrate (IMDUR)  30 MG 24 hr tablet Take 1 tablet by mouth Every Night. 90 tablet 3    LORazepam (ATIVAN) 0.5 MG tablet TAKE 1 TABLET BY MOUTH EVERY 8 HOURS AS NEEDED FOR ANXIETY 30 tablet 0    melatonin 5 MG tablet tablet Take 1 tablet by mouth Every Night.      metoprolol succinate XL (TOPROL-XL) 25 MG 24 hr tablet Take 1 tablet by mouth twice daily 180 tablet 0    multivitamin with minerals tablet tablet Take 1 tablet by mouth Every Night. HOLD FOR SURGERY      nitroglycerin (NITROSTAT) 0.4 MG SL tablet DISSOLVE ONE TABLET UNDER THE TONGUE EVERY 5 MINUTES AS NEEDED FOR CHEST PAIN.  DO NOT EXCEED A TOTAL OF 3 DOSES IN 15 MINUTES 5 tablet 0    omeprazole (priLOSEC) 20 MG capsule Take 1 capsule by mouth Daily. 90 capsule 1    saw palmetto 160 MG capsule Take 1 capsule by mouth Every Night. HOLD FOR SURGERY       No current facility-administered medications for this visit.       Review of Systems   Constitutional:  Negative for activity change, appetite change, fatigue, fever, unexpected weight gain and unexpected weight loss.   HENT:  Negative for nosebleeds, rhinorrhea, trouble swallowing and voice change.    Eyes:  Negative for visual disturbance.   Respiratory:  Negative for cough, chest tightness, shortness of breath and wheezing.    Cardiovascular:  Negative for chest pain, palpitations and leg swelling.   Gastrointestinal:  Negative for abdominal pain, blood in stool, constipation, diarrhea, nausea, vomiting, GERD and indigestion.   Genitourinary:  Negative for dysuria, frequency and hematuria.   Musculoskeletal:  Negative for arthralgias, back pain and myalgias.   Skin:  Negative for rash and wound.   Neurological:  Positive for memory problem. Negative for dizziness, tremors, weakness, light-headedness, numbness and headache.   Hematological:  Negative for adenopathy. Does not bruise/bleed easily.   Psychiatric/Behavioral:  Negative for sleep disturbance and depressed mood. The patient is not nervous/anxious.   "      Objective   /62 (BP Location: Left arm, Patient Position: Sitting, Cuff Size: Adult)   Pulse 57   Temp 98.2 °F (36.8 °C) (Temporal)   Ht 175.3 cm (69.02\")   Wt 78.7 kg (173 lb 9.6 oz)   SpO2 98%   BMI 25.62 kg/m²     Physical Exam  Vitals and nursing note reviewed.   Constitutional:       General: He is not in acute distress.     Appearance: He is well-developed. He is not diaphoretic.   HENT:      Head: Normocephalic and atraumatic.      Right Ear: External ear normal.      Left Ear: External ear normal.      Nose: Nose normal.   Eyes:      Conjunctiva/sclera: Conjunctivae normal.      Pupils: Pupils are equal, round, and reactive to light.   Neck:      Thyroid: No thyromegaly.      Trachea: No tracheal deviation.   Cardiovascular:      Rate and Rhythm: Normal rate and regular rhythm.      Heart sounds: Normal heart sounds. No murmur heard.     No friction rub. No gallop.   Pulmonary:      Effort: Pulmonary effort is normal. No respiratory distress.      Breath sounds: Normal breath sounds.   Abdominal:      General: Bowel sounds are normal.      Palpations: Abdomen is soft. There is no mass.      Tenderness: There is no abdominal tenderness. There is no guarding.   Musculoskeletal:         General: Normal range of motion.      Cervical back: Normal range of motion and neck supple.      Comments: Using walker.  Wearing diabetic shoes.   Lymphadenopathy:      Cervical: No cervical adenopathy.   Skin:     General: Skin is warm and dry.      Capillary Refill: Capillary refill takes less than 2 seconds.      Findings: No rash.      Comments: Thickened yellow toenails and small scratches top of the foot on the left.   Neurological:      Mental Status: He is alert and oriented to person, place, and time.      Motor: No abnormal muscle tone.      Deep Tendon Reflexes: Reflexes normal.   Psychiatric:         Behavior: Behavior normal.         Thought Content: Thought content normal.         Judgment: " Judgment normal.         Recent Results (from the past 2016 hour(s))   Hemoglobin A1c    Collection Time: 10/19/23  8:08 AM    Specimen: Blood   Result Value Ref Range    Hemoglobin A1C 6.30 (H) 4.80 - 5.60 %   Comprehensive metabolic panel    Collection Time: 10/19/23  8:08 AM    Specimen: Blood   Result Value Ref Range    Glucose 244 (H) 65 - 99 mg/dL    BUN 33 (H) 8 - 23 mg/dL    Creatinine 1.45 (H) 0.76 - 1.27 mg/dL    EGFR Result 49.6 (L) >60.0 mL/min/1.73    BUN/Creatinine Ratio 22.8 7.0 - 25.0    Sodium 137 136 - 145 mmol/L    Potassium 4.4 3.5 - 5.2 mmol/L    Chloride 99 98 - 107 mmol/L    Total CO2 27.0 22.0 - 29.0 mmol/L    Calcium 9.2 8.6 - 10.5 mg/dL    Total Protein 6.8 6.0 - 8.5 g/dL    Albumin 4.4 3.5 - 5.2 g/dL    Globulin 2.4 gm/dL    A/G Ratio 1.8 g/dL    Total Bilirubin 0.5 0.0 - 1.2 mg/dL    Alkaline Phosphatase 139 (H) 39 - 117 U/L    AST (SGOT) 26 1 - 40 U/L    ALT (SGPT) 23 1 - 41 U/L   Lipid panel    Collection Time: 10/19/23  8:08 AM    Specimen: Blood   Result Value Ref Range    Total Cholesterol 159 0 - 200 mg/dL    Triglycerides 202 (H) 0 - 150 mg/dL    HDL Cholesterol 34 (L) 40 - 60 mg/dL    VLDL Cholesterol Michael 35 5 - 40 mg/dL    LDL Chol Calc (NIH) 90 0 - 100 mg/dL   Microalbumin / Creatinine Urine Ratio - Urine, Clean Catch    Collection Time: 10/19/23  8:08 AM    Specimen: Urine, Clean Catch   Result Value Ref Range    Creatinine, Urine 148.5 Not Estab. mg/dL    Microalbumin, Urine 27.5 Not Estab. ug/mL    Microalbumin/Creatinine Ratio 19 0 - 29 mg/g creat   PSA SCREENING    Collection Time: 10/19/23  8:08 AM    Specimen: Blood   Result Value Ref Range    PSA 0.681 0.000 - 4.000 ng/mL     Assessment & Plan   Diagnoses and all orders for this visit:    1. Type 2 diabetes mellitus with diabetic peripheral angiopathy without gangrene, without long-term current use of insulin (Primary)  -     glimepiride (AMARYL) 4 MG tablet; Take 1 tablet by mouth Every Morning Before Breakfast.   Dispense: 90 tablet; Refill: 1    2. Coronary artery disease involving native coronary artery of native heart without angina pectoris    3. Other hyperlipidemia  -     atorvastatin (LIPITOR) 80 MG tablet; Take 1 tablet by mouth Daily.  Dispense: 90 tablet; Refill: 1    4. Anxiety  -     busPIRone (BUSPAR) 5 MG tablet; Take 1 tablet by mouth 2 (Two) Times a Day.  Dispense: 180 tablet; Refill: 1    5. Gastroesophageal reflux disease without esophagitis  -     omeprazole (priLOSEC) 20 MG capsule; Take 1 capsule by mouth Daily.  Dispense: 90 capsule; Refill: 1    6. Immunization due  -     Fluzone High-Dose 65+yrs (8750-9829)  -     COVID-19 F23 (Pfizer) 12yrs+ (COMIRNATY)    Diabetes recently been much better controlled.  Wife is being much involved in his care to the point that he is becoming aggravated.  We will continue with his current medications and diet.  I encouraged him to continue to wear the diabetic shoes that we would recommend.  Monitor his feet.  Continue with the cholesterol medicine 80 mg once a day.  Follow-up with the heart doctor as scheduled.           COVID-19 Precautions - Patient was compliant in wearing a mask. When I saw the patient, I used appropriate personal protective equipment (PPE) including mask and eye shield (standard procedure).  Additionally, I used gown and gloves if indicated.  Hand hygiene was completed before and after seeing the patient.  Dictated utilizing Dragon Dictation

## 2023-10-24 ENCOUNTER — OFFICE VISIT (OUTPATIENT)
Dept: CARDIOLOGY | Facility: CLINIC | Age: 78
End: 2023-10-24
Payer: MEDICARE

## 2023-10-24 VITALS
HEIGHT: 69 IN | HEART RATE: 60 BPM | SYSTOLIC BLOOD PRESSURE: 130 MMHG | DIASTOLIC BLOOD PRESSURE: 82 MMHG | WEIGHT: 172 LBS | BODY MASS INDEX: 25.48 KG/M2

## 2023-10-24 DIAGNOSIS — I48.0 PAROXYSMAL ATRIAL FIBRILLATION: ICD-10-CM

## 2023-10-24 DIAGNOSIS — N18.31 STAGE 3A CHRONIC KIDNEY DISEASE (CKD): ICD-10-CM

## 2023-10-24 DIAGNOSIS — E11.51 TYPE 2 DIABETES MELLITUS WITH DIABETIC PERIPHERAL ANGIOPATHY WITHOUT GANGRENE, WITHOUT LONG-TERM CURRENT USE OF INSULIN: ICD-10-CM

## 2023-10-24 DIAGNOSIS — E78.49 OTHER HYPERLIPIDEMIA: Chronic | ICD-10-CM

## 2023-10-24 DIAGNOSIS — I71.40 ABDOMINAL AORTIC ANEURYSM (AAA) 3.0 CM TO 5.5 CM IN DIAMETER IN MALE: ICD-10-CM

## 2023-10-24 DIAGNOSIS — I25.10 CORONARY ARTERY DISEASE INVOLVING NATIVE CORONARY ARTERY OF NATIVE HEART WITHOUT ANGINA PECTORIS: Primary | Chronic | ICD-10-CM

## 2023-10-24 DIAGNOSIS — I73.9 PVD (PERIPHERAL VASCULAR DISEASE): ICD-10-CM

## 2023-10-24 DIAGNOSIS — I73.9 PERIPHERAL ARTERIAL DISEASE: ICD-10-CM

## 2023-10-24 PROCEDURE — 1159F MED LIST DOCD IN RCRD: CPT | Performed by: INTERNAL MEDICINE

## 2023-10-24 PROCEDURE — 1160F RVW MEDS BY RX/DR IN RCRD: CPT | Performed by: INTERNAL MEDICINE

## 2023-10-24 PROCEDURE — 99214 OFFICE O/P EST MOD 30 MIN: CPT | Performed by: INTERNAL MEDICINE

## 2023-10-24 RX ORDER — METOPROLOL SUCCINATE 25 MG/1
25 TABLET, EXTENDED RELEASE ORAL DAILY
Qty: 90 TABLET | Refills: 3 | Status: SHIPPED | OUTPATIENT
Start: 2023-10-24

## 2023-10-24 NOTE — PROGRESS NOTES
Subjective:     Encounter Date:  10/24/23        Patient ID: Erick Bruno is a 78 y.o. male.    Chief Complaint: abnormal stress test  Coronary Artery Disease  His past medical history is significant for past myocardial infarction.       Dear ,    I had the pleasure of seeing this patient in the office today for follow-up of his cardiac status.  He has a history of CAD, significant peripheral arterial disease, and paroxysmal atrial fibrillation.    He had badly injured his foot, was having a lot of pain, and his blood pressure was jumping around so he had increased as directed his metoprolol from 25 mg daily to 50 mg daily.  Now he is not having any further discomfort and sometimes he gets some dizziness on standing.  He does have dizziness at other times as well so is not sure if it is just triggered by standing up.  Sometimes he will feel dizzy when he is lying down.  They have noticed his blood pressure at times seems to run low.    He is been seen in the past for chest discomfort.  In 2017 he had a stress test performed that showed a small area of hypokinesis in the inferior wall with an ejection fraction of 48% and no ischemia.  At the time he elected not to proceed with any additional diagnostic testing.  He's been treated with medical therapy.    In December 2020, he was seen for routine follow-up and perioperative cardiac risk assessment.  Nuclear stress test was completed and showed small to medium size, mild to moderately severe area of ischemia in the inferior wall and LVEF of 54%. Compared to the study in May 2017 there was significant change with new ischemia noted.    It was felt that it was low risk to proceed with the really necessary carotid endarterectomy, and he went through that surgical procedure without any cardiac issues.     In February 2021, he presented to [facility] ED with complaints of redness and tender right foot following a fall.  He was diagnosed with  right foot cellulitis and admitted and treated with antibiotic therapy.  Blood cultures were negative.  During his hospitalization he developed new onset atrial fibrillation and cardiology was consulted.  Echocardiogram showed normal LVEF grade 1 diastolic dysfunction, moderate calcification of the aortic valve, and mild mitral regurgitation. CT of the abdomen and pelvis showed penetrating ulcer formation and infrarenal abdominal aortic aneurysm, moderate stenosis of the left renal artery, severe stenosis of the right common iliac artery, mild stenosis of the right internal iliac artery at the origin, considerable stenosis of the right deep femoral artery, and 2 wall stents in the left SFA.  Carotid duplex showed right carotid stent with no stenosis and left carotid moderate stenosis.  Cardiology and vascular surgery agreed on stopping the clopidogrel and to continue with aspirin and apixaban for stroke prevention.      Patient has a history of peripheral arterial disease, including bilateral lower extremity disease as well as bilateral carotid artery disease.  He has occlusion of the left vertebral artery.  He has a stent placed in his right carotid.  He has abdominal aortic aneurysm followed by vascular.    The following portions of the patient's history were reviewed and updated as appropriate: allergies, current medications, past family history, past medical history, past social history, past surgical history and problem list.    Past Medical History:   Diagnosis Date    Abnormal nuclear stress test 05/21/2018    Acid reflux     Alzheimer's dementia     Anemia     Anesthesia complication     PT'S WIFE STATES PT CRYING AND EMOTIONAL AFTER SURGERY IN THE PAST    Anxiety     Arthritis     Atrial fibrillation     CAD (coronary artery disease)     Carotid artery disease     Chronic anticoagulation     Dementia     Diabetes mellitus     Elevated cholesterol     Enlarged prostate     Fracture 2023    Foot    Frequent  "urination at night     History of seizure 2013    S/P TEMPERATURE    Hyperlipidemia     Hypertension     Leg pain     BILAT-D/T PVD    Macular degeneration     New onset atrial fibrillation 02/2021    NSTEMI (non-ST elevated myocardial infarction)     Panarteritis     Peripheral arterial disease     Poor balance     HIGH RISK FOR FALLS    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    PVD (peripheral vascular disease)     Sleep apnea     DOES NOT WEAR CPAP    SSS (sick sinus syndrome)        Past Surgical History:   Procedure Laterality Date    APPENDECTOMY      ATHRECTOMY ILIAC, FEMORAL, TIBIAL ARTERY N/A 10/17/2018    Procedure: AIF ARTERIOGRAM, LEFT SFA  ANGIOPLASTY;  Surgeon: Jayden Marie MD;  Location: [facility] MAIN OR;  Service: Vascular    ATHRECTOMY ILIAC, FEMORAL, TIBIAL ARTERY Bilateral 4/20/2022    Procedure: AIF LEFT LEG ANGIOGRAM;  Surgeon: Jayden Marie MD;  Location: [facility] HYBRID OR 18/19;  Service: Vascular;  Laterality: Bilateral;    CATARACT EXTRACTION WITH INTRAOCULAR LENS IMPLANT Bilateral     CHOLECYSTECTOMY WITH INTRAOPERATIVE CHOLANGIOGRAM N/A 05/09/2018    Procedure: CHOLECYSTECTOMY LAPAROSCOPIC INTRAOPERATIVE CHOLANGIOGRAM;  Surgeon: Daniel Gonzalez MD;  Location: [facility] MAIN OR;  Service: General    COLONOSCOPY      COLONOSCOPY N/A 05/08/2018    Procedure: COLONOSCOPY to cecum with polypectomies;  Surgeon: Daniel Gonzalez MD;  Location: [facility] ENDOSCOPY;  Service: Gastroenterology    ENDOSCOPY N/A 05/08/2018    Procedure: ESOPHAGOGASTRODUODENOSCOPY with biopsies;  Surgeon: Daniel Gonzalez MD;  Location: [facility] ENDOSCOPY;  Service: Gastroenterology    FEMORAL ARTERY STENT Left     on 3/22/16           Procedures       Objective:     Vitals:    10/24/23 1430   BP: 130/82   Pulse: 60   Weight: 78 kg (172 lb)   Height: 175.3 cm (69\")       General Appearance:    Alert, cooperative, in no acute distress   Head:    Normocephalic, without obvious abnormality, atraumatic   Eyes:         "    Lids and lashes normal, conjunctivae and sclerae normal, no   icterus, no pallor, corneas clear, PERRLA   Ears:    Ears appear intact with no abnormalities noted   Throat:   No oral lesions, no thrush, oral mucosa moist   Neck:   No adenopathy, supple, trachea midline, no thyromegaly, no   carotid bruit, no JVD   Back:     No kyphosis present, no scoliosis present, no skin lesions, erythema or scars, no tenderness to percussion or palpation, range of motion normal   Lungs:     Clear to auscultation,respirations regular, even and unlabored    Heart:    Regular rhythm and normal rate, normal S1 and S2, no murmur, no gallop, no rub, no click   Chest Wall:    No abnormalities observed   Abdomen:     Normal bowel sounds, no masses, no organomegaly, soft        non-tender, non-distended, no guarding, no rebound  tenderness   Extremities:   Moves all extremities well, no edema, no cyanosis, no redness   Pulses:  Diminished peripheral pulses both lower extremities, bilateral carotid bruits   Skin:  Psychiatric:   No bleeding, bruising or rash    Alert and oriented x 3, normal mood and affect           Lab Review:   Results from last 7 days   Lab Units 10/19/23  0808   SODIUM mmol/L 137   POTASSIUM mmol/L 4.4   CHLORIDE mmol/L 99   CO2 mmol/L 27.0   BUN mg/dL 33*   CREATININE mg/dL 1.45*   GLUCOSE mg/dL 244*   CALCIUM mg/dL 9.2     Results from last 7 days   Lab Units 10/19/23  0808   TRIGLYCERIDES mg/dL 202*   HDL CHOL mg/dL 34*   LDL CHOL mg/dL 90     Results for orders placed during the hospital encounter of 02/26/21    Adult Transthoracic Echo Complete W/ Cont if Necessary Per Protocol    Interpretation Summary  · Estimated left ventricular EF = 51% Left ventricular systolic function is normal.  · Left ventricular diastolic function is consistent with (grade I) impaired relaxation.  · There is moderate calcification of the aortic valve.  · Mild mitral valve regurgitation is present.    Lab Results   Component Value  Date    GLUCOSE 244 (H) 10/19/2023    BUN 33 (H) 10/19/2023    CREATININE 1.45 (H) 10/19/2023    EGFRIFNONA 54 (L) 02/07/2022    EGFRIFAFRI 63 02/07/2022    BCR 22.8 10/19/2023    K 4.4 10/19/2023    CO2 27.0 10/19/2023    CALCIUM 9.2 10/19/2023    PROTENTOTREF 6.8 10/19/2023    ALBUMIN 4.4 10/19/2023    LABIL2 1.8 10/19/2023    AST 26 10/19/2023    ALT 23 10/19/2023             Assessment:          Diagnosis Plan   1. Coronary artery disease involving native coronary artery of native heart without angina pectoris  metoprolol succinate XL (TOPROL-XL) 25 MG 24 hr tablet    apixaban (Eliquis) 5 MG tablet tablet      2. Paroxysmal atrial fibrillation  metoprolol succinate XL (TOPROL-XL) 25 MG 24 hr tablet    apixaban (Eliquis) 5 MG tablet tablet      3. Other hyperlipidemia  metoprolol succinate XL (TOPROL-XL) 25 MG 24 hr tablet    apixaban (Eliquis) 5 MG tablet tablet      4. Stage 3a chronic kidney disease (CKD)  metoprolol succinate XL (TOPROL-XL) 25 MG 24 hr tablet    apixaban (Eliquis) 5 MG tablet tablet      5. PVD (peripheral vascular disease)  metoprolol succinate XL (TOPROL-XL) 25 MG 24 hr tablet    apixaban (Eliquis) 5 MG tablet tablet      6. Type 2 diabetes mellitus with diabetic peripheral angiopathy without gangrene, without long-term current use of insulin  metoprolol succinate XL (TOPROL-XL) 25 MG 24 hr tablet    apixaban (Eliquis) 5 MG tablet tablet      7. Abdominal aortic aneurysm (AAA) 3.0 cm to 5.5 cm in diameter in male (HCC)  metoprolol succinate XL (TOPROL-XL) 25 MG 24 hr tablet    apixaban (Eliquis) 5 MG tablet tablet      8. Peripheral arterial disease  metoprolol succinate XL (TOPROL-XL) 25 MG 24 hr tablet    apixaban (Eliquis) 5 MG tablet tablet               Plan:       1. Coronary Artery Disease  Assessment   The patient has no angina    Plan   Lifestyle modifications discussed include adhering to a heart healthy diet, avoidance of tobacco products, maintenance of a healthy weight,  medication compliance, regular exercise and regular monitoring of cholesterol and blood pressure    Subjective - Objective   There is a history of past MI    Current antiplatelet therapy includes aspirin 81 mg    2.  Peripheral vascular disease, with both disease in bilateral lower extremities as well as high-grade carotid artery disease, abdominal aortic aneurysm, prior right coronary artery stent, occluded left vertebral, followed by vascular  3.  Diabetes mellitus with circulatory complication, continue risk factor modification  4.  Paroxysmal atrial fibrillation, remains in sinus rhythm, continue apixaban  5.  Mixed hyperlipidemia, LDL reviewed, continue lipid-lowering therapy    Thank you very much for allowing us to participate in the care of this pleasant patient.  Please don't hesitate to call if I can be of assistance in any way.      Current Outpatient Medications:     apixaban (Eliquis) 5 MG tablet tablet, TAKE 1 TABLET BY MOUTH EVERY 12 HOURS FOR  ATRIAL  FIBRILLATION, Disp: 60 tablet, Rfl: 1    aspirin 81 MG EC tablet, Take 1 tablet by mouth Daily. OK TO CONTINUE TO TAKE LEADING UP TO SURGERY PER DR. MULLER, Disp: , Rfl:     atorvastatin (LIPITOR) 80 MG tablet, Take 1 tablet by mouth Daily., Disp: 90 tablet, Rfl: 1    busPIRone (BUSPAR) 5 MG tablet, Take 1 tablet by mouth 2 (Two) Times a Day., Disp: 180 tablet, Rfl: 1    Coenzyme Q10 200 MG capsule, Take 200 mg by mouth Every Night. HOLDING FOR SURGERY, Disp: , Rfl:     escitalopram (LEXAPRO) 10 MG tablet, Take 1 tablet by mouth Every Night., Disp: , Rfl:     glimepiride (AMARYL) 4 MG tablet, Take 1 tablet by mouth Every Morning Before Breakfast., Disp: 90 tablet, Rfl: 1    isosorbide mononitrate (IMDUR) 30 MG 24 hr tablet, Take 1 tablet by mouth Every Night., Disp: 90 tablet, Rfl: 3    LORazepam (ATIVAN) 0.5 MG tablet, TAKE 1 TABLET BY MOUTH EVERY 8 HOURS AS NEEDED FOR ANXIETY, Disp: 30 tablet, Rfl: 0    melatonin 5 MG tablet tablet, Take 1 tablet by  mouth Every Night., Disp: , Rfl:     metoprolol succinate XL (TOPROL-XL) 25 MG 24 hr tablet, Take 1 tablet by mouth twice daily, Disp: 180 tablet, Rfl: 0    multivitamin with minerals tablet tablet, Take 1 tablet by mouth Every Night. HOLD FOR SURGERY, Disp: , Rfl:     nitroglycerin (NITROSTAT) 0.4 MG SL tablet, DISSOLVE ONE TABLET UNDER THE TONGUE EVERY 5 MINUTES AS NEEDED FOR CHEST PAIN.  DO NOT EXCEED A TOTAL OF 3 DOSES IN 15 MINUTES, Disp: 5 tablet, Rfl: 0    omeprazole (priLOSEC) 20 MG capsule, Take 1 capsule by mouth Daily., Disp: 90 capsule, Rfl: 1    saw palmetto 160 MG capsule, Take 1 capsule by mouth Every Night. HOLD FOR SURGERY, Disp: , Rfl:          EMR Dragon/Transcription disclaimer:    Much of this encounter note is an electronic transcription/translation of spoken language to printed text. The electronic translation of spoken language may permit erroneous, or at times, nonsensical words or phrases to be inadvertently transcribed; Although I have reviewed the note for such errors, some may still exist.

## 2023-12-12 NOTE — TELEPHONE ENCOUNTER
ldl 79 and tgs 180  Want ldl at 70  Alk up slom  Add   Fractionated alk phos   incr lipitor to 80mg qd  Consider vascepa  mmay be cost issue  cmp lipids 6wks  Add januvia I gave sampes of  Fu w hba1c 3mo    Informed wife   No

## 2023-12-28 DIAGNOSIS — I71.40 ABDOMINAL AORTIC ANEURYSM (AAA) 3.0 CM TO 5.5 CM IN DIAMETER IN MALE: ICD-10-CM

## 2023-12-28 DIAGNOSIS — I48.0 PAROXYSMAL ATRIAL FIBRILLATION: ICD-10-CM

## 2023-12-28 DIAGNOSIS — I25.10 CORONARY ARTERY DISEASE INVOLVING NATIVE CORONARY ARTERY OF NATIVE HEART WITHOUT ANGINA PECTORIS: ICD-10-CM

## 2023-12-28 DIAGNOSIS — I73.9 PVD (PERIPHERAL VASCULAR DISEASE): ICD-10-CM

## 2023-12-28 DIAGNOSIS — E11.51 TYPE 2 DIABETES MELLITUS WITH DIABETIC PERIPHERAL ANGIOPATHY WITHOUT GANGRENE, WITHOUT LONG-TERM CURRENT USE OF INSULIN: ICD-10-CM

## 2023-12-28 DIAGNOSIS — I73.9 PERIPHERAL ARTERIAL DISEASE: ICD-10-CM

## 2023-12-28 DIAGNOSIS — N18.31 STAGE 3A CHRONIC KIDNEY DISEASE (CKD): ICD-10-CM

## 2023-12-28 DIAGNOSIS — E78.49 OTHER HYPERLIPIDEMIA: Chronic | ICD-10-CM

## 2023-12-28 DIAGNOSIS — I25.10 CORONARY ARTERY DISEASE INVOLVING NATIVE CORONARY ARTERY OF NATIVE HEART WITHOUT ANGINA PECTORIS: Chronic | ICD-10-CM

## 2023-12-28 DIAGNOSIS — E78.49 OTHER HYPERLIPIDEMIA: ICD-10-CM

## 2023-12-28 RX ORDER — METOPROLOL SUCCINATE 25 MG/1
25 TABLET, EXTENDED RELEASE ORAL 2 TIMES DAILY
Qty: 180 TABLET | Refills: 0 | Status: SHIPPED | OUTPATIENT
Start: 2023-12-28

## 2023-12-28 RX ORDER — ISOSORBIDE MONONITRATE 30 MG/1
30 TABLET, EXTENDED RELEASE ORAL NIGHTLY
Qty: 90 TABLET | Refills: 3 | Status: SHIPPED | OUTPATIENT
Start: 2023-12-28

## 2023-12-28 NOTE — TELEPHONE ENCOUNTER
NOV-10/24/24-TK  LOV-10/21/23-CELESTINO    Plan   1. Coronary Artery Disease  Assessment   The patient has no angina     Plan   Lifestyle modifications discussed include adhering to a heart healthy diet, avoidance of tobacco products, maintenance of a healthy weight, medication compliance, regular exercise and regular monitoring of cholesterol and blood pressure     Subjective - Objective   There is a history of past MI    Current antiplatelet therapy includes aspirin 81 mg     2.  Peripheral vascular disease, with both disease in bilateral lower extremities as well as high-grade carotid artery disease, abdominal aortic aneurysm, prior right coronary artery stent, occluded left vertebral, followed by vascular  3.  Diabetes mellitus with circulatory complication, continue risk factor modification  4.  Paroxysmal atrial fibrillation, remains in sinus rhythm, continue apixaban  5.  Mixed hyperlipidemia, LDL reviewed, continue lipid-lowering therapy

## 2024-01-03 ENCOUNTER — OFFICE VISIT (OUTPATIENT)
Dept: WOUND CARE | Facility: HOSPITAL | Age: 79
End: 2024-01-03
Payer: MEDICARE

## 2024-01-03 ENCOUNTER — LAB REQUISITION (OUTPATIENT)
Dept: LAB | Facility: HOSPITAL | Age: 79
End: 2024-01-03
Payer: MEDICARE

## 2024-01-03 DIAGNOSIS — L02.212 CUTANEOUS ABSCESS OF BACK (ANY PART, EXCEPT BUTTOCK): ICD-10-CM

## 2024-01-03 PROCEDURE — G0463 HOSPITAL OUTPT CLINIC VISIT: HCPCS

## 2024-01-03 PROCEDURE — 87015 SPECIMEN INFECT AGNT CONCNTJ: CPT | Performed by: NURSE PRACTITIONER

## 2024-01-03 PROCEDURE — 87070 CULTURE OTHR SPECIMN AEROBIC: CPT | Performed by: NURSE PRACTITIONER

## 2024-01-03 PROCEDURE — 87205 SMEAR GRAM STAIN: CPT | Performed by: NURSE PRACTITIONER

## 2024-01-03 PROCEDURE — 87075 CULTR BACTERIA EXCEPT BLOOD: CPT | Performed by: NURSE PRACTITIONER

## 2024-01-06 LAB
BACTERIA SPEC AEROBE CULT: NORMAL
GRAM STN SPEC: NORMAL
GRAM STN SPEC: NORMAL

## 2024-01-08 LAB — BACTERIA SPEC ANAEROBE CULT: NORMAL

## 2024-01-24 ENCOUNTER — OFFICE VISIT (OUTPATIENT)
Dept: WOUND CARE | Facility: HOSPITAL | Age: 79
End: 2024-01-24
Payer: MEDICARE

## 2024-02-07 ENCOUNTER — LAB REQUISITION (OUTPATIENT)
Dept: LAB | Facility: HOSPITAL | Age: 79
End: 2024-02-07
Payer: MEDICARE

## 2024-02-07 ENCOUNTER — OFFICE VISIT (OUTPATIENT)
Dept: WOUND CARE | Facility: HOSPITAL | Age: 79
End: 2024-02-07
Payer: MEDICARE

## 2024-02-07 DIAGNOSIS — L02.212 CUTANEOUS ABSCESS OF BACK (ANY PART, EXCEPT BUTTOCK): ICD-10-CM

## 2024-02-07 PROCEDURE — 87205 SMEAR GRAM STAIN: CPT | Performed by: NURSE PRACTITIONER

## 2024-02-07 PROCEDURE — 87075 CULTR BACTERIA EXCEPT BLOOD: CPT | Performed by: NURSE PRACTITIONER

## 2024-02-07 PROCEDURE — 87015 SPECIMEN INFECT AGNT CONCNTJ: CPT | Performed by: NURSE PRACTITIONER

## 2024-02-07 PROCEDURE — 87070 CULTURE OTHR SPECIMN AEROBIC: CPT | Performed by: NURSE PRACTITIONER

## 2024-02-10 LAB
BACTERIA SPEC AEROBE CULT: NORMAL
GRAM STN SPEC: NORMAL

## 2024-02-12 LAB — BACTERIA SPEC ANAEROBE CULT: ABNORMAL

## 2024-02-28 ENCOUNTER — OFFICE VISIT (OUTPATIENT)
Dept: WOUND CARE | Facility: HOSPITAL | Age: 79
End: 2024-02-28
Payer: MEDICARE

## 2024-02-28 PROCEDURE — G0463 HOSPITAL OUTPT CLINIC VISIT: HCPCS

## 2024-03-19 ENCOUNTER — LAB REQUISITION (OUTPATIENT)
Dept: LAB | Facility: HOSPITAL | Age: 79
End: 2024-03-19
Payer: MEDICARE

## 2024-03-19 ENCOUNTER — OFFICE VISIT (OUTPATIENT)
Dept: WOUND CARE | Facility: HOSPITAL | Age: 79
End: 2024-03-19
Payer: MEDICARE

## 2024-03-19 DIAGNOSIS — L02.212 CUTANEOUS ABSCESS OF BACK (ANY PART, EXCEPT BUTTOCK): ICD-10-CM

## 2024-03-19 PROCEDURE — 87205 SMEAR GRAM STAIN: CPT | Performed by: SURGERY

## 2024-03-19 PROCEDURE — 87076 CULTURE ANAEROBE IDENT EACH: CPT | Performed by: SURGERY

## 2024-03-19 PROCEDURE — 87070 CULTURE OTHR SPECIMN AEROBIC: CPT | Performed by: SURGERY

## 2024-03-19 PROCEDURE — 87075 CULTR BACTERIA EXCEPT BLOOD: CPT | Performed by: SURGERY

## 2024-03-19 RX ORDER — SULFAMETHOXAZOLE AND TRIMETHOPRIM 800; 160 MG/1; MG/1
1 TABLET ORAL 2 TIMES DAILY
Qty: 10 TABLET | Refills: 0 | Status: SHIPPED | OUTPATIENT
Start: 2024-03-19 | End: 2024-03-24

## 2024-03-22 LAB
BACTERIA SPEC AEROBE CULT: NORMAL
GRAM STN SPEC: NORMAL
GRAM STN SPEC: NORMAL

## 2024-03-24 LAB — BACTERIA SPEC ANAEROBE CULT: ABNORMAL

## 2024-03-26 ENCOUNTER — OFFICE VISIT (OUTPATIENT)
Dept: WOUND CARE | Facility: HOSPITAL | Age: 79
End: 2024-03-26
Payer: MEDICARE

## 2024-03-26 PROCEDURE — G0463 HOSPITAL OUTPT CLINIC VISIT: HCPCS

## 2024-03-27 DIAGNOSIS — E78.49 OTHER HYPERLIPIDEMIA: Chronic | ICD-10-CM

## 2024-03-27 DIAGNOSIS — I48.0 PAROXYSMAL ATRIAL FIBRILLATION: ICD-10-CM

## 2024-03-27 DIAGNOSIS — I73.9 PVD (PERIPHERAL VASCULAR DISEASE): ICD-10-CM

## 2024-03-27 DIAGNOSIS — N18.31 STAGE 3A CHRONIC KIDNEY DISEASE (CKD): ICD-10-CM

## 2024-03-27 DIAGNOSIS — I25.10 CORONARY ARTERY DISEASE INVOLVING NATIVE CORONARY ARTERY OF NATIVE HEART WITHOUT ANGINA PECTORIS: Chronic | ICD-10-CM

## 2024-03-27 DIAGNOSIS — I73.9 PERIPHERAL ARTERIAL DISEASE: ICD-10-CM

## 2024-03-27 DIAGNOSIS — E11.51 TYPE 2 DIABETES MELLITUS WITH DIABETIC PERIPHERAL ANGIOPATHY WITHOUT GANGRENE, WITHOUT LONG-TERM CURRENT USE OF INSULIN: ICD-10-CM

## 2024-03-27 DIAGNOSIS — I71.40 ABDOMINAL AORTIC ANEURYSM (AAA) 3.0 CM TO 5.5 CM IN DIAMETER IN MALE: ICD-10-CM

## 2024-03-27 RX ORDER — METOPROLOL SUCCINATE 25 MG/1
25 TABLET, EXTENDED RELEASE ORAL 2 TIMES DAILY
Qty: 180 TABLET | Refills: 0 | Status: SHIPPED | OUTPATIENT
Start: 2024-03-27

## 2024-04-09 ENCOUNTER — OFFICE VISIT (OUTPATIENT)
Dept: WOUND CARE | Facility: HOSPITAL | Age: 79
End: 2024-04-09
Payer: MEDICARE

## 2024-04-29 ENCOUNTER — OFFICE VISIT (OUTPATIENT)
Dept: FAMILY MEDICINE CLINIC | Facility: CLINIC | Age: 79
End: 2024-04-29
Payer: MEDICARE

## 2024-04-29 VITALS
DIASTOLIC BLOOD PRESSURE: 74 MMHG | SYSTOLIC BLOOD PRESSURE: 128 MMHG | WEIGHT: 175.4 LBS | HEART RATE: 55 BPM | HEIGHT: 69 IN | BODY MASS INDEX: 25.98 KG/M2 | OXYGEN SATURATION: 95 % | TEMPERATURE: 97.7 F

## 2024-04-29 DIAGNOSIS — E78.49 OTHER HYPERLIPIDEMIA: Chronic | ICD-10-CM

## 2024-04-29 DIAGNOSIS — I73.9 PERIPHERAL ARTERIAL DISEASE: ICD-10-CM

## 2024-04-29 DIAGNOSIS — E11.51 TYPE 2 DIABETES MELLITUS WITH DIABETIC PERIPHERAL ANGIOPATHY WITHOUT GANGRENE, WITHOUT LONG-TERM CURRENT USE OF INSULIN: Chronic | ICD-10-CM

## 2024-04-29 DIAGNOSIS — I73.9 PVD (PERIPHERAL VASCULAR DISEASE): ICD-10-CM

## 2024-04-29 DIAGNOSIS — N18.31 STAGE 3A CHRONIC KIDNEY DISEASE (CKD): ICD-10-CM

## 2024-04-29 DIAGNOSIS — I25.10 CORONARY ARTERY DISEASE INVOLVING NATIVE CORONARY ARTERY OF NATIVE HEART WITHOUT ANGINA PECTORIS: Chronic | ICD-10-CM

## 2024-04-29 DIAGNOSIS — F41.9 ANXIETY: ICD-10-CM

## 2024-04-29 DIAGNOSIS — I48.0 PAROXYSMAL ATRIAL FIBRILLATION: ICD-10-CM

## 2024-04-29 DIAGNOSIS — K21.9 GASTROESOPHAGEAL REFLUX DISEASE WITHOUT ESOPHAGITIS: ICD-10-CM

## 2024-04-29 PROCEDURE — G2211 COMPLEX E/M VISIT ADD ON: HCPCS | Performed by: INTERNAL MEDICINE

## 2024-04-29 PROCEDURE — 1159F MED LIST DOCD IN RCRD: CPT | Performed by: INTERNAL MEDICINE

## 2024-04-29 PROCEDURE — 1160F RVW MEDS BY RX/DR IN RCRD: CPT | Performed by: INTERNAL MEDICINE

## 2024-04-29 PROCEDURE — 99214 OFFICE O/P EST MOD 30 MIN: CPT | Performed by: INTERNAL MEDICINE

## 2024-04-29 RX ORDER — GLIMEPIRIDE 4 MG/1
4 TABLET ORAL
Qty: 90 TABLET | Refills: 1 | Status: SHIPPED | OUTPATIENT
Start: 2024-04-29

## 2024-04-29 RX ORDER — BUSPIRONE HYDROCHLORIDE 5 MG/1
5 TABLET ORAL 2 TIMES DAILY
Qty: 180 TABLET | Refills: 1 | Status: SHIPPED | OUTPATIENT
Start: 2024-04-29

## 2024-04-29 RX ORDER — OMEPRAZOLE 20 MG/1
20 CAPSULE, DELAYED RELEASE ORAL DAILY
Qty: 90 CAPSULE | Refills: 1 | Status: SHIPPED | OUTPATIENT
Start: 2024-04-29

## 2024-04-29 RX ORDER — ATORVASTATIN CALCIUM 80 MG/1
80 TABLET, FILM COATED ORAL DAILY
Qty: 90 TABLET | Refills: 1 | Status: SHIPPED | OUTPATIENT
Start: 2024-04-29

## 2024-04-29 RX ORDER — METOPROLOL SUCCINATE 25 MG/1
25 TABLET, EXTENDED RELEASE ORAL 2 TIMES DAILY
Qty: 180 TABLET | Refills: 1 | Status: SHIPPED | OUTPATIENT
Start: 2024-04-29

## 2024-04-29 RX ORDER — CLINDAMYCIN PHOSPHATE 10 UG/ML
LOTION TOPICAL
COMMUNITY
Start: 2024-04-25

## 2024-04-29 NOTE — PROGRESS NOTES
Subjective   Erick Bruno is a 78 y.o. male.     Chief Complaint   Patient presents with    Diabetes    Hyperlipidemia    Follow-up     He has a wound on his back they have been working on since January. He is gong to the wound clinic       History of Present Illness   Wife was present during the history-taking and subsequent discussion (and for part of the physical exam) with this patient.  Patient agrees to the presence of the individual during this visit.    Follow-up for hypertension.  Currently, has been feeling well and asymptomatic without any headaches, vision changes, cough, chest pain, shortness of breath, swelling, focal neurologic deficit, memory loss or syncope.  Home BP usually run 140/80's but more elevated since the foot fractures.  Has been taking the medications regularly and adherent with the regimen metoprolol succinate 25 mg 2 tabs twice a day.  Denies medication side effects and no significant interval events.       Patient with carotid artery stenosis with stent, aortic atheromatosis, infrarenal artery stenosis, stenosis of right external iliac artery, right superficial femoral artery and deep femoral artery on right.  Has follow up with Dr Frank Carpenter vascular.  Full vascular studies scheduled for tomorrow.     Here for follow-up of diabetes.  Patient states to have been compliant with medications.  Blood sugar monitoring - patient states has not been following regularly.  No episodes of hypoglycemia, nausea, vomiting, new rashes, syncope or other issues.  Denies any difficulties with the current medication regimen of glimepiride 4 mg daily. Cannot tolerate metformin secondary to severe GI issues.  Last A1c on 10/19/2023 of 6.3% and on 5/4/2023 of 7.9%.  Last microalbumin 10/19/23     Follow-up for cholesterol.  Currently, has been feeling well without any myalgias, muscle aches, weakness, numbness, chest pain, short of breath or other issues.  Currently, is adherent with medication  regimen of atorvastatin 80 mg and denies medication side effects.  Last performed on 10/19/2023.     History of anxiety and currently on lexapro 10 mg, buspirone 5 mg twice daily and lorazepam 0.5 mg every 8 hours as needed.     Patient with a wound/abscess on his back being followed by wound care which was drained and debrided 4/9/2024 and being cared for continued by wound care.  Wound cultures positive for Veillonella species and finegoldia magna.  Has been through several rounds of antibiotic and now is finally starting to slowly heal.    The following portions of the patient's history were reviewed and updated as appropriate: allergies, current medications, past family history, past medical history, past social history, past surgical history and problem list.    Depression Screen:      5/4/2023     1:13 PM   PHQ-2/PHQ-9 Depression Screening   Little Interest or Pleasure in Doing Things 0-->not at all   Feeling Down, Depressed or Hopeless 0-->not at all   PHQ-9: Brief Depression Severity Measure Score 0       Past Medical History:   Diagnosis Date    Abnormal nuclear stress test 05/21/2018    Acid reflux     Alzheimer's dementia     Anemia     Anesthesia complication     PT'S WIFE STATES PT CRYING AND EMOTIONAL AFTER SURGERY IN THE PAST    Anxiety     Arthritis     Atrial fibrillation     CAD (coronary artery disease)     Carotid artery disease     Chronic anticoagulation     Dementia     Diabetes mellitus     Elevated cholesterol     Enlarged prostate     Fracture 2023    Foot    Frequent urination at night     History of seizure 2013    S/P TEMPERATURE    Hyperlipidemia     Hypertension     Leg pain     BILAT-D/T PVD    Macular degeneration     New onset atrial fibrillation 02/2021    NSTEMI (non-ST elevated myocardial infarction)     Panarteritis     Peripheral arterial disease     Poor balance     HIGH RISK FOR FALLS    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    PVD (peripheral vascular disease)     Sleep  apnea     DOES NOT WEAR CPAP    SSS (sick sinus syndrome)        Past Surgical History:   Procedure Laterality Date    APPENDECTOMY      ATHRECTOMY ILIAC, FEMORAL, TIBIAL ARTERY N/A 10/17/2018    Procedure: AIF ARTERIOGRAM, LEFT SFA  ANGIOPLASTY;  Surgeon: Jayden Marie MD;  Location: [facility] MAIN OR;  Service: Vascular    ATHRECTOMY ILIAC, FEMORAL, TIBIAL ARTERY Bilateral 2022    Procedure: AIF LEFT LEG ANGIOGRAM;  Surgeon: Jayden Marie MD;  Location: [facility] OR ;  Service: Vascular;  Laterality: Bilateral;    CATARACT EXTRACTION WITH INTRAOCULAR LENS IMPLANT Bilateral     CHOLECYSTECTOMY WITH INTRAOPERATIVE CHOLANGIOGRAM N/A 2018    Procedure: CHOLECYSTECTOMY LAPAROSCOPIC INTRAOPERATIVE CHOLANGIOGRAM;  Surgeon: Daniel Gonzalez MD;  Location: [facility] MAIN OR;  Service: General    COLONOSCOPY      COLONOSCOPY N/A 2018    Procedure: COLONOSCOPY to cecum with polypectomies;  Surgeon: Daniel Gonzalez MD;  Location: [facility] ENDOSCOPY;  Service: Gastroenterology    ENDOSCOPY N/A 2018    Procedure: ESOPHAGOGASTRODUODENOSCOPY with biopsies;  Surgeon: Daniel Gonzalez MD;  Location: [facility] ENDOSCOPY;  Service: Gastroenterology    FEMORAL ARTERY STENT Left     on 3/22/16       Family History   Problem Relation Age of Onset    Hypertension Mother     Diabetes Daughter     Cancer Maternal Aunt     Diabetes Maternal Grandfather     Malig Hyperthermia Neg Hx        Social History     Socioeconomic History    Marital status:    Tobacco Use    Smoking status: Former     Current packs/day: 0.00     Average packs/day: 1 pack/day for 60.0 years (60.0 ttl pk-yrs)     Types: Cigarettes     Start date:      Quit date:      Years since quittin.3     Passive exposure: Past    Smokeless tobacco: Never   Vaping Use    Vaping status: Never Used   Substance and Sexual Activity    Alcohol use: No     Comment: Daily caffeine use    Drug use: No    Sexual activity: Defer        Current Outpatient Medications   Medication Sig Dispense Refill    apixaban (Eliquis) 5 MG tablet tablet Take 1 tablet by mouth Every 12 (Twelve) Hours. 180 tablet 3    aspirin 81 MG EC tablet Take 1 tablet by mouth Daily. OK TO CONTINUE TO TAKE LEADING UP TO SURGERY PER DR. MULLER      clindamycin (CLEOCIN T) 1 % lotion       Coenzyme Q10 200 MG capsule Take 200 mg by mouth Every Night. HOLDING FOR SURGERY      escitalopram (LEXAPRO) 10 MG tablet Take 1 tablet by mouth Every Night.      isosorbide mononitrate (IMDUR) 30 MG 24 hr tablet TAKE 1 TABLET BY MOUTH ONCE DAILY AT NIGHT 90 tablet 3    LORazepam (ATIVAN) 0.5 MG tablet TAKE 1 TABLET BY MOUTH EVERY 8 HOURS AS NEEDED FOR ANXIETY 30 tablet 0    melatonin 5 MG tablet tablet Take 1 tablet by mouth Every Night.      multivitamin with minerals tablet tablet Take 1 tablet by mouth Every Night. HOLD FOR SURGERY      nitroglycerin (NITROSTAT) 0.4 MG SL tablet DISSOLVE ONE TABLET UNDER THE TONGUE EVERY 5 MINUTES AS NEEDED FOR CHEST PAIN.  DO NOT EXCEED A TOTAL OF 3 DOSES IN 15 MINUTES 5 tablet 0    saw palmetto 160 MG capsule Take 1 capsule by mouth Every Night. HOLD FOR SURGERY      atorvastatin (LIPITOR) 80 MG tablet Take 1 tablet by mouth Daily. 90 tablet 1    busPIRone (BUSPAR) 5 MG tablet Take 1 tablet by mouth 2 (Two) Times a Day. 180 tablet 1    glimepiride (AMARYL) 4 MG tablet Take 1 tablet by mouth Every Morning Before Breakfast. 90 tablet 1    metoprolol succinate XL (TOPROL-XL) 25 MG 24 hr tablet Take 1 tablet by mouth 2 (Two) Times a Day. 180 tablet 1    omeprazole (priLOSEC) 20 MG capsule Take 1 capsule by mouth Daily. 90 capsule 1     No current facility-administered medications for this visit.       Review of Systems   Constitutional:  Negative for activity change, appetite change, fatigue, fever, unexpected weight gain and unexpected weight loss.   HENT:  Positive for hearing loss. Negative for nosebleeds, rhinorrhea, trouble swallowing and voice  "change.    Eyes:  Negative for visual disturbance.   Respiratory:  Negative for cough, chest tightness, shortness of breath and wheezing.    Cardiovascular:  Negative for chest pain, palpitations and leg swelling.   Gastrointestinal:  Negative for abdominal pain, blood in stool, constipation, diarrhea, nausea, vomiting, GERD and indigestion.   Genitourinary:  Negative for dysuria, frequency and hematuria.   Musculoskeletal:  Negative for arthralgias, back pain and myalgias.   Skin:  Negative for rash and wound.        Open wound on back   Neurological:  Positive for weakness, numbness and memory problem. Negative for dizziness, tremors, light-headedness and headache.   Hematological:  Negative for adenopathy. Does not bruise/bleed easily.   Psychiatric/Behavioral:  Negative for sleep disturbance and depressed mood. The patient is not nervous/anxious.        Objective   /74 (BP Location: Left arm, Patient Position: Sitting, Cuff Size: Adult)   Pulse 55   Temp 97.7 °F (36.5 °C) (Temporal)   Ht 175.3 cm (69.02\")   Wt 79.6 kg (175 lb 6.4 oz)   SpO2 95%   BMI 25.89 kg/m²     Physical Exam  Vitals and nursing note reviewed.   Constitutional:       General: He is not in acute distress.     Appearance: He is well-developed. He is not diaphoretic.   HENT:      Head: Normocephalic and atraumatic.      Right Ear: External ear normal.      Left Ear: External ear normal.      Nose: Nose normal.   Eyes:      Conjunctiva/sclera: Conjunctivae normal.      Pupils: Pupils are equal, round, and reactive to light.   Neck:      Thyroid: No thyromegaly.      Trachea: No tracheal deviation.   Cardiovascular:      Rate and Rhythm: Normal rate and regular rhythm.      Heart sounds: Normal heart sounds. No murmur heard.     No friction rub. No gallop.   Pulmonary:      Effort: Pulmonary effort is normal. No respiratory distress.      Breath sounds: Normal breath sounds.   Abdominal:      General: Bowel sounds are normal.      " Palpations: Abdomen is soft. There is no mass.      Tenderness: There is no abdominal tenderness. There is no guarding.   Musculoskeletal:         General: Normal range of motion.      Cervical back: Normal range of motion and neck supple.      Comments: Using walker   Lymphadenopathy:      Cervical: No cervical adenopathy.   Skin:     General: Skin is warm and dry.      Capillary Refill: Capillary refill takes less than 2 seconds.      Findings: No rash.      Comments: Back wound bandaged.   Neurological:      Mental Status: He is alert and oriented to person, place, and time.      Motor: No abnormal muscle tone.      Deep Tendon Reflexes: Reflexes normal.   Psychiatric:         Behavior: Behavior normal.         Thought Content: Thought content normal.         Judgment: Judgment normal.         Recent Results (from the past 2016 hour(s))   Anaerobic Culture - Swab, Back    Collection Time: 02/07/24 11:18 AM    Specimen: Back; Swab   Result Value Ref Range    Anaerobic Culture Finegoldia magna (A)    Wound Culture - Wound, Back    Collection Time: 02/07/24 11:18 AM    Specimen: Back; Wound   Result Value Ref Range    Wound Culture No growth at 3 days     Gram Stain No WBCs seen     Gram Stain Occasional Gram positive cocci in pairs     Gram Stain Occasional Gram negative bacilli    Wound Culture - Wound, Back    Collection Time: 03/19/24 10:45 AM    Specimen: Back; Wound   Result Value Ref Range    Wound Culture No growth at 3 days     Gram Stain Few (2+) WBCs seen     Gram Stain Many (4+) Gram positive cocci    Anaerobic Culture - Swab, Back    Collection Time: 03/19/24 10:45 AM    Specimen: Back; Swab   Result Value Ref Range    Anaerobic Culture Veillonella species (A)      Assessment & Plan   Diagnoses and all orders for this visit:    1. Type 2 diabetes mellitus with diabetic peripheral angiopathy without gangrene, without long-term current use of insulin (Primary)  -     Comprehensive Metabolic Panel  -      Lipid Panel  -     glimepiride (AMARYL) 4 MG tablet; Take 1 tablet by mouth Every Morning Before Breakfast.  Dispense: 90 tablet; Refill: 1  -     metoprolol succinate XL (TOPROL-XL) 25 MG 24 hr tablet; Take 1 tablet by mouth 2 (Two) Times a Day.  Dispense: 180 tablet; Refill: 1  -     Hemoglobin A1c    2. Other hyperlipidemia  -     Comprehensive Metabolic Panel  -     Lipid Panel  -     atorvastatin (LIPITOR) 80 MG tablet; Take 1 tablet by mouth Daily.  Dispense: 90 tablet; Refill: 1  -     metoprolol succinate XL (TOPROL-XL) 25 MG 24 hr tablet; Take 1 tablet by mouth 2 (Two) Times a Day.  Dispense: 180 tablet; Refill: 1    3. Anxiety  -     busPIRone (BUSPAR) 5 MG tablet; Take 1 tablet by mouth 2 (Two) Times a Day.  Dispense: 180 tablet; Refill: 1    4. Coronary artery disease involving native coronary artery of native heart without angina pectoris  -     metoprolol succinate XL (TOPROL-XL) 25 MG 24 hr tablet; Take 1 tablet by mouth 2 (Two) Times a Day.  Dispense: 180 tablet; Refill: 1    5. Paroxysmal atrial fibrillation  -     metoprolol succinate XL (TOPROL-XL) 25 MG 24 hr tablet; Take 1 tablet by mouth 2 (Two) Times a Day.  Dispense: 180 tablet; Refill: 1    6. Stage 3a chronic kidney disease (CKD)  -     Comprehensive Metabolic Panel  -     metoprolol succinate XL (TOPROL-XL) 25 MG 24 hr tablet; Take 1 tablet by mouth 2 (Two) Times a Day.  Dispense: 180 tablet; Refill: 1    7. PVD (peripheral vascular disease)  -     metoprolol succinate XL (TOPROL-XL) 25 MG 24 hr tablet; Take 1 tablet by mouth 2 (Two) Times a Day.  Dispense: 180 tablet; Refill: 1    8. Abdominal aortic aneurysm (AAA) 3.0 cm to 5.5 cm in diameter in male (HCC)  -     metoprolol succinate XL (TOPROL-XL) 25 MG 24 hr tablet; Take 1 tablet by mouth 2 (Two) Times a Day.  Dispense: 180 tablet; Refill: 1    9. Peripheral arterial disease  -     metoprolol succinate XL (TOPROL-XL) 25 MG 24 hr tablet; Take 1 tablet by mouth 2 (Two) Times a Day.   Dispense: 180 tablet; Refill: 1    10. Gastroesophageal reflux disease without esophagitis  -     omeprazole (priLOSEC) 20 MG capsule; Take 1 capsule by mouth Daily.  Dispense: 90 capsule; Refill: 1    Type 2 diabetes previously controlled.  Labs have been ordered as noted above.  Will adjust medications if necessary based upon test results.  His anxiety appears to be controlled.  Continue follow-up with his vascular doctor for the complete evaluation and ultrasounds as ordered.  Continue the current care and follow up with wound care and with vascular.  Check the labs as ordered and continue the current medications.           COVID-19 Precautions - Patient was compliant in wearing a mask. When I saw the patient, I used appropriate personal protective equipment (PPE) including mask and eye shield (standard procedure).  Additionally, I used gown and gloves if indicated.  Hand hygiene was completed before and after seeing the patient.  Dictated utilizing Dragon Dictation

## 2024-04-30 ENCOUNTER — TELEPHONE (OUTPATIENT)
Dept: FAMILY MEDICINE CLINIC | Facility: CLINIC | Age: 79
End: 2024-04-30
Payer: MEDICARE

## 2024-04-30 ENCOUNTER — OFFICE VISIT (OUTPATIENT)
Dept: WOUND CARE | Facility: HOSPITAL | Age: 79
End: 2024-04-30
Payer: MEDICARE

## 2024-04-30 DIAGNOSIS — E11.51 TYPE 2 DIABETES MELLITUS WITH DIABETIC PERIPHERAL ANGIOPATHY WITHOUT GANGRENE, WITHOUT LONG-TERM CURRENT USE OF INSULIN: Primary | ICD-10-CM

## 2024-04-30 LAB
ALBUMIN SERPL-MCNC: 4.4 G/DL (ref 3.5–5.2)
ALBUMIN/GLOB SERPL: 1.6 G/DL
ALP SERPL-CCNC: 134 U/L (ref 39–117)
ALT SERPL-CCNC: 22 U/L (ref 1–41)
AST SERPL-CCNC: 19 U/L (ref 1–40)
BILIRUB SERPL-MCNC: 0.5 MG/DL (ref 0–1.2)
BUN SERPL-MCNC: 32 MG/DL (ref 8–23)
BUN/CREAT SERPL: 22.9 (ref 7–25)
CALCIUM SERPL-MCNC: 9.1 MG/DL (ref 8.6–10.5)
CHLORIDE SERPL-SCNC: 99 MMOL/L (ref 98–107)
CHOLEST SERPL-MCNC: 202 MG/DL (ref 0–200)
CO2 SERPL-SCNC: 24.8 MMOL/L (ref 22–29)
CREAT SERPL-MCNC: 1.4 MG/DL (ref 0.76–1.27)
EGFRCR SERPLBLD CKD-EPI 2021: 51.8 ML/MIN/1.73
GLOBULIN SER CALC-MCNC: 2.7 GM/DL
GLUCOSE SERPL-MCNC: 204 MG/DL (ref 65–99)
HBA1C MFR BLD: 9.3 % (ref 4.8–5.6)
HDLC SERPL-MCNC: 43 MG/DL (ref 40–60)
LDLC SERPL CALC-MCNC: 129 MG/DL (ref 0–100)
POTASSIUM SERPL-SCNC: 5 MMOL/L (ref 3.5–5.2)
PROT SERPL-MCNC: 7.1 G/DL (ref 6–8.5)
SODIUM SERPL-SCNC: 136 MMOL/L (ref 136–145)
TRIGL SERPL-MCNC: 168 MG/DL (ref 0–150)
VLDLC SERPL CALC-MCNC: 30 MG/DL (ref 5–40)

## 2024-04-30 PROCEDURE — G0463 HOSPITAL OUTPT CLINIC VISIT: HCPCS

## 2024-04-30 NOTE — TELEPHONE ENCOUNTER
DELETE AFTER REVIEWING: Telephone encounter to be sent to the clinical pool    Name: Marcelle Bruno (POA)    Relationship: Emergency Contact    [phone] (Home       Best Callback Number: Phone:HUB PROVIDED THE RELAY MESSAGE FROM THE OFFICE   PATIENT VOICED UNDERSTANDING AND HAS NO FURTHER QUESTIONS AT THIS TIME    STATES THAT THEY WILL CALL BACK TO MAKE FU APPOINTMENT.

## 2024-04-30 NOTE — TELEPHONE ENCOUNTER
OKAY FOR HUB TO RELAY    I tried calling and speaking with the patient but did not get an answer. I left a brief message letting him know I was calling over his recent lab results and for him to give us a call back. If he calls back:      Dr Franco said your labs have returned demonstrating that your average blood sugar/A1c has increased and is uncontrolled at 9.3% and our goal is being less than 7.  Your cholesterol is also increased from your baseline.  The kidney function is stable and there is no problems with your liver.  At this time he does not believe that your glimepiride is remaining effective in treating your diabetes and he believes we need to add medication to your current regimen.  He would recommend trying the addition of a medication such as Jardiance once a day and he has sent in a new prescription for this to the pharmacy.  Please continue your glimepiride while starting the Jardiance and follow your diet.  We will recheck your levels in 3 to 4 months. Please let us know if you have any further questions or concerns.

## 2024-05-06 ENCOUNTER — TELEPHONE (OUTPATIENT)
Dept: FAMILY MEDICINE CLINIC | Facility: CLINIC | Age: 79
End: 2024-05-06
Payer: MEDICARE

## 2024-05-28 ENCOUNTER — OFFICE VISIT (OUTPATIENT)
Dept: WOUND CARE | Facility: HOSPITAL | Age: 79
End: 2024-05-28
Payer: MEDICARE

## 2024-05-28 PROCEDURE — G0463 HOSPITAL OUTPT CLINIC VISIT: HCPCS

## 2024-06-03 ENCOUNTER — TRANSCRIBE ORDERS (OUTPATIENT)
Dept: GENERAL RADIOLOGY | Facility: HOSPITAL | Age: 79
End: 2024-06-03
Payer: MEDICARE

## 2024-06-03 ENCOUNTER — OFFICE VISIT (OUTPATIENT)
Age: 79
End: 2024-06-03
Payer: MEDICARE

## 2024-06-03 ENCOUNTER — HOSPITAL ENCOUNTER (OUTPATIENT)
Facility: HOSPITAL | Age: 79
Discharge: HOME | End: 2024-06-03
Payer: MEDICARE

## 2024-06-03 VITALS
DIASTOLIC BLOOD PRESSURE: 64 MMHG | WEIGHT: 175 LBS | BODY MASS INDEX: 25.92 KG/M2 | SYSTOLIC BLOOD PRESSURE: 150 MMHG | HEIGHT: 69 IN

## 2024-06-03 DIAGNOSIS — I73.9 PERIPHERAL ARTERIAL DISEASE: ICD-10-CM

## 2024-06-03 DIAGNOSIS — I70.221 ATHEROSCLEROSIS OF NATIVE ARTERY OF RIGHT LOWER EXTREMITY WITH REST PAIN: Primary | ICD-10-CM

## 2024-06-03 DIAGNOSIS — I65.23 BILATERAL CAROTID ARTERY STENOSIS: ICD-10-CM

## 2024-06-03 DIAGNOSIS — I71.43 INFRARENAL ABDOMINAL AORTIC ANEURYSM (AAA) WITHOUT RUPTURE: ICD-10-CM

## 2024-06-03 DIAGNOSIS — E11.42 DIABETIC PERIPHERAL NEUROPATHY: ICD-10-CM

## 2024-06-03 DIAGNOSIS — I71.40 ABDOMINAL AORTIC ANEURYSM (AAA) 3.0 CM TO 5.5 CM IN DIAMETER IN MALE: Primary | ICD-10-CM

## 2024-06-03 DIAGNOSIS — I70.221 ATHEROSCLEROSIS OF NATIVE ARTERY OF RIGHT LOWER EXTREMITY WITH REST PAIN: ICD-10-CM

## 2024-06-03 DIAGNOSIS — Z95.820 STATUS POST ANGIOPLASTY WITH STENT: ICD-10-CM

## 2024-06-03 DIAGNOSIS — N18.31 STAGE 3A CHRONIC KIDNEY DISEASE (CKD): ICD-10-CM

## 2024-06-03 LAB
ABDOMINAL DIST AORTA AP: 3.31 CM
ABDOMINAL DIST AORTA TRANS: 3.33 CM
ABDOMINAL DIST AORTA VEL: 80 CM/S
ABDOMINAL LT COM ILIAC AP: 1.3 CM
ABDOMINAL LT COM ILIAC TRANS: 1.09 CM
ABDOMINAL LT COM ILIAC VEL: 139.7 CM/S
ABDOMINAL LT EXT ILIAC VEL: 178.1 CM/S
ABDOMINAL MID AORTA AP: 2.24 CM
ABDOMINAL MID AORTA TRANS: 2.39 CM
ABDOMINAL MID AORTA VEL: 69.7 CM/S
ABDOMINAL PROX AORTA AP: 1.89 CM
ABDOMINAL PROX AORTA TRANS: 1.94 CM
ABDOMINAL PROX AORTA VEL: 82.6 CM/S
ABDOMINAL RT COM ILIAC AP: 1.3 CM
ABDOMINAL RT COM ILIAC TRANS: 1.4 CM
ABDOMINAL RT COM ILIAC VEL: 237.4 CM/S
ABDOMINAL RT EXT ILIAC VEL: 161.9 CM/S
BH CV LEA LEFT CFA PROX PSV: 190 CM/S
BH CV LEA LEFT DFA PROX PSV: 385 CM/S
BH CV LEA LEFT POPITEAL A  DISTAL PSV: -41.7 CM/S
BH CV LEA LEFT POPITEAL A  MID PSV: 34.3 CM/S
BH CV LEA LEFT POPITEAL A  PROX PSV: 39.9 CM/S
BH CV LEA LEFT SFA DISTAL PSV: -70.1 CM/S
BH CV LEA LEFT SFA MID PSV: -402.7 CM/S
BH CV LEA LEFT SFA PROX PSV: 91.1 CM/S
BH CV LOWER ARTERIAL LEFT ABI RATIO: 0.66
BH CV LOWER ARTERIAL LEFT DORSALIS PEDIS SYS MAX: 100
BH CV LOWER ARTERIAL LEFT POST TIBIAL SYS MAX: 86
BH CV LOWER ARTERIAL RIGHT ABI RATIO: 0.42
BH CV LOWER ARTERIAL RIGHT DORSALIS PEDIS SYS MAX: 64
BH CV LOWER ARTERIAL RIGHT POST TIBIAL SYS MAX: 60
BH CV RIGHT CCA HIDDEN LRR: 1 CM/S
BH CV VAS ABD AO LT EXTERNAL ILIAC AP: 1.03 CM
BH CV VAS ABD AO RT EXTERNAL ILIAC AP: 1.1 CM
BH CV VAS CAROTID RIGHT DISTAL STENT EDV: 19.9 CM/S
BH CV VAS CAROTID RIGHT DISTAL STENT PSV: 101 CM/S
BH CV VAS CAROTID RIGHT DISTAL TO STENT NATIVE VESSEL E: 22 CM/S
BH CV VAS CAROTID RIGHT DISTAL TO STENT PSV: 117 CM/S
BH CV VAS CAROTID RIGHT MID STENT EDV: 30 CM/S
BH CV VAS CAROTID RIGHT MID STENT PSV: 137 CM/S
BH CV VAS CAROTID RIGHT PROXIMAL STENT EDV: 14.8 CM/S
BH CV VAS CAROTID RIGHT PROXIMAL STENT PSV: 84 CM/S
BH CV VAS CAROTID RIGHT STENT NATIVE VESSEL PROXIMAL EDV: 14.8 CM/S
BH CV VAS CAROTID RIGHT STENT NATIVE VESSEL PROXIMAL PS: 74.6 CM/S
BH CV VAS LEA LEFT FREE TEXT STENT ONE: NORMAL
BH CV VAS LEA LEFT STENT ONE DIST STENT PSV: 70.1 CM/S
BH CV VAS LEA LEFT STENT ONE MID STENT PSV: 403 CM/S
BH CV VAS LEA LEFT STENT ONE POST STENT PSV: 64.2 CM/S
BH CV VAS LEA LEFT STENT ONE PRE STENT PSV: 190 CM/S
BH CV VAS LEA LEFT STENT ONE PROX STENT PSV: 91.1 CM/S
BH CV XLRA MEAS LEFT CAROTID BULB EDV: -15.4 CM/SEC
BH CV XLRA MEAS LEFT CAROTID BULB PSV: -149.3 CM/SEC
BH CV XLRA MEAS LEFT DIST CCA EDV: -26.3 CM/SEC
BH CV XLRA MEAS LEFT DIST CCA PSV: -153.7 CM/SEC
BH CV XLRA MEAS LEFT DIST ICA EDV: -19.8 CM/SEC
BH CV XLRA MEAS LEFT DIST ICA PSV: -73 CM/SEC
BH CV XLRA MEAS LEFT ICA/CCA RATIO: 1.29
BH CV XLRA MEAS LEFT MID CCA EDV: 29.6 CM/SEC
BH CV XLRA MEAS LEFT MID CCA PSV: 167.9 CM/SEC
BH CV XLRA MEAS LEFT MID ICA EDV: -32 CM/SEC
BH CV XLRA MEAS LEFT MID ICA PSV: -144.3 CM/SEC
BH CV XLRA MEAS LEFT PROX CCA EDV: 17.2 CM/SEC
BH CV XLRA MEAS LEFT PROX CCA PSV: 106.6 CM/SEC
BH CV XLRA MEAS LEFT PROX ECA EDV: -27.7 CM/SEC
BH CV XLRA MEAS LEFT PROX ECA PSV: -221.8 CM/SEC
BH CV XLRA MEAS LEFT PROX ICA EDV: 42.8 CM/SEC
BH CV XLRA MEAS LEFT PROX ICA PSV: 198.7 CM/SEC
BH CV XLRA MEAS LEFT PROX SCLA PSV: 164.6 CM/SEC
BH CV XLRA MEAS LEFT VERTEBRAL A PSV: -34.6 CM/SEC
BH CV XLRA MEAS RIGHT CAROTID BULB EDV: -30 CM/SEC
BH CV XLRA MEAS RIGHT CAROTID BULB PSV: -136.5 CM/SEC
BH CV XLRA MEAS RIGHT DIST CCA EDV: 14.8 CM/SEC
BH CV XLRA MEAS RIGHT DIST CCA PSV: 84 CM/SEC
BH CV XLRA MEAS RIGHT DIST ICA EDV: -11.5 CM/SEC
BH CV XLRA MEAS RIGHT DIST ICA PSV: -66.4 CM/SEC
BH CV XLRA MEAS RIGHT ICA/CCA RATIO: 1.63
BH CV XLRA MEAS RIGHT MID CCA EDV: 17 CM/SEC
BH CV XLRA MEAS RIGHT MID CCA PSV: 75.7 CM/SEC
BH CV XLRA MEAS RIGHT MID ICA EDV: -22 CM/SEC
BH CV XLRA MEAS RIGHT MID ICA PSV: -116.8 CM/SEC
BH CV XLRA MEAS RIGHT PROX CCA EDV: 11.5 CM/SEC
BH CV XLRA MEAS RIGHT PROX CCA PSV: 73 CM/SEC
BH CV XLRA MEAS RIGHT PROX ECA EDV: -58.8 CM/SEC
BH CV XLRA MEAS RIGHT PROX ECA PSV: -423.3 CM/SEC
BH CV XLRA MEAS RIGHT PROX ICA EDV: -19.9 CM/SEC
BH CV XLRA MEAS RIGHT PROX ICA PSV: -101.3 CM/SEC
BH CV XLRA MEAS RIGHT PROX SCLA PSV: 165.7 CM/SEC
BH CV XLRA MEAS RIGHT VERTEBRAL A EDV: 12.8 CM/SEC
BH CV XLRA MEAS RIGHT VERTEBRAL A PSV: 66.3 CM/SEC
BH CVPROX RIGHT ICA HIDDEN LRR: 1 CM
LEFT ARM BP: NORMAL MMHG
LEFT GROIN CFA SYS: 190.2 CM/SEC
Lab: 4.42
RIGHT ARM BP: NORMAL MMHG
UPPER ARTERIAL LEFT ARM BRACHIAL SYS MAX: NORMAL
UPPER ARTERIAL RIGHT ARM BRACHIAL SYS MAX: NORMAL

## 2024-06-03 PROCEDURE — 1159F MED LIST DOCD IN RCRD: CPT | Performed by: NURSE PRACTITIONER

## 2024-06-03 PROCEDURE — 93926 LOWER EXTREMITY STUDY: CPT | Performed by: SURGERY

## 2024-06-03 PROCEDURE — 93922 UPR/L XTREMITY ART 2 LEVELS: CPT | Performed by: SURGERY

## 2024-06-03 PROCEDURE — 99214 OFFICE O/P EST MOD 30 MIN: CPT | Performed by: NURSE PRACTITIONER

## 2024-06-03 PROCEDURE — 93922 UPR/L XTREMITY ART 2 LEVELS: CPT

## 2024-06-03 PROCEDURE — 93880 EXTRACRANIAL BILAT STUDY: CPT | Performed by: SURGERY

## 2024-06-03 PROCEDURE — 93978 VASCULAR STUDY: CPT | Performed by: SURGERY

## 2024-06-03 PROCEDURE — G2211 COMPLEX E/M VISIT ADD ON: HCPCS | Performed by: NURSE PRACTITIONER

## 2024-06-03 PROCEDURE — 1160F RVW MEDS BY RX/DR IN RCRD: CPT | Performed by: NURSE PRACTITIONER

## 2024-06-03 PROCEDURE — 93880 EXTRACRANIAL BILAT STUDY: CPT

## 2024-06-03 PROCEDURE — 93926 LOWER EXTREMITY STUDY: CPT

## 2024-06-03 PROCEDURE — 93978 VASCULAR STUDY: CPT

## 2024-06-03 RX ORDER — SODIUM CHLORIDE 9 MG/ML
500 INJECTION, SOLUTION INTRAVENOUS ONCE
Status: DISCONTINUED | OUTPATIENT
Start: 2024-06-06 | End: 2024-06-04

## 2024-06-03 RX ORDER — SODIUM CHLORIDE 9 MG/ML
100 INJECTION, SOLUTION INTRAVENOUS ONCE
Status: DISCONTINUED | OUTPATIENT
Start: 2024-06-06 | End: 2024-06-04

## 2024-06-03 NOTE — PROGRESS NOTES
Chief Complaint  Peripheral Vascular Disease, Aortic Aneurysm, and Carotid Artery Disease    Subjective        Erick Bruno presents to [facility] VASCULAR SURGERY  HPI   Erick Bruno is a 78 y.o. male that has been followed in our office by Dr. Marie for carotid artery stenosis and peripheral arterial disease.  He also has an abdominal aortic aneurysm.  He has a history of a left SFA angioplasty and stent in 2016.  In 2018, he had a left SFA and popliteal angioplasty.  In 2021, he had a right TCAR.  More recently, Dr. Marie attempted arteriogram, though could not get a 6 Hungarian sheath to consider him for any intervention.  He had discussed that he would need arm access or right common femoral endarterectomy as part of his access.  At that time, he was not having rest pain or tissue loss and also had multiple comorbidities, therefore we elected to continue medical management only.  He did end up getting a foot wound last year, though fortunately went on to heal without intervention.  He returns today in follow up along with a carotid duplex and ABIs. He  reports he had a cyst removed to his  back since the last visit. His wife reports this morning it started bleeding more than normal and had to pack it. He denies any symptoms consistent with CVA, TIA, or amaurosis fugax. He  reports he has had pain to his right foot, especially at night for the past 2 months. He describes this as a throbbing pain to the top of his right foot. He also has 2 areas of tissue loss to his right great and third toe from stubbing it several weeks ago.  Doing his medical history and his most recent labs, his creatinine was 1.4.  He was diagnosed with stage III kidney disease by his primary care provider, though his wife reports they were not made aware of this.  He does not follow with nephrology    Review of Systems   Constitutional:  Negative for fever.   Eyes:  Negative for visual disturbance.   Cardiovascular:   Negative for leg swelling.   Gastrointestinal:  Negative for abdominal pain.   Musculoskeletal:  Negative for back pain.   Skin:  Negative for color change, pallor and wound.   Neurological:  Negative for dizziness, facial asymmetry, speech difficulty and weakness.        Erick Bruno  reports that he quit smoking about 16 years ago. His smoking use included cigarettes. He started smoking about 76 years ago. He has a 60 pack-year smoking history. He has been exposed to tobacco smoke. He has never used smokeless tobacco..        Objective   Vital Signs:  Vitals:    06/03/24 1043   BP: 150/64      Body mass index is 25.83 kg/m².   BMI is >= 25 and <30. (Overweight) The following options were offered after discussion;: information on healthy weight added to patient's after visit summary        Physical Exam  Vitals reviewed.   Constitutional:       Appearance: Normal appearance.   HENT:      Head: Normocephalic.   Cardiovascular:      Rate and Rhythm: Normal rate and regular rhythm.      Pulses: Normal pulses.           Dorsalis pedis pulses are 3+ on the right side and 3+ on the left side.        Posterior tibial pulses are 3+ on the right side and 3+ on the left side.   Pulmonary:      Effort: Pulmonary effort is normal.   Skin:     General: Skin is warm.   Neurological:      General: No focal deficit present.      Mental Status: He is alert and oriented to person, place, and time.   Psychiatric:         Mood and Affect: Mood normal.          Result Review :      Previous carotid duplex: Patent stent with no stenosis.  On the left, 50 to 69% stenosis.  Left vertebral artery occlusion    Previous aortic duplex: 3.7 cm.  Duplex Aorta IVC Iliac Graft Complete CAR (06/03/2024 10:06)   Carotid duplex from today: Duplex Carotid Ultrasound CAR (06/03/2024 09:40)     Previous ABIs: 0.55 and 0.49    ABIs today:Doppler Ankle Brachial Index Single Level CAR (06/03/2024 10:34)   Duplex Lower Extremity Art / Grafts - Left CAR  (06/03/2024 10:18)     CMP          10/19/2023    08:08 4/29/2024    10:56   CMP   Glucose 244  204    BUN 33  32    Creatinine 1.45  1.40    Sodium 137  136    Potassium 4.4  5.0    Chloride 99  99    Calcium 9.2  9.1    Total Protein 6.8  7.1    Albumin 4.4  4.4    Globulin 2.4  2.7    Total Bilirubin 0.5  0.5    Alkaline Phosphatase 139  134    AST (SGOT) 26  19    ALT (SGPT) 23  22    BUN/Creatinine Ratio 22.8  22.9                   Assessment and Plan     Diagnoses and all orders for this visit:    1. Abdominal aortic aneurysm (AAA) 3.0 cm to 5.5 cm in diameter in male (Primary)    2. Atherosclerosis of native artery of right lower extremity with rest pain  -     CT Angio Abdominal Aorta Bilateral Iliofem Runoff; Future  -     sodium chloride 0.9 % infusion  -     sodium chloride 0.9 % infusion    3. Bilateral carotid artery stenosis    4. Stage 3a chronic kidney disease (CKD)  -     Ambulatory Referral to Nephrology  -     sodium chloride 0.9 % infusion  -     sodium chloride 0.9 % infusion    5. Diabetic peripheral neuropathy             Patient presents today for ongoing management of his  carotid artery stenosis and peripheral arterial disease. He has worsening in stent stenosis and complains of rest pain to his right foot has had a drop to his JODI to 0.42.  He also has early tissue loss to his right great toe and right toe.  For this this reason, I recommended that he proceed with a CT angiogram with bilateral lower extremity runoff.  We will order this with saline protocol due to his chronic kidney disease.  I have also sent a [facility] for evaluation management of his chronic kidney disease.  He is to continue his antiplatelet agents which are aspirin and Plavix.  He is also on Eliquis. He is on a statin for cholesterol control.  He will have the CT angiogram done this week and return to the office to discuss the results with Dr. Marie  Follow Up     Return in about 4 weeks  (around 7/1/2024) for see BGT to review CTA .  Patient was given instructions and counseling regarding his condition or for health maintenance advice. Please see specific information pulled into the AVS if appropriate.     DEBORAH Villafuerte

## 2024-06-03 NOTE — PATIENT INSTRUCTIONS
"\"3-3-0-Almost None!\"  Healthy Habits Start Early    EAT 5 OR MORE SERVINGS OF VEGETABLES AND FRUITS EVERY DAY.    Help Erick get three vegetables and two fruits each day. Red, green, yellow, orange...encourage them to try all the colors so they can enjoy different flavors and get more vitamins.    How can I help Erick do this?  ---------------------------------------------  -BE PATIENT WITH Erick, remember it may take 10 times before they start to like new food. So, start with small bites and just keep trying.  -Serve at least one vegetable or fruit at every meal. Even try two. Remember, portions do not have to be as big as you think.  -Encourage eating fruits and vegetables instead of drinking them..it's a better way to get fiber and vitamins..so limit the amount of juice to 1/2 cup per day for children 1-6 years and one cup per day for children 7-18 years of age. Try using 1/2 part water and 1/2 part juice.    Spend less than two hours per day watching television and other screen media. Screen media includes video games, movies and computer use for entertainment.    How can I help Erick do this?  -Turn off the TV at dinner. Dinner is the best time to hang out with your kids and just talk, learn about their day, and tell them about your day. Your kids have a lot to learn from you and dinner is a great time to share.  "

## 2024-06-04 DIAGNOSIS — N18.30 CKD STAGE 3 SECONDARY TO DIABETES: Primary | ICD-10-CM

## 2024-06-04 DIAGNOSIS — E11.22 CKD STAGE 3 SECONDARY TO DIABETES: Primary | ICD-10-CM

## 2024-06-06 ENCOUNTER — HOSPITAL ENCOUNTER (OUTPATIENT)
Dept: RADIOLOGY | Facility: HOSPITAL | Age: 79
Discharge: HOME | End: 2024-06-06
Payer: MEDICARE

## 2024-06-06 ENCOUNTER — HOSPITAL ENCOUNTER (OUTPATIENT)
Dept: CT IMAGING | Facility: HOSPITAL | Age: 79
Discharge: HOME | End: 2024-06-06
Payer: MEDICARE

## 2024-06-06 DIAGNOSIS — N18.30 CKD STAGE 3 SECONDARY TO DIABETES: ICD-10-CM

## 2024-06-06 DIAGNOSIS — I70.221 ATHEROSCLEROSIS OF NATIVE ARTERY OF RIGHT LOWER EXTREMITY WITH REST PAIN: ICD-10-CM

## 2024-06-06 DIAGNOSIS — E11.22 CKD STAGE 3 SECONDARY TO DIABETES: ICD-10-CM

## 2024-06-06 LAB
CREAT BLDA-MCNC: 1.7 MG/DL (ref 0.6–1.3)
GLUCOSE BLDC GLUCOMTR-MCNC: 170 MG/DL (ref 70–130)

## 2024-06-06 PROCEDURE — 96360 HYDRATION IV INFUSION INIT: CPT

## 2024-06-06 PROCEDURE — 82565 ASSAY OF CREATININE: CPT

## 2024-06-06 PROCEDURE — 75635 CT ANGIO ABDOMINAL ARTERIES: CPT

## 2024-06-06 PROCEDURE — 96361 HYDRATE IV INFUSION ADD-ON: CPT

## 2024-06-06 PROCEDURE — 82948 REAGENT STRIP/BLOOD GLUCOSE: CPT

## 2024-06-06 PROCEDURE — 25510000001 IOPAMIDOL PER 1 ML: Performed by: NURSE PRACTITIONER

## 2024-06-06 RX ORDER — SODIUM CHLORIDE 9 MG/ML
100 INJECTION, SOLUTION INTRAVENOUS CONTINUOUS
Status: ACTIVE | OUTPATIENT
Start: 2024-06-06 | End: 2024-06-06

## 2024-06-06 RX ORDER — SODIUM CHLORIDE 9 MG/ML
500 INJECTION, SOLUTION INTRAVENOUS CONTINUOUS
Status: DISCONTINUED | OUTPATIENT
Start: 2024-06-06 | End: 2024-06-07 | Stop reason: HOSPADM

## 2024-06-06 RX ADMIN — SODIUM CHLORIDE 100 ML/HR: 900 INJECTION INTRAVENOUS at 11:00

## 2024-06-06 RX ADMIN — IOPAMIDOL 95 ML: 755 INJECTION, SOLUTION INTRAVENOUS at 10:41

## 2024-06-06 RX ADMIN — SODIUM CHLORIDE 500 ML/HR: 900 INJECTION INTRAVENOUS at 09:20

## 2024-06-10 ENCOUNTER — OFFICE VISIT (OUTPATIENT)
Age: 79
End: 2024-06-10
Payer: MEDICARE

## 2024-06-10 VITALS
BODY MASS INDEX: 25.92 KG/M2 | DIASTOLIC BLOOD PRESSURE: 62 MMHG | HEIGHT: 69 IN | SYSTOLIC BLOOD PRESSURE: 158 MMHG | HEART RATE: 56 BPM | WEIGHT: 175 LBS

## 2024-06-10 DIAGNOSIS — I70.221 ATHEROSCLEROSIS OF NATIVE ARTERY OF RIGHT LOWER EXTREMITY WITH REST PAIN: Primary | ICD-10-CM

## 2024-06-10 DIAGNOSIS — N18.31 STAGE 3A CHRONIC KIDNEY DISEASE: ICD-10-CM

## 2024-06-10 DIAGNOSIS — Z86.39 HISTORY OF UNCONTROLLED DIABETES: ICD-10-CM

## 2024-06-10 PROCEDURE — G2211 COMPLEX E/M VISIT ADD ON: HCPCS | Performed by: SURGERY

## 2024-06-10 PROCEDURE — 99214 OFFICE O/P EST MOD 30 MIN: CPT | Performed by: SURGERY

## 2024-06-10 NOTE — PROGRESS NOTES
"Chief Complaint  Aortic Aneurysm    Subjective          HPI: Erick Bruno presents to [facility] VASCULAR SURGERY peripheral arterial disease with tissue loss of the right foot    Review of Systems     History Review Reviewed Comments   Past Medical History:  [x]     Past Surgical History: [x]  Left SFA stent   Family History: [x]     Social History: [x]       Objective   Vital Signs:  /62   Pulse 56   Ht 175.3 cm (69.02\")   Wt 79.4 kg (175 lb)   BMI 25.83 kg/m²   Estimated body mass index is 25.83 kg/m² as calculated from the following:    Height as of this encounter: 175.3 cm (69.02\").    Weight as of this encounter: 79.4 kg (175 lb).              Physical Exam  Vascular: Tissue loss of the right foot      Result Review :    Common labs          10/19/2023    08:08 4/29/2024    10:56 6/6/2024    09:11   Common Labs   Glucose 244  204     BUN 33  32     Creatinine 1.45  1.40  1.70    Sodium 137  136     Potassium 4.4  5.0     Chloride 99  99     Calcium 9.2  9.1     Total Protein 6.8  7.1     Albumin 4.4  4.4     Total Bilirubin 0.5  0.5     Alkaline Phosphatase 139  134     AST (SGOT) 26  19     ALT (SGPT) 23  22     Total Cholesterol 159  202     Triglycerides 202  168     HDL Cholesterol 34  43     LDL Cholesterol  90  129     Hemoglobin A1C 6.30  9.30     Microalbumin, Urine 27.5      PSA 0.681        Most notable findings include: A1c of 9.3 which is worse creatinine of 1.7 which is also worse    CT Angio Abdominal Aorta Bilateral Iliofem Runoff (06/06/2024 10:40)         Erick Bruno  reports that he quit smoking about 16 years ago. His smoking use included cigarettes. He started smoking about 76 years ago. He has a 60 pack-year smoking history. He has been exposed to tobacco smoke. He has never used smokeless tobacco..           Assessment and Plan     Assessment & Plan  Atherosclerosis of native artery of right lower extremity with rest pain    History of uncontrolled " diabetes    Stage 3a chronic kidney disease     Mr. Bruno is a 78-year-old gentleman who I have followed for multiple vascular issues.  His medical records were again reviewed.  He has a history of carotid artery stenosis and peripheral arterial disease.  There is a history of a left SFA angioplasty and stent placement back in 2016 and again in 2018.  In 2021 he had a right-sided TCAR.  More recently I tried to gain access through his right leg to intervene on his in-stent stenosis on the left and once barely able to get obtain access due to extensive calcifications.  I ended up recommending medical management.  He did end up getting a foot wound that went on to heal without intervention.  He Shola presented to our nurse practitioner recently with a stumped great toe on the right and an JODI that had declined from 0.55 down to 0.45.  She ordered a CT angiogram and short-term follow-up.  The patient has extensive disease.  He has an infrarenal aortic dissection with some aneurysmal degeneration.  There is also a right greater than left orificial common iliac artery stenosis.  There is extensive external iliac disease on the right as well as multifocal disease and an extremely heavily calcified SFA.    Please see the photograph above under exam.  The tissue loss is superficial.  He may have adequate perfusion to heal but clearly a major concern.  He is quite ill and I am not sure how well he would do with any surgical interventions.  Options are limited.  I ultimately decided to give him a few weeks to see how he does with wound care alone to see if these wounds improve as his contralateral leg, or if they worsen.  He is going to call me sooner if anything changes.  Otherwise follow-up in 4 weeks.  Significant risk of amputation discussed with patient and family.             Follow Up     No follow-ups on file.  Patient was given instructions and counseling regarding his condition or for health maintenance advice. Please  see specific information pulled into the AVS if appropriate.

## 2024-06-13 ENCOUNTER — TELEPHONE (OUTPATIENT)
Dept: CARDIOLOGY | Facility: CLINIC | Age: 79
End: 2024-06-13
Payer: MEDICARE

## 2024-06-13 ENCOUNTER — TELEPHONE (OUTPATIENT)
Dept: FAMILY MEDICINE CLINIC | Facility: CLINIC | Age: 79
End: 2024-06-13

## 2024-06-13 NOTE — TELEPHONE ENCOUNTER
Caller: Marcelle Bruno (POA)    Relationship: Emergency Contact    Best call back number: [phone]     Which medication are you concerned about: empagliflozin (JARDIANCE) 10 MG tablet tablet     Who prescribed you this medication: DR BENNETT BARRAZA    What are your concerns: PATIENT'S WIFE STATES THAT HE WAS DIAGNOSED WITH KIDNEY DISEASE BY ANOTHER DOCTOR. NEED TO DISCUSS DISCONTINUING ABOVE MEDICATION, AND VERIFY IF ANY OTHER MEDICATIONS MAY AFFECT HIS CONDITION. PLEASE CALL AND ADVISE

## 2024-06-13 NOTE — TELEPHONE ENCOUNTER
Pt's wife called with concern over pt's OAC.  She says pt was recently diagnosed with stg 4 CKD.  Pt is presently on apixaban.  She was told by pt's pharmacist and vascular provider that perhaps rivaroxaban might be a better alternative for pt.  She also notes that with pt currently on BID medication, it is challenging because essentially pt will be sleeping too much to be able to take a BID medicine, and so a QD med would be preferable for him.  Due to request to switch OAC and also pt's somnolence, I added him onto schedule to be seen this Monday.  I encouraged for them to bring either all his medication bottles in, or a current list of everything he's taking so we can minimize nephrotoxic medications.    Thank you,    Rula CARDONA RN  Triage [facility] [place]  06/13/24 13:01 EDT

## 2024-06-17 ENCOUNTER — TELEPHONE (OUTPATIENT)
Age: 79
End: 2024-06-17

## 2024-06-17 ENCOUNTER — OFFICE VISIT (OUTPATIENT)
Age: 79
End: 2024-06-17
Payer: MEDICARE

## 2024-06-17 VITALS
SYSTOLIC BLOOD PRESSURE: 174 MMHG | HEART RATE: 51 BPM | HEIGHT: 69 IN | BODY MASS INDEX: 25.92 KG/M2 | DIASTOLIC BLOOD PRESSURE: 65 MMHG | WEIGHT: 175 LBS

## 2024-06-17 DIAGNOSIS — I73.9 PVD (PERIPHERAL VASCULAR DISEASE): Primary | ICD-10-CM

## 2024-06-17 PROCEDURE — 99213 OFFICE O/P EST LOW 20 MIN: CPT | Performed by: SURGERY

## 2024-06-17 NOTE — PROGRESS NOTES
"Chief Complaint  Follow-up    Subjective          HPI: Erick Bruno presents to [facility] VASCULAR SURGERY peripheral arterial disease with tissue loss of the right foot.  They asked if he is seen today because of concerns with worsening on the right foot wounds    Review of Systems     History Review Reviewed Comments   Past Medical History:  [x]     Past Surgical History: [x]  Left SFA stent   Family History: [x]     Social History: [x]       Objective   Vital Signs:  /65 (BP Location: Right arm)   Pulse 51   Ht 175.3 cm (69.02\")   Wt 79.4 kg (175 lb)   BMI 25.83 kg/m²   Estimated body mass index is 25.83 kg/m² as calculated from the following:    Height as of this encounter: 175.3 cm (69.02\").    Weight as of this encounter: 79.4 kg (175 lb).              Physical Exam  Vascular: Tissue loss of the right foot      Result Review :    Common labs          10/19/2023    08:08 4/29/2024    10:56 6/6/2024    09:11   Common Labs   Glucose 244  204     BUN 33  32     Creatinine 1.45  1.40  1.70    Sodium 137  136     Potassium 4.4  5.0     Chloride 99  99     Calcium 9.2  9.1     Total Protein 6.8  7.1     Albumin 4.4  4.4     Total Bilirubin 0.5  0.5     Alkaline Phosphatase 139  134     AST (SGOT) 26  19     ALT (SGPT) 23  22     Total Cholesterol 159  202     Triglycerides 202  168     HDL Cholesterol 34  43     LDL Cholesterol  90  129     Hemoglobin A1C 6.30  9.30     Microalbumin, Urine 27.5      PSA 0.681        Most notable findings include: A1c of 9.3 which is worse creatinine of 1.7 which is also worse    CT Angio Abdominal Aorta Bilateral Iliofem Runoff (06/06/2024 10:40)         Erick Bruno  reports that he quit smoking about 16 years ago. His smoking use included cigarettes. He started smoking about 76 years ago. He has a 60 pack-year smoking history. He has been exposed to tobacco smoke. He has never used smokeless tobacco..           Assessment and Plan     Assessment & " Plan     Mr. Bruno is a 78-year-old gentleman who I have followed for multiple vascular issues.  His medical records were again reviewed.  He has a history of carotid artery stenosis and peripheral arterial disease.  There is a history of a left SFA angioplasty and stent placement back in 2016 and again in 2018.  In 2021 he had a right-sided TCAR.  More recently I tried to gain access through his right leg to intervene on his in-stent stenosis on the left and once barely able to get obtain access due to extensive calcifications.  I ended up recommending medical management.  He did end up getting a foot wound that went on to heal without intervention.  He Shola presented to our nurse practitioner recently with a stumped great toe on the right and an JODI that had declined from 0.55 down to 0.45.  She ordered a CT angiogram and short-term follow-up.  The patient has extensive disease.  He has an infrarenal aortic dissection with some aneurysmal degeneration.  There is also a right greater than left orificial common iliac artery stenosis.  There is extensive external iliac disease on the right as well as multifocal disease and an extremely heavily calcified SFA.   Options are limited.  I ultimately decided to give him a few weeks to see how he does with wound care alone to see if these wounds improve as his contralateral leg, or if they worsen.    I called our office today to be added on because of concerns for worsening of the wound.  I took down his dressings and actually the wounds are all improved.  He really has toenail bed wound on the right foot great toe and the eschar at the tip is now gone.  He has a slid on the end of the second digit with a moderate amount appropriations exudate and slough but no erythema or drainage.  I cleaned these up in a nonselective fashion.  Follow-up as previously scheduled and continue wound care     Follow Up     Return for Patient should keep previously scheduled  appointment.  Patient was given instructions and counseling regarding his condition or for health maintenance advice. Please see specific information pulled into the AVS if appropriate.

## 2024-06-17 NOTE — TELEPHONE ENCOUNTER
Pt spouse called with concern for right first and second toes. States both toes now have slough and discoloration. She is unable to upload photo into [employer]. Appt made for this afternoon with Dr. Marie in the clinic.

## 2024-06-20 ENCOUNTER — APPOINTMENT (OUTPATIENT)
Dept: CT IMAGING | Facility: HOSPITAL | Age: 79
DRG: 908 | End: 2024-06-20
Payer: MEDICARE

## 2024-06-20 ENCOUNTER — HOSPITAL ENCOUNTER (INPATIENT)
Facility: HOSPITAL | Age: 79
LOS: 1 days | Discharge: HOME | DRG: 908 | End: 2024-06-20
Attending: EMERGENCY MEDICINE | Admitting: HOSPITALIST
Payer: MEDICARE

## 2024-06-20 ENCOUNTER — READMISSION MANAGEMENT (OUTPATIENT)
Dept: CALL CENTER | Facility: HOSPITAL | Age: 79
End: 2024-06-20
Payer: MEDICARE

## 2024-06-20 VITALS
BODY MASS INDEX: 25.92 KG/M2 | HEIGHT: 69 IN | DIASTOLIC BLOOD PRESSURE: 80 MMHG | HEART RATE: 51 BPM | OXYGEN SATURATION: 99 % | TEMPERATURE: 98.9 F | RESPIRATION RATE: 16 BRPM | SYSTOLIC BLOOD PRESSURE: 189 MMHG | WEIGHT: 175 LBS

## 2024-06-20 DIAGNOSIS — T14.8XXA BLEEDING FROM WOUND: ICD-10-CM

## 2024-06-20 DIAGNOSIS — N18.32 STAGE 3B CHRONIC KIDNEY DISEASE: ICD-10-CM

## 2024-06-20 DIAGNOSIS — S21.209A OPEN WOUND OF MIDLINE OF BACK: Primary | ICD-10-CM

## 2024-06-20 DIAGNOSIS — R00.1 BRADYCARDIA: ICD-10-CM

## 2024-06-20 DIAGNOSIS — Z79.01 ANTICOAGULATED: ICD-10-CM

## 2024-06-20 LAB
ALBUMIN SERPL-MCNC: 4.2 G/DL (ref 3.5–5.2)
ALBUMIN/GLOB SERPL: 1.4 G/DL
ALP SERPL-CCNC: 132 U/L (ref 39–117)
ALT SERPL W P-5'-P-CCNC: 21 U/L (ref 1–41)
ANION GAP SERPL CALCULATED.3IONS-SCNC: 6.5 MMOL/L (ref 5–15)
ANION GAP SERPL CALCULATED.3IONS-SCNC: 7.9 MMOL/L (ref 5–15)
APTT PPP: 31.6 SECONDS (ref 22.7–35.4)
AST SERPL-CCNC: 19 U/L (ref 1–40)
BASOPHILS # BLD AUTO: 0.05 10*3/MM3 (ref 0–0.2)
BASOPHILS # BLD AUTO: 0.07 10*3/MM3 (ref 0–0.2)
BASOPHILS NFR BLD AUTO: 0.5 % (ref 0–1.5)
BASOPHILS NFR BLD AUTO: 0.8 % (ref 0–1.5)
BILIRUB SERPL-MCNC: 0.4 MG/DL (ref 0–1.2)
BUN SERPL-MCNC: 26 MG/DL (ref 8–23)
BUN SERPL-MCNC: 27 MG/DL (ref 8–23)
BUN/CREAT SERPL: 16.4 (ref 7–25)
BUN/CREAT SERPL: 17 (ref 7–25)
CALCIUM SPEC-SCNC: 8.7 MG/DL (ref 8.6–10.5)
CALCIUM SPEC-SCNC: 8.9 MG/DL (ref 8.6–10.5)
CHLORIDE SERPL-SCNC: 105 MMOL/L (ref 98–107)
CHLORIDE SERPL-SCNC: 105 MMOL/L (ref 98–107)
CO2 SERPL-SCNC: 23.1 MMOL/L (ref 22–29)
CO2 SERPL-SCNC: 24.5 MMOL/L (ref 22–29)
CREAT SERPL-MCNC: 1.53 MG/DL (ref 0.76–1.27)
CREAT SERPL-MCNC: 1.65 MG/DL (ref 0.76–1.27)
DEPRECATED RDW RBC AUTO: 44.8 FL (ref 37–54)
DEPRECATED RDW RBC AUTO: 45.7 FL (ref 37–54)
EGFRCR SERPLBLD CKD-EPI 2021: 42.2 ML/MIN/1.73
EGFRCR SERPLBLD CKD-EPI 2021: 46.2 ML/MIN/1.73
EOSINOPHIL # BLD AUTO: 0.24 10*3/MM3 (ref 0–0.4)
EOSINOPHIL # BLD AUTO: 0.31 10*3/MM3 (ref 0–0.4)
EOSINOPHIL NFR BLD AUTO: 2.6 % (ref 0.3–6.2)
EOSINOPHIL NFR BLD AUTO: 3.5 % (ref 0.3–6.2)
ERYTHROCYTE [DISTWIDTH] IN BLOOD BY AUTOMATED COUNT: 13.1 % (ref 12.3–15.4)
ERYTHROCYTE [DISTWIDTH] IN BLOOD BY AUTOMATED COUNT: 13.3 % (ref 12.3–15.4)
GLOBULIN UR ELPH-MCNC: 2.9 GM/DL
GLUCOSE BLDC GLUCOMTR-MCNC: 154 MG/DL (ref 70–130)
GLUCOSE BLDC GLUCOMTR-MCNC: 195 MG/DL (ref 70–130)
GLUCOSE SERPL-MCNC: 199 MG/DL (ref 65–99)
GLUCOSE SERPL-MCNC: 228 MG/DL (ref 65–99)
HCT VFR BLD AUTO: 35.6 % (ref 37.5–51)
HCT VFR BLD AUTO: 40 % (ref 37.5–51)
HGB BLD-MCNC: 11.6 G/DL (ref 13–17.7)
HGB BLD-MCNC: 13.1 G/DL (ref 13–17.7)
IMM GRANULOCYTES # BLD AUTO: 0.04 10*3/MM3 (ref 0–0.05)
IMM GRANULOCYTES # BLD AUTO: 0.04 10*3/MM3 (ref 0–0.05)
IMM GRANULOCYTES NFR BLD AUTO: 0.4 % (ref 0–0.5)
IMM GRANULOCYTES NFR BLD AUTO: 0.5 % (ref 0–0.5)
INR PPP: 1.15 (ref 0.9–1.1)
LYMPHOCYTES # BLD AUTO: 2.01 10*3/MM3 (ref 0.7–3.1)
LYMPHOCYTES # BLD AUTO: 2.16 10*3/MM3 (ref 0.7–3.1)
LYMPHOCYTES NFR BLD AUTO: 21.4 % (ref 19.6–45.3)
LYMPHOCYTES NFR BLD AUTO: 24.6 % (ref 19.6–45.3)
MCH RBC QN AUTO: 30.4 PG (ref 26.6–33)
MCH RBC QN AUTO: 31 PG (ref 26.6–33)
MCHC RBC AUTO-ENTMCNC: 32.6 G/DL (ref 31.5–35.7)
MCHC RBC AUTO-ENTMCNC: 32.8 G/DL (ref 31.5–35.7)
MCV RBC AUTO: 93.4 FL (ref 79–97)
MCV RBC AUTO: 94.6 FL (ref 79–97)
MONOCYTES # BLD AUTO: 0.63 10*3/MM3 (ref 0.1–0.9)
MONOCYTES # BLD AUTO: 0.67 10*3/MM3 (ref 0.1–0.9)
MONOCYTES NFR BLD AUTO: 6.7 % (ref 5–12)
MONOCYTES NFR BLD AUTO: 7.6 % (ref 5–12)
NEUTROPHILS NFR BLD AUTO: 5.52 10*3/MM3 (ref 1.7–7)
NEUTROPHILS NFR BLD AUTO: 6.41 10*3/MM3 (ref 1.7–7)
NEUTROPHILS NFR BLD AUTO: 63 % (ref 42.7–76)
NEUTROPHILS NFR BLD AUTO: 68.4 % (ref 42.7–76)
NRBC BLD AUTO-RTO: 0 /100 WBC (ref 0–0.2)
NRBC BLD AUTO-RTO: 0 /100 WBC (ref 0–0.2)
PLATELET # BLD AUTO: 200 10*3/MM3 (ref 140–450)
PLATELET # BLD AUTO: 208 10*3/MM3 (ref 140–450)
PMV BLD AUTO: 9.2 FL (ref 6–12)
PMV BLD AUTO: 9.2 FL (ref 6–12)
POTASSIUM SERPL-SCNC: 4.1 MMOL/L (ref 3.5–5.2)
POTASSIUM SERPL-SCNC: 4.3 MMOL/L (ref 3.5–5.2)
PROT SERPL-MCNC: 7.1 G/DL (ref 6–8.5)
PROTHROMBIN TIME: 14.9 SECONDS (ref 11.7–14.2)
RBC # BLD AUTO: 3.81 10*6/MM3 (ref 4.14–5.8)
RBC # BLD AUTO: 4.23 10*6/MM3 (ref 4.14–5.8)
SODIUM SERPL-SCNC: 136 MMOL/L (ref 136–145)
SODIUM SERPL-SCNC: 136 MMOL/L (ref 136–145)
WBC NRBC COR # BLD AUTO: 8.77 10*3/MM3 (ref 3.4–10.8)
WBC NRBC COR # BLD AUTO: 9.38 10*3/MM3 (ref 3.4–10.8)

## 2024-06-20 PROCEDURE — 93010 ELECTROCARDIOGRAM REPORT: CPT | Performed by: INTERNAL MEDICINE

## 2024-06-20 PROCEDURE — 99223 1ST HOSP IP/OBS HIGH 75: CPT | Performed by: STUDENT IN AN ORGANIZED HEALTH CARE EDUCATION/TRAINING PROGRAM

## 2024-06-20 PROCEDURE — 71250 CT THORAX DX C-: CPT

## 2024-06-20 PROCEDURE — 80053 COMPREHEN METABOLIC PANEL: CPT | Performed by: PHYSICIAN ASSISTANT

## 2024-06-20 PROCEDURE — 85730 THROMBOPLASTIN TIME PARTIAL: CPT | Performed by: PHYSICIAN ASSISTANT

## 2024-06-20 PROCEDURE — 93005 ELECTROCARDIOGRAM TRACING: CPT | Performed by: PHYSICIAN ASSISTANT

## 2024-06-20 PROCEDURE — 87077 CULTURE AEROBIC IDENTIFY: CPT

## 2024-06-20 PROCEDURE — 87186 SC STD MICRODIL/AGAR DIL: CPT | Performed by: NURSE PRACTITIONER

## 2024-06-20 PROCEDURE — 87205 SMEAR GRAM STAIN: CPT | Performed by: NURSE PRACTITIONER

## 2024-06-20 PROCEDURE — 85025 COMPLETE CBC W/AUTO DIFF WBC: CPT | Performed by: PHYSICIAN ASSISTANT

## 2024-06-20 PROCEDURE — 87077 CULTURE AEROBIC IDENTIFY: CPT | Performed by: NURSE PRACTITIONER

## 2024-06-20 PROCEDURE — 85610 PROTHROMBIN TIME: CPT | Performed by: PHYSICIAN ASSISTANT

## 2024-06-20 PROCEDURE — 0W3K3ZZ CONTROL BLEEDING IN UPPER BACK, PERCUTANEOUS APPROACH: ICD-10-PCS | Performed by: STUDENT IN AN ORGANIZED HEALTH CARE EDUCATION/TRAINING PROGRAM

## 2024-06-20 PROCEDURE — 99284 EMERGENCY DEPT VISIT MOD MDM: CPT

## 2024-06-20 PROCEDURE — 87070 CULTURE OTHR SPECIMN AEROBIC: CPT | Performed by: NURSE PRACTITIONER

## 2024-06-20 PROCEDURE — 87186 SC STD MICRODIL/AGAR DIL: CPT

## 2024-06-20 PROCEDURE — 82948 REAGENT STRIP/BLOOD GLUCOSE: CPT

## 2024-06-20 PROCEDURE — 85025 COMPLETE CBC W/AUTO DIFF WBC: CPT | Performed by: NURSE PRACTITIONER

## 2024-06-20 RX ORDER — DEXTROSE MONOHYDRATE 25 G/50ML
25 INJECTION, SOLUTION INTRAVENOUS
Status: DISCONTINUED | OUTPATIENT
Start: 2024-06-20 | End: 2024-06-20 | Stop reason: HOSPADM

## 2024-06-20 RX ORDER — BISACODYL 10 MG
10 SUPPOSITORY, RECTAL RECTAL DAILY PRN
Status: DISCONTINUED | OUTPATIENT
Start: 2024-06-20 | End: 2024-06-20 | Stop reason: HOSPADM

## 2024-06-20 RX ORDER — IBUPROFEN 600 MG/1
1 TABLET ORAL
Status: DISCONTINUED | OUTPATIENT
Start: 2024-06-20 | End: 2024-06-20 | Stop reason: HOSPADM

## 2024-06-20 RX ORDER — SODIUM CHLORIDE 0.9 % (FLUSH) 0.9 %
10 SYRINGE (ML) INJECTION AS NEEDED
Status: DISCONTINUED | OUTPATIENT
Start: 2024-06-20 | End: 2024-06-20 | Stop reason: HOSPADM

## 2024-06-20 RX ORDER — ACETAMINOPHEN 160 MG/5ML
650 SOLUTION ORAL EVERY 4 HOURS PRN
Status: DISCONTINUED | OUTPATIENT
Start: 2024-06-20 | End: 2024-06-20

## 2024-06-20 RX ORDER — BUSPIRONE HYDROCHLORIDE 5 MG/1
5 TABLET ORAL 2 TIMES DAILY
Status: DISCONTINUED | OUTPATIENT
Start: 2024-06-20 | End: 2024-06-20 | Stop reason: HOSPADM

## 2024-06-20 RX ORDER — ACETAMINOPHEN 650 MG/1
650 SUPPOSITORY RECTAL EVERY 4 HOURS PRN
Status: DISCONTINUED | OUTPATIENT
Start: 2024-06-20 | End: 2024-06-20

## 2024-06-20 RX ORDER — PANTOPRAZOLE SODIUM 40 MG/1
40 TABLET, DELAYED RELEASE ORAL
Status: DISCONTINUED | OUTPATIENT
Start: 2024-06-20 | End: 2024-06-20 | Stop reason: HOSPADM

## 2024-06-20 RX ORDER — METOPROLOL SUCCINATE 25 MG/1
25 TABLET, EXTENDED RELEASE ORAL 2 TIMES DAILY
Status: DISCONTINUED | OUTPATIENT
Start: 2024-06-20 | End: 2024-06-20 | Stop reason: HOSPADM

## 2024-06-20 RX ORDER — SODIUM CHLORIDE 0.9 % (FLUSH) 0.9 %
10 SYRINGE (ML) INJECTION EVERY 12 HOURS SCHEDULED
Status: DISCONTINUED | OUTPATIENT
Start: 2024-06-20 | End: 2024-06-20 | Stop reason: HOSPADM

## 2024-06-20 RX ORDER — ONDANSETRON 2 MG/ML
4 INJECTION INTRAMUSCULAR; INTRAVENOUS EVERY 6 HOURS PRN
Status: DISCONTINUED | OUTPATIENT
Start: 2024-06-20 | End: 2024-06-20 | Stop reason: HOSPADM

## 2024-06-20 RX ORDER — BISACODYL 5 MG/1
5 TABLET, DELAYED RELEASE ORAL DAILY PRN
Status: DISCONTINUED | OUTPATIENT
Start: 2024-06-20 | End: 2024-06-20 | Stop reason: HOSPADM

## 2024-06-20 RX ORDER — ESCITALOPRAM OXALATE 10 MG/1
10 TABLET ORAL NIGHTLY
Status: DISCONTINUED | OUTPATIENT
Start: 2024-06-20 | End: 2024-06-20 | Stop reason: HOSPADM

## 2024-06-20 RX ORDER — NICOTINE POLACRILEX 4 MG
15 LOZENGE BUCCAL
Status: DISCONTINUED | OUTPATIENT
Start: 2024-06-20 | End: 2024-06-20 | Stop reason: HOSPADM

## 2024-06-20 RX ORDER — POLYETHYLENE GLYCOL 3350 17 G/17G
17 POWDER, FOR SOLUTION ORAL DAILY PRN
Status: DISCONTINUED | OUTPATIENT
Start: 2024-06-20 | End: 2024-06-20 | Stop reason: HOSPADM

## 2024-06-20 RX ORDER — ATORVASTATIN CALCIUM 80 MG/1
80 TABLET, FILM COATED ORAL DAILY
Status: DISCONTINUED | OUTPATIENT
Start: 2024-06-20 | End: 2024-06-20 | Stop reason: HOSPADM

## 2024-06-20 RX ORDER — AMOXICILLIN 250 MG
2 CAPSULE ORAL 2 TIMES DAILY PRN
Status: DISCONTINUED | OUTPATIENT
Start: 2024-06-20 | End: 2024-06-20 | Stop reason: HOSPADM

## 2024-06-20 RX ORDER — ACETAMINOPHEN 325 MG/1
650 TABLET ORAL EVERY 4 HOURS PRN
Status: DISCONTINUED | OUTPATIENT
Start: 2024-06-20 | End: 2024-06-20 | Stop reason: HOSPADM

## 2024-06-20 RX ORDER — LORAZEPAM 0.5 MG/1
0.5 TABLET ORAL EVERY 8 HOURS PRN
Status: DISCONTINUED | OUTPATIENT
Start: 2024-06-20 | End: 2024-06-20 | Stop reason: HOSPADM

## 2024-06-20 RX ORDER — ISOSORBIDE MONONITRATE 30 MG/1
30 TABLET, EXTENDED RELEASE ORAL NIGHTLY
Status: DISCONTINUED | OUTPATIENT
Start: 2024-06-20 | End: 2024-06-20 | Stop reason: HOSPADM

## 2024-06-20 RX ORDER — SODIUM CHLORIDE 9 MG/ML
40 INJECTION, SOLUTION INTRAVENOUS AS NEEDED
Status: DISCONTINUED | OUTPATIENT
Start: 2024-06-20 | End: 2024-06-20 | Stop reason: HOSPADM

## 2024-06-20 RX ORDER — INSULIN LISPRO 100 [IU]/ML
2-9 INJECTION, SOLUTION INTRAVENOUS; SUBCUTANEOUS
Status: DISCONTINUED | OUTPATIENT
Start: 2024-06-20 | End: 2024-06-20 | Stop reason: HOSPADM

## 2024-06-20 RX ADMIN — Medication 10 ML: at 11:25

## 2024-06-20 RX ADMIN — PANTOPRAZOLE SODIUM 40 MG: 40 TABLET, DELAYED RELEASE ORAL at 11:26

## 2024-06-20 RX ADMIN — ATORVASTATIN CALCIUM 80 MG: 80 TABLET, FILM COATED ORAL at 11:26

## 2024-06-20 NOTE — ED PROVIDER NOTES
MD ATTESTATION NOTE    SHARED VISIT: This visit was performed by BOTH a physician and an APC. The substantive portion of the medical decision making was performed by this attesting physician who made or approved the management plan and takes responsibility for patient management. All studies documented in the APC note (if performed) were independently interpreted by me.    The ALEXANDRIA and I have discussed this patient's history, physical exam, MDM, and treatment plan.  I have reviewed the documentation and personally had a face to face interaction with the patient. The attached note describes my personal findings.      Erick Bruno is a 79 y.o. male who presents to the ED c/o acute bleeding from wound on his back.  Patient had a abscess to his back that was longstanding and not healing.  Apparently had a cyst excised 2 weeks ago.  Workup this evening with bleeding and tissue protruding from the defect and his skin.  Patient is anticoagulated on Eliquis.  No fever or chills.    On exam:  GENERAL: not distressed  HENT: nares patent  EYES: no scleral icterus  CV: regular rhythm, regular rate  RESPIRATORY: normal effort  ABDOMEN: soft  MUSCULOSKELETAL: no deformity  NEURO: alert, moves all extremities, follows commands  SKIN: warm, dry, open wound to upper thoracic back there is roughly ping-pong ball sized collection of tissue protruding from the defect small amount of blood oozing no significant purulence    Labs  Recent Results (from the past 24 hour(s))   CBC Auto Differential    Collection Time: 06/20/24  3:12 AM    Specimen: Blood   Result Value Ref Range    WBC 8.77 3.40 - 10.80 10*3/mm3    RBC 4.23 4.14 - 5.80 10*6/mm3    Hemoglobin 13.1 13.0 - 17.7 g/dL    Hematocrit 40.0 37.5 - 51.0 %    MCV 94.6 79.0 - 97.0 fL    MCH 31.0 26.6 - 33.0 pg    MCHC 32.8 31.5 - 35.7 g/dL    RDW 13.3 12.3 - 15.4 %    RDW-SD 45.7 37.0 - 54.0 fl    MPV 9.2 6.0 - 12.0 fL    Platelets 208 140 - 450 10*3/mm3    Neutrophil % 63.0 42.7 - 76.0  %    Lymphocyte % 24.6 19.6 - 45.3 %    Monocyte % 7.6 5.0 - 12.0 %    Eosinophil % 3.5 0.3 - 6.2 %    Basophil % 0.8 0.0 - 1.5 %    Immature Grans % 0.5 0.0 - 0.5 %    Neutrophils, Absolute 5.52 1.70 - 7.00 10*3/mm3    Lymphocytes, Absolute 2.16 0.70 - 3.10 10*3/mm3    Monocytes, Absolute 0.67 0.10 - 0.90 10*3/mm3    Eosinophils, Absolute 0.31 0.00 - 0.40 10*3/mm3    Basophils, Absolute 0.07 0.00 - 0.20 10*3/mm3    Immature Grans, Absolute 0.04 0.00 - 0.05 10*3/mm3    nRBC 0.0 0.0 - 0.2 /100 WBC   Protime-INR    Collection Time: 06/20/24  3:12 AM    Specimen: Blood   Result Value Ref Range    Protime 14.9 (H) 11.7 - 14.2 Seconds    INR 1.15 (H) 0.90 - 1.10   aPTT    Collection Time: 06/20/24  3:12 AM    Specimen: Blood   Result Value Ref Range    PTT 31.6 22.7 - 35.4 seconds   Comprehensive Metabolic Panel    Collection Time: 06/20/24  3:27 AM    Specimen: Blood   Result Value Ref Range    Glucose 228 (H) 65 - 99 mg/dL    BUN 27 (H) 8 - 23 mg/dL    Creatinine 1.65 (H) 0.76 - 1.27 mg/dL    Sodium 136 136 - 145 mmol/L    Potassium 4.3 3.5 - 5.2 mmol/L    Chloride 105 98 - 107 mmol/L    CO2 24.5 22.0 - 29.0 mmol/L    Calcium 8.9 8.6 - 10.5 mg/dL    Total Protein 7.1 6.0 - 8.5 g/dL    Albumin 4.2 3.5 - 5.2 g/dL    ALT (SGPT) 21 1 - 41 U/L    AST (SGOT) 19 1 - 40 U/L    Alkaline Phosphatase 132 (H) 39 - 117 U/L    Total Bilirubin 0.4 0.0 - 1.2 mg/dL    Globulin 2.9 gm/dL    A/G Ratio 1.4 g/dL    BUN/Creatinine Ratio 16.4 7.0 - 25.0    Anion Gap 6.5 5.0 - 15.0 mmol/L    eGFR 42.2 (L) >60.0 mL/min/1.73   ECG 12 Lead Bradycardia    Collection Time: 06/20/24  5:28 AM   Result Value Ref Range    QT Interval 476 ms    QTC Interval 421 ms       Radiology  CT Chest Without Contrast Diagnostic    Result Date: 6/20/2024  Patient: MONIKA SNEED  Time Out: 05:32 Exam(s): CT CHEST Without Contrast EXAM:   CT Chest Without Intravenous Contrast CLINICAL HISTORY:    Open protruding wound to upper thoracic back. TECHNIQUE:   Axial  computed tomography images of the chest without intravenous contrast.  CTDI is 7.45 mGy and DLP is 289.3 mGy-cm.  This CT exam was performed according to the principle of ALARA (As Low As Reasonably Achievable) by using one or more of the following dose reduction techniques: automated exposure control, adjustment of the mA and or kV according to patient size, and or use of iterative reconstruction technique. COMPARISON: Chest x-ray 1 11 2023. FINDINGS:   Lungs: Punctate right lower lobe calcification granuloma.  No mass.  No consolidation.   Pleural space:  Unremarkable.  No pneumothorax.  No pleural effusion.   Heart:  No cardiomegaly.  No significant pericardial effusion.  Coronary artery calcifications.  Calcified mediastinal lymph nodes.   Bones joints:  Degenerative changes of the spine.  No acute fracture.   Soft tissues: 3 x 3.1 x 1.8 cm soft tissue lesion involving the cutaneous surface and subcutaneous fat in the posterior midline upper thoracic wall centered at C7-T1.  Lesion is slightly hypodense centrally with a submillimeter gas bubble at the anterior margin of the lesion.  Mild infiltration of the subcutaneous fat.  No involvement of the posterior paraspinal musculature.  No radiopaque foreign body.   Vasculature:  Atherosclerotic vascular calcifications.  No thoracic aortic aneurysm.   Abdomen:  Post cholecystectomy. IMPRESSION:     Superficial soft tissue lesion in the midline upper back with a small gas bubble suggestive of a phlegmon or abscess.  Adjacent cellulitis.  No evidence for involvement of the posterior paraspinal musculature. Calcified mediastinal lymph node and lung parenchymal granuloma compatible with prior granulomatous disease. Atherosclerotic vascular calcifications.    Electronically signed by Mayank Villarreal M.D. on 06-20-24 at 0532     Medications given in the ED:  Medications   sodium chloride 0.9 % flush 10 mL (has no administration in time range)   sodium chloride 0.9 % flush  10 mL (has no administration in time range)   sodium chloride 0.9 % infusion 40 mL (has no administration in time range)   acetaminophen (TYLENOL) tablet 650 mg (has no administration in time range)     Or   acetaminophen (TYLENOL) 160 MG/5ML oral solution 650 mg (has no administration in time range)     Or   acetaminophen (TYLENOL) suppository 650 mg (has no administration in time range)   sennosides-docusate (PERICOLACE) 8.6-50 MG per tablet 2 tablet (has no administration in time range)     And   polyethylene glycol (MIRALAX) packet 17 g (has no administration in time range)     And   bisacodyl (DULCOLAX) EC tablet 5 mg (has no administration in time range)     And   bisacodyl (DULCOLAX) suppository 10 mg (has no administration in time range)   ondansetron (ZOFRAN) injection 4 mg (has no administration in time range)       Orders placed during this visit:  Orders Placed This Encounter   Procedures    Wound Culture - Wound, Back, Lower    CT Chest Without Contrast Diagnostic    CBC Auto Differential    Protime-INR    aPTT    Comprehensive Metabolic Panel    Basic Metabolic Panel    CBC Auto Differential    NPO Diet NPO Type: Sips with Meds    Vital Signs    Intake & Output    Weigh Patient    Oral Care    Saline Lock & Maintain IV Access    Up With Assistance    Code Status and Medical Interventions:    [facility] (on-call MD unless specified) Details    Inpatient General Surgery Consult    ECG 12 Lead Bradycardia    Wound Ostomy Eval & Treat    Insert Peripheral IV    Inpatient Admission    CBC & Differential       Medical Decision Making:  ED Course as of 06/20/24 0622   u Jun 20, 2024   0321 I spoke with Dr. Macdonald about patient, agrees with plan of care.   [SA]   0339 Hemoglobin: 13.1 [SA]   0418 Creatinine(!): 1.65 [SA]   0432 Will obtain CT without contrast due to history of CKD [SA]   0455 Pt re-checked.  Pressure dressing applied, hemodynamically stable.  No active bleeding through the dressing on  reexamination.  Hemoglobin stable.  CT chest pending at this time.  Plan for admission for wound care.  Discussed need for admission, reviewed treatment plan and reason for admission with pt/family and admitting physician.  Pt/family voiced understanding of the plan for admission for further testing/treatment as needed.    [SA]   0512 Consult: I discussed the case with ALEXANDRIA Miller.  Reviewed patient's history, presentation, exam findings, and ED workup.  They agree with plan of care.  Agrees to admit to to Dr. Anton. CT Chest pending at admission.   [SA]   0518 Pts HR ranging 46-50. Resting comfortably, denies current symptoms. Hx of a fib. Will obtain EKG. Ordered cardiac monitoring. [SA]   0533 EKG          EKG time: 0 528  Rhythm/Rate: Sinus rhythm rate 47  P waves and OK: Smallman baseline artifact  QRS, axis: Incomplete left bundle  ST and T waves: Normal    Interpreted Contemporaneously by me, independently viewed [TJ]   0543 CT Chest Without Contrast Diagnostic [SA]   0547 CT chest concerning for phlegmon versus abscess.  Physical exam inconsistent with abscess.  No erythema or warmth or purulent drainage.  White blood cell count within normal limits, afebrile.  Will defer antibiotics at this time to admitting team. [SA]      ED Course User Index  [SA] Natasha Mancilla PA-C  [TJ] Frank Macdonald MD       Differential diagnosis:  Abscess, cyst, cellulitis    Diagnosis  Final diagnoses:   Open wound of midline of back   Bleeding from wound   Anticoagulated   Stage 3b chronic kidney disease   Bradycardia          Frank Macdonald MD  06/20/24 3505

## 2024-06-20 NOTE — ED NOTES
Nursing report ED to floor  Erick Bruno  79 y.o.  male    HPI :  HPI (Adult)  Stated Reason for Visit: pt to ed via pv w/ spouse. Pt reports he has a wound on his back from where he has a cyst removed. Pt reports his wound started bleeding at midnight. Pt is on eliquis.  History Obtained From: patient  Precipitating Event(s): recent surgery    Chief Complaint  Chief Complaint   Patient presents with    Wound Check       Admitting doctor:   Mitchell Marino MD    Admitting diagnosis:   The primary encounter diagnosis was Open wound of midline of back. Diagnoses of Bleeding from wound, Anticoagulated, Stage 3b chronic kidney disease, and Bradycardia were also pertinent to this visit.    Code status:   Current Code Status       Date Active Code Status Order ID Comments User Context       6/20/2024 0510 CPR (Attempt to Resuscitate) [id number]  Angela Davis, [facility]        Question Answer    Code Status (Patient has no pulse and is not breathing) CPR (Attempt to Resuscitate)    Medical Interventions (Patient has pulse or is breathing) Full Support    Level Of Support Discussed With Patient                    Allergies:   Biaxin [clarithromycin], Penicillins, Percocet [oxycodone-acetaminophen], Vancomycin, Crestor [rosuvastatin], Levaquin [levofloxacin in d5w], Metformin, and Latex    Isolation:   No active isolations    Intake and Output  No intake or output data in the 24 hours ending 06/20/24 1138    Weight:       06/20/24  0247   Weight: 79.4 kg (175 lb)       Most recent vitals:   Vitals:    06/20/24 0603 06/20/24 0701 06/20/24 0901 06/20/24 0955   BP: (!) 181/72 (!) 181/81 (!) 192/83    Pulse: (!) 49 50 50 51   Resp:  16  16   Temp:       SpO2: 99% 99% 99% 98%   Weight:       Height:           Active LDAs/IV Access:   Lines, Drains & Airways       Active LDAs       Name Placement date Placement time Site Days    Peripheral IV 06/20/24 0312 Right Antecubital 06/20/24 0312  Antecubital  less than 1                     Labs (abnormal labs have a star):   Labs Reviewed   PROTIME-INR - Abnormal; Notable for the following components:       Result Value    Protime 14.9 (*)     INR 1.15 (*)     All other components within normal limits   COMPREHENSIVE METABOLIC PANEL - Abnormal; Notable for the following components:    Glucose 228 (*)     BUN 27 (*)     Creatinine 1.65 (*)     Alkaline Phosphatase 132 (*)     eGFR 42.2 (*)     All other components within normal limits    Narrative:     GFR Normal >60  Chronic Kidney Disease <60  Kidney Failure <15    The GFR formula is only valid for adults with stable renal function between ages 18 and 70.   BASIC METABOLIC PANEL - Abnormal; Notable for the following components:    Glucose 199 (*)     BUN 26 (*)     Creatinine 1.53 (*)     eGFR 46.2 (*)     All other components within normal limits    Narrative:     GFR Normal >60  Chronic Kidney Disease <60  Kidney Failure <15    The GFR formula is only valid for adults with stable renal function between ages 18 and 70.   CBC WITH AUTO DIFFERENTIAL - Abnormal; Notable for the following components:    RBC 3.81 (*)     Hemoglobin 11.6 (*)     Hematocrit 35.6 (*)     All other components within normal limits   POCT GLUCOSE FINGERSTICK - Abnormal; Notable for the following components:    Glucose 195 (*)     All other components within normal limits   POCT GLUCOSE FINGERSTICK - Abnormal; Notable for the following components:    Glucose 154 (*)     All other components within normal limits   CBC WITH AUTO DIFFERENTIAL - Normal   APTT - Normal   WOUND CULTURE   POCT GLUCOSE FINGERSTICK   POCT GLUCOSE FINGERSTICK   POCT GLUCOSE FINGERSTICK   POCT GLUCOSE FINGERSTICK   POCT GLUCOSE FINGERSTICK   CBC AND DIFFERENTIAL    Narrative:     The following orders were created for panel order CBC & Differential.  Procedure                               Abnormality         Status                     ---------                               -----------          ------                     CBC Auto Differential[[id number]]        Normal              Final result                 Please view results for these tests on the individual orders.       EKG:   ECG 12 Lead Bradycardia   Preliminary Result   HEART RATE= 47  bpm   RR Interval= 1277  ms   AR Interval=   ms   P Horizontal Axis=   deg   P Front Axis=   deg   QRSD Interval= 110  ms   QT Interval= 476  ms   QTcB= 421  ms   QRS Axis= 26  deg   T Wave Axis= -5  deg   - ABNORMAL ECG -   Junctional rhythm   Incomplete left bundle branch block   Borderline low voltage, extremity leads   Electronically Signed By:    Date and Time of Study: 2024-06-20 05:28:50          Meds given in ED:   Medications   sodium chloride 0.9 % flush 10 mL (10 mL Intravenous Given 6/20/24 1125)   sodium chloride 0.9 % flush 10 mL (has no administration in time range)   sodium chloride 0.9 % infusion 40 mL (has no administration in time range)   acetaminophen (TYLENOL) tablet 650 mg (has no administration in time range)   sennosides-docusate (PERICOLACE) 8.6-50 MG per tablet 2 tablet (has no administration in time range)     And   polyethylene glycol (MIRALAX) packet 17 g (has no administration in time range)     And   bisacodyl (DULCOLAX) EC tablet 5 mg (has no administration in time range)     And   bisacodyl (DULCOLAX) suppository 10 mg (has no administration in time range)   ondansetron (ZOFRAN) injection 4 mg (has no administration in time range)   escitalopram (LEXAPRO) tablet 10 mg (has no administration in time range)   melatonin tablet 5 mg (has no administration in time range)   LORazepam (ATIVAN) tablet 0.5 mg (has no administration in time range)   isosorbide mononitrate (IMDUR) 24 hr tablet 30 mg (has no administration in time range)   atorvastatin (LIPITOR) tablet 80 mg (80 mg Oral Given 6/20/24 1126)   busPIRone (BUSPAR) tablet 5 mg (has no administration in time range)   metoprolol succinate XL (TOPROL-XL) 24 hr tablet 25 mg (has no  administration in time range)   pantoprazole (PROTONIX) EC tablet 40 mg (40 mg Oral Given 24 1126)   dextrose (GLUTOSE) oral gel 15 g (has no administration in time range)   dextrose (D50W) (25 g/50 mL) IV injection 25 g (has no administration in time range)   glucagon (GLUCAGEN) injection 1 mg (has no administration in time range)   insulin lispro (HUMALOG/ADMELOG) injection 2-9 Units ( Subcutaneous Not Given 24 1116)       Imaging results:  CT Chest Without Contrast Diagnostic    Result Date: 2024  Electronically signed by Mayank Villarreal M.D. on 24 at 0532     Ambulatory status:   - assistx1 uses walker at home    Social issues:   Social History     Socioeconomic History    Marital status:    Tobacco Use    Smoking status: Former     Current packs/day: 0.00     Average packs/day: 1 pack/day for 60.0 years (60.0 ttl pk-yrs)     Types: Cigarettes     Start date:      Quit date:      Years since quittin.4     Passive exposure: Past    Smokeless tobacco: Never   Vaping Use    Vaping status: Never Used   Substance and Sexual Activity    Alcohol use: No     Comment: Daily caffeine use    Drug use: No    Sexual activity: Defer       Peripheral Neurovascular  Peripheral Neurovascular (Adult)  Peripheral Neurovascular WDL: WDL    Neuro Cognitive  Neuro Cognitive (Adult)  Cognitive/Neuro/Behavioral WDL: WDL    Learning  Learning Assessment (Adult)  Learning Readiness and Ability: no barriers identified  Education Provided  Person Taught: patient    Respiratory  Respiratory WDL  Respiratory WDL: WDL    Abdominal Pain       Pain Assessments  Pain (Adult)  (0-10) Pain Rating: Rest: 0    NIH Stroke Scale       Elmira Sanchez RN  24 11:38 EDT

## 2024-06-20 NOTE — Clinical Note
Level of Care: Telemetry [5]   Diagnosis: Open wound [775764]   Admitting Physician: KIKI KING [[id number]]   Attending Physician: KIKI KING [[id number]]   Certification: I Certify That Inpatient Hospital Services Are Medically Necessary For Greater Than 2 Midnights

## 2024-06-20 NOTE — ED NOTES
Pt to ED via PV w/ wife.     Pt states he has a wound on his bach and is followed by wound care. Pt reports wound started bleeding around midnight. Bandage in place. Pt takes Eliquis.

## 2024-06-20 NOTE — ED NOTES
pt has open wound on upper mid back secondary to cyst removal that was preformed outpatient approximately two weeks ago. The wound appears to have tissue protruding with minimal bleeding as well. The tissue protrusion is approximately the size of a golf ball. This wound was dressed with pressure d/t pt taking eliquis. Pt denies pain at this time.

## 2024-06-20 NOTE — NURSING NOTE
CWOCN- reviewed chart. Await surgical/wound plan from either Dr Fernando or Dr Partida. WOC following as needed.

## 2024-06-20 NOTE — ED PROVIDER NOTES
Date seen: 6/20/2024        History provided by: Patient        Chief Complaints: Wound check    HPI:  Pt is a 79 y.o. male with history of peripheral vascular disease, CAD, paroxysmal atrial fibrillation anticoagulated on Eliquis, stage III CKD, diabetes, dementia, who presents for wound check to back.  Patient states he woke up a little after midnight had bleeding from wound to his back.  Bleeding has been uncontrolled.  Denies associated symptoms, denies pain, fever, chills, nausea, vomiting, purulent drainage.  States she has had an open wound to his thoracic back since January, wound was previously biopsied - reported benign.  Wife has been packing wound at home as instructed by wound care.  States when she saw the bleeding tonight she noticed protrusion of skin from the wound which is new today.          Medical Record Review:  Patient was evaluated by wound care specialist, Dr. Fernando who on 5/28/2024 for follow-up of back abscess.  Given wound care instructions and instructed to follow-up in 4 weeks.        PAST MEDICAL HISTORY  Active Ambulatory Problems     Diagnosis Date Noted   • Toe ulcer    • Sleep apnea    • Seizure    • PVD (peripheral vascular disease)    • Atherosclerosis of native arteries of extremity with rest pain    • Macular degeneration    • Hyperlipidemia    • Enlarged prostate    • Type 2 diabetes mellitus, without long-term current use of insulin    • Colon polyps    • Arthritis    • Anxiety    • Acid reflux    • Hx of colonic polyps 05/06/2018   • Abnormal nuclear stress test 05/21/2018   • CAD (coronary artery disease)    • Carotid stenosis, asymptomatic, right 01/15/2021   • Paroxysmal atrial fibrillation 03/18/2021   • Abdominal aortic aneurysm (AAA) 3.0 cm to 5.5 cm in diameter in male 03/18/2021   • Dementia    • Medicare annual wellness visit, subsequent 02/07/2022   • Chronic anticoagulation    • Poor balance    • Closed fracture of base of fifth metatarsal bone 01/16/2023   •  Closed fracture of fourth metatarsal bone 01/16/2023   • Diabetic peripheral neuropathy 01/16/2023   • Metatarsalgia of right foot 01/16/2023   • Stage 3a chronic kidney disease (CKD) 01/23/2023   • Idiopathic peripheral neuropathy 03/08/2023     Resolved Ambulatory Problems     Diagnosis Date Noted   • Pancreatitis, acute 05/06/2018   • Pancreatitis 05/06/2018   • Epigastric pain 05/06/2018   • Gallstones 05/06/2018   • Cellulitis of right foot 02/26/2021     Past Medical History:   Diagnosis Date   • Alzheimer's dementia    • Anemia    • Anesthesia complication    • Atrial fibrillation    • Carotid artery disease    • Diabetes mellitus    • Elevated cholesterol    • Fracture 2023   • Frequent urination at night    • History of seizure 2013   • Hypertension    • Leg pain    • New onset atrial fibrillation 02/2021   • NSTEMI (non-ST elevated myocardial infarction)    • Panarteritis    • Peripheral arterial disease    • Pseudobulbar affect    • SSS (sick sinus syndrome)          PAST SURGICAL HISTORY  Past Surgical History:   Procedure Laterality Date   • APPENDECTOMY     • ATHRECTOMY ILIAC, FEMORAL, TIBIAL ARTERY N/A 10/17/2018    Procedure: AIF ARTERIOGRAM, LEFT SFA  ANGIOPLASTY;  Surgeon: Jayden Marie MD;  Location: [facility] OR;  Service: Vascular   • ATHRECTOMY ILIAC, FEMORAL, TIBIAL ARTERY Bilateral 4/20/2022    Procedure: AIF LEFT LEG ANGIOGRAM;  Surgeon: Jayden Marie MD;  Location: [facility] OR 18/19;  Service: Vascular;  Laterality: Bilateral;   • CATARACT EXTRACTION WITH INTRAOCULAR LENS IMPLANT Bilateral    • CHOLECYSTECTOMY WITH INTRAOPERATIVE CHOLANGIOGRAM N/A 05/09/2018    Procedure: CHOLECYSTECTOMY LAPAROSCOPIC INTRAOPERATIVE CHOLANGIOGRAM;  Surgeon: Daniel Gonzalez MD;  Location: [facility] OR;  Service: General   • COLONOSCOPY     • COLONOSCOPY N/A 05/08/2018    Procedure: COLONOSCOPY to cecum with polypectomies;  Surgeon: Daniel Gonzalez MD;  Location: [facility] ENDOSCOPY;   Service: Gastroenterology   • ENDOSCOPY N/A 2018    Procedure: ESOPHAGOGASTRODUODENOSCOPY with biopsies;  Surgeon: Daniel Gonzalez MD;  Location: [facility] ENDOSCOPY;  Service: Gastroenterology   • FEMORAL ARTERY STENT Left     on 3/22/16         FAMILY HISTORY  Family History   Problem Relation Age of Onset   • Hypertension Mother    • Diabetes Daughter    • Cancer Maternal Aunt    • Diabetes Maternal Grandfather    • Malig Hyperthermia Neg Hx          SOCIAL HISTORY  Social History     Socioeconomic History   • Marital status:    Tobacco Use   • Smoking status: Former     Current packs/day: 0.00     Average packs/day: 1 pack/day for 60.0 years (60.0 ttl pk-yrs)     Types: Cigarettes     Start date:      Quit date:      Years since quittin.4     Passive exposure: Past   • Smokeless tobacco: Never   Vaping Use   • Vaping status: Never Used   Substance and Sexual Activity   • Alcohol use: No     Comment: Daily caffeine use   • Drug use: No   • Sexual activity: Defer         ALLERGIES  Biaxin [clarithromycin], Penicillins, Percocet [oxycodone-acetaminophen], Vancomycin, Crestor [rosuvastatin], Levaquin [levofloxacin in d5w], Metformin, and Latex            PHYSICAL EXAM  ED Triage Vitals [24 0247]   Temp Heart Rate Resp BP SpO2   98.9 °F (37.2 °C) 50 16 -- 99 %      Temp src Heart Rate Source Patient Position BP Location FiO2 (%)   -- -- -- -- --       Physical Exam  Vitals reviewed.   Constitutional:       General: He is not in acute distress.     Appearance: Normal appearance. He is not ill-appearing.   HENT:      Head: Normocephalic.      Nose: Nose normal.      Mouth/Throat:      Mouth: Mucous membranes are moist.   Eyes:      Extraocular Movements: Extraocular movements intact.      Conjunctiva/sclera: Conjunctivae normal.   Cardiovascular:      Rate and Rhythm: Normal rate and regular rhythm.      Pulses: Normal pulses.   Pulmonary:      Effort: Pulmonary effort is normal. No  respiratory distress.      Breath sounds: Normal breath sounds.   Abdominal:      General: There is no distension.      Palpations: Abdomen is soft.      Tenderness: There is no abdominal tenderness. There is no guarding.   Musculoskeletal:         General: No swelling or deformity. Normal range of motion.      Cervical back: Normal range of motion. No rigidity.      Right lower leg: No edema.      Left lower leg: No edema.   Skin:     General: Skin is warm.      Comments: Ping-pong ball sized protruded tissue from small open wound to upper midline thoracic back with mild active oozing bleeding.  No erythema, streaking, fluctuance, crepitus, edema, warmth, purulence.   Neurological:      General: No focal deficit present.      Mental Status: He is alert and oriented to person, place, and time.   Psychiatric:         Mood and Affect: Mood normal.         Vital signs and nursing notes reviewed.            Differential Diagnosis includes but not limited to: Acute blood loss anemia, wound infection, open wound with bleeding, abscess          LAB RESULTS  Recent Results (from the past 24 hour(s))   CBC Auto Differential    Collection Time: 06/20/24  3:12 AM    Specimen: Blood   Result Value Ref Range    WBC 8.77 3.40 - 10.80 10*3/mm3    RBC 4.23 4.14 - 5.80 10*6/mm3    Hemoglobin 13.1 13.0 - 17.7 g/dL    Hematocrit 40.0 37.5 - 51.0 %    MCV 94.6 79.0 - 97.0 fL    MCH 31.0 26.6 - 33.0 pg    MCHC 32.8 31.5 - 35.7 g/dL    RDW 13.3 12.3 - 15.4 %    RDW-SD 45.7 37.0 - 54.0 fl    MPV 9.2 6.0 - 12.0 fL    Platelets 208 140 - 450 10*3/mm3    Neutrophil % 63.0 42.7 - 76.0 %    Lymphocyte % 24.6 19.6 - 45.3 %    Monocyte % 7.6 5.0 - 12.0 %    Eosinophil % 3.5 0.3 - 6.2 %    Basophil % 0.8 0.0 - 1.5 %    Immature Grans % 0.5 0.0 - 0.5 %    Neutrophils, Absolute 5.52 1.70 - 7.00 10*3/mm3    Lymphocytes, Absolute 2.16 0.70 - 3.10 10*3/mm3    Monocytes, Absolute 0.67 0.10 - 0.90 10*3/mm3    Eosinophils, Absolute 0.31 0.00 - 0.40  10*3/mm3    Basophils, Absolute 0.07 0.00 - 0.20 10*3/mm3    Immature Grans, Absolute 0.04 0.00 - 0.05 10*3/mm3    nRBC 0.0 0.0 - 0.2 /100 WBC   Protime-INR    Collection Time: 06/20/24  3:12 AM    Specimen: Blood   Result Value Ref Range    Protime 14.9 (H) 11.7 - 14.2 Seconds    INR 1.15 (H) 0.90 - 1.10   aPTT    Collection Time: 06/20/24  3:12 AM    Specimen: Blood   Result Value Ref Range    PTT 31.6 22.7 - 35.4 seconds   Comprehensive Metabolic Panel    Collection Time: 06/20/24  3:27 AM    Specimen: Blood   Result Value Ref Range    Glucose 228 (H) 65 - 99 mg/dL    BUN 27 (H) 8 - 23 mg/dL    Creatinine 1.65 (H) 0.76 - 1.27 mg/dL    Sodium 136 136 - 145 mmol/L    Potassium 4.3 3.5 - 5.2 mmol/L    Chloride 105 98 - 107 mmol/L    CO2 24.5 22.0 - 29.0 mmol/L    Calcium 8.9 8.6 - 10.5 mg/dL    Total Protein 7.1 6.0 - 8.5 g/dL    Albumin 4.2 3.5 - 5.2 g/dL    ALT (SGPT) 21 1 - 41 U/L    AST (SGOT) 19 1 - 40 U/L    Alkaline Phosphatase 132 (H) 39 - 117 U/L    Total Bilirubin 0.4 0.0 - 1.2 mg/dL    Globulin 2.9 gm/dL    A/G Ratio 1.4 g/dL    BUN/Creatinine Ratio 16.4 7.0 - 25.0    Anion Gap 6.5 5.0 - 15.0 mmol/L    eGFR 42.2 (L) >60.0 mL/min/1.73   ECG 12 Lead Bradycardia    Collection Time: 06/20/24  5:28 AM   Result Value Ref Range    QT Interval 476 ms    QTC Interval 421 ms       Ordered the above labs and reviewed the results.          RADIOLOGY  CT Chest Without Contrast Diagnostic    Result Date: 6/20/2024  Patient: MONIKA SNEED  Time Out: 05:32 Exam(s): CT CHEST Without Contrast EXAM:   CT Chest Without Intravenous Contrast CLINICAL HISTORY:    Open protruding wound to upper thoracic back. TECHNIQUE:   Axial computed tomography images of the chest without intravenous contrast.  CTDI is 7.45 mGy and DLP is 289.3 mGy-cm.  This CT exam was performed according to the principle of ALARA (As Low As Reasonably Achievable) by using one or more of the following dose reduction techniques: automated exposure control,  adjustment of the mA and or kV according to patient size, and or use of iterative reconstruction technique. COMPARISON: Chest x-ray 1 11 2023. FINDINGS:   Lungs: Punctate right lower lobe calcification granuloma.  No mass.  No consolidation.   Pleural space:  Unremarkable.  No pneumothorax.  No pleural effusion.   Heart:  No cardiomegaly.  No significant pericardial effusion.  Coronary artery calcifications.  Calcified mediastinal lymph nodes.   Bones joints:  Degenerative changes of the spine.  No acute fracture.   Soft tissues: 3 x 3.1 x 1.8 cm soft tissue lesion involving the cutaneous surface and subcutaneous fat in the posterior midline upper thoracic wall centered at C7-T1.  Lesion is slightly hypodense centrally with a submillimeter gas bubble at the anterior margin of the lesion.  Mild infiltration of the subcutaneous fat.  No involvement of the posterior paraspinal musculature.  No radiopaque foreign body.   Vasculature:  Atherosclerotic vascular calcifications.  No thoracic aortic aneurysm.   Abdomen:  Post cholecystectomy. IMPRESSION:     Superficial soft tissue lesion in the midline upper back with a small gas bubble suggestive of a phlegmon or abscess.  Adjacent cellulitis.  No evidence for involvement of the posterior paraspinal musculature. Calcified mediastinal lymph node and lung parenchymal granuloma compatible with prior granulomatous disease. Atherosclerotic vascular calcifications.    Electronically signed by Mayank Villarreal M.D. on 06-20-24 at 0532     Ordered the above noted radiological studies. Reviewed by me in PACS.      - My independent interpretation of CT chest:  no pneumothorax          EKG: Time 0528, rate 47 bpm, junctional rhythm, no ST elevation                    Procedures:   Procedures                     Progress Notes/ED Course:   ED Course as of 06/20/24 0548   Thu Jun 20, 2024   0738 I spoke with Dr. Macdonald about patient, agrees with plan of care.   [SA]   7634 Hemoglobin:  13.1 [SA]   0418 Creatinine(!): 1.65 [SA]   0432 Will obtain CT without contrast due to history of CKD [SA]   0455 Pt re-checked.  Pressure dressing applied, hemodynamically stable.  No active bleeding through the dressing on reexamination.  Hemoglobin stable.  CT chest pending at this time.  Plan for admission for wound care.  Discussed need for admission, reviewed treatment plan and reason for admission with pt/family and admitting physician.  Pt/family voiced understanding of the plan for admission for further testing/treatment as needed.    [SA]   0512 Consult: I discussed the case with ALEXANDRIA Miller.  Reviewed patient's history, presentation, exam findings, and ED workup.  They agree with plan of care.  Agrees to admit to to Dr. Michelle. CT Chest pending at admission.   [SA]   0518 Pts HR ranging 46-50. Resting comfortably, denies current symptoms. Hx of a fib. Will obtain EKG. Ordered cardiac monitoring. [SA]   0533 EKG          EKG time: 0 528  Rhythm/Rate: Sinus rhythm rate 47  P waves and NM: Smallman baseline artifact  QRS, axis: Incomplete left bundle  ST and T waves: Normal    Interpreted Contemporaneously by me, independently viewed [TJ]   0543 CT Chest Without Contrast Diagnostic [SA]   0547 CT chest concerning for phlegmon versus abscess.  Physical exam inconsistent with abscess.  No erythema or warmth or purulent drainage.  White blood cell count within normal limits, afebrile.  Will defer antibiotics at this time to admitting team. [SA]      ED Course User Index  [SA] Natasha Mancilla PA-C  [TJ] Frank Macdonald MD               DIAGNOSIS  Final diagnoses:   Open wound of midline of back   Bleeding from wound   Anticoagulated   Stage 3b chronic kidney disease   Bradycardia                   Disposition:   ED Disposition       ED Disposition   Decision to Admit    Condition   --    Comment   Level of Care: Telemetry [5]   Diagnosis: Open wound [032620]   Admitting Physician: EBONY MICHELLE  H [[id number]]   Attending Physician: EBONY MICHELLE H [[id number]]   Certification: I Certify That Inpatient Hospital Services Are Medically Necessary For Greater Than 2 Midnights                       Latest Documented Vital Signs:  As of 05:44 EDT  BP- 170/75 HR- (!) 49 Temp- 98.9 °F (37.2 °C) O2 sat- 100%      --        Provider Attestation:   I personally reviewed the past medical history, past surgical history, social history, family history, current medications, and allergies as they appear in the chart.    The patient was seen and examined by myself and Dr. Macdonald who agrees with plan.      Please note that portions of this were completed with a voice recognition program.     Note Disclaimer: At [facility], we believe that sharing information builds trust and better relationships. You are receiving this note because you are receiving care at [facility] or recently visited. It is possible you will see health information before a provider has talked with you about it. This kind of information can be easy to misunderstand. To help you fully understand what it means for your health, we urge you to discuss this note with your provider.        Provider note signed by:       Natasha Mancilla PA-C  06/20/24 0548

## 2024-06-20 NOTE — CONSULTS
General Surgery Consultation    Consulting Physician: Terrie Partida MD  Referring:     Angela Davis APRN       Reason for consultation:   Back wound    CC:   Bleeding back wound    HPI:   Patient is a 79-year-old gentleman with diabetes, A-fib with chronic anticoagulation on Eliquis, and history of back abscess with chronic wound.  He has been followed by [facility], [place], operated by [facility] wound care and Dr. Fernando. Patient states about a month ago he had a cyst excised from his back. Since then, he has been undergoing daily dressing changes by his wife. She reports she has been cleaning his wound with Dakin's, packing it with gauze, and using a silicone foam bordered dressing.  Yesterday, she noted that it appeared to be bleeding a lot.  She states that it always bleeds from a pinpoint area in the inferior aspect of the wound, however this time he had more bleeding, so she brought him to the ER.  The patient denies any other symptoms.  Denies chest pain, shortness of breath, lightheadedness, dizziness, feeling racing to pass out, palpitations.  He is asking if he can go home.  He was planning to follow-up with his wound care specialist in mid July.    Past Medical History:  Past Medical History:   Diagnosis Date    Abnormal nuclear stress test 05/21/2018    Acid reflux     Alzheimer's dementia     Anemia     Anesthesia complication     PT'S WIFE STATES PT CRYING AND EMOTIONAL AFTER SURGERY IN THE PAST    Anxiety     Arthritis     Atrial fibrillation     CAD (coronary artery disease)     Carotid artery disease     Chronic anticoagulation     Dementia     Diabetes mellitus     Elevated cholesterol     Enlarged prostate     Fracture 2023    Foot    Frequent urination at night     History of seizure 2013    S/P TEMPERATURE    Hyperlipidemia     Hypertension     Leg pain     BILAT-D/T PVD    Macular degeneration     New onset atrial fibrillation 02/2021    NSTEMI (non-ST elevated myocardial infarction)     Panarteritis      Peripheral arterial disease     Poor balance     HIGH RISK FOR FALLS    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    PVD (peripheral vascular disease)     Renal disorder     Sleep apnea     DOES NOT WEAR CPAP    SSS (sick sinus syndrome)        Past Surgical History:  Past Surgical History:   Procedure Laterality Date    APPENDECTOMY      ATHRECTOMY ILIAC, FEMORAL, TIBIAL ARTERY N/A 10/17/2018    Procedure: AIF ARTERIOGRAM, LEFT SFA  ANGIOPLASTY;  Surgeon: Jayden Marie MD;  Location: [facility] MAIN OR;  Service: Vascular    ATHRECTOMY ILIAC, FEMORAL, TIBIAL ARTERY Bilateral 4/20/2022    Procedure: AIF LEFT LEG ANGIOGRAM;  Surgeon: Jayden Marie MD;  Location: [facility] HYBRID OR 18/19;  Service: Vascular;  Laterality: Bilateral;    CATARACT EXTRACTION WITH INTRAOCULAR LENS IMPLANT Bilateral     CHOLECYSTECTOMY WITH INTRAOPERATIVE CHOLANGIOGRAM N/A 05/09/2018    Procedure: CHOLECYSTECTOMY LAPAROSCOPIC INTRAOPERATIVE CHOLANGIOGRAM;  Surgeon: Daniel Gonzalez MD;  Location: [facility] MAIN OR;  Service: General    COLONOSCOPY      COLONOSCOPY N/A 05/08/2018    Procedure: COLONOSCOPY to cecum with polypectomies;  Surgeon: Daniel Gonzalez MD;  Location: [facility] ENDOSCOPY;  Service: Gastroenterology    ENDOSCOPY N/A 05/08/2018    Procedure: ESOPHAGOGASTRODUODENOSCOPY with biopsies;  Surgeon: Daniel Gonzalez MD;  Location: [facility] ENDOSCOPY;  Service: Gastroenterology    FEMORAL ARTERY STENT Left     on 3/22/16       Medications:    Current Facility-Administered Medications:     acetaminophen (TYLENOL) tablet 650 mg, 650 mg, Oral, Q4H PRN **OR** [DISCONTINUED] acetaminophen (TYLENOL) 160 MG/5ML oral solution 650 mg, 650 mg, Oral, Q4H PRN **OR** [DISCONTINUED] acetaminophen (TYLENOL) suppository 650 mg, 650 mg, Rectal, Q4H PRN, Angela Davis APRN    atorvastatin (LIPITOR) tablet 80 mg, 80 mg, Oral, Daily, Mitchell Marino MD, 80 mg at 06/20/24 1126    sennosides-docusate (PERICOLACE) 8.6-50 MG per tablet 2 tablet, 2  tablet, Oral, BID PRN **AND** polyethylene glycol (MIRALAX) packet 17 g, 17 g, Oral, Daily PRN **AND** bisacodyl (DULCOLAX) EC tablet 5 mg, 5 mg, Oral, Daily PRN **AND** bisacodyl (DULCOLAX) suppository 10 mg, 10 mg, Rectal, Daily PRN, Angela Davis APRN    busPIRone (BUSPAR) tablet 5 mg, 5 mg, Oral, BID, Mitchell Marino MD    dextrose (D50W) (25 g/50 mL) IV injection 25 g, 25 g, Intravenous, Q15 Min PRN, Mitchell Marino MD    dextrose (GLUTOSE) oral gel 15 g, 15 g, Oral, Q15 Min PRN, Mitchell Marino MD    escitalopram (LEXAPRO) tablet 10 mg, 10 mg, Oral, Nightly, Mitchell Marino MD    glucagon (GLUCAGEN) injection 1 mg, 1 mg, Intramuscular, Q15 Min PRN, Mitchell Marino MD    insulin lispro (HUMALOG/ADMELOG) injection 2-9 Units, 2-9 Units, Subcutaneous, 4x Daily AC & at Bedtime, Mitchell Marino MD    isosorbide mononitrate (IMDUR) 24 hr tablet 30 mg, 30 mg, Oral, Nightly, Mitchell Marino MD    LORazepam (ATIVAN) tablet 0.5 mg, 0.5 mg, Oral, Q8H PRN, Mitchell Marino MD    melatonin tablet 5 mg, 5 mg, Oral, Nightly, Mitchell Marino MD    metoprolol succinate XL (TOPROL-XL) 24 hr tablet 25 mg, 25 mg, Oral, BID, Mitchell Marino MD    ondansetron (ZOFRAN) injection 4 mg, 4 mg, Intravenous, Q6H PRN, Angela Davis APRN    pantoprazole (PROTONIX) EC tablet 40 mg, 40 mg, Oral, Q AM, Mitchell Marino MD, 40 mg at 06/20/24 1126    silver nitrate 75-25 % applicator 1 Application, 1 Application, Topical, Once, Tracey Hightower APRN    sodium chloride 0.9 % flush 10 mL, 10 mL, Intravenous, Q12H, Angela Davis APRN, 10 mL at 06/20/24 1125    sodium chloride 0.9 % flush 10 mL, 10 mL, Intravenous, PRN, Angela Davis, DEBORAH    sodium chloride 0.9 % infusion 40 mL, 40 mL, Intravenous, PRN, Angela Davis, DEBORAH    Current Outpatient Medications:     apixaban (Eliquis) 5 MG tablet tablet, Take 1 tablet by mouth Every 12 (Twelve) Hours., Disp: 180 tablet, Rfl: 3    aspirin 81 MG EC tablet, Take 1 tablet by mouth  Daily. OK TO CONTINUE TO TAKE LEADING UP TO SURGERY PER DR. MULLER, Disp: , Rfl:     atorvastatin (LIPITOR) 80 MG tablet, Take 1 tablet by mouth Daily., Disp: 90 tablet, Rfl: 1    busPIRone (BUSPAR) 5 MG tablet, Take 1 tablet by mouth 2 (Two) Times a Day., Disp: 180 tablet, Rfl: 1    Coenzyme Q10 200 MG capsule, Take 200 mg by mouth Every Night. HOLDING FOR SURGERY, Disp: , Rfl:     escitalopram (LEXAPRO) 10 MG tablet, Take 1 tablet by mouth Every Night., Disp: , Rfl:     glimepiride (AMARYL) 4 MG tablet, Take 1 tablet by mouth Every Morning Before Breakfast., Disp: 90 tablet, Rfl: 1    isosorbide mononitrate (IMDUR) 30 MG 24 hr tablet, TAKE 1 TABLET BY MOUTH ONCE DAILY AT NIGHT, Disp: 90 tablet, Rfl: 3    LORazepam (ATIVAN) 0.5 MG tablet, TAKE 1 TABLET BY MOUTH EVERY 8 HOURS AS NEEDED FOR ANXIETY, Disp: 30 tablet, Rfl: 0    melatonin 5 MG tablet tablet, Take 1 tablet by mouth Every Night., Disp: , Rfl:     metoprolol succinate XL (TOPROL-XL) 25 MG 24 hr tablet, Take 1 tablet by mouth 2 (Two) Times a Day., Disp: 180 tablet, Rfl: 1    multivitamin with minerals tablet tablet, Take 1 tablet by mouth Every Night. HOLD FOR SURGERY, Disp: , Rfl:     nitroglycerin (NITROSTAT) 0.4 MG SL tablet, DISSOLVE ONE TABLET UNDER THE TONGUE EVERY 5 MINUTES AS NEEDED FOR CHEST PAIN.  DO NOT EXCEED A TOTAL OF 3 DOSES IN 15 MINUTES, Disp: 5 tablet, Rfl: 0    omeprazole (priLOSEC) 20 MG capsule, Take 1 capsule by mouth Daily., Disp: 90 capsule, Rfl: 1    saw palmetto 160 MG capsule, Take 1 capsule by mouth Every Night. HOLD FOR SURGERY, Disp: , Rfl:     VITAMIN E PO, Take  by mouth., Disp: , Rfl:     Wound Dressings ([facility] Wound/Burn Dressing) gel, Apply 1 Application topically Daily., Disp: 1 each, Rfl: 0    clindamycin (CLEOCIN T) 1 % lotion, , Disp: , Rfl:     clopidogrel (PLAVIX) 75 MG tablet, Take 1 tablet by mouth., Disp: , Rfl:     Allergies:   Allergies   Allergen Reactions    Biaxin [Clarithromycin] Itching    Penicillins  "Anaphylaxis and Hives    Percocet [Oxycodone-Acetaminophen] Itching    Vancomycin Rash    Crestor [Rosuvastatin] Other (See Comments)     \"CAN'T REMEMBER THE REACTION\"    Levaquin [Levofloxacin In D5w] Other (See Comments)     MUSCLE STIFFNESS    Metformin Diarrhea    Latex Rash     Band aids cause blistering, ok with paper tape       Social History:   Social History     Socioeconomic History    Marital status:    Tobacco Use    Smoking status: Former     Current packs/day: 0.00     Average packs/day: 1 pack/day for 60.0 years (60.0 ttl pk-yrs)     Types: Cigarettes     Start date:      Quit date:      Years since quittin.4     Passive exposure: Past    Smokeless tobacco: Never   Vaping Use    Vaping status: Never Used   Substance and Sexual Activity    Alcohol use: No     Comment: Daily caffeine use    Drug use: No    Sexual activity: Defer       Family History:   Family History   Problem Relation Age of Onset    Hypertension Mother     Diabetes Daughter     Cancer Maternal Aunt     Diabetes Maternal Grandfather     Malig Hyperthermia Neg Hx        Review of Systems:  Constitutional: denies any fever or chills  Cardiovascular: denies chest pain or palpitations   Respiratory: denies cough or shortness of breath  Musculoskeletal: denies weakness  Neurologic: denies headache, weakness  Skin: denies rash or jaundice; + reports poor wound healing   Endocrine: + Diabetes     Physical Exam:   Vitals:    24 1101   BP: (!) 192/75   Pulse: 51   Resp: 16   Temp:    SpO2: 98%     GENERAL: alert, interactive, cooperative, comfortable appearing sitting up in bed  HEENT: no scleral icterus; moist mucous membranes  NECK: Supple  RESPIRATORY: normal work of breathing on room air  CARDIOVASCULAR: Heart rate 50s, hypertensive on monitor  MUSCULOSKELETAL: no cyanosis or edema   NEUROLOGIC: alert and oriented, normal speech, no gross focal deficits   SKIN: In the mid upper back there is an approximately 3 cm x " 2 cm wound that extends deep to the subcutaneous tissues with what appears to be chronic hyperpigmentation of the wound edges without evidence of active infection, there is no purulent fluid, fluctuance, or crepitus, there was a 2 x 2 centimeter blood clot that I removed, I noted that the inferior aspect of the wound had a pinpoint slow bleed.  This was controlled with Surgicel, manual pressure, and then application of silver nitrate.  Following this, the wound was hemostatic.        Diagnostic workup:     Pertinent labs:   Results from last 7 days   Lab Units 06/20/24  0646 06/20/24  0312   WBC 10*3/mm3 9.38 8.77   HEMOGLOBIN g/dL 11.6* 13.1   HEMATOCRIT % 35.6* 40.0   PLATELETS 10*3/mm3 200 208     Results from last 7 days   Lab Units 06/20/24  0646 06/20/24  0327   SODIUM mmol/L 136 136   POTASSIUM mmol/L 4.1 4.3   CHLORIDE mmol/L 105 105   CO2 mmol/L 23.1 24.5   BUN mg/dL 26* 27*   CREATININE mg/dL 1.53* 1.65*   CALCIUM mg/dL 8.7 8.9   BILIRUBIN mg/dL  --  0.4   ALK PHOS U/L  --  132*   ALT (SGPT) U/L  --  21   AST (SGOT) U/L  --  19   GLUCOSE mg/dL 199* 228*       Imaging:   I reviewed the report and images of the CT Chest:      FINDINGS:    Lungs: Punctate right lower lobe calcification granuloma.  No mass.  No   consolidation.    Pleural space:  Unremarkable.  No pneumothorax.  No pleural effusion.    Heart:  No cardiomegaly.  No significant pericardial effusion.    Coronary artery calcifications.  Calcified mediastinal lymph nodes.    Bones joints:  Degenerative changes of the spine.  No acute fracture.    Soft tissues: 3 x 3.1 x 1.8 cm soft tissue lesion involving the   cutaneous surface and subcutaneous fat in the posterior midline upper   thoracic wall centered at C7-T1.  Lesion is slightly hypodense centrally   with a submillimeter gas bubble at the anterior margin of the lesion.    Mild infiltration of the subcutaneous fat.  No involvement of the   posterior paraspinal musculature.  No radiopaque  foreign body.    Vasculature:  Atherosclerotic vascular calcifications.  No thoracic   aortic aneurysm.    Abdomen:  Post cholecystectomy.     IMPRESSION:       Superficial soft tissue lesion in the midline upper back with a small gas   bubble suggestive of a phlegmon or abscess.  Adjacent cellulitis.  No   evidence for involvement of the posterior paraspinal musculature.  Calcified mediastinal lymph node and lung parenchymal granuloma   compatible with prior granulomatous disease.  Atherosclerotic vascular calcifications.      Assessment and plan:   The patient is a 79 y.o. male with bleeding from a chronic upper back wound    The patient's soft tissue lesion noted on CT is consistent with a blood clot. The underlying wound does not appear infected.  The area of pinpoint bleeding at the inferior aspect of the wound was controlled with Surgicel and silver nitrate.  The wound was dressed with a gauze pressure dressing.  He does not need further surgical intervention at this time.  The patient was admitted for this wound, however it is reasonable at this point to send him home with pressure dressing in place to continue local wound care and follow up at his usual wound care appointment. He is on Eliquis, and this along with his diabetes and history of poor wound healing put him at risk for recurrent bleeding.  I discussed this with the patient and his wife and instructed them about how to hold pressure and to call the office or return to the ER with recurrent bleeding.    Terrie Partida M.D.  General, Robotic, and Endoscopic Surgery  [facility]    [address], [address]  [place], [place], [address]  P: [phone]  F: [phone]

## 2024-06-20 NOTE — H&P
Patient Name:  Erick Bruno  YOB: 1945  MRN:  [id number]  Admit Date:  6/20/2024  Patient Care Team:  Provider, No Known as PCP - General  Michael Wyman III, MD as Consulting Physician (Cardiology)  Jayden Marie MD as Consulting Physician (Vascular Surgery)  Segundo Cunningham OD as Consulting Physician (Ophthalmology)  Daniel Gonzalez MD as Surgeon (General Surgery)  Mayank Guillen DO as Consulting Physician (Neurology)  Jessie Banks APRN as Nurse Practitioner (Orthopedic Surgery)  Vasiliy Fernando MD as Consulting Physician (Plastic Surgery)      Subjective   History Present Illness     Chief Complaint   Patient presents with    Wound Check       Mr. Bruno is a 79 y.o. male with a history of diabetes, PAD, multiple chronic nonhealing wounds, hypertension, A-fib, etc. that presents to [facility] complaining of bleeding wound on his back.  He is followed by Dr. Fernando in the wound care center.  It seems this was initially a cyst on his back that have been present for nearly 20 years according to patient's wife.  It was excised in the wound care office by Dr. Fernando towards the end of last month.  Wife has been providing wound care at home.  They have not really had any problems until last evening when evidently wound opened up and started profusely bleeding.  He has had no fever or chills.  No systemic illness.  No nausea or vomiting.      Review of Systems   Constitutional: Negative.    HENT: Negative.     Eyes: Negative.    Respiratory: Negative.     Gastrointestinal: Negative.    Endocrine: Negative.    Genitourinary: Negative.    Musculoskeletal: Negative.    Skin:  Positive for wound.   Neurological: Negative.    Hematological:  Bruises/bleeds easily.   Psychiatric/Behavioral: Negative.          Personal History     Past Medical History:   Diagnosis Date    Abnormal nuclear stress test 05/21/2018    Acid reflux     Alzheimer's dementia     Anemia      Anesthesia complication     PT'S WIFE STATES PT CRYING AND EMOTIONAL AFTER SURGERY IN THE PAST    Anxiety     Arthritis     Atrial fibrillation     CAD (coronary artery disease)     Carotid artery disease     Chronic anticoagulation     Dementia     Diabetes mellitus     Elevated cholesterol     Enlarged prostate     Fracture 2023    Foot    Frequent urination at night     History of seizure 2013    S/P TEMPERATURE    Hyperlipidemia     Hypertension     Leg pain     BILAT-D/T PVD    Macular degeneration     New onset atrial fibrillation 02/2021    NSTEMI (non-ST elevated myocardial infarction)     Panarteritis     Peripheral arterial disease     Poor balance     HIGH RISK FOR FALLS    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    PVD (peripheral vascular disease)     Renal disorder     Sleep apnea     DOES NOT WEAR CPAP    SSS (sick sinus syndrome)      Past Surgical History:   Procedure Laterality Date    APPENDECTOMY      ATHRECTOMY ILIAC, FEMORAL, TIBIAL ARTERY N/A 10/17/2018    Procedure: AIF ARTERIOGRAM, LEFT SFA  ANGIOPLASTY;  Surgeon: Jayden Marie MD;  Location: [facility] MAIN OR;  Service: Vascular    ATHRECTOMY ILIAC, FEMORAL, TIBIAL ARTERY Bilateral 4/20/2022    Procedure: AIF LEFT LEG ANGIOGRAM;  Surgeon: Jayden Marie MD;  Location: [facility] HYBRID OR 18/19;  Service: Vascular;  Laterality: Bilateral;    CATARACT EXTRACTION WITH INTRAOCULAR LENS IMPLANT Bilateral     CHOLECYSTECTOMY WITH INTRAOPERATIVE CHOLANGIOGRAM N/A 05/09/2018    Procedure: CHOLECYSTECTOMY LAPAROSCOPIC INTRAOPERATIVE CHOLANGIOGRAM;  Surgeon: Daniel Gonzalez MD;  Location: [facility] MAIN OR;  Service: General    COLONOSCOPY      COLONOSCOPY N/A 05/08/2018    Procedure: COLONOSCOPY to cecum with polypectomies;  Surgeon: Daniel Gonzalez MD;  Location: [facility] ENDOSCOPY;  Service: Gastroenterology    ENDOSCOPY N/A 05/08/2018    Procedure: ESOPHAGOGASTRODUODENOSCOPY with biopsies;  Surgeon: Daniel Gonzalez MD;  Location: [facility]  ENDOSCOPY;  Service: Gastroenterology    FEMORAL ARTERY STENT Left     on 3/22/16     Family History   Problem Relation Age of Onset    Hypertension Mother     Diabetes Daughter     Cancer Maternal Aunt     Diabetes Maternal Grandfather     Malig Hyperthermia Neg Hx      Social History     Tobacco Use    Smoking status: Former     Current packs/day: 0.00     Average packs/day: 1 pack/day for 60.0 years (60.0 ttl pk-yrs)     Types: Cigarettes     Start date:      Quit date:      Years since quittin.4     Passive exposure: Past    Smokeless tobacco: Never   Vaping Use    Vaping status: Never Used   Substance Use Topics    Alcohol use: No     Comment: Daily caffeine use    Drug use: No     No current facility-administered medications on file prior to encounter.     Current Outpatient Medications on File Prior to Encounter   Medication Sig Dispense Refill    apixaban (Eliquis) 5 MG tablet tablet Take 1 tablet by mouth Every 12 (Twelve) Hours. 180 tablet 3    aspirin 81 MG EC tablet Take 1 tablet by mouth Daily. OK TO CONTINUE TO TAKE LEADING UP TO SURGERY PER DR. MULLER      atorvastatin (LIPITOR) 80 MG tablet Take 1 tablet by mouth Daily. 90 tablet 1    busPIRone (BUSPAR) 5 MG tablet Take 1 tablet by mouth 2 (Two) Times a Day. 180 tablet 1    Coenzyme Q10 200 MG capsule Take 200 mg by mouth Every Night. HOLDING FOR SURGERY      escitalopram (LEXAPRO) 10 MG tablet Take 1 tablet by mouth Every Night.      glimepiride (AMARYL) 4 MG tablet Take 1 tablet by mouth Every Morning Before Breakfast. 90 tablet 1    isosorbide mononitrate (IMDUR) 30 MG 24 hr tablet TAKE 1 TABLET BY MOUTH ONCE DAILY AT NIGHT 90 tablet 3    LORazepam (ATIVAN) 0.5 MG tablet TAKE 1 TABLET BY MOUTH EVERY 8 HOURS AS NEEDED FOR ANXIETY 30 tablet 0    melatonin 5 MG tablet tablet Take 1 tablet by mouth Every Night.      metoprolol succinate XL (TOPROL-XL) 25 MG 24 hr tablet Take 1 tablet by mouth 2 (Two) Times a Day. 180 tablet 1     "multivitamin with minerals tablet tablet Take 1 tablet by mouth Every Night. HOLD FOR SURGERY      nitroglycerin (NITROSTAT) 0.4 MG SL tablet DISSOLVE ONE TABLET UNDER THE TONGUE EVERY 5 MINUTES AS NEEDED FOR CHEST PAIN.  DO NOT EXCEED A TOTAL OF 3 DOSES IN 15 MINUTES 5 tablet 0    omeprazole (priLOSEC) 20 MG capsule Take 1 capsule by mouth Daily. 90 capsule 1    saw palmetto 160 MG capsule Take 1 capsule by mouth Every Night. HOLD FOR SURGERY      VITAMIN E PO Take  by mouth.      Wound Dressings ([facility] Wound/Burn Dressing) gel Apply 1 Application topically Daily. 1 each 0    clindamycin (CLEOCIN T) 1 % lotion       clopidogrel (PLAVIX) 75 MG tablet Take 1 tablet by mouth.       Allergies   Allergen Reactions    Biaxin [Clarithromycin] Itching    Penicillins Anaphylaxis and Hives    Percocet [Oxycodone-Acetaminophen] Itching    Vancomycin Rash    Crestor [Rosuvastatin] Other (See Comments)     \"CAN'T REMEMBER THE REACTION\"    Levaquin [Levofloxacin In D5w] Other (See Comments)     MUSCLE STIFFNESS    Metformin Diarrhea    Latex Rash     Band aids cause blistering, ok with paper tape       Objective    Objective     Vital Signs  Temp:  [98.9 °F (37.2 °C)] 98.9 °F (37.2 °C)  Heart Rate:  [49-51] 51  Resp:  [16] 16  BP: (170-192)/(70-83) 192/83  SpO2:  [98 %-100 %] 98 %  on   ;   Device (Oxygen Therapy): room air  Body mass index is 25.84 kg/m².    Physical Exam  Vitals and nursing note reviewed.   Constitutional:       Appearance: He is well-developed. He is ill-appearing. He is not toxic-appearing.   HENT:      Head: Normocephalic and atraumatic.   Eyes:      General: No scleral icterus.  Cardiovascular:      Rate and Rhythm: Normal rate and regular rhythm.   Pulmonary:      Effort: Pulmonary effort is normal.      Breath sounds: Normal breath sounds.   Abdominal:      General: Bowel sounds are normal. There is no distension.      Palpations: Abdomen is soft.      Tenderness: There is no abdominal tenderness. "   Skin:     General: Skin is warm and dry.      Comments: Mid thoracic region on back with open wound and protrusion of tissue measuring 2-3cm.  Dried blood present but not actively bleeding.  No obvious surrounding erythema or cellulitis.   Neurological:      Mental Status: He is alert. Mental status is at baseline.         Results Review:  I reviewed the patient's new clinical results.  I reviewed the patient's new imaging results and agree with the interpretation.  I reviewed the patient's other test results and agree with the interpretation  I personally viewed and interpreted the patient's EKG/Telemetry data  Discussed with ED provider.    Lab Results (last 24 hours)       Procedure Component Value Units Date/Time    CBC & Differential [[id number]]  (Normal) Collected: 06/20/24 0312    Specimen: Blood Updated: 06/20/24 0325    Narrative:      The following orders were created for panel order CBC & Differential.  Procedure                               Abnormality         Status                     ---------                               -----------         ------                     CBC Auto Differential[[id number]]        Normal              Final result                 Please view results for these tests on the individual orders.    CBC Auto Differential [[id number]]  (Normal) Collected: 06/20/24 0312    Specimen: Blood Updated: 06/20/24 0325     WBC 8.77 10*3/mm3      RBC 4.23 10*6/mm3      Hemoglobin 13.1 g/dL      Hematocrit 40.0 %      MCV 94.6 fL      MCH 31.0 pg      MCHC 32.8 g/dL      RDW 13.3 %      RDW-SD 45.7 fl      MPV 9.2 fL      Platelets 208 10*3/mm3      Neutrophil % 63.0 %      Lymphocyte % 24.6 %      Monocyte % 7.6 %      Eosinophil % 3.5 %      Basophil % 0.8 %      Immature Grans % 0.5 %      Neutrophils, Absolute 5.52 10*3/mm3      Lymphocytes, Absolute 2.16 10*3/mm3      Monocytes, Absolute 0.67 10*3/mm3      Eosinophils, Absolute 0.31 10*3/mm3      Basophils, Absolute 0.07 10*3/mm3       Immature Grans, Absolute 0.04 10*3/mm3      nRBC 0.0 /100 WBC     Protime-INR [[id number]]  (Abnormal) Collected: 06/20/24 0312    Specimen: Blood Updated: 06/20/24 0345     Protime 14.9 Seconds      INR 1.15    aPTT [[id number]]  (Normal) Collected: 06/20/24 0312    Specimen: Blood Updated: 06/20/24 0345     PTT 31.6 seconds     Comprehensive Metabolic Panel [[id number]]  (Abnormal) Collected: 06/20/24 0327    Specimen: Blood Updated: 06/20/24 0416     Glucose 228 mg/dL      BUN 27 mg/dL      Creatinine 1.65 mg/dL      Sodium 136 mmol/L      Potassium 4.3 mmol/L      Chloride 105 mmol/L      CO2 24.5 mmol/L      Calcium 8.9 mg/dL      Total Protein 7.1 g/dL      Albumin 4.2 g/dL      ALT (SGPT) 21 U/L      AST (SGOT) 19 U/L      Alkaline Phosphatase 132 U/L      Total Bilirubin 0.4 mg/dL      Globulin 2.9 gm/dL      A/G Ratio 1.4 g/dL      BUN/Creatinine Ratio 16.4     Anion Gap 6.5 mmol/L      eGFR 42.2 mL/min/1.73     Narrative:      GFR Normal >60  Chronic Kidney Disease <60  Kidney Failure <15    The GFR formula is only valid for adults with stable renal function between ages 18 and 70.    Wound Culture - Wound, Back, Lower [[id number]] Collected: 06/20/24 0638    Specimen: Wound from Back, Lower Updated: 06/20/24 0640    POC Glucose Once [[id number]]  (Abnormal) Collected: 06/20/24 0640    Specimen: Blood Updated: 06/20/24 0642     Glucose 195 mg/dL     Basic Metabolic Panel [[id number]]  (Abnormal) Collected: 06/20/24 0646    Specimen: Blood Updated: 06/20/24 0728     Glucose 199 mg/dL      BUN 26 mg/dL      Creatinine 1.53 mg/dL      Sodium 136 mmol/L      Potassium 4.1 mmol/L      Chloride 105 mmol/L      CO2 23.1 mmol/L      Calcium 8.7 mg/dL      BUN/Creatinine Ratio 17.0     Anion Gap 7.9 mmol/L      eGFR 46.2 mL/min/1.73     Narrative:      GFR Normal >60  Chronic Kidney Disease <60  Kidney Failure <15    The GFR formula is only valid for adults with stable renal function between ages 18 and 70.    CBC Auto  Differential [[id number]]  (Abnormal) Collected: 06/20/24 0646    Specimen: Blood Updated: 06/20/24 0714     WBC 9.38 10*3/mm3      RBC 3.81 10*6/mm3      Hemoglobin 11.6 g/dL      Hematocrit 35.6 %      MCV 93.4 fL      MCH 30.4 pg      MCHC 32.6 g/dL      RDW 13.1 %      RDW-SD 44.8 fl      MPV 9.2 fL      Platelets 200 10*3/mm3      Neutrophil % 68.4 %      Lymphocyte % 21.4 %      Monocyte % 6.7 %      Eosinophil % 2.6 %      Basophil % 0.5 %      Immature Grans % 0.4 %      Neutrophils, Absolute 6.41 10*3/mm3      Lymphocytes, Absolute 2.01 10*3/mm3      Monocytes, Absolute 0.63 10*3/mm3      Eosinophils, Absolute 0.24 10*3/mm3      Basophils, Absolute 0.05 10*3/mm3      Immature Grans, Absolute 0.04 10*3/mm3      nRBC 0.0 /100 WBC             Imaging Results (Last 24 Hours)       Procedure Component Value Units Date/Time    CT Chest Without Contrast Diagnostic [[id number]] Collected: 06/20/24 0533     Updated: 06/20/24 0533    Narrative:        Patient: MONIKA SNEED  Time Out: 05:32  Exam(s): CT CHEST Without Contrast     EXAM:    CT Chest Without Intravenous Contrast    CLINICAL HISTORY:     Open protruding wound to upper thoracic back.    TECHNIQUE:    Axial computed tomography images of the chest without intravenous   contrast.  CTDI is 7.45 mGy and DLP is 289.3 mGy-cm.  This CT exam was   performed according to the principle of ALARA (As Low As Reasonably   Achievable) by using one or more of the following dose reduction   techniques: automated exposure control, adjustment of the mA and or kV   according to patient size, and or use of iterative reconstruction   technique.    COMPARISON:  Chest x-ray 1 11 2023.    FINDINGS:    Lungs: Punctate right lower lobe calcification granuloma.  No mass.  No   consolidation.    Pleural space:  Unremarkable.  No pneumothorax.  No pleural effusion.    Heart:  No cardiomegaly.  No significant pericardial effusion.    Coronary artery calcifications.  Calcified mediastinal  lymph nodes.    Bones joints:  Degenerative changes of the spine.  No acute fracture.    Soft tissues: 3 x 3.1 x 1.8 cm soft tissue lesion involving the   cutaneous surface and subcutaneous fat in the posterior midline upper   thoracic wall centered at C7-T1.  Lesion is slightly hypodense centrally   with a submillimeter gas bubble at the anterior margin of the lesion.    Mild infiltration of the subcutaneous fat.  No involvement of the   posterior paraspinal musculature.  No radiopaque foreign body.    Vasculature:  Atherosclerotic vascular calcifications.  No thoracic   aortic aneurysm.    Abdomen:  Post cholecystectomy.    IMPRESSION:       Superficial soft tissue lesion in the midline upper back with a small gas   bubble suggestive of a phlegmon or abscess.  Adjacent cellulitis.  No   evidence for involvement of the posterior paraspinal musculature.  Calcified mediastinal lymph node and lung parenchymal granuloma   compatible with prior granulomatous disease.  Atherosclerotic vascular calcifications.    Impression:          Electronically signed by Mayank Villarreal M.D. on 06-20-24 at 0532            Results for orders placed during the hospital encounter of 02/26/21    Adult Transthoracic Echo Complete W/ Cont if Necessary Per Protocol    Interpretation Summary  · Estimated left ventricular EF = 51% Left ventricular systolic function is normal.  · Left ventricular diastolic function is consistent with (grade I) impaired relaxation.  · There is moderate calcification of the aortic valve.  · Mild mitral valve regurgitation is present.    ECG 12 Lead Bradycardia   Preliminary Result   HEART RATE= 47  bpm   RR Interval= 1277  ms   AR Interval=   ms   P Horizontal Axis=   deg   P Front Axis=   deg   QRSD Interval= 110  ms   QT Interval= 476  ms   QTcB= 421  ms   QRS Axis= 26  deg   T Wave Axis= -5  deg   - ABNORMAL ECG -   Junctional rhythm   Incomplete left bundle branch block   Borderline low voltage, extremity  "leads   Electronically Signed By:    Date and Time of Study: 2024-06-20 05:28:50        Assessment/Plan   Assessment & Plan   Active Hospital Problems    Diagnosis  POA    **Back wound [S21.209A]  Yes    Open wound [T14.8XXA]  Yes    Stage 3a chronic kidney disease (CKD) [N18.31]  Yes    Chronic anticoagulation [Z79.01]  Not Applicable    Paroxysmal atrial fibrillation [I48.0]  Yes    CAD (coronary artery disease) [I25.10]  Yes    Hyperlipidemia [E78.5]  Yes    Sleep apnea [G47.30]  Yes    Type 2 diabetes mellitus, without long-term current use of insulin [E11.9]  Yes      Resolved Hospital Problems   No resolved problems to display.       79 y.o. male admitted with Back wound.    Patient previously seen by Dr. Fernando with plastic surgery who originally operated on this \"cyst.\"  Will see if Dr. Fernando available for consultation.  Otherwise will need general surgery to look at this.  No obvious signs or symptoms of infection, will hold on antibiotic for now.  Will hold blood thinners.  Continue other home medications.  Blood pressure elevated in the ED.  Monitor blood glucose.      SCDs for DVT prophylaxis.  Full code.  Discussed with patient, spouse, nursing staff, and ED provider.      Mitchell Marino MD  [facility]  06/20/24  10:28 EDT    "

## 2024-06-20 NOTE — DISCHARGE SUMMARY
Patient Name: Erick Bruno  : 1945  MRN: [id number]    Date of Admission: 2024  Date of Discharge:  2024  Primary Care Physician: Provider, No Known      Chief Complaint:   Wound Check      Discharge Diagnoses     Active Hospital Problems    Diagnosis  POA    **Bleeding from open wound on back [T14.8XXA]  Yes    Stage 3a chronic kidney disease (CKD) [N18.31]  Yes    Chronic anticoagulation [Z79.01]  Not Applicable    Paroxysmal atrial fibrillation [I48.0]  Yes    CAD (coronary artery disease) [I25.10]  Yes    Hyperlipidemia [E78.5]  Yes    Sleep apnea [G47.30]  Yes    Type 2 diabetes mellitus, without long-term current use of insulin [E11.9]  Yes      Resolved Hospital Problems   No resolved problems to display.        Hospital Course     Mr. Bruno is a 79 y.o. male with a history of diabetes, PAD, multiple chronic nonhealing wounds, hypertension, A-fib, etc. that presents to [facility] complaining of bleeding wound on his back.  He is followed by Dr. Fernando in the wound care center.  It seems this was initially a cyst on his back that have been present for nearly 20 years according to patient's wife.  It was excised in the wound care office by Dr. Fernando towards the end of last month.  Wife has been providing wound care at home.  They have not really had any problems until last evening when evidently wound opened up and started profusely bleeding.  He has had no fever or chills.  No systemic illness.  No nausea or vomiting.    He was seen in consultation by general surgery who removed a blood clot from the open wound.  There was a small area of bleeding that was controlled with Surgicel, manual pressure and application of silver nitrate.  Pressure dressing was applied and it was felt bleeding controlled.  He was cleared for discharge by surgery to follow-up with his wound care specialist next week.  No other issues during his stay.      Day of Discharge     Subjective:  Seen  again following surgical evaluation.  Blood clot removed and bleeding seemingly controlled.  Fresh packing and bandage placed.  Cleared for discharge by surgery.    Physical Exam:  Temp:  [98.9 °F (37.2 °C)] 98.9 °F (37.2 °C)  Heart Rate:  [49-51] 51  Resp:  [16] 16  BP: (170-192)/(70-83) 189/80  Body mass index is 25.84 kg/m².  Physical Exam    Consultants     Consult Orders (all) (From admission, onward)       Start     Ordered    06/20/24 1211  Inpatient Plastic Surgery Consult  Once        Specialty:  Plastic Surgery  Provider:  Vasiliy Fernando MD    06/20/24 1210    06/20/24 0702  Inpatient General Surgery Consult  IN AM        Specialty:  General Surgery  Provider:  Francisca Flores MD    06/20/24 0510             Procedures     Imaging Results (All)       Procedure Component Value Units Date/Time    CT Chest Without Contrast Diagnostic [[id number]] Collected: 06/20/24 0533     Updated: 06/20/24 0533    Narrative:        Patient: MONIKA SNEED  Time Out: 05:32  Exam(s): CT CHEST Without Contrast     EXAM:    CT Chest Without Intravenous Contrast    CLINICAL HISTORY:     Open protruding wound to upper thoracic back.    TECHNIQUE:    Axial computed tomography images of the chest without intravenous   contrast.  CTDI is 7.45 mGy and DLP is 289.3 mGy-cm.  This CT exam was   performed according to the principle of ALARA (As Low As Reasonably   Achievable) by using one or more of the following dose reduction   techniques: automated exposure control, adjustment of the mA and or kV   according to patient size, and or use of iterative reconstruction   technique.    COMPARISON:  Chest x-ray 1 11 2023.    FINDINGS:    Lungs: Punctate right lower lobe calcification granuloma.  No mass.  No   consolidation.    Pleural space:  Unremarkable.  No pneumothorax.  No pleural effusion.    Heart:  No cardiomegaly.  No significant pericardial effusion.    Coronary artery calcifications.  Calcified mediastinal lymph nodes.     "Bones joints:  Degenerative changes of the spine.  No acute fracture.    Soft tissues: 3 x 3.1 x 1.8 cm soft tissue lesion involving the   cutaneous surface and subcutaneous fat in the posterior midline upper   thoracic wall centered at C7-T1.  Lesion is slightly hypodense centrally   with a submillimeter gas bubble at the anterior margin of the lesion.    Mild infiltration of the subcutaneous fat.  No involvement of the   posterior paraspinal musculature.  No radiopaque foreign body.    Vasculature:  Atherosclerotic vascular calcifications.  No thoracic   aortic aneurysm.    Abdomen:  Post cholecystectomy.    IMPRESSION:       Superficial soft tissue lesion in the midline upper back with a small gas   bubble suggestive of a phlegmon or abscess.  Adjacent cellulitis.  No   evidence for involvement of the posterior paraspinal musculature.  Calcified mediastinal lymph node and lung parenchymal granuloma   compatible with prior granulomatous disease.  Atherosclerotic vascular calcifications.         Pertinent Labs     Results from last 7 days   Lab Units 06/20/24  0646 06/20/24  0312   WBC 10*3/mm3 9.38 8.77   HEMOGLOBIN g/dL 11.6* 13.1   PLATELETS 10*3/mm3 200 208     Results from last 7 days   Lab Units 06/20/24  0646 06/20/24  0327   SODIUM mmol/L 136 136   POTASSIUM mmol/L 4.1 4.3   CHLORIDE mmol/L 105 105   CO2 mmol/L 23.1 24.5   BUN mg/dL 26* 27*   CREATININE mg/dL 1.53* 1.65*   GLUCOSE mg/dL 199* 228*   EGFR mL/min/1.73 46.2* 42.2*     Results from last 7 days   Lab Units 06/20/24  0327   ALBUMIN g/dL 4.2   BILIRUBIN mg/dL 0.4   ALK PHOS U/L 132*   AST (SGOT) U/L 19   ALT (SGPT) U/L 21     Results from last 7 days   Lab Units 06/20/24  0646 06/20/24  0327   CALCIUM mg/dL 8.7 8.9   ALBUMIN g/dL  --  4.2               Invalid input(s): \"LDLCALC\"          Test Results Pending at Discharge     Pending Labs       Order Current Status    Wound Culture - Wound, Back, Lower Preliminary result            Discharge " Details        Discharge Medications        Continue These Medications        Instructions Start Date   apixaban 5 MG tablet tablet  Commonly known as: Eliquis   5 mg, Oral, Every 12 Hours Scheduled      aspirin 81 MG EC tablet   81 mg, Oral, Daily, OK TO CONTINUE TO TAKE LEADING UP TO SURGERY PER DR. MULLER      atorvastatin 80 MG tablet  Commonly known as: LIPITOR   80 mg, Oral, Daily      busPIRone 5 MG tablet  Commonly known as: BUSPAR   5 mg, Oral, 2 Times Daily      clindamycin 1 % lotion  Commonly known as: CLEOCIN T       clopidogrel 75 MG tablet  Commonly known as: PLAVIX   75 mg, Oral      Coenzyme Q10 200 MG capsule   200 mg, Oral, Nightly, HOLDING FOR SURGERY      escitalopram 10 MG tablet  Commonly known as: LEXAPRO   10 mg, Oral, Nightly      glimepiride 4 MG tablet  Commonly known as: AMARYL   4 mg, Oral, Every Morning Before Breakfast      isosorbide mononitrate 30 MG 24 hr tablet  Commonly known as: IMDUR   30 mg, Oral, Nightly      LORazepam 0.5 MG tablet  Commonly known as: ATIVAN   TAKE 1 TABLET BY MOUTH EVERY 8 HOURS AS NEEDED FOR ANXIETY      Medihoney Wound/Burn Dressing gel   1 Application, Apply externally, Daily      melatonin 5 MG tablet tablet   5 mg, Oral, Nightly      metoprolol succinate XL 25 MG 24 hr tablet  Commonly known as: TOPROL-XL   25 mg, Oral, 2 Times Daily      multivitamin with minerals tablet tablet   1 tablet, Oral, Nightly, HOLD FOR SURGERY      nitroglycerin 0.4 MG SL tablet  Commonly known as: NITROSTAT   DISSOLVE ONE TABLET UNDER THE TONGUE EVERY 5 MINUTES AS NEEDED FOR CHEST PAIN.  DO NOT EXCEED A TOTAL OF 3 DOSES IN 15 MINUTES      omeprazole 20 MG capsule  Commonly known as: priLOSEC   20 mg, Oral, Daily      saw palmetto 160 MG capsule   160 mg, Oral, Nightly, HOLD FOR SURGERY      VITAMIN E PO   Oral               Allergies   Allergen Reactions    Biaxin [Clarithromycin] Itching    Penicillins Anaphylaxis and Hives    Percocet [Oxycodone-Acetaminophen] Itching     "Vancomycin Rash    Crestor [Rosuvastatin] Other (See Comments)     \"CAN'T REMEMBER THE REACTION\"    Levaquin [Levofloxacin In D5w] Other (See Comments)     MUSCLE STIFFNESS    Metformin Diarrhea    Latex Rash     Band aids cause blistering, ok with paper tape       Discharge Disposition:  Home or Self Care      Discharge Diet:  Diet Order   Procedures    NPO Diet NPO Type: Sips with Meds       Discharge Activity:   Activity Instructions       Activity as Tolerated              CODE STATUS:    Code Status and Medical Interventions:   Ordered at: 06/20/24 0510     Level Of Support Discussed With:    Patient     Code Status (Patient has no pulse and is not breathing):    CPR (Attempt to Resuscitate)     Medical Interventions (Patient has pulse or is breathing):    Full Support       Future Appointments   Date Time Provider Department Center   7/15/2024 11:15 AM Jayden Marie MD [facility]   10/24/2024  1:30 PM Aaliyah Christiansen APRN [facility]   10/31/2024  9:30 AM Richie Franco MD [facility]     Additional Instructions for the Follow-ups that You Need to Schedule       Discharge Follow-up with PCP   As directed       Currently Documented PCP:    Provider, No Known    PCP Phone Number:    [phone]     Follow Up Details: 1 to 2 weeks (or sooner if problems)               Follow-up Information       Provider, No Known .    Why: 1 to 2 weeks (or sooner if problems)  Contact information:  [place] [address]  [phone]                             Additional Instructions for the Follow-ups that You Need to Schedule       Discharge Follow-up with PCP   As directed       Currently Documented PCP:    Provider, No Known    PCP Phone Number:    [phone]     Follow Up Details: 1 to 2 weeks (or sooner if problems)            Time Spent on Discharge:  Greater than 30 minutes      Mitchell Marino MD  [facility]  06/20/24  14:30 EDT            "

## 2024-06-21 LAB
QT INTERVAL: 476 MS
QTC INTERVAL: 421 MS

## 2024-06-21 NOTE — OUTREACH NOTE
Prep Survey      Flowsheet Row Responses   [facility] patient discharged from? [facility]   Is LACE score < 7 ? No   Eligibility Readm Mgmt   Discharge diagnosis Bleeding from open wound on back  Principal problem   Does the patient have one of the following disease processes/diagnoses(primary or secondary)? Other   Does the patient have Home health ordered? No   Is there a DME ordered? No   Prep survey completed? Yes            GERI PURCELL - Registered Nurse

## 2024-06-25 ENCOUNTER — READMISSION MANAGEMENT (OUTPATIENT)
Dept: CALL CENTER | Facility: HOSPITAL | Age: 79
End: 2024-06-25
Payer: MEDICARE

## 2024-06-25 ENCOUNTER — OFFICE VISIT (OUTPATIENT)
Dept: WOUND CARE | Facility: HOSPITAL | Age: 79
End: 2024-06-25
Payer: MEDICARE

## 2024-06-25 NOTE — OUTREACH NOTE
Medical Week 1 Survey      Flowsheet Row Responses   [facility] patient discharged from? [place]   Does the patient have one of the following disease processes/diagnoses(primary or secondary)? Other   Week 1 attempt successful? No   Unsuccessful attempts Attempt 1            Deisy Sherman Licensed Nurse

## 2024-06-30 LAB
BACTERIA SPEC AEROBE CULT: ABNORMAL
BACTERIA SPEC AEROBE CULT: ABNORMAL
GRAM STN SPEC: ABNORMAL

## 2024-07-02 ENCOUNTER — READMISSION MANAGEMENT (OUTPATIENT)
Dept: CALL CENTER | Facility: HOSPITAL | Age: 79
End: 2024-07-02
Payer: MEDICARE

## 2024-07-02 LAB
BACTERIA SPEC AEROBE CULT: ABNORMAL
BACTERIA SPEC AEROBE CULT: ABNORMAL
GRAM STN SPEC: ABNORMAL

## 2024-07-02 NOTE — OUTREACH NOTE
Medical Week 1 Survey      Flowsheet Row Responses   [facility] patient discharged from? [place]   Does the patient have one of the following disease processes/diagnoses(primary or secondary)? Other   Week 1 attempt successful? Yes   Call start time 1258   Call end time 1304   Discharge diagnosis Bleeding from open wound on back  Principal problem   Is patient permission given to speak with other caregiver? Yes   List who call center can speak with Spouse   Person spoke with today (if not patient) and relationship Spouse   Meds reviewed with patient/caregiver? Yes   Is the patient having any side effects they believe may be caused by any medication additions or changes? No   Does the patient have all medications ordered at discharge? N/A   Is the patient taking all medications as directed (includes completed medication regime)? Yes   Comments regarding appointments Has had wound care follow up, cardiology follow up 7/15/24   Does the patient have a primary care provider?  Yes   Does the patient have an appointment with their PCP within 7 days of discharge? Yes   Comments regarding PCP Patient currently looking for new PCP. Spouse declined number to patient connection hub.   Has the patient kept scheduled appointments due by today? Yes   Has home health visited the patient within 72 hours of discharge? N/A   Psychosocial issues? No   Did the patient receive a copy of their discharge instructions? Yes   Nursing interventions Reviewed instructions with patient   What is the patient's perception of their health status since discharge? Improving   Is the patient/caregiver able to teach back signs and symptoms related to disease process for when to call PCP? Yes   Is the patient/caregiver able to teach back signs and symptoms related to disease process for when to call 911? Yes   Is the patient/caregiver able to teach back the hierarchy of who to call/visit for symptoms/problems? PCP, Specialist, Home health  nurse, Urgent Care, ED, 911 Yes   Week 1 call completed? Yes   Would this patient benefit from a Referral to [facility] Social Work? No   Is the patient interested in additional calls from an ambulatory ? No   Call end time 1304            Rocío HARRISON - Registered Nurse

## 2024-07-15 ENCOUNTER — OFFICE VISIT (OUTPATIENT)
Age: 79
End: 2024-07-15
Payer: MEDICARE

## 2024-07-15 VITALS
HEART RATE: 61 BPM | HEIGHT: 69 IN | WEIGHT: 175 LBS | DIASTOLIC BLOOD PRESSURE: 67 MMHG | BODY MASS INDEX: 25.92 KG/M2 | SYSTOLIC BLOOD PRESSURE: 157 MMHG

## 2024-07-15 DIAGNOSIS — I71.40 ABDOMINAL AORTIC ANEURYSM (AAA) 3.0 CM TO 5.5 CM IN DIAMETER IN MALE: ICD-10-CM

## 2024-07-15 DIAGNOSIS — I65.23 BILATERAL CAROTID ARTERY STENOSIS: Primary | ICD-10-CM

## 2024-07-15 DIAGNOSIS — I73.9 PVD (PERIPHERAL VASCULAR DISEASE): ICD-10-CM

## 2024-07-15 NOTE — PROGRESS NOTES
"Chief Complaint  Wound Check    Subjective          HPI: Erick Bruno presents to [facility] VASCULAR SURGERY peripheral arterial disease with tissue loss of the right foot.  He also developed a back abscess that had to be treated by general surgery    Review of Systems     History Review Reviewed Comments   Past Medical History:  [x]     Past Surgical History: [x]  Left SFA stent   Family History: [x]     Social History: [x]       Objective   Vital Signs:  /67 (BP Location: Left arm)   Pulse 61   Ht 175.3 cm (69.02\")   Wt 79.4 kg (175 lb)   BMI 25.83 kg/m²   Estimated body mass index is 25.83 kg/m² as calculated from the following:    Height as of this encounter: 175.3 cm (69.02\").    Weight as of this encounter: 79.4 kg (175 lb).              Physical Exam  Vascular: Patient's wounds continue to improve.  Third digit wound is nearly healed.  Minor tissue loss on the tip of the great toe.        Result Review :    Common labs          4/29/2024    10:56 6/6/2024    09:11 6/20/2024    03:12 6/20/2024    03:27 6/20/2024    06:46   Common Labs   Glucose 204    228  199    BUN 32    27  26    Creatinine 1.40  1.70   1.65  1.53    Sodium 136    136  136    Potassium 5.0    4.3  4.1    Chloride 99    105  105    Calcium 9.1    8.9  8.7    Total Protein 7.1        Albumin 4.4    4.2     Total Bilirubin 0.5    0.4     Alkaline Phosphatase 134    132     AST (SGOT) 19    19     ALT (SGPT) 22    21     WBC   8.77   9.38    Hemoglobin   13.1   11.6    Hematocrit   40.0   35.6    Platelets   208   200    Total Cholesterol 202        Triglycerides 168        HDL Cholesterol 43        LDL Cholesterol  129        Hemoglobin A1C 9.30          Most notable findings include: Creatinine 1.53.  Hemoglobin 11.6.  Glucose 199.  Hemo and A1c 9.3.  Total cholesterol 202 with an LDL of 129    CT Angio Abdominal Aorta Bilateral Iliofem Runoff (06/06/2024 10:40)         Erick Bruno  reports that he quit smoking " about 16 years ago. His smoking use included cigarettes. He started smoking about 76 years ago. He has a 60 pack-year smoking history. He has been exposed to tobacco smoke. He has never used smokeless tobacco..           Assessment and Plan     Assessment & Plan     Mr. Bruno is a 78-year-old gentleman who I have followed for multiple vascular issues.  His medical records were again reviewed.  He has a history of carotid artery stenosis and peripheral arterial disease.  There is a history of a left SFA angioplasty and stent placement back in 2016 and again in 2018.  In 2021 he had a right-sided TCAR.  More recently I tried to gain access through his right leg to intervene on his in-stent stenosis on the left and once barely able to get obtain access due to extensive calcifications.  I ended up recommending medical management.  He did end up getting a foot wound that went on to heal without intervention.  He thenpresented to our nurse practitioner recently with a stumped great toe on the right and an JODI that had declined from 0.55 down to 0.45.  She ordered a CT angiogram and short-term follow-up.  The patient has extensive disease.  He has an infrarenal aortic dissection with some aneurysmal degeneration.  There is also a right greater than left orificial common iliac artery stenosis.  There is extensive external iliac disease on the right as well as multifocal disease and an extremely heavily calcified SFA.   Options are limited.  I ultimately decided to give him a few weeks to see how he does with wound care alone to see if these wounds improve as his contralateral leg, or if they worsen.    Since last time I saw him he also developed a back abscess and had to be admitted to the hospital for management of this.  She has been using Medihoney to the wound and they continue to improve.  Please see photograph under exam above.  Given limited options and improvement with his wounds I have recommended continue local  wound care for now.  I will see him again in 3 months.  He should continue his prescription medications until then including Eliquis and clopidogrel.  When we see him at that time we will make sure that when he is back on schedule for all of his follow-up vascular imaging studies.     Follow Up     Return in about 3 months (around 10/15/2024).  Patient was given instructions and counseling regarding his condition or for health maintenance advice. Please see specific information pulled into the AVS if appropriate.

## 2024-07-23 ENCOUNTER — OFFICE VISIT (OUTPATIENT)
Dept: WOUND CARE | Facility: HOSPITAL | Age: 79
End: 2024-07-23
Payer: MEDICARE

## 2024-07-24 ENCOUNTER — OFFICE VISIT (OUTPATIENT)
Dept: CARDIOLOGY | Facility: CLINIC | Age: 79
End: 2024-07-24
Payer: MEDICARE

## 2024-07-24 VITALS
WEIGHT: 177.8 LBS | HEART RATE: 56 BPM | HEIGHT: 69 IN | DIASTOLIC BLOOD PRESSURE: 78 MMHG | BODY MASS INDEX: 26.33 KG/M2 | SYSTOLIC BLOOD PRESSURE: 142 MMHG | OXYGEN SATURATION: 99 %

## 2024-07-24 DIAGNOSIS — I48.0 PAROXYSMAL ATRIAL FIBRILLATION: Primary | ICD-10-CM

## 2024-07-24 DIAGNOSIS — E78.2 MIXED HYPERLIPIDEMIA: ICD-10-CM

## 2024-07-24 DIAGNOSIS — I10 PRIMARY HYPERTENSION: ICD-10-CM

## 2024-07-24 DIAGNOSIS — N18.32 STAGE 3B CHRONIC KIDNEY DISEASE: ICD-10-CM

## 2024-07-24 DIAGNOSIS — I73.9 PVD (PERIPHERAL VASCULAR DISEASE): ICD-10-CM

## 2024-07-24 DIAGNOSIS — I25.10 CORONARY ARTERY DISEASE INVOLVING NATIVE CORONARY ARTERY OF NATIVE HEART WITHOUT ANGINA PECTORIS: ICD-10-CM

## 2024-07-24 DIAGNOSIS — E11.51 TYPE 2 DIABETES MELLITUS WITH DIABETIC PERIPHERAL ANGIOPATHY WITHOUT GANGRENE, WITHOUT LONG-TERM CURRENT USE OF INSULIN: ICD-10-CM

## 2024-07-24 PROBLEM — T14.8XXA BLEEDING FROM WOUND: Status: RESOLVED | Noted: 2024-06-20 | Resolved: 2024-07-24

## 2024-07-24 PROBLEM — R94.39 ABNORMAL NUCLEAR STRESS TEST: Chronic | Status: RESOLVED | Noted: 2018-05-21 | Resolved: 2024-07-24

## 2024-07-24 NOTE — PROGRESS NOTES
Date of Office Visit: 2024  Encounter Provider: DEBORAH Green  Place of Service: [facility] CARDIOLOGY  Patient Name: Erick Bruno  :1945  Primary Cardiologist: Dr. Michael Wyman    Chief Complaint   Patient presents with    Follow-up   :     HPI: Erick Bruno is a 79 y.o. male who presents today for discussion of anticoagulation. I have reviewed his medical records.    He has known hypertension, hyperlipidemia, type 2 diabetes mellitus, obstructive sleep apnea, and Alzheimer's dementia.  He has carotid artery stenosis and abdominal aortic aneurysm followed by vascular surgery.  He underwent left SFA angioplasty and stent placement in  and again in .  Extensive PAD of the lower extremities.    In May 2017, stress testing was abnormal showing inferior wall abnormality, no ischemia, and EF of 48%. The patient elected not to proceed with any additional diagnostic testing.    In 2020, he needed perioperative cardiac risk assessment for carotid endarterectomy. Myocardial perfusion study was abnormal and there was no significant change from the study in May 2017.  He went on to have a surgery.    In 2021, he was hospitalized for right foot cellulitis and developed atrial fibrillation.  Echocardiogram showed normal LVEF, grade 1 diastolic dysfunction, moderate calcification of the aortic valve, and mild mitral regurgitation. CT of the abdomen and pelvis showed penetrating ulcer formation and infrarenal abdominal aortic aneurysm, moderate stenosis of the left renal artery, severe stenosis of the right common iliac artery, mild stenosis of the right internal iliac artery at the origin, considerable stenosis of the right deep femoral artery, and 2 wall stents in the left SFA.  Carotid duplex showed right carotid stent with no stenosis and left carotid moderate stenosis.  Cardiology and vascular surgery agreed on stopping the clopidogrel and to  continue with aspirin and apixaban for stroke prevention.     In June 2024, his wife contacted our office stating that she had been told by the pharmacist and vascular provider that rivaroxaban may be a better alternative for patient with chronic kidney disease. There was also concern about him taking the apixaban twice per day because he sleeps a lot and may miss a dosage.    He presents today for follow-up visit with his wife accompanying him.  She explains that in June he went to the [facility] ED with bleeding wound on his back.  His kidney function was elevated at 1.7 and his wife said she did not know that he had chronic kidney disease.  His blood pressure was elevated in the ED and heart rate was 46-50s.    He denies any further bleeding wounds.  He reports some mild dizziness.  He denies chest pain, shortness of air, palpitations, edema, syncope, or bleeding.  Blood pressure is elevated today.  They do not check his blood pressure at home.  They have been referred to see a nephrologist in September.  His wife is wondering if he should continue with the apixaban because he sleeps so often that he does not always want to take his medications.      Past Medical History:   Diagnosis Date    Abnormal nuclear stress test 05/21/2018    Acid reflux     Alzheimer's dementia     Anemia     Anesthesia complication     PT'S WIFE STATES PT CRYING AND EMOTIONAL AFTER SURGERY IN THE PAST    Anxiety     Arthritis     Atrial fibrillation     CAD (coronary artery disease)     Carotid artery disease     Chronic anticoagulation     Dementia     Diabetes mellitus     Elevated cholesterol     Enlarged prostate     Fracture 2023    Foot    Frequent urination at night     History of seizure 2013    S/P TEMPERATURE    Hyperlipidemia     Hypertension     Leg pain     BILAT-D/T PVD    Macular degeneration     New onset atrial fibrillation 02/2021    NSTEMI (non-ST elevated myocardial infarction)     Panarteritis     Peripheral arterial  disease     Poor balance     HIGH RISK FOR FALLS    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    PVD (peripheral vascular disease)     Renal disorder     Sleep apnea     DOES NOT WEAR CPAP    SSS (sick sinus syndrome)        Past Surgical History:   Procedure Laterality Date    APPENDECTOMY      ATHRECTOMY ILIAC, FEMORAL, TIBIAL ARTERY N/A 10/17/2018    Procedure: AIF ARTERIOGRAM, LEFT SFA  ANGIOPLASTY;  Surgeon: Jayden Marie MD;  Location: [facility] MAIN OR;  Service: Vascular    ATHRECTOMY ILIAC, FEMORAL, TIBIAL ARTERY Bilateral 2022    Procedure: AIF LEFT LEG ANGIOGRAM;  Surgeon: Jayden Marie MD;  Location: [facility] OR ;  Service: Vascular;  Laterality: Bilateral;    CATARACT EXTRACTION WITH INTRAOCULAR LENS IMPLANT Bilateral     CHOLECYSTECTOMY WITH INTRAOPERATIVE CHOLANGIOGRAM N/A 2018    Procedure: CHOLECYSTECTOMY LAPAROSCOPIC INTRAOPERATIVE CHOLANGIOGRAM;  Surgeon: Daniel Gonzalez MD;  Location: [facility] MAIN OR;  Service: General    COLONOSCOPY      COLONOSCOPY N/A 2018    Procedure: COLONOSCOPY to cecum with polypectomies;  Surgeon: Daniel Gonzalez MD;  Location: [facility] ENDOSCOPY;  Service: Gastroenterology    ENDOSCOPY N/A 2018    Procedure: ESOPHAGOGASTRODUODENOSCOPY with biopsies;  Surgeon: Daniel Gonzalez MD;  Location: [facility] ENDOSCOPY;  Service: Gastroenterology    FEMORAL ARTERY STENT Left     on 3/22/16       Social History     Socioeconomic History    Marital status:    Tobacco Use    Smoking status: Former     Current packs/day: 0.00     Average packs/day: 1 pack/day for 60.0 years (60.0 ttl pk-yrs)     Types: Cigarettes     Start date:      Quit date:      Years since quittin.5     Passive exposure: Past    Smokeless tobacco: Never   Vaping Use    Vaping status: Never Used   Substance and Sexual Activity    Alcohol use: No     Comment: Daily caffeine use    Drug use: No    Sexual activity: Defer       Family History   Problem  "Relation Age of Onset    Hypertension Mother     Diabetes Daughter     Cancer Maternal Aunt     Diabetes Maternal Grandfather     Malig Hyperthermia Neg Hx        The following portion of the patient's history were reviewed and updated as appropriate: past medical history, past surgical history, past social history, past family history, allergies, current medications, and problem list.    Review of Systems   Constitutional: Negative.   Cardiovascular: Negative.    Respiratory: Negative.     Hematologic/Lymphatic: Negative.    Neurological:  Positive for dizziness.       Allergies   Allergen Reactions    Biaxin [Clarithromycin] Itching    Penicillins Anaphylaxis and Hives    Percocet [Oxycodone-Acetaminophen] Itching    Vancomycin Rash    Crestor [Rosuvastatin] Other (See Comments)     \"CAN'T REMEMBER THE REACTION\"    Levaquin [Levofloxacin In D5w] Other (See Comments)     MUSCLE STIFFNESS    Metformin Diarrhea    Latex Rash     Band aids cause blistering, ok with paper tape         Current Outpatient Medications:     apixaban (Eliquis) 5 MG tablet tablet, Take 1 tablet by mouth Every 12 (Twelve) Hours., Disp: 180 tablet, Rfl: 3    aspirin 81 MG EC tablet, Take 1 tablet by mouth Daily. OK TO CONTINUE TO TAKE LEADING UP TO SURGERY PER DR. MULLER, Disp: , Rfl:     atorvastatin (LIPITOR) 80 MG tablet, Take 1 tablet by mouth Daily., Disp: 90 tablet, Rfl: 1    busPIRone (BUSPAR) 5 MG tablet, Take 1 tablet by mouth 2 (Two) Times a Day., Disp: 180 tablet, Rfl: 1    clopidogrel (PLAVIX) 75 MG tablet, Take 1 tablet by mouth., Disp: , Rfl:     Coenzyme Q10 200 MG capsule, Take 200 mg by mouth Every Night. HOLDING FOR SURGERY, Disp: , Rfl:     escitalopram (LEXAPRO) 10 MG tablet, Take 1 tablet by mouth Every Night., Disp: , Rfl:     glimepiride (AMARYL) 4 MG tablet, Take 1 tablet by mouth Every Morning Before Breakfast., Disp: 90 tablet, Rfl: 1    isosorbide mononitrate (IMDUR) 30 MG 24 hr tablet, TAKE 1 TABLET BY MOUTH ONCE " "DAILY AT NIGHT, Disp: 90 tablet, Rfl: 3    LORazepam (ATIVAN) 0.5 MG tablet, TAKE 1 TABLET BY MOUTH EVERY 8 HOURS AS NEEDED FOR ANXIETY, Disp: 30 tablet, Rfl: 0    melatonin 5 MG tablet tablet, Take 1 tablet by mouth Every Night., Disp: , Rfl:     metoprolol succinate XL (TOPROL-XL) 25 MG 24 hr tablet, Take 1 tablet by mouth 2 (Two) Times a Day., Disp: 180 tablet, Rfl: 1    multivitamin with minerals tablet tablet, Take 1 tablet by mouth Every Night. HOLD FOR SURGERY, Disp: , Rfl:     nitroglycerin (NITROSTAT) 0.4 MG SL tablet, DISSOLVE ONE TABLET UNDER THE TONGUE EVERY 5 MINUTES AS NEEDED FOR CHEST PAIN.  DO NOT EXCEED A TOTAL OF 3 DOSES IN 15 MINUTES, Disp: 5 tablet, Rfl: 0    omeprazole (priLOSEC) 20 MG capsule, Take 1 capsule by mouth Daily., Disp: 90 capsule, Rfl: 1    saw palmetto 160 MG capsule, Take 1 capsule by mouth Every Night. HOLD FOR SURGERY, Disp: , Rfl:     Wound Dressings (Medihoney Wound/Burn Dressing) gel, Apply 1 Application topically Daily., Disp: 1 each, Rfl: 0         Objective:     Vitals:    07/24/24 1310   BP: 142/78   BP Location: Left arm   Patient Position: Sitting   Cuff Size: Adult   Pulse: 56   SpO2: 99%   Weight: 80.6 kg (177 lb 12.8 oz)   Height: 175.3 cm (69.02\")     Body mass index is 26.24 kg/m².    PHYSICAL EXAM:    Vitals Reviewed.   General Appearance: No acute distress, well developed and well nourished.   HENT: No hearing loss noted.    Respiratory: No signs of respiratory distress. Respiration rhythm and depth normal.  Clear to auscultation.   Cardiovascular:  Jugular Venous Pressure: Normal  Heart Rate and Rhythm: Normal, Heart Sounds: Normal S1 and S2. No S3 or S4 noted.  Murmurs: No murmurs noted. No rubs, thrills, or gallops.   Lower Extremities: No edema noted.  Musculoskeletal: Normal movement of extremities.  Skin: General appearance normal.    Psychiatric: Patient alert and oriented to person, place, and time. Speech and behavior appropriate. Normal mood and affect. "       ECG 12 Lead    Date/Time: 7/24/2024 1:10 PM  Performed by: Aaliyah Christiansen APRN    Authorized by: Aaliyah Christiansen APRN  Comparison: compared with previous ECG from 6/20/2024  Similar to previous ECG  Rhythm: sinus rhythm  Rate: bradycardic  BPM: 56  Conduction: conduction normal  ST Depression: V3 and V4  T Waves: T waves normal  QRS axis: normal    Clinical impression: non-specific ECG            Assessment:       Diagnosis Plan   1. Paroxysmal atrial fibrillation  ECG 12 Lead    apixaban (Eliquis) 5 MG tablet tablet      2. Coronary artery disease involving native coronary artery of native heart without angina pectoris        3. PVD (peripheral vascular disease)  ECG 12 Lead    apixaban (Eliquis) 5 MG tablet tablet      4. Abdominal aortic aneurysm (AAA) 3.0 cm to 5.5 cm in diameter in male (HCC)  ECG 12 Lead    apixaban (Eliquis) 5 MG tablet tablet      5. Primary hypertension  ECG 12 Lead      6. Stage 3b chronic kidney disease  ECG 12 Lead      7. Type 2 diabetes mellitus with diabetic peripheral angiopathy without gangrene, without long-term current use of insulin  ECG 12 Lead    apixaban (Eliquis) 5 MG tablet tablet      8. Mixed hyperlipidemia               Plan:       1.  Paroxysmal Atrial Fibrillation: Diagnosed in February 2021 while hospitalized for cellulitis.  Currently in a normal sinus rhythm.  [id number] score 5.  We decided we will continue with the apixaban and if he is sleeping he will wake up to take the dose.  Continue metoprolol.    2.  Presumed coronary artery disease and abnormal stress testing showing mild to moderately severe area of the inferior wall ischemia.  Stress testing was the same in May 2017 and again in 2020.  Medical treatment recommended.  Denies angina.    3.  Peripheral arterial disease and abdominal aortic aneurysm followed by vascular surgery.    4.  Hypertension: Elevated today.  Continue to monitor.    5.  Stage IIIB chronic kidney disease: He has seen the  nephrologist in September.    6.  Type 2 diabetes mellitus.    7.  Hyperlipidemia: Continue atorvastatin.    8.  Cancel appointment with me in October.  Follow-up with Dr. Wyman in 6 months.    As always, it has been a pleasure to participate in your patient's care. Thank you.         Sincerely,         DEBORAH Hastings  [facility]      Dictated utilizing Dragon Dictation  I spent 35 minutes reviewing her medical records/testing/previous office notes/labs, face-to-face interaction with patient, physical examination, formulating the plan of care, and discussion of plan of care with patient.

## 2024-08-20 ENCOUNTER — OFFICE VISIT (OUTPATIENT)
Dept: WOUND CARE | Facility: HOSPITAL | Age: 79
End: 2024-08-20
Payer: MEDICARE

## 2024-09-12 ENCOUNTER — TRANSCRIBE ORDERS (OUTPATIENT)
Dept: ADMINISTRATIVE | Facility: HOSPITAL | Age: 79
End: 2024-09-12
Payer: MEDICARE

## 2024-09-12 DIAGNOSIS — N18.31 STAGE 3A CHRONIC KIDNEY DISEASE (CKD): Primary | ICD-10-CM

## 2024-09-20 ENCOUNTER — HOSPITAL ENCOUNTER (OUTPATIENT)
Dept: ULTRASOUND IMAGING | Facility: HOSPITAL | Age: 79
Discharge: HOME | End: 2024-09-20
Payer: MEDICARE

## 2024-09-20 ENCOUNTER — LAB (OUTPATIENT)
Dept: LAB | Facility: HOSPITAL | Age: 79
End: 2024-09-20
Payer: MEDICARE

## 2024-09-20 ENCOUNTER — TRANSCRIBE ORDERS (OUTPATIENT)
Dept: ADMINISTRATIVE | Facility: HOSPITAL | Age: 79
End: 2024-09-20
Payer: MEDICARE

## 2024-09-20 DIAGNOSIS — N18.6 TYPE 2 DIABETES MELLITUS WITH ESRD (END-STAGE RENAL DISEASE): ICD-10-CM

## 2024-09-20 DIAGNOSIS — I25.10 DISEASE OF CARDIOVASCULAR SYSTEM: ICD-10-CM

## 2024-09-20 DIAGNOSIS — E11.22 TYPE 2 DIABETES MELLITUS WITH ESRD (END-STAGE RENAL DISEASE): ICD-10-CM

## 2024-09-20 DIAGNOSIS — I48.0 PAROXYSMAL ATRIAL FIBRILLATION: ICD-10-CM

## 2024-09-20 DIAGNOSIS — I73.9 PERIPHERAL VASCULAR DISEASE, UNSPECIFIED: ICD-10-CM

## 2024-09-20 DIAGNOSIS — Z12.5 SPECIAL SCREENING FOR MALIGNANT NEOPLASM OF PROSTATE: ICD-10-CM

## 2024-09-20 DIAGNOSIS — I12.9 HYPERTENSIVE NEPHROPATHY: ICD-10-CM

## 2024-09-20 DIAGNOSIS — E11.40 DIABETIC NEUROPATHY WITH NEUROLOGIC COMPLICATION: ICD-10-CM

## 2024-09-20 DIAGNOSIS — E78.5 HYPERLIPIDEMIA, UNSPECIFIED HYPERLIPIDEMIA TYPE: ICD-10-CM

## 2024-09-20 DIAGNOSIS — N18.31 STAGE 3A CHRONIC KIDNEY DISEASE (CKD): ICD-10-CM

## 2024-09-20 DIAGNOSIS — E11.9 DIABETES MELLITUS WITHOUT COMPLICATION: ICD-10-CM

## 2024-09-20 DIAGNOSIS — E11.40 DIABETIC NEUROPATHY WITH NEUROLOGIC COMPLICATION: Primary | ICD-10-CM

## 2024-09-20 DIAGNOSIS — N18.31 CHRONIC KIDNEY DISEASE (CKD) STAGE G3A/A1, MODERATELY DECREASED GLOMERULAR FILTRATION RATE (GFR) BETWEEN 45-59 ML/MIN/1.73 SQUARE METER AND ALBUMINURIA CREATININE RATIO LESS THAN 30 MG/G (CMS/H*: Primary | ICD-10-CM

## 2024-09-20 DIAGNOSIS — N18.31 CHRONIC KIDNEY DISEASE (CKD) STAGE G3A/A1, MODERATELY DECREASED GLOMERULAR FILTRATION RATE (GFR) BETWEEN 45-59 ML/MIN/1.73 SQUARE METER AND ALBUMINURIA CREATININE RATIO LESS THAN 30 MG/G (CMS/H*: ICD-10-CM

## 2024-09-20 DIAGNOSIS — E11.49 DIABETIC NEUROPATHY WITH NEUROLOGIC COMPLICATION: ICD-10-CM

## 2024-09-20 DIAGNOSIS — E11.49 DIABETIC NEUROPATHY WITH NEUROLOGIC COMPLICATION: Primary | ICD-10-CM

## 2024-09-20 LAB
25(OH)D3 SERPL-MCNC: 40.4 NG/ML (ref 30–100)
ALBUMIN SERPL-MCNC: 4 G/DL (ref 3.5–5.2)
ALBUMIN UR-MCNC: <1.2 MG/DL
ALBUMIN/GLOB SERPL: 1.3 G/DL
ALP SERPL-CCNC: 116 U/L (ref 39–117)
ALT SERPL W P-5'-P-CCNC: 16 U/L (ref 1–41)
ANION GAP SERPL CALCULATED.3IONS-SCNC: 7.1 MMOL/L (ref 5–15)
AST SERPL-CCNC: 18 U/L (ref 1–40)
BASOPHILS # BLD AUTO: 0.04 10*3/MM3 (ref 0–0.2)
BASOPHILS NFR BLD AUTO: 0.7 % (ref 0–1.5)
BILIRUB SERPL-MCNC: 0.4 MG/DL (ref 0–1.2)
BUN SERPL-MCNC: 27 MG/DL (ref 8–23)
BUN/CREAT SERPL: 19.9 (ref 7–25)
CALCIUM SPEC-SCNC: 8.8 MG/DL (ref 8.6–10.5)
CHLORIDE SERPL-SCNC: 104 MMOL/L (ref 98–107)
CHOLEST SERPL-MCNC: 191 MG/DL (ref 0–200)
CO2 SERPL-SCNC: 22.9 MMOL/L (ref 22–29)
CREAT SERPL-MCNC: 1.36 MG/DL (ref 0.76–1.27)
CREAT UR-MCNC: 51.3 MG/DL
CREAT UR-MCNC: 51.3 MG/DL
DEPRECATED RDW RBC AUTO: 44.6 FL (ref 37–54)
EGFRCR SERPLBLD CKD-EPI 2021: 53.3 ML/MIN/1.73
EOSINOPHIL # BLD AUTO: 0.19 10*3/MM3 (ref 0–0.4)
EOSINOPHIL NFR BLD AUTO: 3.3 % (ref 0.3–6.2)
ERYTHROCYTE [DISTWIDTH] IN BLOOD BY AUTOMATED COUNT: 12.9 % (ref 12.3–15.4)
GLOBULIN UR ELPH-MCNC: 3 GM/DL
GLUCOSE SERPL-MCNC: 213 MG/DL (ref 65–99)
HBA1C MFR BLD: 9.6 % (ref 4.8–5.6)
HCT VFR BLD AUTO: 38.2 % (ref 37.5–51)
HDLC SERPL-MCNC: 36 MG/DL (ref 40–60)
HGB BLD-MCNC: 12.1 G/DL (ref 13–17.7)
IMM GRANULOCYTES # BLD AUTO: 0.02 10*3/MM3 (ref 0–0.05)
IMM GRANULOCYTES NFR BLD AUTO: 0.3 % (ref 0–0.5)
LDLC SERPL CALC-MCNC: 125 MG/DL (ref 0–100)
LDLC/HDLC SERPL: 3.38 {RATIO}
LYMPHOCYTES # BLD AUTO: 1.12 10*3/MM3 (ref 0.7–3.1)
LYMPHOCYTES NFR BLD AUTO: 19.4 % (ref 19.6–45.3)
MAGNESIUM SERPL-MCNC: 1.9 MG/DL (ref 1.6–2.4)
MCH RBC QN AUTO: 29.7 PG (ref 26.6–33)
MCHC RBC AUTO-ENTMCNC: 31.7 G/DL (ref 31.5–35.7)
MCV RBC AUTO: 93.9 FL (ref 79–97)
MICROALBUMIN/CREAT UR: NORMAL MG/G{CREAT}
MONOCYTES # BLD AUTO: 0.4 10*3/MM3 (ref 0.1–0.9)
MONOCYTES NFR BLD AUTO: 6.9 % (ref 5–12)
NEUTROPHILS NFR BLD AUTO: 4 10*3/MM3 (ref 1.7–7)
NEUTROPHILS NFR BLD AUTO: 69.4 % (ref 42.7–76)
NRBC BLD AUTO-RTO: 0 /100 WBC (ref 0–0.2)
PHOSPHATE SERPL-MCNC: 3 MG/DL (ref 2.5–4.5)
PLATELET # BLD AUTO: 179 10*3/MM3 (ref 140–450)
PMV BLD AUTO: 9.1 FL (ref 6–12)
POTASSIUM SERPL-SCNC: 4.5 MMOL/L (ref 3.5–5.2)
PROT ?TM UR-MCNC: 6.6 MG/DL
PROT SERPL-MCNC: 7 G/DL (ref 6–8.5)
PROT/CREAT UR: 128.7 MG/G CREA (ref 0–200)
RBC # BLD AUTO: 4.07 10*6/MM3 (ref 4.14–5.8)
SODIUM SERPL-SCNC: 134 MMOL/L (ref 136–145)
TRIGL SERPL-MCNC: 166 MG/DL (ref 0–150)
URATE SERPL-MCNC: 4.6 MG/DL (ref 3.4–7)
VLDLC SERPL-MCNC: 30 MG/DL (ref 5–40)
WBC NRBC COR # BLD AUTO: 5.77 10*3/MM3 (ref 3.4–10.8)

## 2024-09-20 PROCEDURE — 86334 IMMUNOFIX E-PHORESIS SERUM: CPT

## 2024-09-20 PROCEDURE — 83735 ASSAY OF MAGNESIUM: CPT

## 2024-09-20 PROCEDURE — 86038 ANTINUCLEAR ANTIBODIES: CPT

## 2024-09-20 PROCEDURE — 82043 UR ALBUMIN QUANTITATIVE: CPT

## 2024-09-20 PROCEDURE — 83036 HEMOGLOBIN GLYCOSYLATED A1C: CPT

## 2024-09-20 PROCEDURE — 80061 LIPID PANEL: CPT

## 2024-09-20 PROCEDURE — 84165 PROTEIN E-PHORESIS SERUM: CPT

## 2024-09-20 PROCEDURE — 86225 DNA ANTIBODY NATIVE: CPT

## 2024-09-20 PROCEDURE — 36415 COLL VENOUS BLD VENIPUNCTURE: CPT

## 2024-09-20 PROCEDURE — 80053 COMPREHEN METABOLIC PANEL: CPT

## 2024-09-20 PROCEDURE — 84550 ASSAY OF BLOOD/URIC ACID: CPT

## 2024-09-20 PROCEDURE — 76775 US EXAM ABDO BACK WALL LIM: CPT

## 2024-09-20 PROCEDURE — 84156 ASSAY OF PROTEIN URINE: CPT

## 2024-09-20 PROCEDURE — 82784 ASSAY IGA/IGD/IGG/IGM EACH: CPT

## 2024-09-20 PROCEDURE — 85025 COMPLETE CBC W/AUTO DIFF WBC: CPT

## 2024-09-20 PROCEDURE — 82570 ASSAY OF URINE CREATININE: CPT

## 2024-09-20 PROCEDURE — 82306 VITAMIN D 25 HYDROXY: CPT

## 2024-09-20 PROCEDURE — 84100 ASSAY OF PHOSPHORUS: CPT

## 2024-09-23 LAB
ALBUMIN SERPL ELPH-MCNC: 3.8 G/DL (ref 2.9–4.4)
ALBUMIN/GLOB SERPL: 1.2 {RATIO} (ref 0.7–1.7)
ALPHA1 GLOB SERPL ELPH-MCNC: 0.2 G/DL (ref 0–0.4)
ALPHA2 GLOB SERPL ELPH-MCNC: 0.8 G/DL (ref 0.4–1)
ANA SER QL: POSITIVE
B-GLOBULIN SERPL ELPH-MCNC: 1.3 G/DL (ref 0.7–1.3)
DSDNA AB SER-ACNC: 1 IU/ML (ref 0–9)
GAMMA GLOB SERPL ELPH-MCNC: 1.1 G/DL (ref 0.4–1.8)
GLOBULIN SER-MCNC: 3.4 G/DL (ref 2.2–3.9)
IGA SERPL-MCNC: 421 MG/DL (ref 61–437)
IGG SERPL-MCNC: 1231 MG/DL (ref 603–1613)
IGM SERPL-MCNC: 79 MG/DL (ref 15–143)
INTERPRETATION SERPL IEP-IMP: NORMAL
LABORATORY COMMENT REPORT: NORMAL
Lab: NORMAL
M PROTEIN SERPL ELPH-MCNC: NORMAL G/DL
PROT SERPL-MCNC: 7.2 G/DL (ref 6–8.5)

## 2024-10-14 ENCOUNTER — OFFICE VISIT (OUTPATIENT)
Age: 79
End: 2024-10-14
Payer: MEDICARE

## 2024-10-14 VITALS
HEART RATE: 61 BPM | DIASTOLIC BLOOD PRESSURE: 56 MMHG | SYSTOLIC BLOOD PRESSURE: 128 MMHG | HEIGHT: 69 IN | BODY MASS INDEX: 25.62 KG/M2 | WEIGHT: 173 LBS

## 2024-10-14 DIAGNOSIS — I71.40 ABDOMINAL AORTIC ANEURYSM (AAA) 3.0 CM TO 5.5 CM IN DIAMETER IN MALE: Primary | ICD-10-CM

## 2024-10-14 DIAGNOSIS — S91.101D OPEN WOUND OF RIGHT GREAT TOE, SUBSEQUENT ENCOUNTER: ICD-10-CM

## 2024-10-14 DIAGNOSIS — I65.23 CAROTID STENOSIS, BILATERAL: ICD-10-CM

## 2024-10-14 DIAGNOSIS — I73.9 PVD (PERIPHERAL VASCULAR DISEASE): ICD-10-CM

## 2024-10-14 PROBLEM — S91.101A OPEN WOUND OF RIGHT GREAT TOE: Status: ACTIVE | Noted: 2024-10-14

## 2024-10-14 PROCEDURE — 1160F RVW MEDS BY RX/DR IN RCRD: CPT | Performed by: NURSE PRACTITIONER

## 2024-10-14 PROCEDURE — 99213 OFFICE O/P EST LOW 20 MIN: CPT | Performed by: NURSE PRACTITIONER

## 2024-10-14 PROCEDURE — G2211 COMPLEX E/M VISIT ADD ON: HCPCS | Performed by: NURSE PRACTITIONER

## 2024-10-14 PROCEDURE — 1159F MED LIST DOCD IN RCRD: CPT | Performed by: NURSE PRACTITIONER

## 2024-10-14 NOTE — PROGRESS NOTES
Chief Complaint  Peripheral Vascular Disease    Subjective        Erick Bruno presents to [facility] VASCULAR SURGERY  HPI   Erick Bruno is a 79 y.o. male that has been followed in our office by Dr. Marie for carotid artery stenosis and peripheral arterial disease.  He also has an abdominal aortic aneurysm.  He has a history of a left SFA angioplasty and stent in 2016.  In 2018, he had a left SFA and popliteal angioplasty.  In 2021, he had a right TCAR.  More recently, Dr. Marie attempted arteriogram, though could not get a 6 Greek sheath to consider him for any intervention.  He had discussed that he would need arm access or right common femoral endarterectomy as part of his access.  At that time, he was not having rest pain or tissue loss and also had multiple comorbidities, therefore we elected to continue medical management only.  He did end up getting a foot wound last year, though fortunately went on to heal without intervention.  He was in our office in June of this year where he was having rest pain to his right foot as well as new tissue loss to his right great toe.  He had a CT angiogram done and followed up with Dr. Marie.  This showed extensive disease and limited options.  Dr. Marie ultimately decided to him with local wound care.  He returns today in follow-up along with a wound reassessment.  Today, his wife reports that his wound has waxed and waned, and has not healed.  She has been using Medihoney.     Review of Systems   Constitutional:  Negative for fever.   Eyes:  Negative for visual disturbance.   Cardiovascular:  Negative for leg swelling.   Gastrointestinal:  Negative for abdominal pain.   Musculoskeletal:  Negative for back pain.   Skin:  Negative for color change, pallor and wound.   Neurological:  Negative for dizziness, facial asymmetry, speech difficulty and weakness.      Physical exam:    Pedal pulses dopplerable bilaterally.  Wound to right great toe with  slough and exudate with no surrounding cellulitis or purulent discharge.      Erick Bruno  reports that he quit smoking about 16 years ago. His smoking use included cigarettes. He started smoking about 76 years ago. He has a 60 pack-year smoking history. He has been exposed to tobacco smoke. He has never used smokeless tobacco..        Objective   Vital Signs:  Vitals:    10/14/24 1054   BP: 128/56   Pulse: 61        Body mass index is 25.53 kg/m².   BMI is >= 25 and <30. (Overweight) The following options were offered after discussion;: information on healthy weight added to patient's after visit summary             Result Review :        Duplex Aorta IVC Iliac Graft Complete CAR (06/03/2024 10:06)    Duplex Carotid Ultrasound CAR (06/03/2024 09:40)   :Doppler Ankle Brachial Index Single Level CAR (06/03/2024 10:34)   Duplex Lower Extremity Art / Grafts - Left CAR (06/03/2024 10:18)                  Assessment and Plan     Diagnoses and all orders for this visit:    1. Abdominal aortic aneurysm (AAA) 3.0 cm to 5.5 cm in diameter in male (Primary)    2. PVD (peripheral vascular disease)  -     Doppler Ankle Brachial Index Single Level CAR; Future    3. Carotid stenosis, bilateral  -     Duplex Carotid Ultrasound CAR; Future    4. Open wound of right great toe, subsequent encounter               Patient presents today for follow-up of his right great toe wound in the setting of severe peripheral arterial disease.  Today, his wound appears worse than from when I saw it in June.  He has slough and exudate in the wound bed.  His wife has been using Medihoney but has not been able to get this to heal.  She is quite frustrated with this.  The patient also is relatively noncompliant.  She does not want to take him back to the wound care center.  She has done this in the past for a back abscess that did go on to heal.  I recommended that we change his dressing to Betadine wet-to-dry twice a day, though she reports he only  is awake for approximately 8 hours a day so we will try this at once a day.  He will be due for his carotid duplex and ABIs in December and we will follow-up on his wound at that time.  She will call us should this percent.  Follow Up     Return in about 2 months (around 12/14/2024) for carotid duplex, dory.  Patient was given instructions and counseling regarding his condition or for health maintenance advice. Please see specific information pulled into the AVS if appropriate.     DEBORAH Villafuerte

## 2024-10-27 NOTE — ED TRIAGE NOTES
Pt complains of umbilical area abdominal pain that started 1 week ago. Denies N/V/D or fevers and urinary issues with the pain. Pt appears to be in NAD at this time, skin is PWD, and breathing is even and unlabored.    no
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
## 2024-12-16 DIAGNOSIS — K21.9 GASTROESOPHAGEAL REFLUX DISEASE WITHOUT ESOPHAGITIS: ICD-10-CM

## 2024-12-16 DIAGNOSIS — E78.49 OTHER HYPERLIPIDEMIA: ICD-10-CM

## 2024-12-16 DIAGNOSIS — E78.49 OTHER HYPERLIPIDEMIA: Chronic | ICD-10-CM

## 2024-12-16 DIAGNOSIS — I25.10 CORONARY ARTERY DISEASE INVOLVING NATIVE CORONARY ARTERY OF NATIVE HEART WITHOUT ANGINA PECTORIS: ICD-10-CM

## 2024-12-16 DIAGNOSIS — N18.31 STAGE 3A CHRONIC KIDNEY DISEASE (CKD): ICD-10-CM

## 2024-12-16 DIAGNOSIS — I48.0 PAROXYSMAL ATRIAL FIBRILLATION: ICD-10-CM

## 2024-12-16 DIAGNOSIS — I73.9 PVD (PERIPHERAL VASCULAR DISEASE): ICD-10-CM

## 2024-12-16 DIAGNOSIS — E11.51 TYPE 2 DIABETES MELLITUS WITH DIABETIC PERIPHERAL ANGIOPATHY WITHOUT GANGRENE, WITHOUT LONG-TERM CURRENT USE OF INSULIN: ICD-10-CM

## 2024-12-16 DIAGNOSIS — I71.40 ABDOMINAL AORTIC ANEURYSM (AAA) 3.0 CM TO 5.5 CM IN DIAMETER IN MALE: ICD-10-CM

## 2024-12-16 DIAGNOSIS — E11.51 TYPE 2 DIABETES MELLITUS WITH DIABETIC PERIPHERAL ANGIOPATHY WITHOUT GANGRENE, WITHOUT LONG-TERM CURRENT USE OF INSULIN: Chronic | ICD-10-CM

## 2024-12-16 DIAGNOSIS — I73.9 PERIPHERAL ARTERIAL DISEASE: ICD-10-CM

## 2024-12-16 DIAGNOSIS — I25.10 CORONARY ARTERY DISEASE INVOLVING NATIVE CORONARY ARTERY OF NATIVE HEART WITHOUT ANGINA PECTORIS: Chronic | ICD-10-CM

## 2024-12-16 RX ORDER — ISOSORBIDE MONONITRATE 30 MG/1
30 TABLET, EXTENDED RELEASE ORAL NIGHTLY
Qty: 90 TABLET | Refills: 0 | Status: SHIPPED | OUTPATIENT
Start: 2024-12-16

## 2024-12-17 RX ORDER — METOPROLOL SUCCINATE 25 MG/1
25 TABLET, EXTENDED RELEASE ORAL 2 TIMES DAILY
Qty: 180 TABLET | Refills: 0 | OUTPATIENT
Start: 2024-12-17

## 2025-01-28 ENCOUNTER — OFFICE VISIT (OUTPATIENT)
Dept: CARDIOLOGY | Facility: CLINIC | Age: 80
End: 2025-01-28
Payer: MEDICARE

## 2025-01-28 VITALS
BODY MASS INDEX: 24.73 KG/M2 | OXYGEN SATURATION: 99 % | HEART RATE: 65 BPM | WEIGHT: 167 LBS | SYSTOLIC BLOOD PRESSURE: 100 MMHG | HEIGHT: 69 IN | DIASTOLIC BLOOD PRESSURE: 70 MMHG

## 2025-01-28 DIAGNOSIS — I25.10 CORONARY ARTERY DISEASE INVOLVING NATIVE CORONARY ARTERY OF NATIVE HEART WITHOUT ANGINA PECTORIS: Primary | Chronic | ICD-10-CM

## 2025-01-28 DIAGNOSIS — I48.0 PAROXYSMAL ATRIAL FIBRILLATION: ICD-10-CM

## 2025-01-28 DIAGNOSIS — R55 SYNCOPE AND COLLAPSE: ICD-10-CM

## 2025-01-28 DIAGNOSIS — E11.51 TYPE 2 DIABETES MELLITUS WITH DIABETIC PERIPHERAL ANGIOPATHY WITHOUT GANGRENE, WITHOUT LONG-TERM CURRENT USE OF INSULIN: ICD-10-CM

## 2025-01-28 DIAGNOSIS — I73.9 PVD (PERIPHERAL VASCULAR DISEASE): ICD-10-CM

## 2025-01-28 DIAGNOSIS — Z79.01 CHRONIC ANTICOAGULATION: ICD-10-CM

## 2025-01-28 DIAGNOSIS — R26.89 POOR BALANCE: ICD-10-CM

## 2025-01-28 DIAGNOSIS — E78.49 OTHER HYPERLIPIDEMIA: Chronic | ICD-10-CM

## 2025-01-28 PROCEDURE — 1160F RVW MEDS BY RX/DR IN RCRD: CPT | Performed by: INTERNAL MEDICINE

## 2025-01-28 PROCEDURE — 1159F MED LIST DOCD IN RCRD: CPT | Performed by: INTERNAL MEDICINE

## 2025-01-28 PROCEDURE — 99214 OFFICE O/P EST MOD 30 MIN: CPT | Performed by: INTERNAL MEDICINE

## 2025-01-28 RX ORDER — EZETIMIBE 10 MG/1
10 TABLET ORAL DAILY
COMMUNITY

## 2025-01-28 RX ORDER — LABETALOL 100 MG/1
100 TABLET, FILM COATED ORAL 2 TIMES DAILY
COMMUNITY
End: 2025-01-28

## 2025-01-28 RX ORDER — FINASTERIDE 5 MG/1
5 TABLET, FILM COATED ORAL DAILY
COMMUNITY

## 2025-01-28 RX ORDER — NEBIVOLOL 5 MG/1
5 TABLET ORAL DAILY
Qty: 90 TABLET | Refills: 3 | Status: SHIPPED | OUTPATIENT
Start: 2025-01-28

## 2025-01-28 NOTE — PROGRESS NOTES
Subjective:     Encounter Date:  01/28/25        Patient ID: Erick Bruno is a 79 y.o. male.    Chief Complaint: abnormal stress test  Coronary Artery Disease  His past medical history is significant for past myocardial infarction.       Dear Dr. Mercedes,    I had the pleasure of seeing this patient in the office today for follow-up of his cardiac status.  He has a history of CAD, significant peripheral arterial disease, and paroxysmal atrial fibrillation.    He has been having issues with dizziness and lightheadedness.  He recently cut down his labetalol which he said it helped and he felt better.  However he still gets pretty dizzy when he stands up sometimes, and on Sunday he passed out.  He was lying in bed, need to go to the bathroom, stood up and started walking with his walker and then he passed out.  He thinks he was only unconscious for a few seconds and woke right back up.  Did not feel any palpitations or tachycardia, no chest pain or chest discomfort.  No shortness of breath.  Fortunately no injury.    Patient has been having orthostatic symptoms.    No anginal symptoms at all.  No chest pain or chest discomfort.    He is been seen in the past for chest discomfort.  In 2017 he had a stress test performed that showed a small area of hypokinesis in the inferior wall with an ejection fraction of 48% and no ischemia.  At the time he elected not to proceed with any additional diagnostic testing.  He's been treated with medical therapy.    In December 2020, he was seen for routine follow-up and perioperative cardiac risk assessment.  Nuclear stress test was completed and showed small to medium size, mild to moderately severe area of ischemia in the inferior wall and LVEF of 54%. Compared to the study in May 2017 there was significant change with new ischemia noted.    It was felt that it was low risk to proceed with the really necessary carotid endarterectomy, and he went through that surgical procedure  without any cardiac issues.     In February 2021, he presented to [facility] ED with complaints of redness and tender right foot following a fall.  He was diagnosed with right foot cellulitis and admitted and treated with antibiotic therapy.  Blood cultures were negative.  During his hospitalization he developed new onset atrial fibrillation and cardiology was consulted.  Echocardiogram showed normal LVEF grade 1 diastolic dysfunction, moderate calcification of the aortic valve, and mild mitral regurgitation. CT of the abdomen and pelvis showed penetrating ulcer formation and infrarenal abdominal aortic aneurysm, moderate stenosis of the left renal artery, severe stenosis of the right common iliac artery, mild stenosis of the right internal iliac artery at the origin, considerable stenosis of the right deep femoral artery, and 2 wall stents in the left SFA.  Carotid duplex showed right carotid stent with no stenosis and left carotid moderate stenosis.  Cardiology and vascular surgery agreed on stopping the clopidogrel and to continue with aspirin and apixaban for stroke prevention.      Patient has a history of peripheral arterial disease, including bilateral lower extremity disease as well as bilateral carotid artery disease.  He has occlusion of the left vertebral artery.  He has a stent placed in his right carotid.  He has abdominal aortic aneurysm followed by vascular.    The following portions of the patient's history were reviewed and updated as appropriate: allergies, current medications, past family history, past medical history, past social history, past surgical history and problem list.    Past Medical History:   Diagnosis Date    Abnormal nuclear stress test 05/21/2018    Acid reflux     Alzheimer's dementia     Anemia     Anesthesia complication     PT'S WIFE STATES PT CRYING AND EMOTIONAL AFTER SURGERY IN THE PAST    Anxiety     Arthritis     Atrial fibrillation     CAD (coronary artery  disease)     Carotid artery disease     Chronic anticoagulation     Chronic kidney disease     Stage 3    Dementia     Diabetes mellitus     Elevated cholesterol     Enlarged prostate     Fracture 2023    Foot    Frequent urination at night     History of seizure 2013    S/P TEMPERATURE    Hyperlipidemia     Hypertension     Leg pain     BILAT-D/T PVD    Macular degeneration     New onset atrial fibrillation 02/2021    NSTEMI (non-ST elevated myocardial infarction)     Panarteritis     Peripheral arterial disease     Poor balance     HIGH RISK FOR FALLS    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    PVD (peripheral vascular disease)     Renal disorder     Sleep apnea     DOES NOT WEAR CPAP    SSS (sick sinus syndrome)        Past Surgical History:   Procedure Laterality Date    APPENDECTOMY      ATHRECTOMY ILIAC, FEMORAL, TIBIAL ARTERY N/A 10/17/2018    Procedure: AIF ARTERIOGRAM, LEFT SFA  ANGIOPLASTY;  Surgeon: Jayden Marie MD;  Location: [facility] MAIN OR;  Service: Vascular    ATHRECTOMY ILIAC, FEMORAL, TIBIAL ARTERY Bilateral 4/20/2022    Procedure: AIF LEFT LEG ANGIOGRAM;  Surgeon: Jayden Marie MD;  Location: [facility] OR 18/19;  Service: Vascular;  Laterality: Bilateral;    CATARACT EXTRACTION WITH INTRAOCULAR LENS IMPLANT Bilateral     CHOLECYSTECTOMY WITH INTRAOPERATIVE CHOLANGIOGRAM N/A 05/09/2018    Procedure: CHOLECYSTECTOMY LAPAROSCOPIC INTRAOPERATIVE CHOLANGIOGRAM;  Surgeon: Daniel Gonzalez MD;  Location: [facility] MAIN OR;  Service: General    COLONOSCOPY      COLONOSCOPY N/A 05/08/2018    Procedure: COLONOSCOPY to cecum with polypectomies;  Surgeon: aDniel Gonzalez MD;  Location: [facility] ENDOSCOPY;  Service: Gastroenterology    ENDOSCOPY N/A 05/08/2018    Procedure: ESOPHAGOGASTRODUODENOSCOPY with biopsies;  Surgeon: Daniel Gonzalez MD;  Location: [facility] ENDOSCOPY;  Service: Gastroenterology    FEMORAL ARTERY STENT Left     on 3/22/16           Procedures       Objective:     Vitals:     "01/28/25 1355   BP: 100/70   BP Location: Left arm   Patient Position: Sitting   Cuff Size: Adult   Pulse: 65   SpO2: 99%   Weight: 75.8 kg (167 lb)   Height: 175.3 cm (69.02\")       General Appearance:    Alert, cooperative, in no acute distress   Head:    Normocephalic, without obvious abnormality, atraumatic   Eyes:            Lids and lashes normal, conjunctivae and sclerae normal, no   icterus, no pallor, corneas clear, PERRLA   Ears:    Ears appear intact with no abnormalities noted   Throat:   No oral lesions, no thrush, oral mucosa moist   Neck:   No adenopathy, supple, trachea midline, no thyromegaly, no   carotid bruit, no JVD   Back:     No kyphosis present, no scoliosis present, no skin lesions, erythema or scars, no tenderness to percussion or palpation, range of motion normal   Lungs:     Clear to auscultation,respirations regular, even and unlabored    Heart:    Regular rhythm and normal rate, normal S1 and S2, no murmur, no gallop, no rub, no click   Chest Wall:    No abnormalities observed   Abdomen:     Normal bowel sounds, no masses, no organomegaly, soft        non-tender, non-distended, no guarding, no rebound  tenderness   Extremities:   Moves all extremities well, no edema, no cyanosis, no redness   Pulses:  Diminished peripheral pulses both lower extremities, bilateral carotid bruits   Skin:  Psychiatric:   No bleeding, bruising or rash    Alert and oriented x 3, normal mood and affect           Lab Review:               Results for orders placed during the hospital encounter of 02/26/21    Adult Transthoracic Echo Complete W/ Cont if Necessary Per Protocol    Interpretation Summary  · Estimated left ventricular EF = 51% Left ventricular systolic function is normal.  · Left ventricular diastolic function is consistent with (grade I) impaired relaxation.  · There is moderate calcification of the aortic valve.  · Mild mitral valve regurgitation is present.    Lab Results   Component Value Date "    GLUCOSE 213 (H) 09/20/2024    BUN 27 (H) 09/20/2024    CREATININE 1.36 (H) 09/20/2024    EGFRIFNONA 54 (L) 02/07/2022    EGFRIFAFRI 63 02/07/2022    BCR 19.9 09/20/2024    K 4.5 09/20/2024    CO2 22.9 09/20/2024    CALCIUM 8.8 09/20/2024    PROTENTOTREF 7.2 09/20/2024    ALBUMIN 4.0 09/20/2024    ALBUMIN 3.8 09/20/2024    LABIL2 1.2 09/20/2024    AST 18 09/20/2024    ALT 16 09/20/2024             Assessment:          Diagnosis Plan   1. Coronary artery disease involving native coronary artery of native heart without angina pectoris        2. Paroxysmal atrial fibrillation        3. Other hyperlipidemia        4. PVD (peripheral vascular disease)        5. Chronic anticoagulation        6. Type 2 diabetes mellitus with diabetic peripheral angiopathy without gangrene, without long-term current use of insulin        7. Poor balance        8. Syncope and collapse                   Plan:       1. Coronary Artery Disease  Assessment   The patient has no angina    Plan   Lifestyle modifications discussed include adhering to a heart healthy diet, avoidance of tobacco products, maintenance of a healthy weight, medication compliance, regular exercise and regular monitoring of cholesterol and blood pressure    Subjective - Objective   There is a history of past MI    Current antiplatelet therapy includes aspirin 81 mg    2.  Peripheral vascular disease, with both disease in bilateral lower extremities as well as high-grade carotid artery disease, abdominal aortic aneurysm, prior right coronary artery stent, occluded left vertebral, followed by vascular  3.  Diabetes mellitus with circulatory complication, continue risk factor modification  4.  Paroxysmal atrial fibrillation, remains in sinus rhythm, continue apixaban  5.  Mixed hyperlipidemia, LDL reviewed, continue lipid-lowering therapy  6.  Syncope and collapse-having orthostatic symptoms, blood pressures lower than need be today.  I will stop his labetalol and replace  that with nebivolol 5 mg daily.  Additionally have him stop his isosorbide since he is having absolutely no angina pectoris.  I have asked him to then to check back in with me next week and let me how he is doing.    Thank you very much for allowing us to participate in the care of this pleasant patient.  Please don't hesitate to call if I can be of assistance in any way.      Current Outpatient Medications:     apixaban (Eliquis) 5 MG tablet tablet, Take 1 tablet by mouth Every 12 (Twelve) Hours., Disp: 180 tablet, Rfl: 3    aspirin 81 MG EC tablet, Take 1 tablet by mouth Daily. OK TO CONTINUE TO TAKE LEADING UP TO SURGERY PER DR. MULLER, Disp: , Rfl:     atorvastatin (LIPITOR) 80 MG tablet, Take 1 tablet by mouth Daily., Disp: 90 tablet, Rfl: 1    busPIRone (BUSPAR) 5 MG tablet, Take 1 tablet by mouth 2 (Two) Times a Day., Disp: 180 tablet, Rfl: 1    coenzyme Q10 100 MG capsule, Take 2 capsules by mouth Every Night., Disp: , Rfl:     escitalopram (LEXAPRO) 10 MG tablet, Take 1 tablet by mouth Every Night., Disp: , Rfl:     ezetimibe (ZETIA) 10 MG tablet, Take 1 tablet by mouth Daily., Disp: , Rfl:     finasteride (PROSCAR) 5 MG tablet, Take 1 tablet by mouth Daily., Disp: , Rfl:     glimepiride (AMARYL) 4 MG tablet, Take 1 tablet by mouth Every Morning Before Breakfast., Disp: 90 tablet, Rfl: 1    melatonin 5 MG tablet tablet, Take 1 tablet by mouth Every Night., Disp: , Rfl:     multivitamin with minerals tablet tablet, Take 1 tablet by mouth Every Night. HOLD FOR SURGERY, Disp: , Rfl:     nitroglycerin (NITROSTAT) 0.4 MG SL tablet, DISSOLVE ONE TABLET UNDER THE TONGUE EVERY 5 MINUTES AS NEEDED FOR CHEST PAIN.  DO NOT EXCEED A TOTAL OF 3 DOSES IN 15 MINUTES, Disp: 5 tablet, Rfl: 0    omeprazole (priLOSEC) 20 MG capsule, Take 1 capsule by mouth Daily., Disp: 90 capsule, Rfl: 1    saw palmetto 160 MG capsule, Take 1 capsule by mouth Every Night. HOLD FOR SURGERY, Disp: , Rfl:     Wound Dressings (Medihoney  Wound/Burn Dressing) gel, Apply 1 Application topically Daily., Disp: 1 each, Rfl: 0    clopidogrel (PLAVIX) 75 MG tablet, Take 1 tablet by mouth. (Patient not taking: Reported on 1/28/2025), Disp: , Rfl:     nebivolol (BYSTOLIC) 5 MG tablet, Take 1 tablet by mouth Daily., Disp: 90 tablet, Rfl: 3

## 2025-02-26 ENCOUNTER — TELEPHONE (OUTPATIENT)
Facility: HOSPITAL | Age: 80
End: 2025-02-26
Payer: MEDICARE

## 2025-03-12 DIAGNOSIS — F41.9 ANXIETY: ICD-10-CM

## 2025-03-12 DIAGNOSIS — E78.49 OTHER HYPERLIPIDEMIA: Chronic | ICD-10-CM

## 2025-03-12 RX ORDER — BUSPIRONE HYDROCHLORIDE 5 MG/1
5 TABLET ORAL 2 TIMES DAILY
Qty: 180 TABLET | Refills: 0 | Status: SHIPPED | OUTPATIENT
Start: 2025-03-12

## 2025-03-12 RX ORDER — ATORVASTATIN CALCIUM 80 MG/1
80 TABLET, FILM COATED ORAL DAILY
Qty: 90 TABLET | Refills: 0 | Status: SHIPPED | OUTPATIENT
Start: 2025-03-12

## 2025-03-30 NOTE — PROGRESS NOTES
"Chief Complaint  Carotid Artery Disease    Subjective          HPI: Erick Bruno presents to [facility] VASCULAR SURGERY peripheral arterial disease with tissue loss of the right foot.  He also developed a back abscess that had to be treated by general surgery    Review of Systems     History Review Reviewed Comments   Past Medical History:  [x]     Past Surgical History: [x]  Left SFA stent, Right TCAR   Family History: [x]     Social History: [x]       Objective   Vital Signs:  /66 (BP Location: Left arm)   Resp 16   Ht 175.3 cm (69.02\")   Wt 75.8 kg (167 lb)   BMI 24.65 kg/m²   Estimated body mass index is 24.65 kg/m² as calculated from the following:    Height as of this encounter: 175.3 cm (69.02\").    Weight as of this encounter: 75.8 kg (167 lb).              Physical Exam  Vascular:   Patient has an ulcer on the end of the right great toe.    Result Review :    Common labs          9/20/2024    12:16 10/29/2024    13:31 11/5/2024    12:24   Common Labs   Glucose 213      BUN 27      Creatinine 1.36      Sodium 134      Potassium 4.5      Chloride 104      Calcium 8.8      Albumin 4.0     3.8      Total Bilirubin 0.4      Alkaline Phosphatase 116      AST (SGOT) 18      ALT (SGPT) 16      WBC 5.77  6        Hemoglobin 12.1  12.3        Hematocrit 38.2  35.5        Platelets 179  212        Total Cholesterol 191      Total Cholesterol  113        Triglycerides 166      HDL Cholesterol 36  30        LDL Cholesterol  125  62.8        Hemoglobin A1C 9.60      Microalbumin, Urine <1.2      PSA  1.47     1.02       Uric Acid 4.6         Details          This result is from an external source.             Most notable findings include:   Hemoglobin 12.35 months ago.  Total cholesterol 113 with an LDL of 62.  Creatinine 1.4    CT Angio Abdominal Aorta Bilateral Iliofem Runoff (06/06/2024 10:40)         Erick Bruno  reports that he quit smoking about 17 years ago. His smoking use included " cigarettes. He started smoking about 77 years ago. He has a 60 pack-year smoking history. He has been exposed to tobacco smoke. He has never used smokeless tobacco..           Assessment and Plan     Assessment & Plan  PVD (peripheral vascular disease)    Atherosclerosis of native arteries of the extremities with ulceration     Mr. Bruno is a 78-year-old gentleman who I have followed for multiple vascular issues.  His medical records were again reviewed.  He has a history of carotid artery stenosis and peripheral arterial disease.  There is a history of a left SFA angioplasty and stent placement back in 2016 and again in 2018.  In 2021 he had a right-sided TCAR.  More recently I tried to gain access through his right leg to intervene on his in-stent stenosis on the left and once barely able to get obtain access due to extensive calcifications.  I ended up recommending medical management.  He did end up getting a foot wound that went on to heal without intervention.  He thenpresented to our nurse practitioner recently with a stumped great toe on the right and an JODI that had declined from 0.55 down to 0.45.  She ordered a CT angiogram and short-term follow-up.  The patient has extensive disease.  He has an infrarenal aortic dissection with some aneurysmal degeneration.  There is also a right greater than left orificial common iliac artery stenosis.  There is extensive external iliac disease on the right as well as multifocal disease and an extremely heavily calcified SFA.      Unfortunately, his ABIs have continued to decline and he has developed a new ulcer on the right great toe.  The old ulcers seen last year were able to heal.  The drop in his ABIs is bilateral and I suspect that his left SFA stent could also be occluded.    The wife does all the wound care and feels like the toe is making progress but I be surprised if we could drive this to healing.  They did agree to short-term follow-up so that I can review  his prior imaging and see if we can do anything to try to improve his flow but this may be very limited due to his complicated anatomy.  Significant risk of limb loss.  I also reviewed his carotid ultrasounds which show that his right carotid stent is widely patent without stenosis and his left has moderate 50 to 69% stenosis.     Follow Up     Return in about 2 weeks (around 4/14/2025).  Patient was given instructions and counseling regarding his condition or for health maintenance advice. Please see specific information pulled into the AVS if appropriate.

## 2025-03-31 ENCOUNTER — HOSPITAL ENCOUNTER (OUTPATIENT)
Facility: HOSPITAL | Age: 80
Discharge: HOME | End: 2025-03-31
Payer: MEDICARE

## 2025-03-31 ENCOUNTER — OFFICE VISIT (OUTPATIENT)
Age: 80
End: 2025-03-31
Payer: MEDICARE

## 2025-03-31 VITALS
BODY MASS INDEX: 24.73 KG/M2 | SYSTOLIC BLOOD PRESSURE: 126 MMHG | RESPIRATION RATE: 16 BRPM | WEIGHT: 167 LBS | DIASTOLIC BLOOD PRESSURE: 66 MMHG | HEIGHT: 69 IN

## 2025-03-31 DIAGNOSIS — I65.23 CAROTID STENOSIS, BILATERAL: ICD-10-CM

## 2025-03-31 DIAGNOSIS — I73.9 PVD (PERIPHERAL VASCULAR DISEASE): ICD-10-CM

## 2025-03-31 DIAGNOSIS — I70.25 ATHEROSCLEROSIS OF NATIVE ARTERIES OF THE EXTREMITIES WITH ULCERATION: ICD-10-CM

## 2025-03-31 DIAGNOSIS — I73.9 PVD (PERIPHERAL VASCULAR DISEASE): Primary | ICD-10-CM

## 2025-03-31 LAB
BH CV LOWER ARTERIAL LEFT ABI RATIO: 0.49
BH CV LOWER ARTERIAL LEFT DORSALIS PEDIS SYS MAX: 70
BH CV LOWER ARTERIAL LEFT POST TIBIAL SYS MAX: 0
BH CV LOWER ARTERIAL RIGHT ABI RATIO: 0.36
BH CV LOWER ARTERIAL RIGHT DORSALIS PEDIS SYS MAX: 46
BH CV LOWER ARTERIAL RIGHT POST TIBIAL SYS MAX: 52
BH CV RIGHT CCA HIDDEN LRR: 1 CM/S
BH CV VAS CAROTID RIGHT DISTAL STENT EDV: 16 CM/S
BH CV VAS CAROTID RIGHT DISTAL STENT PSV: 119 CM/S
BH CV VAS CAROTID RIGHT DISTAL TO STENT NATIVE VESSEL E: 21 CM/S
BH CV VAS CAROTID RIGHT DISTAL TO STENT PSV: 114 CM/S
BH CV VAS CAROTID RIGHT MID STENT EDV: 22 CM/S
BH CV VAS CAROTID RIGHT MID STENT PSV: 96 CM/S
BH CV VAS CAROTID RIGHT PROXIMAL STENT EDV: 10 CM/S
BH CV VAS CAROTID RIGHT PROXIMAL STENT PSV: 75 CM/S
BH CV VAS CAROTID RIGHT STENT NATIVE VESSEL PROXIMAL EDV: 20 CM/S
BH CV VAS CAROTID RIGHT STENT NATIVE VESSEL PROXIMAL PS: 76 CM/S
BH CV XLRA MEAS LEFT CAROTID BULB EDV: 17.3 CM/SEC
BH CV XLRA MEAS LEFT CAROTID BULB PSV: 119.4 CM/SEC
BH CV XLRA MEAS LEFT DIST CCA EDV: -26.5 CM/SEC
BH CV XLRA MEAS LEFT DIST CCA PSV: -182.7 CM/SEC
BH CV XLRA MEAS LEFT DIST ICA EDV: -22 CM/SEC
BH CV XLRA MEAS LEFT DIST ICA PSV: -96.6 CM/SEC
BH CV XLRA MEAS LEFT ICA/CCA RATIO: 1.2
BH CV XLRA MEAS LEFT MID CCA EDV: 28.5 CM/SEC
BH CV XLRA MEAS LEFT MID CCA PSV: 166 CM/SEC
BH CV XLRA MEAS LEFT MID ICA EDV: -27.5 CM/SEC
BH CV XLRA MEAS LEFT MID ICA PSV: -143.8 CM/SEC
BH CV XLRA MEAS LEFT PROX CCA EDV: 17.6 CM/SEC
BH CV XLRA MEAS LEFT PROX CCA PSV: 112 CM/SEC
BH CV XLRA MEAS LEFT PROX ECA EDV: -7.6 CM/SEC
BH CV XLRA MEAS LEFT PROX ECA PSV: -95 CM/SEC
BH CV XLRA MEAS LEFT PROX ICA EDV: 35.4 CM/SEC
BH CV XLRA MEAS LEFT PROX ICA PSV: 200.3 CM/SEC
BH CV XLRA MEAS LEFT PROX SCLA PSV: 270.3 CM/SEC
BH CV XLRA MEAS LEFT VERTEBRAL A EDV: 5.2 CM/SEC
BH CV XLRA MEAS LEFT VERTEBRAL A PSV: 25.9 CM/SEC
BH CV XLRA MEAS RIGHT CAROTID BULB EDV: -22.3 CM/SEC
BH CV XLRA MEAS RIGHT CAROTID BULB PSV: -96.2 CM/SEC
BH CV XLRA MEAS RIGHT DIST CCA EDV: -17.6 CM/SEC
BH CV XLRA MEAS RIGHT DIST CCA PSV: -76.2 CM/SEC
BH CV XLRA MEAS RIGHT DIST ICA EDV: -22.8 CM/SEC
BH CV XLRA MEAS RIGHT DIST ICA PSV: -117.1 CM/SEC
BH CV XLRA MEAS RIGHT ICA/CCA RATIO: 1.57
BH CV XLRA MEAS RIGHT MID CCA EDV: 20.8 CM/SEC
BH CV XLRA MEAS RIGHT MID CCA PSV: 76.6 CM/SEC
BH CV XLRA MEAS RIGHT MID ICA EDV: -22 CM/SEC
BH CV XLRA MEAS RIGHT MID ICA PSV: -114.7 CM/SEC
BH CV XLRA MEAS RIGHT PROX CCA EDV: 16.9 CM/SEC
BH CV XLRA MEAS RIGHT PROX CCA PSV: 74.3 CM/SEC
BH CV XLRA MEAS RIGHT PROX ECA EDV: -11.2 CM/SEC
BH CV XLRA MEAS RIGHT PROX ECA PSV: -397.4 CM/SEC
BH CV XLRA MEAS RIGHT PROX ICA EDV: 17.3 CM/SEC
BH CV XLRA MEAS RIGHT PROX ICA PSV: 119.4 CM/SEC
BH CV XLRA MEAS RIGHT PROX SCLA PSV: 188.6 CM/SEC
BH CV XLRA MEAS RIGHT VERTEBRAL A EDV: 9.4 CM/SEC
BH CV XLRA MEAS RIGHT VERTEBRAL A PSV: 47.1 CM/SEC
BH CVPROX RIGHT ICA HIDDEN LRR: 1 CM
LEFT ARM BP: 140 MMHG
RIGHT ARM BP: 142 MMHG
UPPER ARTERIAL LEFT ARM BRACHIAL SYS MAX: 140
UPPER ARTERIAL RIGHT ARM BRACHIAL SYS MAX: 142

## 2025-03-31 PROCEDURE — 93880 EXTRACRANIAL BILAT STUDY: CPT | Performed by: SURGERY

## 2025-03-31 PROCEDURE — 93880 EXTRACRANIAL BILAT STUDY: CPT

## 2025-03-31 PROCEDURE — 1160F RVW MEDS BY RX/DR IN RCRD: CPT | Performed by: SURGERY

## 2025-03-31 PROCEDURE — 99214 OFFICE O/P EST MOD 30 MIN: CPT | Performed by: SURGERY

## 2025-03-31 PROCEDURE — 93922 UPR/L XTREMITY ART 2 LEVELS: CPT

## 2025-03-31 PROCEDURE — 93922 UPR/L XTREMITY ART 2 LEVELS: CPT | Performed by: SURGERY

## 2025-03-31 PROCEDURE — 1159F MED LIST DOCD IN RCRD: CPT | Performed by: SURGERY

## 2025-04-14 ENCOUNTER — OFFICE VISIT (OUTPATIENT)
Age: 80
End: 2025-04-14
Payer: MEDICARE

## 2025-04-14 VITALS
TEMPERATURE: 98.3 F | HEART RATE: 65 BPM | HEIGHT: 69 IN | BODY MASS INDEX: 25.03 KG/M2 | RESPIRATION RATE: 16 BRPM | SYSTOLIC BLOOD PRESSURE: 106 MMHG | WEIGHT: 169 LBS | DIASTOLIC BLOOD PRESSURE: 57 MMHG

## 2025-04-14 DIAGNOSIS — I70.25 ATHEROSCLEROSIS OF NATIVE ARTERIES OF THE EXTREMITIES WITH ULCERATION: Primary | ICD-10-CM

## 2025-04-14 RX ORDER — SEMAGLUTIDE 0.68 MG/ML
INJECTION, SOLUTION SUBCUTANEOUS
COMMUNITY

## 2025-04-14 RX ORDER — LANCETS
EACH MISCELLANEOUS DAILY
COMMUNITY
Start: 2025-03-20

## 2025-04-14 RX ORDER — OLMESARTAN MEDOXOMIL 20 MG/1
20 TABLET ORAL EVERY MORNING
COMMUNITY

## 2025-04-14 RX ORDER — MULTIPLE VITAMINS W/ MINERALS TAB 9MG-400MCG
1 TAB ORAL DAILY
COMMUNITY

## 2025-04-14 RX ORDER — BLOOD SUGAR DIAGNOSTIC
1 STRIP MISCELLANEOUS DAILY
COMMUNITY

## 2025-04-14 NOTE — PROGRESS NOTES
"Chief Complaint  Peripheral Vascular Disease (Follow up 2 weeks)    Subjective          HPI: Erick Bruno presents to [facility] VASCULAR SURGERY peripheral arterial disease with tissue loss of the right foot.  He also developed a back abscess that had to be treated by general surgery.  Thankfully his wound is nearly healed.    Review of Systems     History Review Reviewed Comments   Past Medical History:  [x]     Past Surgical History: [x]  Left SFA stent, Right TCAR   Family History: [x]     Social History: [x]       Objective   Vital Signs:  /57 (BP Location: Left arm, Patient Position: Sitting, Cuff Size: Adult)   Pulse 65   Temp 98.3 °F (36.8 °C) (Temporal)   Resp 16   Ht 175.3 cm (69.02\")   Wt 76.7 kg (169 lb)   BMI 24.95 kg/m²   Estimated body mass index is 24.95 kg/m² as calculated from the following:    Height as of this encounter: 175.3 cm (69.02\").    Weight as of this encounter: 76.7 kg (169 lb).              Physical Exam  Vascular:   Patient has an ulcer on the end of the right great toe.  However, this is improved and nearly healed    Result Review :    Common labs          9/20/2024    12:16 10/29/2024    13:31 11/5/2024    12:24   Common Labs   Glucose 213      BUN 27      Creatinine 1.36      Sodium 134      Potassium 4.5      Chloride 104      Calcium 8.8      Albumin 4.0     3.8      Total Bilirubin 0.4      Alkaline Phosphatase 116      AST (SGOT) 18      ALT (SGPT) 16      WBC 5.77  6        Hemoglobin 12.1  12.3        Hematocrit 38.2  35.5        Platelets 179  212        Total Cholesterol 191      Total Cholesterol  113        Triglycerides 166      HDL Cholesterol 36  30        LDL Cholesterol  125  62.8        Hemoglobin A1C 9.60      Microalbumin, Urine <1.2      PSA  1.47     1.02       Uric Acid 4.6         Details          This result is from an external source.             Most notable findings include:   Hemoglobin 12.35 months ago.  Total cholesterol " 113 with an LDL of 62.  Creatinine 1.4    CT Angio Abdominal Aorta Bilateral Iliofem Runoff (06/06/2024 10:40)         Erick Bruno  reports that he quit smoking about 17 years ago. His smoking use included cigarettes. He started smoking about 77 years ago. He has a 60 pack-year smoking history. He has been exposed to tobacco smoke. He has never used smokeless tobacco..           Assessment and Plan     Assessment & Plan     Mr. Bruno is a 78-year-old gentleman who I have followed for multiple vascular issues.  His medical records were again reviewed.  He has a history of carotid artery stenosis and peripheral arterial disease.  There is a history of a left SFA angioplasty and stent placement back in 2016 and again in 2018.  In 2021 he had a right-sided TCAR.  More recently I tried to gain access through his right leg to intervene on his in-stent stenosis on the left and once barely able to get obtain access due to extensive calcifications.  I ended up recommending medical management.  He did end up getting a foot wound that went on to heal without intervention.  He thenpresented to our nurse practitioner recently with a stumped great toe on the right and an JODI that had declined from 0.55 down to 0.45.  She ordered a CT angiogram and short-term follow-up.  The patient has extensive disease.  He has an infrarenal aortic dissection with some aneurysmal degeneration.  There is also a right greater than left orificial common iliac artery stenosis.  There is extensive external iliac disease on the right as well as multifocal disease and an extremely heavily calcified SFA.      Unfortunately, his ABIs have continued to decline and he has developed a new ulcer on the right great toe.  The old ulcers seen last year were able to heal.  The drop in his ABIs is bilateral and I suspect that his left SFA stent could also be occluded.    Thankfully his wound is nearly healed.  Anything to try to improve his flow but this may be  very limited due to his complicated anatomy.  Significant risk of limb loss but comforted by his progress over the last couple weeks.  I also reviewed his carotid ultrasounds which show that his right carotid stent is widely patent without stenosis and his left has moderate 50 to 69% stenosis.  Follow-up in 1 month     Follow Up     Return in about 4 weeks (around 5/12/2025).  Patient was given instructions and counseling regarding his condition or for health maintenance advice. Please see specific information pulled into the AVS if appropriate.

## 2025-04-18 ENCOUNTER — TELEPHONE (OUTPATIENT)
Age: 80
End: 2025-04-18
Payer: MEDICARE

## 2025-04-18 NOTE — TELEPHONE ENCOUNTER
Patients spouse called and stated that when patient saw Dr Marie the other day, he was concerned that the patient was on medications that could cause a stroke. The spouse stated that she believed it was Eliquis 81mg and Plavix. She was under the impression that Dr Marie was going to contact Dr Iraheta office about this. Spouse stated that they are in Dr Iraheta office right now and that Dr Mercedes has not heard from our office. Patients spouse would like for someone to reach our to Dr Iraheta office about this     [place] office - [phone]

## 2025-04-21 NOTE — TELEPHONE ENCOUNTER
Attempted to call 's office, they report he is off today, called and spoke to pt wife and informed her that I had reached out to  and he said Eliquis plus Plavix or Asa is Ok but not all 3, advised her that 's office could call us if they have further questions.

## 2025-05-12 ENCOUNTER — OFFICE VISIT (OUTPATIENT)
Age: 80
End: 2025-05-12
Payer: MEDICARE

## 2025-05-12 VITALS
RESPIRATION RATE: 16 BRPM | SYSTOLIC BLOOD PRESSURE: 137 MMHG | HEART RATE: 62 BPM | BODY MASS INDEX: 25.03 KG/M2 | DIASTOLIC BLOOD PRESSURE: 63 MMHG | HEIGHT: 69 IN

## 2025-05-12 DIAGNOSIS — I70.25 ATHEROSCLEROSIS OF NATIVE ARTERIES OF THE EXTREMITIES WITH ULCERATION: Primary | ICD-10-CM

## 2025-05-12 PROCEDURE — 1159F MED LIST DOCD IN RCRD: CPT | Performed by: SURGERY

## 2025-05-12 PROCEDURE — G2211 COMPLEX E/M VISIT ADD ON: HCPCS | Performed by: SURGERY

## 2025-05-12 PROCEDURE — 1160F RVW MEDS BY RX/DR IN RCRD: CPT | Performed by: SURGERY

## 2025-05-12 PROCEDURE — 99213 OFFICE O/P EST LOW 20 MIN: CPT | Performed by: SURGERY

## 2025-05-12 NOTE — PROGRESS NOTES
"Chief Complaint  Carotid Artery Disease, Aortic Aneurysm, Peripheral Vascular Disease, and Wound Check    Subjective          HPI: Erick Bruno presents to [facility] VASCULAR SURGERY peripheral arterial disease with tissue loss of the right foot.      Review of Systems     History Review Reviewed Comments   Past Medical History:  [x]     Past Surgical History: [x]  Left SFA stent, Right TCAR   Family History: [x]     Social History: [x]       Objective   Vital Signs:  /63 (BP Location: Right arm, Patient Position: Sitting, Cuff Size: Adult)   Pulse 62   Resp 16   Ht 175 cm (68.9\")   BMI 25.03 kg/m²   Estimated body mass index is 25.03 kg/m² as calculated from the following:    Height as of this encounter: 175 cm (68.9\").    Weight as of 4/14/25: 76.7 kg (169 lb).              Physical Exam  Vascular:       Result Review :    Common labs          9/20/2024    12:16 10/29/2024    13:31 11/5/2024    12:24   Common Labs   Glucose 213      BUN 27      Creatinine 1.36      Sodium 134      Potassium 4.5      Chloride 104      Calcium 8.8      Albumin 4.0     3.8      Total Bilirubin 0.4      Alkaline Phosphatase 116      AST (SGOT) 18      ALT (SGPT) 16      WBC 5.77  6        Hemoglobin 12.1  12.3        Hematocrit 38.2  35.5        Platelets 179  212        Total Cholesterol 191      Total Cholesterol  113        Triglycerides 166      HDL Cholesterol 36  30        LDL Cholesterol  125  62.8        Hemoglobin A1C 9.60      Microalbumin, Urine <1.2      PSA  1.47     1.02       Uric Acid 4.6         Details          This result is from an external source.             Most notable findings include:   No new labs since last visit    CT Angio Abdominal Aorta Bilateral Iliofem Runoff (06/06/2024 10:40)         Erick Bruno  reports that he quit smoking about 17 years ago. His smoking use included cigarettes. He started smoking about 77 years ago. He has a 60 pack-year smoking history. He has been " exposed to tobacco smoke. He has never used smokeless tobacco..           Assessment and Plan     Assessment & Plan     Mr. Bruno is a 78-year-old gentleman who I have followed for multiple vascular issues.  His medical records were again reviewed.  He has a history of carotid artery stenosis and peripheral arterial disease.  There is a history of a left SFA angioplasty and stent placement back in 2016 and again in 2018.  In 2021 he had a right-sided TCAR.  More recently I tried to gain access through his right leg to intervene on his in-stent stenosis on the left and once barely able to get obtain access due to extensive calcifications.  I ended up recommending medical management.  He did end up getting a foot wound that went on to heal without intervention.  He then presented to our nurse practitioner recently with a stumped great toe on the right and an JODI that had declined from 0.55 down to 0.45.  She ordered a CT angiogram and short-term follow-up.  The patient has extensive disease.  He has an infrarenal aortic dissection with some aneurysmal degeneration.  There is also a right greater than left orificial common iliac artery stenosis.  There is extensive external iliac disease on the right as well as multifocal disease and an extremely heavily calcified SFA.      Unfortunately, his ABIs have continued to decline and he has developed a new ulcer on the right great toe.  The old ulcers seen last year were able to heal.  The drop in his ABIs is bilateral and I suspect that his left SFA stent could also be occluded.    Thankfully his wound is nearly healed and continues to look good given the circumstances..  Anything to try to improve his flow but this may be very limited due to his complicated anatomy.  Significant risk of limb loss but comforted by his progress    Follow Up     Return in about 2 months (around 7/12/2025).  Patient was given instructions and counseling regarding his condition or for health  maintenance advice. Please see specific information pulled into the AVS if appropriate.

## 2025-05-28 NOTE — TELEPHONE ENCOUNTER
Rx Refill Note  Requested Prescriptions     Pending Prescriptions Disp Refills   • busPIRone (BUSPAR) 5 MG tablet [Pharmacy Med Name: busPIRone HCl 5 MG Oral Tablet] 60 tablet 0     Sig: Take 1 tablet by mouth twice daily   • metFORMIN ER (GLUCOPHAGE-XR) 500 MG 24 hr tablet [Pharmacy Med Name: metFORMIN HCl  MG Oral Tablet Extended Release 24 Hour] 90 tablet 0     Sig: TAKE 1 TABLET BY MOUTH WITH BREAKFAST   • omeprazole (priLOSEC) 20 MG capsule [Pharmacy Med Name: Omeprazole 20 MG Oral Capsule Delayed Release] 90 capsule 0     Sig: Take 1 capsule by mouth once daily   • atorvastatin (LIPITOR) 80 MG tablet [Pharmacy Med Name: Atorvastatin Calcium 80 MG Oral Tablet] 90 tablet 0     Sig: Take 1 tablet by mouth once daily      Last office visit with prescribing clinician: 7/22/2021      Next office visit with prescribing clinician: Visit date not found   {TIP  Encounters:23}         Ngoc Ray MA  10/14/21, 13:29 EDT  
28-May-2025 07:22

## 2025-06-07 DIAGNOSIS — F41.9 ANXIETY: ICD-10-CM

## 2025-06-07 DIAGNOSIS — E78.49 OTHER HYPERLIPIDEMIA: Chronic | ICD-10-CM

## 2025-06-10 RX ORDER — ATORVASTATIN CALCIUM 80 MG/1
80 TABLET, FILM COATED ORAL DAILY
Qty: 90 TABLET | Refills: 0 | OUTPATIENT
Start: 2025-06-10

## 2025-06-10 RX ORDER — BUSPIRONE HYDROCHLORIDE 5 MG/1
5 TABLET ORAL 2 TIMES DAILY
Qty: 180 TABLET | Refills: 0 | OUTPATIENT
Start: 2025-06-10

## 2025-06-14 DIAGNOSIS — E78.49 OTHER HYPERLIPIDEMIA: Chronic | ICD-10-CM

## 2025-06-14 DIAGNOSIS — F41.9 ANXIETY: ICD-10-CM

## 2025-06-16 RX ORDER — ATORVASTATIN CALCIUM 80 MG/1
80 TABLET, FILM COATED ORAL DAILY
Qty: 90 TABLET | Refills: 0 | OUTPATIENT
Start: 2025-06-16

## 2025-06-16 RX ORDER — BUSPIRONE HYDROCHLORIDE 5 MG/1
5 TABLET ORAL 2 TIMES DAILY
Qty: 180 TABLET | Refills: 0 | OUTPATIENT
Start: 2025-06-16

## 2025-06-19 ENCOUNTER — TELEPHONE (OUTPATIENT)
Age: 80
End: 2025-06-19
Payer: MEDICARE

## 2025-06-19 DIAGNOSIS — I70.221 ATHEROSCLEROSIS OF NATIVE ARTERY OF RIGHT LOWER EXTREMITY WITH REST PAIN: Primary | ICD-10-CM

## 2025-06-19 NOTE — TELEPHONE ENCOUNTER
Spoke with spouse. Photos have been added of the areas of concern. Per Dr. Marie' last note, patient has had declining ABIs however, his wounds were healing at that time. Patient has complicated anatomy and interventions for improving flow are limited. Patient will be set up for ABIs in the office and to see Radhika this week.

## 2025-06-19 NOTE — TELEPHONE ENCOUNTER
Caller: Marcelle Bruno (POA)     Relationship: WIFE    Best call back number: [phone]    What is your medical concern? Left foot from lower half to the toes is bright red and swollen, pt also has a spot on his smaller toe that is turning black (pt skinned his toe on the coffee table). pt is having spasms where his legs a jumping up as well cramps    How long has this issue been going on? Treating toe for 1 week. The redness they are unsure    Is your provider already aware of this issue? no    Have you been treated for this issue? no

## 2025-06-20 ENCOUNTER — HOSPITAL ENCOUNTER (OUTPATIENT)
Dept: GENERAL RADIOLOGY | Facility: HOSPITAL | Age: 80
Discharge: HOME | End: 2025-06-20
Payer: MEDICARE

## 2025-06-20 ENCOUNTER — OFFICE VISIT (OUTPATIENT)
Age: 80
End: 2025-06-20
Payer: MEDICARE

## 2025-06-20 ENCOUNTER — HOSPITAL ENCOUNTER (OUTPATIENT)
Facility: HOSPITAL | Age: 80
Discharge: HOME | End: 2025-06-20
Payer: MEDICARE

## 2025-06-20 VITALS
HEIGHT: 69 IN | BODY MASS INDEX: 25.03 KG/M2 | WEIGHT: 169 LBS | RESPIRATION RATE: 17 BRPM | DIASTOLIC BLOOD PRESSURE: 70 MMHG | SYSTOLIC BLOOD PRESSURE: 156 MMHG

## 2025-06-20 DIAGNOSIS — I73.9 PVD (PERIPHERAL VASCULAR DISEASE): ICD-10-CM

## 2025-06-20 DIAGNOSIS — I73.9 PVD (PERIPHERAL VASCULAR DISEASE): Primary | ICD-10-CM

## 2025-06-20 DIAGNOSIS — I70.221 ATHEROSCLEROSIS OF NATIVE ARTERY OF RIGHT LOWER EXTREMITY WITH REST PAIN: ICD-10-CM

## 2025-06-20 LAB
BH CV LOWER ARTERIAL LEFT ABI RATIO: 0.42
BH CV LOWER ARTERIAL LEFT DORSALIS PEDIS SYS MAX: 66
BH CV LOWER ARTERIAL LEFT POST TIBIAL SYS MAX: 0
BH CV LOWER ARTERIAL RIGHT ABI RATIO: 0.54
BH CV LOWER ARTERIAL RIGHT DORSALIS PEDIS SYS MAX: 0
BH CV LOWER ARTERIAL RIGHT POST TIBIAL SYS MAX: 84
UPPER ARTERIAL LEFT ARM BRACHIAL SYS MAX: NORMAL
UPPER ARTERIAL RIGHT ARM BRACHIAL SYS MAX: NORMAL

## 2025-06-20 PROCEDURE — 73630 X-RAY EXAM OF FOOT: CPT

## 2025-06-20 PROCEDURE — 93922 UPR/L XTREMITY ART 2 LEVELS: CPT

## 2025-06-20 RX ORDER — SULFAMETHOXAZOLE AND TRIMETHOPRIM 400; 80 MG/1; MG/1
1 TABLET ORAL 2 TIMES DAILY
Qty: 20 TABLET | Refills: 0 | Status: SHIPPED | OUTPATIENT
Start: 2025-06-20

## 2025-06-20 NOTE — NURSING NOTE
Per call from Radhika Mahmood, OK to let the pt leave; stated she would call pt's wife to discuss next steps; RN took pt in wheelchair (with wife ambulating) to Main Lobby for exit; no s/s of distress noted

## 2025-06-20 NOTE — PROGRESS NOTES
Chief Complaint  Aortic Aneurysm    Subjective        Erick Bruno presents to [facility] VASCULAR SURGERY  HPI   Erick Bruno is a 80 y.o. male that has been followed in our office by Dr. Marie for carotid artery stenosis and peripheral arterial disease.  He also has an abdominal aortic aneurysm.  He has a history of a left SFA angioplasty and stent in 2016.  In 2018, he had a left SFA and popliteal angioplasty.  In 2021, he had a right TCAR.  More recently, Dr. Marie attempted arteriogram, though could not get a 6 [place] sheath to consider him for any intervention.  He had discussed that he would need arm access or right common femoral endarterectomy as part of his access.  At that time, he was not having rest pain or tissue loss and also had multiple comorbidities, therefore we elected to continue medical management only.  He did end up getting a foot wound last year, though fortunately went on to heal without intervention.  He was in our office in June of this year where he was having rest pain to his right foot as well as new tissue loss to his right great toe.  He had a CT angiogram done and followed up with Dr. Marie.  This showed extensive disease and limited options.  Dr. Marie ultimately decided to him with local wound care.  This did go on to heal.  His wife called our office reporting a new wound to his left fifth toe.  He hit it on a coffee table approximately 2 weeks ago.  This progressively has worsened and now he has dry gangrene to his left fifth toe with surrounding redness.  He also has a spot on his left second toe.    Review of Systems   Constitutional:  Negative for fever.   Eyes:  Negative for visual disturbance.   Cardiovascular:  Negative for leg swelling.   Gastrointestinal:  Negative for abdominal pain.   Musculoskeletal:  Negative for back pain.   Skin:  Negative for color change, pallor and wound.   Neurological:  Negative for dizziness, facial asymmetry, speech  difficulty and weakness.                  Erick Bruno  reports that he quit smoking about 17 years ago. His smoking use included cigarettes. He started smoking about 77 years ago. He has a 60 pack-year smoking history. He has been exposed to tobacco smoke. He has never used smokeless tobacco..        Objective   Vital Signs:  Vitals:    06/20/25 1214   BP: 156/70   Resp: 17          Body mass index is 25.03 kg/m².   BMI is >= 25 and <30. (Overweight) The following options were offered after discussion;: information on healthy weight added to patient's after visit summary             Result Review :      Doppler Ankle Brachial Index Single Level CAR (06/20/2025 12:02)                    Assessment and Plan     Diagnoses and all orders for this visit:    1. PVD (peripheral vascular disease) (Primary)  -     Cancel: XR Foot 2 View Left; Future    Other orders  -     sulfamethoxazole-trimethoprim (Bactrim) 400-80 MG tablet; Take 1 tablet by mouth 2 (Two) Times a Day.  Dispense: 20 tablet; Refill: 0                 Patient presents today for new wound to his left 5th and 2nd toe after hitting it on a coffee table approximately 2 weeks ago.  This has worsened over the past several days and is now red.  I recommended a stat foot x-ray to assess for overt osteomyelitis.  This showed soft tissue swelling and possible osteomyelitis with correlation with MRI or bone scan.  I have recommended starting an antibiotic and Bactrim was E scribed to his local pharmacy.  He will follow-up with Dr. Marie in 2 days for a reassessment.  Follow Up     Return for monday to see BGT.  Patient was given instructions and counseling regarding his condition or for health maintenance advice. Please see specific information pulled into the AVS if appropriate.     DEBORAH Villafuerte

## 2025-06-23 ENCOUNTER — OFFICE VISIT (OUTPATIENT)
Age: 80
End: 2025-06-23
Payer: MEDICARE

## 2025-06-23 VITALS
RESPIRATION RATE: 17 BRPM | WEIGHT: 169 LBS | DIASTOLIC BLOOD PRESSURE: 71 MMHG | HEART RATE: 59 BPM | BODY MASS INDEX: 25.03 KG/M2 | SYSTOLIC BLOOD PRESSURE: 165 MMHG | HEIGHT: 69 IN

## 2025-06-23 DIAGNOSIS — I70.25 ATHEROSCLEROSIS OF NATIVE ARTERIES OF THE EXTREMITIES WITH ULCERATION: Primary | ICD-10-CM

## 2025-06-23 NOTE — PROGRESS NOTES
"Chief Complaint  Peripheral Vascular Disease, Aortic Aneurysm, and Wound Check    Subjective          HPI: Erick Bruno presents to [facility] VASCULAR SURGERY   History of Present Illness  The patient presents for evaluation of ulcers on his left foot.    He reports that he had a minor injury to the top layer of skin on his left foot, which has since developed into an ulcer. He is experiencing significant pain, despite typically having numbness in the area. The ulceration began as a small pimple-like lesion and has progressively worsened over the past 2 days. Additionally, he notes the presence of a dark spot on another toe, which has also started to ulcerate.    His kidney function is currently at stage 3, as per his most recent consultation with his nephrologist.    Review of Systems     Objective   Vital Signs:  /71 (BP Location: Right arm, Patient Position: Sitting, Cuff Size: Adult)   Pulse 59   Resp 17   Ht 175 cm (68.9\")   Wt 76.7 kg (169 lb)   BMI 25.03 kg/m²   Estimated body mass index is 25.03 kg/m² as calculated from the following:    Height as of this encounter: 175 cm (68.9\").    Weight as of this encounter: 76.7 kg (169 lb).              Physical Exam  Physical Exam  There is an ulcer on the MIP joint of the left second digit and another ulcer on the lateral aspect of the fifth digit.        Result Review :    Common labs          9/20/2024    12:16 10/29/2024    13:31 11/5/2024    12:24   Common Labs   Glucose 213      BUN 27      Creatinine 1.36      Sodium 134      Potassium 4.5      Chloride 104      Calcium 8.8      Albumin 4.0     3.8      Total Bilirubin 0.4      Alkaline Phosphatase 116      AST (SGOT) 18      ALT (SGPT) 16      WBC 5.77  6        Hemoglobin 12.1  12.3        Hematocrit 38.2  35.5        Platelets 179  212        Total Cholesterol 191      Total Cholesterol  113        Triglycerides 166      HDL Cholesterol 36  30        LDL Cholesterol  125  62.8  "       Hemoglobin A1C 9.60      Microalbumin, Urine <1.2      PSA  1.47     1.02       Uric Acid 4.6         Details          This result is from an external source.             Results  Imaging  CT scan from a year ago shows stents in the artery in the thigh and significant tibial disease.    Testing  JODI was 0.42 on the left side.     Most notable findings include: Creatinine elevated.    Images Reviewed:  Doppler Ankle Brachial Index Single Level CAR (06/20/2025 12:02) right sided JODI 0.54.  The right DP is known to be occluded.  The right PT is also felt to be occluded and a collateral was evaluated. Left JODI .42    XR Foot 3+ View Left (06/20/2025 13:46) Arterial calcifications noted.  Indistinct cortex on the distal tip of the phalanx noted.    CT Angio Abdominal Aorta Bilateral Iliofem Runoff (06/06/2024 10:40)     Arteriogram (Autofinalize) (04/20/2022 08:55)         Erick Bruno  reports that he quit smoking about 17 years ago. His smoking use included cigarettes. He started smoking about 77 years ago. He has a 60 pack-year smoking history. He has been exposed to tobacco smoke. He has never used smokeless tobacco..        Patient or patient representative verbalized consent for the use of Ambient Listening during the visit with  Jayden Marie MD for chart documentation. 6/26/2025  17:12 EDT        Assessment and Plan     Assessment & Plan       Assessment & Plan  1. Ulcers on the left foot.  He has an ulcer on the MIP joint of the left second digit and another on the lateral aspect of the fifth digit. His JODI was 0.42 on the left side, indicating poor circulation. A CT scan from a year ago showed stents in the artery in the thigh and significant tibial disease. The last surgical intervention in spring 2022 was unsuccessful. Given the severity of his condition, there is a risk of amputation of the left leg due to inadequate circulation to heal a toe amputation. A CT angiogram will be considered after  consulting with his nephrologist due to potential risks associated with his kidney function. Wound care will be initiated, and he will be contacted tomorrow with a plan.    2. Stage 3 kidney disease.  His kidney function is currently at stage 3. The nephrologist will be consulted before proceeding with any CT angiogram to mitigate risks.    PROCEDURE  The patient has stents in the artery in the thigh from a previous procedure.           Follow Up     No follow-ups on file. - MD to call    Patient was given instructions and counseling regarding his condition or for health maintenance advice. Please see specific information pulled into the AVS if appropriate.

## 2025-07-01 DIAGNOSIS — N18.30 CKD STAGE 3 SECONDARY TO DIABETES: Primary | ICD-10-CM

## 2025-07-01 DIAGNOSIS — E11.22 CKD STAGE 3 SECONDARY TO DIABETES: Primary | ICD-10-CM

## 2025-07-01 DIAGNOSIS — I70.25 ATHEROSCLEROSIS OF NATIVE ARTERIES OF THE EXTREMITIES WITH ULCERATION: Primary | ICD-10-CM

## 2025-07-02 ENCOUNTER — TELEPHONE (OUTPATIENT)
Age: 80
End: 2025-07-02
Payer: MEDICARE

## 2025-07-02 NOTE — TELEPHONE ENCOUNTER
Order for pre and post fluid for his CT scan has a typo and needs to be changed from 42 hours post to 4 hour.  Kellen from Xray Triage at [phone]

## 2025-07-08 ENCOUNTER — HOSPITAL ENCOUNTER (OUTPATIENT)
Dept: RADIOLOGY | Facility: HOSPITAL | Age: 80
Discharge: HOME | End: 2025-07-08
Payer: MEDICARE

## 2025-07-08 ENCOUNTER — HOSPITAL ENCOUNTER (OUTPATIENT)
Dept: CT IMAGING | Facility: HOSPITAL | Age: 80
Discharge: HOME | End: 2025-07-08
Payer: MEDICARE

## 2025-07-08 VITALS
OXYGEN SATURATION: 100 % | SYSTOLIC BLOOD PRESSURE: 148 MMHG | HEART RATE: 55 BPM | HEIGHT: 69 IN | RESPIRATION RATE: 14 BRPM | BODY MASS INDEX: 25.03 KG/M2 | TEMPERATURE: 97.7 F | WEIGHT: 169 LBS | DIASTOLIC BLOOD PRESSURE: 65 MMHG

## 2025-07-08 DIAGNOSIS — E11.22 CKD STAGE 3 SECONDARY TO DIABETES: ICD-10-CM

## 2025-07-08 DIAGNOSIS — N18.30 CKD STAGE 3 SECONDARY TO DIABETES: ICD-10-CM

## 2025-07-08 DIAGNOSIS — I70.25 ATHEROSCLEROSIS OF NATIVE ARTERIES OF THE EXTREMITIES WITH ULCERATION: ICD-10-CM

## 2025-07-08 LAB — CREAT BLDA-MCNC: 1.8 MG/DL (ref 0.6–1.3)

## 2025-07-08 PROCEDURE — 82565 ASSAY OF CREATININE: CPT

## 2025-07-08 PROCEDURE — 96361 HYDRATE IV INFUSION ADD-ON: CPT

## 2025-07-08 PROCEDURE — 25510000001 IOPAMIDOL PER 1 ML: Performed by: SURGERY

## 2025-07-08 PROCEDURE — 75635 CT ANGIO ABDOMINAL ARTERIES: CPT

## 2025-07-08 PROCEDURE — 25810000003 SODIUM CHLORIDE 0.9 % SOLUTION: Performed by: SURGERY

## 2025-07-08 PROCEDURE — 96360 HYDRATION IV INFUSION INIT: CPT

## 2025-07-08 RX ORDER — IOPAMIDOL 755 MG/ML
100 INJECTION, SOLUTION INTRAVASCULAR
Status: COMPLETED | OUTPATIENT
Start: 2025-07-08 | End: 2025-07-08

## 2025-07-08 RX ORDER — SODIUM CHLORIDE 9 MG/ML
150 INJECTION, SOLUTION INTRAVENOUS CONTINUOUS
Status: ACTIVE | OUTPATIENT
Start: 2025-07-08 | End: 2025-07-08

## 2025-07-08 RX ADMIN — SODIUM CHLORIDE 150 ML/HR: 900 INJECTION INTRAVENOUS at 10:30

## 2025-07-08 RX ADMIN — SODIUM CHLORIDE 500 ML: 9 INJECTION, SOLUTION INTRAVENOUS at 09:06

## 2025-07-08 RX ADMIN — IOPAMIDOL 95 ML: 755 INJECTION, SOLUTION INTRAVENOUS at 10:25

## 2025-07-14 ENCOUNTER — OFFICE VISIT (OUTPATIENT)
Age: 80
End: 2025-07-14
Payer: MEDICARE

## 2025-07-14 VITALS
DIASTOLIC BLOOD PRESSURE: 60 MMHG | WEIGHT: 165 LBS | RESPIRATION RATE: 17 BRPM | SYSTOLIC BLOOD PRESSURE: 152 MMHG | HEIGHT: 69 IN | BODY MASS INDEX: 24.44 KG/M2 | HEART RATE: 58 BPM

## 2025-07-14 DIAGNOSIS — I70.25 ATHEROSCLEROSIS OF NATIVE ARTERIES OF THE EXTREMITIES WITH ULCERATION: Primary | ICD-10-CM

## 2025-07-14 RX ORDER — TIRZEPATIDE 2.5 MG/.5ML
2.5 INJECTION, SOLUTION SUBCUTANEOUS WEEKLY
COMMUNITY
Start: 2025-07-02

## 2025-07-14 NOTE — H&P (VIEW-ONLY)
"Chief Complaint  Peripheral Vascular Disease    Subjective          HPI: Erick Bruno presents to [facility] VASCULAR SURGERY   History of Present Illness  The patient presents for a follow-up regarding his peripheral arterial disease and foot wounds, as well as to discuss the results of his CT scan.    He reports a new wound on the outer part of his foot. His primary care physician prescribed another course of antibiotics and a topical treatment, which he applied once. However, the condition worsened the following day, prompting him to discontinue its use. He also mentions that his big toe is deviating at the joint. He has been experiencing pain, which is a new symptom for him, and has been taking ibuprofen to manage it. He expresses a strong desire to avoid amputation.    Supplemental Information  He fell on Friday night and injured his elbow.    MEDICATIONS  Current: ibuprofen    Review of Systems     Objective   Vital Signs:  /60 (BP Location: Right arm, Patient Position: Sitting, Cuff Size: Adult)   Pulse 58   Resp 17   Ht 175 cm (68.9\")   Wt 74.8 kg (165 lb)   BMI 24.44 kg/m²   Estimated body mass index is 24.44 kg/m² as calculated from the following:    Height as of this encounter: 175 cm (68.9\").    Weight as of this encounter: 74.8 kg (165 lb).  BMI is within normal parameters. No other follow-up for BMI required.          Physical Exam  Physical Exam  There is a wound on the fifth toe and on the lateral metatarsal head.        Result Review :    Common labs          10/29/2024    13:31 11/5/2024    12:24 7/8/2025    09:06   Common Labs   Creatinine   1.80    WBC 6         Hemoglobin 12.3         Hematocrit 35.5         Platelets 212         Total Cholesterol 113         HDL Cholesterol 30         LDL Cholesterol  62.8         PSA 1.47     1.02           Details          This result is from an external source.             Results  Imaging  CT scan shows an aneurysm of the aorta " and a clogged stent. X-ray from 2022 shows hardening of the arteries and calcium deposits.    Testing  JODI on the left foot is 0.42 or 42% of normal.     Most notable findings include: Creatinine is 1.8.  LDL 62.  Hemoglobin 12.3.    Images Reviewed:  Duplex Vein Mapping Lower Extremity - Bilateral CAR (02/28/2021 18:04)  No Vein    Risks  Patient has been recommended for lower extremity surgical revascularization.  This is a major surgery.  Patient understands the inherent risks associated with lower extremity surgical revascularization .  These include but not are not limited to stroke, heart attack, bleeding, infection, need for secondary surgery/intervention, progression of arterial disease requiring amputation, and even death.  Patient also understands the nature of peripheral arterial disease and if a left untreated surgically would put them at increased risk for worsening ischemia, infection, pain, and possibility for amputation.         Erick Bruno  reports that he quit smoking about 17 years ago. His smoking use included cigarettes. He started smoking about 77 years ago. He has a 60 pack-year smoking history. He has been exposed to tobacco smoke. He has never used smokeless tobacco..        Patient or patient representative verbalized consent for the use of Ambient Listening during the visit with  Jayden Marie MD for chart documentation. 7/15/2025  11:51 EDT        Assessment and Plan     Assessment & Plan  Atherosclerosis of native arteries of the extremities with ulceration    Orders:    Case Request; Standing    ceFAZolin (ANCEF) 2,000 mg in sodium chloride 0.9 % 100 mL IVPB       Assessment & Plan  1. Peripheral Arterial Disease.  The patient's JODI is 0.42, indicating significantly reduced circulation in the left foot. Previous attempts with balloons and stents were unsuccessful.  These were done from a contralateral right transfemoral approach and even getting up and over his bifurcation was  very difficult.  Vein mapping was reviewed.  He has an adequate vein for bypass surgery.  I believe the use of PTFE but increased risk of infection and not have a limited value given the size and need for target vessels.  . An angiogram, which would almost certainly include pedal access on the left foot which will be problematic, will be scheduled to assess the possibility of improving circulation through pedal access. He is advised to apply Betadine to the wound to control infection. If the angiogram is unsuccessful or not pursued, amputation may be considered as a last resort.  Patient has significant increased risk due to his comorbidities, tissue loss, and is a risk of major amputation.    2. Foot Wounds.  The patient has wounds on the fifth toe and the lateral metatarsal head. He reports worsening of the wound after using a prescribed topical antibiotic. He is advised to discontinue the topical antibiotic and use Betadine to control infection. Pain management is crucial, and he should avoid excessive use of ibuprofen due to potential liver damage.       Follow Up     Return for After Procedure.  Patient was given instructions and counseling regarding his condition or for health maintenance advice. Please see specific information pulled into the AVS if appropriate.

## 2025-07-14 NOTE — PROGRESS NOTES
"Chief Complaint  Peripheral Vascular Disease    Subjective          HPI: Erick Bruno presents to [facility] VASCULAR SURGERY   History of Present Illness  The patient presents for a follow-up regarding his peripheral arterial disease and foot wounds, as well as to discuss the results of his CT scan.    He reports a new wound on the outer part of his foot. His primary care physician prescribed another course of antibiotics and a topical treatment, which he applied once. However, the condition worsened the following day, prompting him to discontinue its use. He also mentions that his big toe is deviating at the joint. He has been experiencing pain, which is a new symptom for him, and has been taking ibuprofen to manage it. He expresses a strong desire to avoid amputation.    Supplemental Information  He fell on Friday night and injured his elbow.    MEDICATIONS  Current: ibuprofen    Review of Systems     Objective   Vital Signs:  /60 (BP Location: Right arm, Patient Position: Sitting, Cuff Size: Adult)   Pulse 58   Resp 17   Ht 175 cm (68.9\")   Wt 74.8 kg (165 lb)   BMI 24.44 kg/m²   Estimated body mass index is 24.44 kg/m² as calculated from the following:    Height as of this encounter: 175 cm (68.9\").    Weight as of this encounter: 74.8 kg (165 lb).  BMI is within normal parameters. No other follow-up for BMI required.          Physical Exam  Physical Exam  There is a wound on the fifth toe and on the lateral metatarsal head.        Result Review :    Common labs          10/29/2024    13:31 11/5/2024    12:24 7/8/2025    09:06   Common Labs   Creatinine   1.80    WBC 6         Hemoglobin 12.3         Hematocrit 35.5         Platelets 212         Total Cholesterol 113         HDL Cholesterol 30         LDL Cholesterol  62.8         PSA 1.47     1.02           Details          This result is from an external source.             Results  Imaging  CT scan shows an aneurysm of the aorta " and a clogged stent. X-ray from 2022 shows hardening of the arteries and calcium deposits.    Testing  JODI on the left foot is 0.42 or 42% of normal.     Most notable findings include: Creatinine is 1.8.  LDL 62.  Hemoglobin 12.3.    Images Reviewed:  Duplex Vein Mapping Lower Extremity - Bilateral CAR (02/28/2021 18:04)  No Vein    Risks  Patient has been recommended for lower extremity surgical revascularization.  This is a major surgery.  Patient understands the inherent risks associated with lower extremity surgical revascularization .  These include but not are not limited to stroke, heart attack, bleeding, infection, need for secondary surgery/intervention, progression of arterial disease requiring amputation, and even death.  Patient also understands the nature of peripheral arterial disease and if a left untreated surgically would put them at increased risk for worsening ischemia, infection, pain, and possibility for amputation.         Erick Bruno  reports that he quit smoking about 17 years ago. His smoking use included cigarettes. He started smoking about 77 years ago. He has a 60 pack-year smoking history. He has been exposed to tobacco smoke. He has never used smokeless tobacco..        Patient or patient representative verbalized consent for the use of Ambient Listening during the visit with  Jayden Marie MD for chart documentation. 7/15/2025  11:51 EDT        Assessment and Plan     Assessment & Plan  Atherosclerosis of native arteries of the extremities with ulceration    Orders:    Case Request; Standing    ceFAZolin (ANCEF) 2,000 mg in sodium chloride 0.9 % 100 mL IVPB       Assessment & Plan  1. Peripheral Arterial Disease.  The patient's JODI is 0.42, indicating significantly reduced circulation in the left foot. Previous attempts with balloons and stents were unsuccessful.  These were done from a contralateral right transfemoral approach and even getting up and over his bifurcation was  very difficult.  Vein mapping was reviewed.  He has an adequate vein for bypass surgery.  I believe the use of PTFE but increased risk of infection and not have a limited value given the size and need for target vessels.  . An angiogram, which would almost certainly include pedal access on the left foot which will be problematic, will be scheduled to assess the possibility of improving circulation through pedal access. He is advised to apply Betadine to the wound to control infection. If the angiogram is unsuccessful or not pursued, amputation may be considered as a last resort.  Patient has significant increased risk due to his comorbidities, tissue loss, and is a risk of major amputation.    2. Foot Wounds.  The patient has wounds on the fifth toe and the lateral metatarsal head. He reports worsening of the wound after using a prescribed topical antibiotic. He is advised to discontinue the topical antibiotic and use Betadine to control infection. Pain management is crucial, and he should avoid excessive use of ibuprofen due to potential liver damage.       Follow Up     Return for After Procedure.  Patient was given instructions and counseling regarding his condition or for health maintenance advice. Please see specific information pulled into the AVS if appropriate.

## 2025-07-15 NOTE — ASSESSMENT & PLAN NOTE
Orders:    Case Request; Standing    ceFAZolin (ANCEF) 2,000 mg in sodium chloride 0.9 % 100 mL IVPB

## 2025-07-16 ENCOUNTER — PRE-ADMISSION TESTING (OUTPATIENT)
Dept: PREADMISSION TESTING | Facility: HOSPITAL | Age: 80
End: 2025-07-16
Payer: MEDICARE

## 2025-07-16 ENCOUNTER — TELEPHONE (OUTPATIENT)
Age: 80
End: 2025-07-16
Payer: MEDICARE

## 2025-07-16 VITALS
DIASTOLIC BLOOD PRESSURE: 57 MMHG | OXYGEN SATURATION: 97 % | HEART RATE: 64 BPM | SYSTOLIC BLOOD PRESSURE: 123 MMHG | BODY MASS INDEX: 25.46 KG/M2 | TEMPERATURE: 97.3 F | RESPIRATION RATE: 20 BRPM | WEIGHT: 168 LBS | HEIGHT: 68 IN

## 2025-07-16 LAB
ANION GAP SERPL CALCULATED.3IONS-SCNC: 11.2 MMOL/L (ref 5–15)
BUN SERPL-MCNC: 38 MG/DL (ref 8–23)
BUN/CREAT SERPL: 21.7 (ref 7–25)
CALCIUM SPEC-SCNC: 9.3 MG/DL (ref 8.6–10.5)
CHLORIDE SERPL-SCNC: 104 MMOL/L (ref 98–107)
CO2 SERPL-SCNC: 19.8 MMOL/L (ref 22–29)
CREAT SERPL-MCNC: 1.75 MG/DL (ref 0.76–1.27)
DEPRECATED RDW RBC AUTO: 44.6 FL (ref 37–54)
EGFRCR SERPLBLD CKD-EPI 2021: 39.1 ML/MIN/1.73
ERYTHROCYTE [DISTWIDTH] IN BLOOD BY AUTOMATED COUNT: 12.9 % (ref 12.3–15.4)
GLUCOSE SERPL-MCNC: 129 MG/DL (ref 65–99)
HCT VFR BLD AUTO: 35.9 % (ref 37.5–51)
HGB BLD-MCNC: 11.8 G/DL (ref 13–17.7)
INR PPP: 1.24 (ref 0.9–1.1)
MCH RBC QN AUTO: 31.1 PG (ref 26.6–33)
MCHC RBC AUTO-ENTMCNC: 32.9 G/DL (ref 31.5–35.7)
MCV RBC AUTO: 94.7 FL (ref 79–97)
PLATELET # BLD AUTO: 215 10*3/MM3 (ref 140–450)
PMV BLD AUTO: 9 FL (ref 6–12)
POTASSIUM SERPL-SCNC: 4.7 MMOL/L (ref 3.5–5.2)
PROTHROMBIN TIME: 15.6 SECONDS (ref 11.7–14.2)
QT INTERVAL: 431 MS
QTC INTERVAL: 427 MS
RBC # BLD AUTO: 3.79 10*6/MM3 (ref 4.14–5.8)
SODIUM SERPL-SCNC: 135 MMOL/L (ref 136–145)
WBC NRBC COR # BLD AUTO: 6.64 10*3/MM3 (ref 3.4–10.8)

## 2025-07-16 PROCEDURE — 93005 ELECTROCARDIOGRAM TRACING: CPT

## 2025-07-16 PROCEDURE — 85610 PROTHROMBIN TIME: CPT

## 2025-07-16 PROCEDURE — 85027 COMPLETE CBC AUTOMATED: CPT

## 2025-07-16 PROCEDURE — 36415 COLL VENOUS BLD VENIPUNCTURE: CPT

## 2025-07-16 PROCEDURE — 80048 BASIC METABOLIC PNL TOTAL CA: CPT

## 2025-07-16 RX ORDER — SULFAMETHOXAZOLE AND TRIMETHOPRIM 800; 160 MG/1; MG/1
1 TABLET ORAL DAILY
COMMUNITY
Start: 2025-07-02 | End: 2025-07-16

## 2025-07-16 NOTE — DISCHARGE INSTRUCTIONS
"Holding GLP-1 Receptor Agonists Prior to Anesthesia    In order for safe anesthesia, GLP-1 receptor agonist mediations need to be held before the procedure.     What are GLP-1 Receptor Agonists?  Glucagon-like peptide-1 (GLP-1) receptor agonists are approved by the Food and Drug Administration for treatment of type 2 diabetes mellitus and cardiovascular risk reduction. In addition, GLP-1 receptor agonists are also used for weight loss.     Which of my medications are GLP-1 Receptor Agonists?   Exanetide (Byetta®, Bydureon®)  Lixisenatide (Adlyxin®, Soliqua®)  Semaglutide (Wegovy®, Ozempic®, Rybelsus®)  Liraglutide (Saxenda®, Victoza®, Xutolphy®)  Dulaglutide (Trulicity®)   Tirzepatide (Mounjaro®, Zepbound®)    How can GLP-1 Receptor Agonists cause problems during surgery?  GLP-1 agonists can potentially cause adverse gastrointestinal effects, including the consequences of delayed gastric emptying. This can cause the stomach to be full even after refraining from eating and drinking before surgery and can cause regurgitation or \"throwing up\" of the stomach contents during anesthesia. If the contents enter the airway and the lungs, it could cause anesthesia complications and a severe pneumonia.     What should I do prior to my procedure?  According to the recommendations of the American Society of Anesthesiologists:    If you take GLP-1 agonists every day: Hold the medication on the day of the procedure/surgery.   If you take GLP-1 agonists once a week: Hold the medication a week prior to the procedure/surgery    What if I have questions?  If you have concerns or questions about holding your medications prior to your procedure, contact your doctors before stopping your medications.    Modified from the American Society of Anesthesiologists Consensus-Based Guidance on Preoperative Management of Patients (Adults and Children) on Glucagon-Like Peptide-1 (GLP-1) Receptor Agonists June 29,2023    Take the following " medications the morning of surgery:    OMEPRAZOLE  NEBIVOLOL BUSPAR  PLAVIX  ASPIRIN    If you are on an Aspirin or a Blood Thinner please clarify with the surgeon and prescribing physician if and when you are to hold the medication or if you are to continue the medication.  If you are on prescription narcotic pain medication to control your pain you may also take that medication the morning of surgery.      General Instructions:     Do not eat solid food after midnight the night before surgery.  Clear liquids day of surgery are allowed but must be stopped at least two hours before your hospital arrival time.       Allowed clear liquids      Water, sodas, and tea or coffee with no cream or milk added.       12 to 20 ounces of a clear liquid that contains carbohydrates is recommended.  If non-diabetic, have Gatorade or Powerade.  If diabetic, have G2 or Powerade Zero.     Do not have liquids red in color.  Do not consume chicken, beef, pork or vegetable broth or bouillon cubes of any variety as they are not considered clear liquids and are not allowed.      Infants may have breast milk up to four hours before surgery.  Infants drinking formula may drink formula up to six hours before surgery.   Patients who avoid smoking, chewing tobacco and alcohol for 4 weeks prior to surgery have a reduced risk of post-operative complications.  Quit smoking as many days before surgery as you can.  Do not smoke, use chewing tobacco or drink alcohol the day of surgery.   If applicable bring your C-PAP/ BI-PAP machine in with you to preop day of surgery.  Bring any papers given to you in the doctor’s office.  Wear clean comfortable clothes.  Do not wear contact lenses, false eyelashes or make-up.  Bring a case for your glasses.   Bring crutches or walker if applicable.  Remove all piercings.  Leave jewelry and any other valuables at home.  Hair extensions with metal clips must be removed prior to surgery.  The Pre-Admission Testing  nurse will instruct you to bring medications if unable to obtain an accurate list in Pre-Admission Testing.    Day of surgery you will need to let the preoperative nurse know the last time you took each of your medications.  To ensure a safe environment for patients and staff, we kindly ask that children under the age of 16 not accompany patients.  If you must bring a dependent child or dependent adult please ensure a responsible adult, other than yourself, is present to supervise them.      If you were given a blood bank ID arm band remember to bring it with you the day of surgery.    Preventing a Surgical Site Infection:  For 2 to 3 days before surgery, avoid shaving with a razor because the razor can irritate skin and make it easier to develop an infection.    Any areas of open skin can increase the risk of a post-operative wound infection by allowing bacteria to enter and travel throughout the body.  Notify your surgeon if you have any skin wounds / rashes even if it is not near the expected surgical site.  The area will need assessed to determine if surgery should be delayed until it is healed.  The night prior to surgery shower using a fresh bar of anti-bacterial soap (such as Dial) and clean washcloth.  Sleep in a clean bed with clean clothing.  Do not allow pets to sleep with you.  Shower on the morning of surgery using a fresh bar of anti-bacterial soap (such as Dial) and clean washcloth.  Dry with a clean towel and dress in clean clothing.  Ask your surgeon if you will be receiving antibiotics prior to surgery.  Make sure you, your family, and all healthcare providers clean their hands with soap and water or an alcohol based hand  before caring for you or your wound.    Day of surgery:7/23/2025  Your arrival time is approximately two hours before your scheduled surgery time.  Please note if you have an early arrival time the surgery doors do not open before 5:00 AM.  Upon arrival, a Pre-op nurse  and Anesthesiologist will review your health history, obtain vital signs, and answer questions you may have.  The only belongings needed at this time will be a list of your home medications and if applicable your C-PAP/BI-PAP machine.  A Pre-op nurse will start an IV and you may receive medication in preparation for surgery, including something to help you relax.     Please be aware that surgery does come with discomfort.  We want to make every effort to control your discomfort so please discuss any uncontrolled symptoms with your nurse.   Your doctor will most likely have prescribed pain medications.      If you are going home after surgery you will receive individualized written care instructions before being discharged.  A responsible adult must drive you to and from the hospital on the day of your surgery and ideally stay with you through the night.   .  Discharge prescriptions can be filled by the hospital pharmacy during regular pharmacy hours.  If you are having surgery late in the day/evening your prescription may be e-prescribed to your pharmacy.  Please verify your pharmacy hours or chose a 24 hour pharmacy to avoid not having access to your prescription because your pharmacy has closed for the day.    If you are staying overnight following surgery, you will be transported to your hospital room following the recovery period.  [facility] has all private rooms.    If you have any questions please call Pre-Admission Testing at [phone].  Deductibles and co-payments are collected on the day of service. Please be prepared to pay the required co-pay, deductible or deposit on the day of service as defined by your plan.    Call your surgeon immediately if you experience any of the following symptoms:  Sore Throat  Shortness of Breath or difficulty breathing  Cough  Chills  Body soreness or muscle pain  Headache  Fever  New loss of taste or smell  Do not arrive for your surgery ill.  Your  procedure will need to be rescheduled to another time.  You will need to call your physician before the day of surgery to avoid any unnecessary exposure to hospital staff as well as other patients.  CHLORHEXIDINE CLOTH INSTRUCTIONS  The morning of surgery follow these instructions using the Chlorhexidine cloths you've been given.  These steps reduce bacteria on the body.  Do not use the cloths near your eyes, ears mouth, genitalia or on open wounds.  Throw the cloths away after use but do not try to flush them down a toilet.      Open and remove one cloth at a time from the package.    Leave the cloth unfolded and begin the bathing.  Massage the skin with the cloths using gentle pressure to remove bacteria.  Do not scrub harshly.   Follow the steps below with one 2% CHG cloth per area (6 total cloths).  One cloth for neck, shoulders and chest.  One cloth for both arms, hands, fingers and underarms (do underarms last).  One cloth for the abdomen followed by groin.  One cloth for right leg and foot including between the toes.  One cloth for left leg and foot including between the toes.  The last cloth is to be used for the back of the neck, back and buttocks.    Allow the CHG to air dry 3 minutes on the skin which will give it time to work and decrease the chance of irritation.  The skin may feel sticky until it is dry.  Do not rinse with water or any other liquid or you will lose the beneficial effects of the CHG.  If mild skin irritation occurs, do rinse the skin to remove the CHG.  Report this to the nurse at time of admission.  Do not apply lotions, creams, ointments, deodorants or perfumes after using the clothes. Dress in clean clothes before coming to the hospital.

## 2025-07-16 NOTE — TELEPHONE ENCOUNTER
Spoke with PAT this morning regarding this patients upcoming surgery with Dr. Marie. They called to let us know that the patient took Mounjaro on Sunday 7-13-25, prior to his appt with us on 7-14-25. Patient did not make us aware of this when going over Pre Op Instructions. He must hold for a full week prior to surgery. Procedure with Dr. Marie has been rescheduled from 7-18-25 to 7-23-25 to allow appropriate holding time. Spoke with wife and she is aware he must not take Mounjaro from this point forward until after surgery. PAT also wanted to ask us about the potential of needing Cardiac Clearance from Dr. Michael Wyman. Dr. Marie did not mention needing this during scheduling so it was not requested. Dr. Marie spoke with Dr. Wyman today. Per Dr. Wmyan, it seems appropriate to proceed with the procedure and clearance is not necessary. Updated Renetta in PAT with this info.

## 2025-07-23 ENCOUNTER — APPOINTMENT (OUTPATIENT)
Dept: GENERAL RADIOLOGY | Facility: HOSPITAL | Age: 80
End: 2025-07-23
Payer: MEDICARE

## 2025-07-23 ENCOUNTER — HOSPITAL ENCOUNTER (OUTPATIENT)
Facility: HOSPITAL | Age: 80
Setting detail: HOSPITAL OUTPATIENT SURGERY
Discharge: HOME | End: 2025-07-23
Attending: SURGERY | Admitting: SURGERY
Payer: MEDICARE

## 2025-07-23 ENCOUNTER — ANESTHESIA (OUTPATIENT)
Dept: PERIOP | Facility: HOSPITAL | Age: 80
End: 2025-07-23
Payer: MEDICARE

## 2025-07-23 ENCOUNTER — ANESTHESIA EVENT (OUTPATIENT)
Dept: PERIOP | Facility: HOSPITAL | Age: 80
End: 2025-07-23
Payer: MEDICARE

## 2025-07-23 VITALS
TEMPERATURE: 98.1 F | SYSTOLIC BLOOD PRESSURE: 150 MMHG | RESPIRATION RATE: 16 BRPM | HEART RATE: 66 BPM | DIASTOLIC BLOOD PRESSURE: 82 MMHG | OXYGEN SATURATION: 99 %

## 2025-07-23 DIAGNOSIS — E11.51 TYPE 2 DIABETES MELLITUS WITH DIABETIC PERIPHERAL ANGIOPATHY WITHOUT GANGRENE, WITHOUT LONG-TERM CURRENT USE OF INSULIN: ICD-10-CM

## 2025-07-23 DIAGNOSIS — I70.25 ATHEROSCLEROSIS OF NATIVE ARTERIES OF THE EXTREMITIES WITH ULCERATION: Primary | ICD-10-CM

## 2025-07-23 DIAGNOSIS — I48.0 PAROXYSMAL ATRIAL FIBRILLATION: ICD-10-CM

## 2025-07-23 DIAGNOSIS — I73.9 PVD (PERIPHERAL VASCULAR DISEASE): ICD-10-CM

## 2025-07-23 LAB
GLUCOSE BLDC GLUCOMTR-MCNC: 119 MG/DL (ref 70–130)
GLUCOSE BLDC GLUCOMTR-MCNC: 80 MG/DL (ref 70–130)

## 2025-07-23 PROCEDURE — 25010000002 HEPARIN (PORCINE) PER 1000 UNITS: Performed by: SURGERY

## 2025-07-23 PROCEDURE — C1769 GUIDE WIRE: HCPCS | Performed by: SURGERY

## 2025-07-23 PROCEDURE — 36140 INTRO NDL ICATH UPR/LXTR ART: CPT | Performed by: SURGERY

## 2025-07-23 PROCEDURE — 25010000002 FENTANYL CITRATE (PF) 50 MCG/ML SOLUTION: Performed by: ANESTHESIOLOGY

## 2025-07-23 PROCEDURE — 25010000002 ONDANSETRON PER 1 MG: Performed by: NURSE ANESTHETIST, CERTIFIED REGISTERED

## 2025-07-23 PROCEDURE — 25010000002 CEFAZOLIN PER 500 MG: Performed by: SURGERY

## 2025-07-23 PROCEDURE — 75710 ARTERY X-RAYS ARM/LEG: CPT | Performed by: SURGERY

## 2025-07-23 PROCEDURE — 25010000002 PROPOFOL 10 MG/ML EMULSION: Performed by: NURSE ANESTHETIST, CERTIFIED REGISTERED

## 2025-07-23 PROCEDURE — 25010000002 BUPIVACAINE (PF) 0.25 % SOLUTION 30 ML VIAL: Performed by: SURGERY

## 2025-07-23 PROCEDURE — 25810000003 LACTATED RINGERS PER 1000 ML: Performed by: ANESTHESIOLOGY

## 2025-07-23 PROCEDURE — 25810000003 SODIUM CHLORIDE 0.9 % SOLUTION 250 ML FLEX CONT: Performed by: NURSE ANESTHETIST, CERTIFIED REGISTERED

## 2025-07-23 PROCEDURE — 25010000002 PHENYLEPHRINE 10 MG/ML SOLUTION 5 ML VIAL: Performed by: NURSE ANESTHETIST, CERTIFIED REGISTERED

## 2025-07-23 PROCEDURE — C1894 INTRO/SHEATH, NON-LASER: HCPCS | Performed by: SURGERY

## 2025-07-23 PROCEDURE — C1887 CATHETER, GUIDING: HCPCS | Performed by: SURGERY

## 2025-07-23 PROCEDURE — 25010000002 LIDOCAINE 1 % SOLUTION 20 ML VIAL: Performed by: SURGERY

## 2025-07-23 PROCEDURE — 25010000002 SUGAMMADEX 200 MG/2ML SOLUTION: Performed by: NURSE ANESTHETIST, CERTIFIED REGISTERED

## 2025-07-23 PROCEDURE — 25010000002 FAMOTIDINE 10 MG/ML SOLUTION: Performed by: ANESTHESIOLOGY

## 2025-07-23 PROCEDURE — 76937 US GUIDE VASCULAR ACCESS: CPT | Performed by: SURGERY

## 2025-07-23 PROCEDURE — 25010000002 LIDOCAINE 2% SOLUTION: Performed by: NURSE ANESTHETIST, CERTIFIED REGISTERED

## 2025-07-23 PROCEDURE — 25510000001 IOPAMIDOL PER 1 ML: Performed by: SURGERY

## 2025-07-23 PROCEDURE — 25010000002 PHENYLEPHRINE 10 MG/ML SOLUTION: Performed by: NURSE ANESTHETIST, CERTIFIED REGISTERED

## 2025-07-23 PROCEDURE — 25010000002 DEXAMETHASONE PER 1 MG: Performed by: NURSE ANESTHETIST, CERTIFIED REGISTERED

## 2025-07-23 PROCEDURE — 82948 REAGENT STRIP/BLOOD GLUCOSE: CPT

## 2025-07-23 RX ORDER — MIDAZOLAM HYDROCHLORIDE 1 MG/ML
0.5 INJECTION, SOLUTION INTRAMUSCULAR; INTRAVENOUS
Status: DISCONTINUED | OUTPATIENT
Start: 2025-07-23 | End: 2025-07-23 | Stop reason: HOSPADM

## 2025-07-23 RX ORDER — PHENYLEPHRINE HYDROCHLORIDE 10 MG/ML
INJECTION INTRAVENOUS AS NEEDED
Status: DISCONTINUED | OUTPATIENT
Start: 2025-07-23 | End: 2025-07-23 | Stop reason: SURG

## 2025-07-23 RX ORDER — ROCURONIUM BROMIDE 10 MG/ML
INJECTION, SOLUTION INTRAVENOUS AS NEEDED
Status: DISCONTINUED | OUTPATIENT
Start: 2025-07-23 | End: 2025-07-23 | Stop reason: SURG

## 2025-07-23 RX ORDER — LIDOCAINE HYDROCHLORIDE 20 MG/ML
INJECTION, SOLUTION INFILTRATION; PERINEURAL AS NEEDED
Status: DISCONTINUED | OUTPATIENT
Start: 2025-07-23 | End: 2025-07-23 | Stop reason: SURG

## 2025-07-23 RX ORDER — LABETALOL HYDROCHLORIDE 5 MG/ML
5 INJECTION, SOLUTION INTRAVENOUS
Status: DISCONTINUED | OUTPATIENT
Start: 2025-07-23 | End: 2025-07-23 | Stop reason: HOSPADM

## 2025-07-23 RX ORDER — SODIUM CHLORIDE 0.9 % (FLUSH) 0.9 %
3-10 SYRINGE (ML) INJECTION AS NEEDED
Status: DISCONTINUED | OUTPATIENT
Start: 2025-07-23 | End: 2025-07-23 | Stop reason: HOSPADM

## 2025-07-23 RX ORDER — IPRATROPIUM BROMIDE AND ALBUTEROL SULFATE 2.5; .5 MG/3ML; MG/3ML
3 SOLUTION RESPIRATORY (INHALATION) ONCE AS NEEDED
Status: DISCONTINUED | OUTPATIENT
Start: 2025-07-23 | End: 2025-07-23 | Stop reason: HOSPADM

## 2025-07-23 RX ORDER — FENTANYL CITRATE 50 UG/ML
50 INJECTION, SOLUTION INTRAMUSCULAR; INTRAVENOUS
Refills: 0 | Status: DISCONTINUED | OUTPATIENT
Start: 2025-07-23 | End: 2025-07-23 | Stop reason: HOSPADM

## 2025-07-23 RX ORDER — HYDROCODONE BITARTRATE AND ACETAMINOPHEN 7.5; 325 MG/1; MG/1
1 TABLET ORAL EVERY 4 HOURS PRN
Status: DISCONTINUED | OUTPATIENT
Start: 2025-07-23 | End: 2025-07-23 | Stop reason: HOSPADM

## 2025-07-23 RX ORDER — DROPERIDOL 2.5 MG/ML
0.62 INJECTION, SOLUTION INTRAMUSCULAR; INTRAVENOUS
Status: DISCONTINUED | OUTPATIENT
Start: 2025-07-23 | End: 2025-07-23 | Stop reason: HOSPADM

## 2025-07-23 RX ORDER — SODIUM CHLORIDE, SODIUM LACTATE, POTASSIUM CHLORIDE, CALCIUM CHLORIDE 600; 310; 30; 20 MG/100ML; MG/100ML; MG/100ML; MG/100ML
9 INJECTION, SOLUTION INTRAVENOUS CONTINUOUS
Status: DISCONTINUED | OUTPATIENT
Start: 2025-07-23 | End: 2025-07-23 | Stop reason: HOSPADM

## 2025-07-23 RX ORDER — ATROPINE SULFATE 0.4 MG/ML
0.4 INJECTION, SOLUTION INTRAMUSCULAR; INTRAVENOUS; SUBCUTANEOUS ONCE AS NEEDED
Status: DISCONTINUED | OUTPATIENT
Start: 2025-07-23 | End: 2025-07-23 | Stop reason: HOSPADM

## 2025-07-23 RX ORDER — HYDRALAZINE HYDROCHLORIDE 20 MG/ML
5 INJECTION INTRAMUSCULAR; INTRAVENOUS
Status: DISCONTINUED | OUTPATIENT
Start: 2025-07-23 | End: 2025-07-23 | Stop reason: HOSPADM

## 2025-07-23 RX ORDER — EPHEDRINE SULFATE 50 MG/ML
INJECTION, SOLUTION INTRAVENOUS AS NEEDED
Status: DISCONTINUED | OUTPATIENT
Start: 2025-07-23 | End: 2025-07-23 | Stop reason: SURG

## 2025-07-23 RX ORDER — EPHEDRINE SULFATE 50 MG/ML
5 INJECTION, SOLUTION INTRAVENOUS ONCE AS NEEDED
Status: DISCONTINUED | OUTPATIENT
Start: 2025-07-23 | End: 2025-07-23 | Stop reason: HOSPADM

## 2025-07-23 RX ORDER — FENTANYL CITRATE 50 UG/ML
50 INJECTION, SOLUTION INTRAMUSCULAR; INTRAVENOUS
Status: DISCONTINUED | OUTPATIENT
Start: 2025-07-23 | End: 2025-07-23 | Stop reason: HOSPADM

## 2025-07-23 RX ORDER — PROMETHAZINE HYDROCHLORIDE 25 MG/1
25 TABLET ORAL ONCE AS NEEDED
Status: DISCONTINUED | OUTPATIENT
Start: 2025-07-23 | End: 2025-07-23 | Stop reason: HOSPADM

## 2025-07-23 RX ORDER — DIPHENHYDRAMINE HYDROCHLORIDE 50 MG/ML
12.5 INJECTION, SOLUTION INTRAMUSCULAR; INTRAVENOUS
Status: DISCONTINUED | OUTPATIENT
Start: 2025-07-23 | End: 2025-07-23 | Stop reason: HOSPADM

## 2025-07-23 RX ORDER — ONDANSETRON 2 MG/ML
INJECTION INTRAMUSCULAR; INTRAVENOUS AS NEEDED
Status: DISCONTINUED | OUTPATIENT
Start: 2025-07-23 | End: 2025-07-23 | Stop reason: SURG

## 2025-07-23 RX ORDER — FAMOTIDINE 10 MG/ML
20 INJECTION, SOLUTION INTRAVENOUS ONCE
Status: COMPLETED | OUTPATIENT
Start: 2025-07-23 | End: 2025-07-23

## 2025-07-23 RX ORDER — HYDROCODONE BITARTRATE AND ACETAMINOPHEN 5; 325 MG/1; MG/1
2 TABLET ORAL EVERY 6 HOURS PRN
Qty: 24 TABLET | Refills: 0 | Status: SHIPPED | OUTPATIENT
Start: 2025-07-23

## 2025-07-23 RX ORDER — ONDANSETRON 2 MG/ML
4 INJECTION INTRAMUSCULAR; INTRAVENOUS ONCE AS NEEDED
Status: DISCONTINUED | OUTPATIENT
Start: 2025-07-23 | End: 2025-07-23 | Stop reason: HOSPADM

## 2025-07-23 RX ORDER — FENTANYL CITRATE 50 UG/ML
25 INJECTION, SOLUTION INTRAMUSCULAR; INTRAVENOUS
Status: DISCONTINUED | OUTPATIENT
Start: 2025-07-23 | End: 2025-07-23 | Stop reason: HOSPADM

## 2025-07-23 RX ORDER — PROMETHAZINE HYDROCHLORIDE 25 MG/1
25 SUPPOSITORY RECTAL ONCE AS NEEDED
Status: DISCONTINUED | OUTPATIENT
Start: 2025-07-23 | End: 2025-07-23 | Stop reason: HOSPADM

## 2025-07-23 RX ORDER — PROPOFOL 10 MG/ML
VIAL (ML) INTRAVENOUS AS NEEDED
Status: DISCONTINUED | OUTPATIENT
Start: 2025-07-23 | End: 2025-07-23 | Stop reason: SURG

## 2025-07-23 RX ORDER — HYDROMORPHONE HYDROCHLORIDE 1 MG/ML
0.25 INJECTION, SOLUTION INTRAMUSCULAR; INTRAVENOUS; SUBCUTANEOUS
Status: DISCONTINUED | OUTPATIENT
Start: 2025-07-23 | End: 2025-07-23 | Stop reason: HOSPADM

## 2025-07-23 RX ORDER — DEXAMETHASONE SODIUM PHOSPHATE 4 MG/ML
INJECTION, SOLUTION INTRA-ARTICULAR; INTRALESIONAL; INTRAMUSCULAR; INTRAVENOUS; SOFT TISSUE AS NEEDED
Status: DISCONTINUED | OUTPATIENT
Start: 2025-07-23 | End: 2025-07-23 | Stop reason: SURG

## 2025-07-23 RX ORDER — HYDROCODONE BITARTRATE AND ACETAMINOPHEN 5; 325 MG/1; MG/1
1 TABLET ORAL ONCE AS NEEDED
Status: DISCONTINUED | OUTPATIENT
Start: 2025-07-23 | End: 2025-07-23 | Stop reason: HOSPADM

## 2025-07-23 RX ORDER — SODIUM CHLORIDE 0.9 % (FLUSH) 0.9 %
3 SYRINGE (ML) INJECTION EVERY 12 HOURS SCHEDULED
Status: DISCONTINUED | OUTPATIENT
Start: 2025-07-23 | End: 2025-07-23 | Stop reason: HOSPADM

## 2025-07-23 RX ORDER — IOPAMIDOL 510 MG/ML
150 INJECTION, SOLUTION INTRAVASCULAR
Status: COMPLETED | OUTPATIENT
Start: 2025-07-23 | End: 2025-07-23

## 2025-07-23 RX ORDER — NALOXONE HCL 0.4 MG/ML
0.2 VIAL (ML) INJECTION AS NEEDED
Status: DISCONTINUED | OUTPATIENT
Start: 2025-07-23 | End: 2025-07-23 | Stop reason: HOSPADM

## 2025-07-23 RX ORDER — FLUMAZENIL 0.1 MG/ML
0.2 INJECTION INTRAVENOUS AS NEEDED
Status: DISCONTINUED | OUTPATIENT
Start: 2025-07-23 | End: 2025-07-23 | Stop reason: HOSPADM

## 2025-07-23 RX ADMIN — ONDANSETRON 4 MG: 2 INJECTION, SOLUTION INTRAMUSCULAR; INTRAVENOUS at 13:48

## 2025-07-23 RX ADMIN — EPHEDRINE SULFATE 10 MG: 50 INJECTION INTRAVENOUS at 13:02

## 2025-07-23 RX ADMIN — PHENYLEPHRINE HYDROCHLORIDE 0.3 MCG/KG/MIN: 10 INJECTION, SOLUTION INTRAVENOUS at 13:00

## 2025-07-23 RX ADMIN — FENTANYL CITRATE 50 MCG: 50 INJECTION, SOLUTION INTRAMUSCULAR; INTRAVENOUS at 11:31

## 2025-07-23 RX ADMIN — DEXAMETHASONE SODIUM PHOSPHATE 8 MG: 4 INJECTION, SOLUTION INTRA-ARTICULAR; INTRALESIONAL; INTRAMUSCULAR; INTRAVENOUS; SOFT TISSUE at 12:47

## 2025-07-23 RX ADMIN — PROPOFOL 100 MG: 10 INJECTION, EMULSION INTRAVENOUS at 12:41

## 2025-07-23 RX ADMIN — LIDOCAINE HYDROCHLORIDE 100 MG: 20 INJECTION, SOLUTION INFILTRATION; PERINEURAL at 12:41

## 2025-07-23 RX ADMIN — IOPAMIDOL 5 ML: 510 INJECTION, SOLUTION INTRAVASCULAR at 13:56

## 2025-07-23 RX ADMIN — SODIUM CHLORIDE, POTASSIUM CHLORIDE, SODIUM LACTATE AND CALCIUM CHLORIDE 9 ML/HR: 600; 310; 30; 20 INJECTION, SOLUTION INTRAVENOUS at 11:31

## 2025-07-23 RX ADMIN — ROCURONIUM BROMIDE 50 MG: 10 INJECTION, SOLUTION INTRAVENOUS at 12:41

## 2025-07-23 RX ADMIN — PHENYLEPHRINE HYDROCHLORIDE 100 MCG: 10 INJECTION INTRAVENOUS at 12:53

## 2025-07-23 RX ADMIN — SUGAMMADEX 200 MG: 100 INJECTION, SOLUTION INTRAVENOUS at 13:48

## 2025-07-23 RX ADMIN — CEFAZOLIN 2000 MG: 2 INJECTION, POWDER, FOR SOLUTION INTRAMUSCULAR; INTRAVENOUS at 12:28

## 2025-07-23 RX ADMIN — FAMOTIDINE 20 MG: 10 INJECTION INTRAVENOUS at 11:31

## 2025-07-23 NOTE — OP NOTE
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  Accessed on the right x 2 was in the external iliac at the level of the circumflex iliacs and felt to be too high to place a traditional sheath especially 1 that would allow for intervention.  Despite prolonged attempts, ultimately unable to access the left anterior tibial artery at the foot.    Implants:    Nothing was implanted during the procedure    Specimen: * No orders in the log *    Complications:   None    Closure:  Manual pressure for 15 minutes    Indications:    The patient is an 80 y.o. male seen for evaluation critical limb threatening ischemia.       Procedure:    The patient was taken to the operating room and placed supine on the operating room table. After the induction of IV sedation and general anesthesia, the right groin and left leg were prepped circumferentially. We knew that he had difficult to access common femoral arteries and failed prior attempts in the past. One could see the heavily calcified femoral artery under plain fluoroscopy, and we examined the artery under ultrasound. The femoral head was marked under fluoroscopy as well, and I attempted to access the common femoral artery in the mid femoral head or just above but was unable to visualize this due to dense scar tissue and heavy calcific plaque. I ultimately had to keep  marching up the artery in order to find a point where I could advance the needle into the artery. When I did this, I was able to get blood back and placed a micropuncture sheath. An oblique view at this level showed that my puncture, which was the lowest I felt it could be safely done at, was at the level of the circumflex/iliacs or even slightly higher. At this point I did not feel like I could safely place a working sheath on this side, and so manual pressure was held as we pulled the 4 Welsh micropuncture sheath. While my assistant finished holding pressure in the groin, I went over to the left leg and began to duplex the pedal arteries. Anterior tibial artery was heavily diseased, but I could see it between the veins in the distal ankle. I was not able to detect pulsatile flow on ultrasound and there was only a weak monophasic Doppler signal. I spent 25 to 30 minutes trying to access this vessel under direct vision but ultimately was never able to do so and so I aborted my attempts. Manual pressure was used here for hemostasis but there was no significant bleeding.     PLAN: The patient has tissue loss that is significant and has critical limb-threatening ischemia. 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 Options going forward would be potentially hybrid open procedures, but he is quite frail and I am not sure that these could be tolerated, and perhaps more importantly, that they would be able to be successful enough to provide distal flow for limb salvage.         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      Jayden Marie,  MD     Date: 7/23/2025  Time: 13:57 EDT

## 2025-07-23 NOTE — DISCHARGE INSTRUCTIONS
[facility]  Audie Rae, Nesha, Shakeel Deutsch,Frank, Nuha  [address]  [place] [id number] [phone]      Discharge Instructions for Angiogram    Go home, rest and take it easy today.      You may experience some dizziness or memory loss from the anesthesia.  This may last for the next 24 hours.  Someone should plan on staying with you for the first 24 hours for your safety.    Do not make any important legal decisions or sign any legal papers for the next 24 hours.      Eat and drink lightly today.  Start off with liquids, jello, soup, crackers or other bland foods at first. You may advance your diet tomorrow as tolerated as long as you do not experience any nausea or vomiting.    You may resume routine medications including blood thinners. However, Glucophage should be not be started for 72 hours after the dye is given.      No lifting over 10 pounds and no strenuous activity for the next 2-3 days.    Try not to strain or bear down when your bowels move or when you empty your bladder.    Limit going up and down steps for 2 days.    No driving for the remainder of the day.  You may resume limited driving tomorrow if necessary.      You may shower tomorrow.  No bath or hot tubs for at least 2 days after the procedure.          Leave the pressure dressing on until tomorrow morning.  You may then take this off, as well as the small see through dressing with gauze tomorrow.  If it doesn’t come off easily, do not pull this off.  If it is stuck, shower and let the warm water loosen it before removal.       Check your groin dressing regularly for bleeding through the dressing (under the pressure dressing).  A small amount of blood contained by the gauze is normal; the whole dressing should not be filled with blood or leaking out under the sides.     If you experience bleeding through the gauze/clear sticky dressing, lay flat and have someone apply direct pressure for 15  minutes.  If bleeding has stopped after this, you may put a clean gauze and tape over the area.  Continue to lie flat for 1-2 hours.  If bleeding continues after 15 minutes of pressure, call us at the number listed above.  There is an MD available after hours.      If you experience heavy bleeding or large swelling, continue to hold firm pressure and              call 911.  Do not call the MD on call.      If you experience pain or discomfort you may take Tylenol or Ibuprofen, whichever you normally use for minor discomfort, unless otherwise instructed.      If the MD gives you a prescription for narcotic pain pills (Tylenol #3, Vicodin, Hydrocodone, Oxycodone or Percocet), you cannot drive a vehicle or operate machinery while taking these.     Severe pain is not expected after this procedure.  If you experience severe pain, please call our office at [phone].     Remember to contact our office for any of the following:    Fever > 101 degrees  Severe pain that cannot be controlled by taking your pain pills  Severe nausea or vomiting   Significant bleeding of your incisions  Drainage that has a bad smell or is yellow or green in appearance  Any other questions or concerns

## 2025-07-23 NOTE — ANESTHESIA PROCEDURE NOTES
Airway  Date/Time: 7/23/2025 12:44 PM    General Information and Staff    Patient location during procedure: OR    Indications and Patient Condition  Indications for airway management: airway protection    Mask difficulty assessment: 2 - vent by mask + OA or adjuvant +/- NMBA    Final Airway Details    Final airway type: endotracheal airway      Successful airway: ETT  Cuffed: yes   Successful intubation technique: direct laryngoscopy  Adjuncts used in placement: intubating stylet and cricoid pressure  Endotracheal tube insertion site: oral  Blade: [place]  Blade size: 2  ETT size (mm): 8.0  Cormack-Lehane Classification: grade I - full view of glottis  Placement verified by: chest auscultation and capnometry   Cuff volume (mL): 6  Measured from: teeth  ETT/EBT  to teeth (cm): 22  Number of attempts at approach: 1  Assessment: lips, teeth, and gum same as pre-op and atraumatic intubation

## 2025-07-23 NOTE — ANESTHESIA POSTPROCEDURE EVALUATION
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        Post Anesthesia Care and Evaluation    Pain management: adequate    Airway patency: patent  Anesthetic complications: No anesthetic complications    Cardiovascular status: acceptable  Respiratory status: acceptable  Hydration status: acceptable    Comments: /82 (BP Location: Left arm, Patient Position: Lying)   Pulse 66   Temp 36.7 °C (98.1 °F) (Oral)   Resp 16   SpO2 99%

## 2025-07-23 NOTE — ANESTHESIA PREPROCEDURE EVALUATION
Anesthesia Evaluation     NPO Solid Status: > 8 hours  NPO Liquid Status: > 2 hours           Airway   Mallampati: II  Neck ROM: full  no difficulty expected  Comment: (+)Beard  Dental      Comment: Missing almost all teeth    Pulmonary - normal exam   (+) a smoker Former,sleep apnea  (-) COPD, asthma    PE comment: nonlabored  Cardiovascular - normal exam  Exercise tolerance: poor (<4 METS)    Rhythm: regular  Rate: normal    (+) hypertension, past MI , CAD, dysrhythmias (Sick Sinus Syndrome) Paroxysmal Atrial Fib, PVD, hyperlipidemia,  carotid artery disease right carotid  (-) valvular problems/murmurs, angina    ROS comment: AAA;    Echo 2021:  · Estimated left ventricular EF = 51% Left ventricular systolic function is normal.  · Left ventricular diastolic function is consistent with (grade I) impaired relaxation.  · There is moderate calcification of the aortic valve.  · Mild mitral valve regurgitation is present.         Neuro/Psych  (+) seizures, psychiatric history Anxiety, dementia (Alzheimer's dementia)  (-) TIA, CVA  GI/Hepatic/Renal/Endo    (+) GERD, renal disease (stage 3b CKD)-, diabetes mellitus type 2  (-) liver disease, no thyroid disorder    Musculoskeletal     (+) arthralgias  Abdominal    Substance History      OB/GYN          Other   arthritis, blood dyscrasia (chronic anticoagulation) anemia,                   Anesthesia Plan    ASA 3     general     (Spoke with Dr. Marie, who is ok with forgoing A-line from a surgical standpoint. May still place A-line in OR if pt becomes difficult to monitor or hemodynamically unstable. Discussed with pt and he is agreeable with this plan)  intravenous induction     Anesthetic plan, risks, benefits, and alternatives have been provided, discussed and informed consent has been obtained with: patient.    CODE STATUS:

## 2025-08-01 ENCOUNTER — OFFICE VISIT (OUTPATIENT)
Dept: CARDIOLOGY | Age: 80
End: 2025-08-01
Payer: MEDICARE

## 2025-08-01 VITALS
DIASTOLIC BLOOD PRESSURE: 56 MMHG | HEIGHT: 68 IN | OXYGEN SATURATION: 98 % | BODY MASS INDEX: 25.88 KG/M2 | WEIGHT: 170.8 LBS | HEART RATE: 60 BPM | SYSTOLIC BLOOD PRESSURE: 118 MMHG

## 2025-08-01 DIAGNOSIS — Z79.01 CHRONIC ANTICOAGULATION: ICD-10-CM

## 2025-08-01 DIAGNOSIS — I70.25 ATHEROSCLEROSIS OF NATIVE ARTERIES OF THE EXTREMITIES WITH ULCERATION: ICD-10-CM

## 2025-08-01 DIAGNOSIS — I71.40 ABDOMINAL AORTIC ANEURYSM (AAA) 3.0 CM TO 5.5 CM IN DIAMETER IN MALE: ICD-10-CM

## 2025-08-01 DIAGNOSIS — I25.10 CORONARY ARTERY DISEASE INVOLVING NATIVE CORONARY ARTERY OF NATIVE HEART WITHOUT ANGINA PECTORIS: Chronic | ICD-10-CM

## 2025-08-01 DIAGNOSIS — I48.0 PAROXYSMAL ATRIAL FIBRILLATION: Primary | ICD-10-CM

## 2025-08-01 NOTE — PROGRESS NOTES
RM:________     PCP: Blaze Mercedes, MD                                     Last EKG :  2025    : 1945  AGE: 80 y.o.    REASON FOR VISIT: __________________________________________    ____________________________________________________________         Chest Pain:_______   Palpitations:__________     Shortness of breathe:________  Lightheadedness/Dizziness:___________     Syncope:______ Edema:______ Fatigue:________                                                    WT: ____________ HT: ______ BP: __________ HR ______   02% _______          Lipid Panel:  Lipid Panel          2024    12:16 10/29/2024    13:31   Lipid Panel   Total Cholesterol 191     Total Cholesterol  113       Triglycerides 166     HDL Cholesterol 36  30       VLDL Cholesterol 30     LDL Cholesterol  125  62.8       LDL/HDL Ratio 3.38        Details          This result is from an external source.                 Recent Hospitalization/Testing:__________________________________    _____________________________________________________________      Pharmacy/Mail Order Pharmacy:_____________________________________

## 2025-08-01 NOTE — PROGRESS NOTES
Subjective:     Encounter Date:  08/01/25        Patient ID: Erick Bruno is a 80 y.o. male.    Chief Complaint: abnormal stress test  Coronary Artery Disease        Dear Dr. Mercedes,    I had the pleasure of seeing this patient in the office today for follow-up of his cardiac status.  He has a history of CAD, significant peripheral arterial disease, and paroxysmal atrial fibrillation.    Just hospitalized for continued treatment of his PAD.    Fortunately no cardiac complaints.  No chest pain or chest discomfort.  Denies shortness of breath.  No dizziness    He is been seen in the past for chest discomfort.  In 2017 he had a stress test performed that showed a small area of hypokinesis in the inferior wall with an ejection fraction of 48% and no ischemia.  At the time he elected not to proceed with any additional diagnostic testing.  He's been treated with medical therapy.    In December 2020, he was seen for routine follow-up and perioperative cardiac risk assessment.  Nuclear stress test was completed and showed small to medium size, mild to moderately severe area of ischemia in the inferior wall and LVEF of 54%. Compared to the study in May 2017 there was significant change with new ischemia noted.    It was felt that it was low risk to proceed with the really necessary carotid endarterectomy, and he went through that surgical procedure without any cardiac issues.     In February 2021, he presented to [facility] ED with complaints of redness and tender right foot following a fall.  He was diagnosed with right foot cellulitis and admitted and treated with antibiotic therapy.  Blood cultures were negative.  During his hospitalization he developed new onset atrial fibrillation and cardiology was consulted.  Echocardiogram showed normal LVEF grade 1 diastolic dysfunction, moderate calcification of the aortic valve, and mild mitral regurgitation. CT of the abdomen and pelvis showed penetrating ulcer  formation and infrarenal abdominal aortic aneurysm, moderate stenosis of the left renal artery, severe stenosis of the right common iliac artery, mild stenosis of the right internal iliac artery at the origin, considerable stenosis of the right deep femoral artery, and 2 wall stents in the left SFA.  Carotid duplex showed right carotid stent with no stenosis and left carotid moderate stenosis.  Cardiology and vascular surgery agreed on stopping the clopidogrel and to continue with aspirin and apixaban for stroke prevention.      Patient has a history of peripheral arterial disease, including bilateral lower extremity disease as well as bilateral carotid artery disease.  He has occlusion of the left vertebral artery.  He has a stent placed in his right carotid.  He has abdominal aortic aneurysm followed by vascular.    The following portions of the patient's history were reviewed and updated as appropriate: allergies, current medications, past family history, past medical history, past social history, past surgical history and problem list.    Past Medical History:   Diagnosis Date    Abnormal nuclear stress test 05/21/2018    Acid reflux     Alzheimer's dementia     Anemia     Anesthesia complication     PT'S WIFE STATES PT CRYING AND EMOTIONAL AFTER SURGERY IN THE PAST    Anxiety     Arthritis     Atrial fibrillation     BPH (benign prostatic hyperplasia)     CAD (coronary artery disease)     Carotid artery disease     Chronic anticoagulation     Chronic kidney disease     Stage 3    Dementia     Diabetes mellitus     Elevated cholesterol     Enlarged prostate     Fracture 2023    Foot    Frequent urination at night     Gangrene     LEFT TOES  WIFE CHANGES  DRESSINGS DAILY  CLEANS WITH N/S AND APPLIES BETADINE  AND THEN WRAPS    History of seizure 2013    S/P TEMPERATURE    Hyperlipidemia     Hypertension     Leg pain     BILAT-D/T PVD    Macular degeneration     Mood disorder     New onset atrial fibrillation  02/2021    NSTEMI (non-ST elevated myocardial infarction)     PAD (peripheral artery disease)     Panarteritis     Peripheral arterial disease     Poor balance     HIGH RISK FOR FALLS    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    PVD (peripheral vascular disease)     Renal disorder     Sleep apnea     DOES NOT WEAR CPAP    SSS (sick sinus syndrome)        Past Surgical History:   Procedure Laterality Date    APPENDECTOMY      ARTERIOGRAM LOWER EXTREMITY WITH ANGIOPLASTY STENT Right 7/23/2025    Procedure: Right leg angiogram;  Surgeon: Jayden Marie MD;  Location: [facility] HYBRID OR;  Service: Vascular;  Laterality: Right;    ATHRECTOMY ILIAC, FEMORAL, TIBIAL ARTERY N/A 10/17/2018    Procedure: AIF ARTERIOGRAM, LEFT SFA  ANGIOPLASTY;  Surgeon: Jayden Marie MD;  Location: [facility] MAIN OR;  Service: Vascular    ATHRECTOMY ILIAC, FEMORAL, TIBIAL ARTERY Bilateral 4/20/2022    Procedure: AIF LEFT LEG ANGIOGRAM;  Surgeon: Jayden Marie MD;  Location: [facility] OR 18/19;  Service: Vascular;  Laterality: Bilateral;    CATARACT EXTRACTION WITH INTRAOCULAR LENS IMPLANT Bilateral     CHOLECYSTECTOMY WITH INTRAOPERATIVE CHOLANGIOGRAM N/A 05/09/2018    Procedure: CHOLECYSTECTOMY LAPAROSCOPIC INTRAOPERATIVE CHOLANGIOGRAM;  Surgeon: Daniel Gonzalez MD;  Location: [facility] MAIN OR;  Service: General    COLONOSCOPY      COLONOSCOPY N/A 05/08/2018    Procedure: COLONOSCOPY to cecum with polypectomies;  Surgeon: Daniel Gonzalez MD;  Location: [facility] ENDOSCOPY;  Service: Gastroenterology    ENDOSCOPY N/A 05/08/2018    Procedure: ESOPHAGOGASTRODUODENOSCOPY with biopsies;  Surgeon: Daniel Gonzalez MD;  Location: [facility] ENDOSCOPY;  Service: Gastroenterology    FEMORAL ARTERY STENT Left     on 3/22/16           Procedures       Objective:     Vitals:    08/01/25 1052   BP: 118/56   BP Location: Left arm   Patient Position: Sitting   Cuff Size: Adult   Pulse: 60   SpO2: 98%   Weight: 77.5 kg (170 lb 12.8 oz)  "  Height: 172.7 cm (68\")       General Appearance:    Alert, cooperative, in no acute distress   Head:    Normocephalic, without obvious abnormality, atraumatic   Eyes:            Lids and lashes normal, conjunctivae and sclerae normal, no   icterus, no pallor, corneas clear, PERRLA   Ears:    Ears appear intact with no abnormalities noted   Throat:   No oral lesions, no thrush, oral mucosa moist   Neck:   No adenopathy, supple, trachea midline, no thyromegaly, no   carotid bruit, no JVD   Back:     No kyphosis present, no scoliosis present, no skin lesions, erythema or scars, no tenderness to percussion or palpation, range of motion normal   Lungs:     Clear to auscultation,respirations regular, even and unlabored    Heart:    Regular rhythm and normal rate, normal S1 and S2, no murmur, no gallop, no rub, no click   Chest Wall:    No abnormalities observed   Abdomen:     Normal bowel sounds, no masses, no organomegaly, soft        non-tender, non-distended, no guarding, no rebound  tenderness   Extremities:   Moves all extremities well, no edema, no cyanosis, no redness   Pulses:  Diminished peripheral pulses both lower extremities, bilateral carotid bruits   Skin:  Psychiatric:   No bleeding, bruising or rash    Alert and oriented x 3, normal mood and affect           Lab Review:               Results for orders placed during the hospital encounter of 02/26/21    Adult Transthoracic Echo Complete W/ Cont if Necessary Per Protocol 03/01/2021 12:29 PM    Interpretation Summary  · Estimated left ventricular EF = 51% Left ventricular systolic function is normal.  · Left ventricular diastolic function is consistent with (grade I) impaired relaxation.  · There is moderate calcification of the aortic valve.  · Mild mitral valve regurgitation is present.    Lab Results   Component Value Date    GLUCOSE 129 (H) 07/16/2025    BUN 38.0 (H) 07/16/2025    CREATININE 1.75 (H) 07/16/2025    EGFRIFNONA 54 (L) 02/07/2022    " EGFRIFAFRI 63 02/07/2022    BCR 21.7 07/16/2025    K 4.7 07/16/2025    CO2 19.8 (L) 07/16/2025    CALCIUM 9.3 07/16/2025    ALBUMIN 4.0 09/20/2024    ALBUMIN 3.8 09/20/2024    AST 18 09/20/2024    ALT 16 09/20/2024             Assessment:          Diagnosis Plan   1. Paroxysmal atrial fibrillation        2. Coronary artery disease involving native coronary artery of native heart without angina pectoris        3. Atherosclerosis of native arteries of the extremities with ulceration        4. Abdominal aortic aneurysm (AAA) 3.0 cm to 5.5 cm in diameter in male        5. Chronic anticoagulation                     Plan:       1. Coronary Artery Disease  Assessment   The patient has no angina    Plan   Lifestyle modifications discussed include adhering to a heart healthy diet, avoidance of tobacco products, maintenance of a healthy weight, medication compliance, regular exercise and regular monitoring of cholesterol and blood pressure    Subjective - Objective   There is a history of past MI    Current antiplatelet therapy includes aspirin 81 mg    2.  Peripheral vascular disease, with both disease in bilateral lower extremities as well as high-grade carotid artery disease, abdominal aortic aneurysm, prior right coronary artery stent, occluded left vertebral, followed by vascular  3.  Diabetes mellitus with circulatory complication, continue risk factor modification  4.  Paroxysmal atrial fibrillation, remains in sinus rhythm, continue apixaban  5.  Mixed hyperlipidemia, LDL reviewed, continue lipid-lowering therapy  6.  Syncope and collapse in the past with orthostatic symptoms, at that point we stopped labetalol, stopped isosorbide, switched him over to the Nebivolol and since then he has done fine with complete resolution of his orthostatic symptoms.    Thank you very much for allowing us to participate in the care of this pleasant patient.  Please don't hesitate to call if I can be of assistance in any  way.      Current Outpatient Medications:     apixaban (Eliquis) 5 MG tablet tablet, Take 1 tablet by mouth Every 12 (Twelve) Hours. (Patient taking differently: Take 1 tablet by mouth Every 12 (Twelve) Hours. Once daily), Disp: , Rfl:     aspirin 81 MG EC tablet, Take 1 tablet by mouth Daily. OK TO CONTINUE TO TAKE LEADING UP TO SURGERY PER DR. MULLER, Disp: , Rfl:     atorvastatin (LIPITOR) 80 MG tablet, Take 1 tablet by mouth once daily, Disp: 90 tablet, Rfl: 0    busPIRone (BUSPAR) 5 MG tablet, Take 1 tablet by mouth twice daily, Disp: 180 tablet, Rfl: 0    coenzyme Q10 100 MG capsule, Take 1-2 capsules by mouth Every Night., Disp: , Rfl:     escitalopram (LEXAPRO) 10 MG tablet, Take 1 tablet by mouth Every Night., Disp: , Rfl:     ezetimibe (ZETIA) 10 MG tablet, Take 1 tablet by mouth Every Night., Disp: , Rfl:     finasteride (PROSCAR) 5 MG tablet, Take 1 tablet by mouth Every Night., Disp: , Rfl:     glimepiride (AMARYL) 4 MG tablet, Take 1 tablet by mouth Every Morning Before Breakfast., Disp: 90 tablet, Rfl: 1    HYDROcodone-acetaminophen (NORCO) 5-325 MG per tablet, Take 2 tablets by mouth Every 6 (Six) Hours As Needed for Moderate Pain., Disp: 24 tablet, Rfl: 0    MELATONIN PO, Take 1-2 tablets by mouth Every Night., Disp: , Rfl:     multivitamin with minerals tablet tablet, Take 1 tablet by mouth Daily. HOLD, Disp: , Rfl:     nebivolol (BYSTOLIC) 5 MG tablet, Take 1 tablet by mouth Daily., Disp: 90 tablet, Rfl: 3    nitroglycerin (NITROSTAT) 0.4 MG SL tablet, DISSOLVE ONE TABLET UNDER THE TONGUE EVERY 5 MINUTES AS NEEDED FOR CHEST PAIN.  DO NOT EXCEED A TOTAL OF 3 DOSES IN 15 MINUTES (Patient taking differently: Place 1 tablet under the tongue Every 5 (Five) Minutes As Needed for Chest Pain.), Disp: 5 tablet, Rfl: 0    omeprazole (priLOSEC) 20 MG capsule, Take 1 capsule by mouth Daily., Disp: 90 capsule, Rfl: 1    Tirzepatide (Mounjaro) 2.5 MG/0.5ML solution auto-injector, Inject 2.5 mg under the skin  into the appropriate area as directed 1 (One) Time Per Week. PT TOOK DOSE ON SUNDAY AND THEN SAW MD ON MONDAY AND HIS SURGERY WAS SCHEDULED FOR SURGERY AFTER HE TOOK HIS DOSE, Disp: , Rfl:     Accu-Chek Guide Test test strip, 1 each by Other route Daily. (Patient not taking: Reported on 8/1/2025), Disp: , Rfl:     Accu-Chek Softclix Lancets lancets, Daily. (Patient not taking: Reported on 8/1/2025), Disp: , Rfl:     saw palmetto 160 MG capsule, Take 1 capsule by mouth Every Night. (Patient not taking: Reported on 8/1/2025), Disp: , Rfl:

## 2025-08-04 ENCOUNTER — OFFICE VISIT (OUTPATIENT)
Age: 80
End: 2025-08-04
Payer: MEDICARE

## 2025-08-04 VITALS
SYSTOLIC BLOOD PRESSURE: 186 MMHG | DIASTOLIC BLOOD PRESSURE: 74 MMHG | TEMPERATURE: 97.7 F | BODY MASS INDEX: 25.89 KG/M2 | HEART RATE: 54 BPM | WEIGHT: 170.86 LBS | HEIGHT: 68 IN

## 2025-08-04 DIAGNOSIS — I70.25 ATHEROSCLEROSIS OF NATIVE ARTERIES OF THE EXTREMITIES WITH ULCERATION: Primary | ICD-10-CM

## 2025-08-04 DIAGNOSIS — N18.30 CKD STAGE 3 SECONDARY TO DIABETES: ICD-10-CM

## 2025-08-04 DIAGNOSIS — E11.22 CKD STAGE 3 SECONDARY TO DIABETES: ICD-10-CM

## 2025-08-11 ENCOUNTER — TELEPHONE (OUTPATIENT)
Age: 80
End: 2025-08-11
Payer: MEDICARE

## 2025-08-28 ENCOUNTER — TELEPHONE (OUTPATIENT)
Age: 80
End: 2025-08-28
Payer: MEDICARE

## 2025-08-28 PROBLEM — I96 GANGRENE OF TOE OF LEFT FOOT: Status: ACTIVE | Noted: 2025-08-28

## (undated) DEVICE — DESTINATION RENAL GUIDING SHEATH: Brand: DESTINATION

## (undated) DEVICE — LOU LAP CHOLE: Brand: MEDLINE INDUSTRIES, INC.

## (undated) DEVICE — TOTAL TRAY, 16FR 10ML SIL FOLEY, URN: Brand: MEDLINE

## (undated) DEVICE — SYR LUERLOK 30CC

## (undated) DEVICE — RADIFOCUS GLIDECATH: Brand: GLIDECATH

## (undated) DEVICE — Device

## (undated) DEVICE — ST ACC MICROPUNCTURE STFF .018 ECHO/PLDM/TP 4F/10CM 21G/7CM

## (undated) DEVICE — GLIDESHEATH SLENDER NITINOL HYDROPHILIC COATED INTRODUCER SHEATH: Brand: GLIDESHEATH SLENDER

## (undated) DEVICE — CATH GUIDE SOFTVU FLUSH HT STR .035 5F 65CM

## (undated) DEVICE — CATH TEMPO 5F BER II 65CM: Brand: TEMPO

## (undated) DEVICE — GLV SURG BIOGEL LTX PF 7

## (undated) DEVICE — SYRINGE KIT,PACKAGED,,150FT,MK 7(ANGIO-ARTERION, 150ML SYR KIT W/QFT,MC)(60729385): Brand: MEDRAD® MARK 7 ARTERION DISPOSABLE SYRINGE 150 ML WITH QUICK FILL TUBE

## (undated) DEVICE — DRAPE,REIN 53X77,STERILE: Brand: MEDLINE

## (undated) DEVICE — 1LYRTR 16FR10ML100%SIL UMS SNP: Brand: MEDLINE INDUSTRIES, INC.

## (undated) DEVICE — PK ENDART CARTOID 40

## (undated) DEVICE — SYR LUERLOK 20CC BX/50

## (undated) DEVICE — 3M™ STERI-STRIP™ COMPOUND BENZOIN TINCTURE 40 BAGS/CARTON 4 CARTONS/CASE C1544: Brand: 3M™ STERI-STRIP™

## (undated) DEVICE — SYR LL 3CC

## (undated) DEVICE — RADIFOCUS GLIDEWIRE ADVANTAGE GUIDEWIRE: Brand: GLIDEWIRE ADVANTAGE

## (undated) DEVICE — STPCK 3WY HP ROT

## (undated) DEVICE — RADIFOCUS GLIDEWIRE: Brand: GLIDEWIRE

## (undated) DEVICE — SOL NACL 0.9PCT 1000ML

## (undated) DEVICE — TRAP FLD MINIVAC MEGADYNE 100ML

## (undated) DEVICE — TP UMB COTN RO 1/8X24IN U16G

## (undated) DEVICE — SUT PROLN 6/0 BV1 D/A 30IN 8709H

## (undated) DEVICE — NAVICROSS SUPPORT CATHETER: Brand: NAVICROSS

## (undated) DEVICE — PINNACLE R/O II INTRODUCER SHEATH WITH RADIOPAQUE MARKER: Brand: PINNACLE

## (undated) DEVICE — TBG 02 CRUSH RESIST LF CLR 7FT

## (undated) DEVICE — KT INTRO MIC TROC 4F 21GA 7CM

## (undated) DEVICE — SYR LUERLOK 5CC

## (undated) DEVICE — ANTIBACTERIAL UNDYED BRAIDED (POLYGLACTIN 910), SYNTHETIC ABSORBABLE SUTURE: Brand: COATED VICRYL

## (undated) DEVICE — SYR LL TP 10ML STRL

## (undated) DEVICE — 200 ML FASTURN SYRINGE WITH QUICK FILL TUBE(ANGIO-SET,PKG,200ML FASTURN SYR W/QFT,MC)(60729695): Brand: MEDRAD® MARK V PROVIS 200ML FASTURN STERILE DISPOSABLE SYRINGE & QFT

## (undated) DEVICE — CVR PROB 96IN LF STRL

## (undated) DEVICE — CATH GUIDE SOFTVU FLUSH HT PIG .035 5F 65CM

## (undated) DEVICE — ENDOPATH XCEL BLADELESS TROCARS WITH STABILITY SLEEVES: Brand: ENDOPATH XCEL

## (undated) DEVICE — EXTENSION SET, MALE LUER LOCK ADAPTER WITH RETRACTABLE COLLAR

## (undated) DEVICE — CATH SELECTIVE RIM 5FX65CM .038

## (undated) DEVICE — EQUIPMENT COVER BAG TYPE 48” X 36” (122CM X 91CM): Brand: EQUIPMENT COVER BAG TYPE

## (undated) DEVICE — APPL CHLORAPREP W/ALC 26ML CLR

## (undated) DEVICE — TOWEL,OR,DSP,ST,BLUE,STD,4/PK,20PK/CS: Brand: MEDLINE

## (undated) DEVICE — Device: Brand: D-STAT® DRY SILVER CLEAR TOPICAL HEMOSTAT

## (undated) DEVICE — SUT SILK 3/0 SH CR5 18IN C0135

## (undated) DEVICE — RADIFOCUS TORQUE DEVICE MULTI-TORQUE VISE: Brand: RADIFOCUS TORQUE DEVICE

## (undated) DEVICE — SUT SILK 3/0 TIES 18IN A184H

## (undated) DEVICE — DRSNG SURESITE WNDW 4X4.5

## (undated) DEVICE — GOWN,NON-REINFORCED,SIRUS,SET IN SLV,XL: Brand: MEDLINE

## (undated) DEVICE — 3M™ STERI-STRIP™ REINFORCED ADHESIVE SKIN CLOSURES, R1547, 1/2 IN X 4 IN (12 MM X 100 MM), 6 STRIPS/ENVELOPE: Brand: 3M™ STERI-STRIP™

## (undated) DEVICE — ST ACC MICROPUNCTURE STFF .018 ECHO/PLAT/TP 4F/10CM 21G/7CM

## (undated) DEVICE — CANN NASL CO2 TRULINK W/O2 A/

## (undated) DEVICE — ARMADA 35 LL PTA CATHETER 4 MM X 250 MM X 135 CM / OVER-THE-WIRE: Brand: ARMADA

## (undated) DEVICE — GW ENROUTE HC .014IN 95CM

## (undated) DEVICE — PK ANGIO 40

## (undated) DEVICE — GOWN,SIRUS,NONRNF,SETINSLV,XL,20/CS: Brand: MEDLINE

## (undated) DEVICE — CATH ANGIO TRCN NB BCN .038 5F 65CM RIM

## (undated) DEVICE — TUBING, SUCTION, 1/4" X 10', STRAIGHT: Brand: MEDLINE

## (undated) DEVICE — ANGLED-TIP ARTERIAL SHEATH CONFIGURATION: Brand: ENROUTE TRANSCAROTID NEUROPROTECTION SYSTEM

## (undated) DEVICE — APPL CHLORAPREP W/TINT 26ML ORNG

## (undated) DEVICE — INFLATION DEVICE: Brand: ENCORE™ 26

## (undated) DEVICE — PENCL E/S ULTRAVAC TELESCP NOSE HOLSTR 10FT

## (undated) DEVICE — GW TORQFLX SS .018IN 40CM

## (undated) DEVICE — 3M™ STERI-DRAPE™ INSTRUMENT POUCH 1018: Brand: STERI-DRAPE™

## (undated) DEVICE — SINGLE-USE BIOPSY FORCEPS: Brand: RADIAL JAW 4

## (undated) DEVICE — NDL HYPO PRECISIONGLIDE REG 25G 1 1/2

## (undated) DEVICE — 3M™ IOBAN™ 2 ANTIMICROBIAL INCISE DRAPE 6640EZ: Brand: IOBAN™ 2

## (undated) DEVICE — PINNACLE INTRODUCER SHEATH: Brand: PINNACLE

## (undated) DEVICE — THE TORRENT IRRIGATION SCOPE CONNECTOR IS USED WITH THE TORRENT IRRIGATION TUBING TO PROVIDE IRRIGATION FLUIDS SUCH AS STERILE WATER DURING GASTROINTESTINAL ENDOSCOPIC PROCEDURES WHEN USED IN CONJUNCTION WITH AN IRRIGATION PUMP (OR ELECTROSURGICAL UNIT).: Brand: TORRENT

## (undated) DEVICE — CATH GUIDE SOFTVU FLUSH HT PIG .038 5F 65CM

## (undated) DEVICE — SUT SILK 4/0 TIES 18IN A183H

## (undated) DEVICE — SPNG GZ WOVN 4X4IN 12PLY 10/BX STRL

## (undated) DEVICE — PINNACLE R/O II HIFLO INTRODUCER SHEATH WITH RADIOPAQUE MARKER: Brand: PINNACLE

## (undated) DEVICE — BALN ADMIRAL INPACT 5F 4X150MM 130CM

## (undated) DEVICE — ADHS SKIN DERMABOND TOP ADVANCED

## (undated) DEVICE — SUT PROLN 5/0 RB1 D/A 36IN 8556H

## (undated) DEVICE — SUT SILK 2/0 TIES 18IN A185H

## (undated) DEVICE — DRSNG WND BORDR/ADHS NONADHR/GZ LF 2X2IN STRL

## (undated) DEVICE — MYNXGRIP 5F: Brand: MYNXGRIP

## (undated) DEVICE — Device: Brand: DEFENDO AIR/WATER/SUCTION AND BIOPSY VALVE

## (undated) DEVICE — BITEBLOCK OMNI BLOC

## (undated) DEVICE — AVIATOR PLUS .014 6.0X30 142CM: Brand: AVIATOR

## (undated) DEVICE — SUT VIC 4/0 SH 27IN J415H